# Patient Record
Sex: MALE | Race: BLACK OR AFRICAN AMERICAN | NOT HISPANIC OR LATINO | Employment: UNEMPLOYED | URBAN - METROPOLITAN AREA
[De-identification: names, ages, dates, MRNs, and addresses within clinical notes are randomized per-mention and may not be internally consistent; named-entity substitution may affect disease eponyms.]

---

## 2017-09-27 ENCOUNTER — OFFICE VISIT - HEALTHEAST (OUTPATIENT)
Dept: FAMILY MEDICINE | Facility: CLINIC | Age: 22
End: 2017-09-27

## 2017-09-27 ENCOUNTER — AMBULATORY - HEALTHEAST (OUTPATIENT)
Dept: FAMILY MEDICINE | Facility: CLINIC | Age: 22
End: 2017-09-27

## 2017-09-27 DIAGNOSIS — Z00.00 ROUTINE GENERAL MEDICAL EXAMINATION AT A HEALTH CARE FACILITY: ICD-10-CM

## 2017-09-27 DIAGNOSIS — D64.9 ANEMIA: ICD-10-CM

## 2017-09-27 LAB
CHOLEST SERPL-MCNC: 149 MG/DL
FASTING STATUS PATIENT QL REPORTED: YES
HDLC SERPL-MCNC: 39 MG/DL
LDLC SERPL CALC-MCNC: 87 MG/DL
TRIGL SERPL-MCNC: 113 MG/DL

## 2017-09-27 ASSESSMENT — MIFFLIN-ST. JEOR: SCORE: 1727.78

## 2017-12-05 ENCOUNTER — OFFICE VISIT - HEALTHEAST (OUTPATIENT)
Dept: FAMILY MEDICINE | Facility: CLINIC | Age: 22
End: 2017-12-05

## 2017-12-05 DIAGNOSIS — Z71.85 IMMUNIZATION COUNSELING: ICD-10-CM

## 2017-12-05 DIAGNOSIS — F32.A DEPRESSION: ICD-10-CM

## 2017-12-05 ASSESSMENT — MIFFLIN-ST. JEOR: SCORE: 1732.31

## 2017-12-19 ENCOUNTER — OFFICE VISIT - HEALTHEAST (OUTPATIENT)
Dept: BEHAVIORAL HEALTH | Facility: CLINIC | Age: 22
End: 2017-12-19

## 2017-12-19 DIAGNOSIS — F33.1 MODERATE EPISODE OF RECURRENT MAJOR DEPRESSIVE DISORDER (H): ICD-10-CM

## 2017-12-29 ENCOUNTER — COMMUNICATION - HEALTHEAST (OUTPATIENT)
Dept: FAMILY MEDICINE | Facility: CLINIC | Age: 22
End: 2017-12-29

## 2018-01-04 ENCOUNTER — OFFICE VISIT - HEALTHEAST (OUTPATIENT)
Dept: BEHAVIORAL HEALTH | Facility: CLINIC | Age: 23
End: 2018-01-04

## 2018-01-04 DIAGNOSIS — F33.1 MODERATE EPISODE OF RECURRENT MAJOR DEPRESSIVE DISORDER (H): ICD-10-CM

## 2018-01-18 ENCOUNTER — OFFICE VISIT - HEALTHEAST (OUTPATIENT)
Dept: BEHAVIORAL HEALTH | Facility: CLINIC | Age: 23
End: 2018-01-18

## 2018-01-18 DIAGNOSIS — F33.1 MODERATE EPISODE OF RECURRENT MAJOR DEPRESSIVE DISORDER (H): ICD-10-CM

## 2018-02-20 ENCOUNTER — OFFICE VISIT - HEALTHEAST (OUTPATIENT)
Dept: BEHAVIORAL HEALTH | Facility: CLINIC | Age: 23
End: 2018-02-20

## 2018-02-20 ENCOUNTER — AMBULATORY - HEALTHEAST (OUTPATIENT)
Dept: BEHAVIORAL HEALTH | Facility: CLINIC | Age: 23
End: 2018-02-20

## 2018-02-20 DIAGNOSIS — F33.1 MODERATE EPISODE OF RECURRENT MAJOR DEPRESSIVE DISORDER (H): ICD-10-CM

## 2018-03-01 ENCOUNTER — OFFICE VISIT - HEALTHEAST (OUTPATIENT)
Dept: BEHAVIORAL HEALTH | Facility: CLINIC | Age: 23
End: 2018-03-01

## 2018-03-01 DIAGNOSIS — F32.9 MDD (MAJOR DEPRESSIVE DISORDER): ICD-10-CM

## 2018-03-01 ASSESSMENT — MIFFLIN-ST. JEOR: SCORE: 1760.1

## 2018-03-14 ENCOUNTER — OFFICE VISIT - HEALTHEAST (OUTPATIENT)
Dept: BEHAVIORAL HEALTH | Facility: CLINIC | Age: 23
End: 2018-03-14

## 2018-03-14 DIAGNOSIS — F33.1 MODERATE EPISODE OF RECURRENT MAJOR DEPRESSIVE DISORDER (H): ICD-10-CM

## 2018-04-04 ENCOUNTER — OFFICE VISIT - HEALTHEAST (OUTPATIENT)
Dept: BEHAVIORAL HEALTH | Facility: CLINIC | Age: 23
End: 2018-04-04

## 2018-04-04 DIAGNOSIS — F33.1 MODERATE EPISODE OF RECURRENT MAJOR DEPRESSIVE DISORDER (H): ICD-10-CM

## 2018-04-18 ENCOUNTER — OFFICE VISIT - HEALTHEAST (OUTPATIENT)
Dept: BEHAVIORAL HEALTH | Facility: CLINIC | Age: 23
End: 2018-04-18

## 2018-04-18 DIAGNOSIS — F33.1 MODERATE EPISODE OF RECURRENT MAJOR DEPRESSIVE DISORDER (H): ICD-10-CM

## 2018-05-01 ENCOUNTER — COMMUNICATION - HEALTHEAST (OUTPATIENT)
Dept: BEHAVIORAL HEALTH | Facility: CLINIC | Age: 23
End: 2018-05-01

## 2018-05-01 DIAGNOSIS — F32.9 MDD (MAJOR DEPRESSIVE DISORDER): ICD-10-CM

## 2018-05-02 ENCOUNTER — OFFICE VISIT - HEALTHEAST (OUTPATIENT)
Dept: BEHAVIORAL HEALTH | Facility: CLINIC | Age: 23
End: 2018-05-02

## 2018-05-02 DIAGNOSIS — F33.1 MODERATE EPISODE OF RECURRENT MAJOR DEPRESSIVE DISORDER (H): ICD-10-CM

## 2018-05-23 ENCOUNTER — OFFICE VISIT - HEALTHEAST (OUTPATIENT)
Dept: BEHAVIORAL HEALTH | Facility: CLINIC | Age: 23
End: 2018-05-23

## 2018-05-23 DIAGNOSIS — F32.9 MDD (MAJOR DEPRESSIVE DISORDER): ICD-10-CM

## 2018-05-23 ASSESSMENT — MIFFLIN-ST. JEOR: SCORE: 1751.03

## 2018-06-13 ENCOUNTER — OFFICE VISIT - HEALTHEAST (OUTPATIENT)
Dept: BEHAVIORAL HEALTH | Facility: CLINIC | Age: 23
End: 2018-06-13

## 2018-06-13 ENCOUNTER — COMMUNICATION - HEALTHEAST (OUTPATIENT)
Dept: TELEHEALTH | Facility: CLINIC | Age: 23
End: 2018-06-13

## 2018-06-13 DIAGNOSIS — F33.1 MODERATE EPISODE OF RECURRENT MAJOR DEPRESSIVE DISORDER (H): ICD-10-CM

## 2018-06-16 ENCOUNTER — OFFICE VISIT (OUTPATIENT)
Dept: URGENT CARE | Facility: URGENT CARE | Age: 23
End: 2018-06-16
Payer: COMMERCIAL

## 2018-06-16 VITALS
BODY MASS INDEX: 25.91 KG/M2 | SYSTOLIC BLOOD PRESSURE: 133 MMHG | HEART RATE: 79 BPM | TEMPERATURE: 98.1 F | OXYGEN SATURATION: 99 % | HEIGHT: 68 IN | WEIGHT: 171 LBS | DIASTOLIC BLOOD PRESSURE: 81 MMHG

## 2018-06-16 DIAGNOSIS — G43.009 NONINTRACTABLE MIGRAINE, UNSPECIFIED MIGRAINE TYPE: Primary | ICD-10-CM

## 2018-06-16 PROCEDURE — 96372 THER/PROPH/DIAG INJ SC/IM: CPT | Performed by: FAMILY MEDICINE

## 2018-06-16 PROCEDURE — 99203 OFFICE O/P NEW LOW 30 MIN: CPT | Mod: 25 | Performed by: FAMILY MEDICINE

## 2018-06-16 RX ORDER — KETOROLAC TROMETHAMINE 30 MG/ML
60 INJECTION, SOLUTION INTRAMUSCULAR; INTRAVENOUS ONCE
Qty: 2 ML | Refills: 0 | OUTPATIENT
Start: 2018-06-16 | End: 2018-06-16

## 2018-06-16 RX ORDER — SERTRALINE HYDROCHLORIDE 25 MG/1
25 TABLET, FILM COATED ORAL
COMMUNITY
Start: 2018-05-23 | End: 2021-07-26

## 2018-06-16 NOTE — PROGRESS NOTES
"SUBJECTIVE:  Roland Sandoval is a 23 year old male who comes in for evaluation of headache.  Headache began this morning (over 10 hours ago) and is sudden onset, still present and worsening  DESCRIPTION OF HEADACHE:   Location of pain: frontal and retro-orbital   Radiation of pain?: NO   Character of pain:throbbing   Severity of pain: moderate   Accompanying symptoms: photophobia   Prodromal sx?: none   Rapidity of onset: abrupt     History of Migranes: Yes   Are most headaches similar in presentation? YES    TYPICAL PRECIPITANTS OF HEADACHE: stress, change in sleep pattern and change in eating pattern    CURRENT USE OF MEDS TO TREAT HA:   Abortive meds? none    Daily use? NO   Prophylactic meds? none    Patient has had migraine headaches since he was a teenager.  Migraine headaches are infrequent, only had 5 bad migraines since it started.  Usually resolves with rest and sleep.  This one has persisted, states that has not been taking care of self over the past couple of days, was out with friends and drinking.  Endorsed photophobia and phonophobia, denies any nausea.  Denies any recent URI symptoms, no fever.    Family history - HTN, Anxiety    PCP - [facility]    No past medical history on file.  Current Outpatient Prescriptions   Medication Sig Dispense Refill     sertraline (ZOLOFT) 25 MG tablet Take 25 mg by mouth       Social History   Substance Use Topics     Smoking status: Never Smoker     Smokeless tobacco: Never Used     Alcohol use Not on file       ROS:   Review of systems negative except as stated above.    OBJECTIVE:  /81  Pulse 79  Temp 98.1  F (36.7  C) (Tympanic)  Ht 5' 8\" (1.727 m)  Wt 171 lb (77.6 kg)  SpO2 99%  BMI 26 kg/m2  GENERAL APPEARANCE: healthy, alert and mild distress  PSYCH: mentation appears normal and affect normal/bright    ASSESSMENT/PLAN:  (G43.009) Nonintractable migraine, unspecified migraine type  (primary encounter diagnosis)  Plan: ketorolac (TORADOL) 60 MG/2ML " SOLN injection              Reassurance given, reviewed treatment for migraine headaches and encourage to avoid triggers.  Bad migraines are infrequent, recommend to keep track of triggers and follow up with primary to review if needs any abortive medication.  Due to migraine headache has been present since morning, will given Toradol 60 mg IM X1 now.    Work excuse note given to be off work today  Follow up with primary to review headache and if needs further evaluation.      Foreign Boateng MD  June 16, 2018 7:22 PM

## 2018-06-16 NOTE — PATIENT INSTRUCTIONS
Preventing Migraine Headaches: Triggers  The first step in preventing migraines is to learn what triggers them. You may then be able to control your triggers to avoid or reduce the severity of your migraines.  Know your triggers  Be aware that you may have more than one trigger, and that some triggers may work together. Common migraine triggers include:    Food and nutrition. Skipping meals or not drinking enough water can trigger headaches. So can certain foods, such as caffeine, monosodium glutamate (MSG), aged cheese, or sausage.    Alcohol. Red wine and other alcoholic beverages are common migraine triggers.    Chemicals. Scents, cleaning products, gasoline, glue, perfume, and paint can be triggers. So can tobacco smoke, including secondhand smoke.    Emotions. Stress can trigger headaches or make them worse once they begin.    Sleep disruption. Staying up late, sleeping late, and traveling across time zones can disrupt your sleep cycle, triggering headaches.    Hormones. Many women notice that migraines tend to happen at a certain point in their menstrual cycle. Birth control pills or hormone replacement therapy may also trigger migraines.    Environment and weather. Air travel, changes in altitude, air pressure changes, hot sun, or bright or flashing lights can be triggers.  Control your triggers  These are some of the things you can do to try to control triggers:    Avoid triggers if you can. For example, stay clear of alcohol and foods that trigger your headaches. Use unscented household products. Keep regular sleep habits. Manage stress to help control emotional triggers.    Change your behavior at times when triggers can't be avoided. For example, make sure to get enough rest and drink plenty of water while you're traveling. Make sure to carry a hat, sunglasses, and your medicines. Be alert for migraine symptoms, so you can treat a migraine early if it happens.  Date Last Reviewed: 10/9/2015    2117-6186  [employer]. [address] [place] [address], [place] [address]. All rights reserved. This information is not intended as a substitute for professional medical care. Always follow your healthcare professional's instructions.

## 2018-06-16 NOTE — LETTER
June 16, 2018      Roland Sandoval  [address] [place] [address]        To Whom It May Concern:    Roland Sandoval  was seen on 6/16.  Please excuse him from work 6/16 due to illness.        Sincerely,        Foreign Boateng MD     Admission

## 2018-06-17 NOTE — NURSING NOTE
Prior to injection verified patient identity using patient's name and date of birth.  Due to injection administration, patient instructed to remain in clinic for 15 minutes  afterwards, and to report any adverse reaction to me immediately.  Africa Pardo MA

## 2018-07-03 ENCOUNTER — OFFICE VISIT - HEALTHEAST (OUTPATIENT)
Dept: FAMILY MEDICINE | Facility: CLINIC | Age: 23
End: 2018-07-03

## 2018-07-03 DIAGNOSIS — Z11.3 SCREEN FOR STD (SEXUALLY TRANSMITTED DISEASE): ICD-10-CM

## 2018-07-03 DIAGNOSIS — F32.A DEPRESSION: ICD-10-CM

## 2018-07-03 LAB — HIV 1+2 AB+HIV1 P24 AG SERPL QL IA: NEGATIVE

## 2018-07-04 LAB
HBV SURFACE AG SERPL QL IA: NEGATIVE
HCV AB SERPL QL IA: NEGATIVE
T PALLIDUM AB SER QL: NEGATIVE

## 2018-07-05 LAB
C TRACH DNA SPEC QL PROBE+SIG AMP: NEGATIVE
N GONORRHOEA DNA SPEC QL NAA+PROBE: NEGATIVE

## 2018-07-09 ENCOUNTER — COMMUNICATION - HEALTHEAST (OUTPATIENT)
Dept: FAMILY MEDICINE | Facility: CLINIC | Age: 23
End: 2018-07-09

## 2018-07-09 ENCOUNTER — AMBULATORY - HEALTHEAST (OUTPATIENT)
Dept: FAMILY MEDICINE | Facility: CLINIC | Age: 23
End: 2018-07-09

## 2018-07-11 ENCOUNTER — OFFICE VISIT - HEALTHEAST (OUTPATIENT)
Dept: BEHAVIORAL HEALTH | Facility: CLINIC | Age: 23
End: 2018-07-11

## 2018-07-11 ENCOUNTER — AMBULATORY - HEALTHEAST (OUTPATIENT)
Dept: BEHAVIORAL HEALTH | Facility: CLINIC | Age: 23
End: 2018-07-11

## 2018-07-11 DIAGNOSIS — F33.1 MODERATE EPISODE OF RECURRENT MAJOR DEPRESSIVE DISORDER (H): ICD-10-CM

## 2018-08-08 ENCOUNTER — OFFICE VISIT - HEALTHEAST (OUTPATIENT)
Dept: BEHAVIORAL HEALTH | Facility: CLINIC | Age: 23
End: 2018-08-08

## 2018-08-08 DIAGNOSIS — F33.1 MODERATE EPISODE OF RECURRENT MAJOR DEPRESSIVE DISORDER (H): ICD-10-CM

## 2018-08-08 DIAGNOSIS — F33.0 MILD EPISODE OF RECURRENT MAJOR DEPRESSIVE DISORDER (H): ICD-10-CM

## 2018-08-15 ENCOUNTER — OFFICE VISIT - HEALTHEAST (OUTPATIENT)
Dept: BEHAVIORAL HEALTH | Facility: CLINIC | Age: 23
End: 2018-08-15

## 2018-08-15 DIAGNOSIS — F32.9 MDD (MAJOR DEPRESSIVE DISORDER): ICD-10-CM

## 2018-08-15 ASSESSMENT — MIFFLIN-ST. JEOR: SCORE: 1741.96

## 2018-10-09 ENCOUNTER — COMMUNICATION - HEALTHEAST (OUTPATIENT)
Dept: BEHAVIORAL HEALTH | Facility: CLINIC | Age: 23
End: 2018-10-09

## 2018-10-24 ENCOUNTER — OFFICE VISIT - HEALTHEAST (OUTPATIENT)
Dept: BEHAVIORAL HEALTH | Facility: CLINIC | Age: 23
End: 2018-10-24

## 2018-10-24 DIAGNOSIS — F32.9 MDD (MAJOR DEPRESSIVE DISORDER): ICD-10-CM

## 2018-10-24 ASSESSMENT — MIFFLIN-ST. JEOR: SCORE: 1746.49

## 2019-01-02 ENCOUNTER — COMMUNICATION - HEALTHEAST (OUTPATIENT)
Dept: BEHAVIORAL HEALTH | Facility: CLINIC | Age: 24
End: 2019-01-02

## 2019-01-16 ENCOUNTER — OFFICE VISIT - HEALTHEAST (OUTPATIENT)
Dept: BEHAVIORAL HEALTH | Facility: CLINIC | Age: 24
End: 2019-01-16

## 2019-01-16 DIAGNOSIS — F32.9 MDD (MAJOR DEPRESSIVE DISORDER): ICD-10-CM

## 2019-01-16 ASSESSMENT — MIFFLIN-ST. JEOR: SCORE: 1791.85

## 2019-01-18 ENCOUNTER — AMBULATORY - HEALTHEAST (OUTPATIENT)
Dept: BEHAVIORAL HEALTH | Facility: CLINIC | Age: 24
End: 2019-01-18

## 2019-02-14 ENCOUNTER — OFFICE VISIT - HEALTHEAST (OUTPATIENT)
Dept: BEHAVIORAL HEALTH | Facility: CLINIC | Age: 24
End: 2019-02-14

## 2019-02-14 DIAGNOSIS — F33.1 MODERATE EPISODE OF RECURRENT MAJOR DEPRESSIVE DISORDER (H): ICD-10-CM

## 2019-02-19 ENCOUNTER — COMMUNICATION - HEALTHEAST (OUTPATIENT)
Dept: BEHAVIORAL HEALTH | Facility: CLINIC | Age: 24
End: 2019-02-19

## 2019-02-19 DIAGNOSIS — F32.9 MDD (MAJOR DEPRESSIVE DISORDER): ICD-10-CM

## 2019-03-22 ENCOUNTER — COMMUNICATION - HEALTHEAST (OUTPATIENT)
Dept: BEHAVIORAL HEALTH | Facility: CLINIC | Age: 24
End: 2019-03-22

## 2019-03-22 DIAGNOSIS — F32.9 MDD (MAJOR DEPRESSIVE DISORDER): ICD-10-CM

## 2019-04-16 ENCOUNTER — COMMUNICATION - HEALTHEAST (OUTPATIENT)
Dept: BEHAVIORAL HEALTH | Facility: CLINIC | Age: 24
End: 2019-04-16

## 2019-04-17 ENCOUNTER — OFFICE VISIT - HEALTHEAST (OUTPATIENT)
Dept: BEHAVIORAL HEALTH | Facility: CLINIC | Age: 24
End: 2019-04-17

## 2019-04-17 DIAGNOSIS — F32.1 CURRENT MODERATE EPISODE OF MAJOR DEPRESSIVE DISORDER, UNSPECIFIED WHETHER RECURRENT (H): ICD-10-CM

## 2019-04-17 ASSESSMENT — MIFFLIN-ST. JEOR: SCORE: 1782.78

## 2019-04-19 ENCOUNTER — AMBULATORY - HEALTHEAST (OUTPATIENT)
Dept: BEHAVIORAL HEALTH | Facility: CLINIC | Age: 24
End: 2019-04-19

## 2019-04-19 ENCOUNTER — OFFICE VISIT - HEALTHEAST (OUTPATIENT)
Dept: BEHAVIORAL HEALTH | Facility: CLINIC | Age: 24
End: 2019-04-19

## 2019-04-19 DIAGNOSIS — F33.42 RECURRENT MAJOR DEPRESSIVE DISORDER, IN FULL REMISSION (H): ICD-10-CM

## 2019-05-03 ENCOUNTER — OFFICE VISIT - HEALTHEAST (OUTPATIENT)
Dept: BEHAVIORAL HEALTH | Facility: CLINIC | Age: 24
End: 2019-05-03

## 2019-05-03 DIAGNOSIS — F33.42 RECURRENT MAJOR DEPRESSIVE DISORDER, IN FULL REMISSION (H): ICD-10-CM

## 2019-06-21 ENCOUNTER — COMMUNICATION - HEALTHEAST (OUTPATIENT)
Dept: BEHAVIORAL HEALTH | Facility: CLINIC | Age: 24
End: 2019-06-21

## 2019-06-21 DIAGNOSIS — F32.9 MDD (MAJOR DEPRESSIVE DISORDER): ICD-10-CM

## 2019-06-29 ENCOUNTER — COMMUNICATION - HEALTHEAST (OUTPATIENT)
Dept: BEHAVIORAL HEALTH | Facility: CLINIC | Age: 24
End: 2019-06-29

## 2019-06-29 DIAGNOSIS — F32.9 MDD (MAJOR DEPRESSIVE DISORDER): ICD-10-CM

## 2019-07-10 ENCOUNTER — OFFICE VISIT - HEALTHEAST (OUTPATIENT)
Dept: BEHAVIORAL HEALTH | Facility: CLINIC | Age: 24
End: 2019-07-10

## 2019-07-10 DIAGNOSIS — F32.9 MDD (MAJOR DEPRESSIVE DISORDER): ICD-10-CM

## 2019-07-10 ASSESSMENT — MIFFLIN-ST. JEOR: SCORE: 1778.25

## 2019-09-16 ENCOUNTER — OFFICE VISIT - HEALTHEAST (OUTPATIENT)
Dept: FAMILY MEDICINE | Facility: CLINIC | Age: 24
End: 2019-09-16

## 2019-09-16 DIAGNOSIS — Z71.85 IMMUNIZATION COUNSELING: ICD-10-CM

## 2019-09-16 DIAGNOSIS — F32.9 MAJOR DEPRESSIVE DISORDER, REMISSION STATUS UNSPECIFIED, UNSPECIFIED WHETHER RECURRENT: ICD-10-CM

## 2019-09-16 DIAGNOSIS — Z00.00 ROUTINE GENERAL MEDICAL EXAMINATION AT A HEALTH CARE FACILITY: ICD-10-CM

## 2019-09-16 DIAGNOSIS — Z11.3 SCREEN FOR STD (SEXUALLY TRANSMITTED DISEASE): ICD-10-CM

## 2019-09-16 LAB
ALBUMIN SERPL-MCNC: 3.1 G/DL (ref 3.5–5)
ALP SERPL-CCNC: 76 U/L (ref 45–120)
ALT SERPL W P-5'-P-CCNC: 15 U/L (ref 0–45)
ANION GAP SERPL CALCULATED.3IONS-SCNC: 13 MMOL/L (ref 5–18)
AST SERPL W P-5'-P-CCNC: 14 U/L (ref 0–40)
BILIRUB SERPL-MCNC: 0.4 MG/DL (ref 0–1)
BUN SERPL-MCNC: 15 MG/DL (ref 8–22)
CALCIUM SERPL-MCNC: 9.2 MG/DL (ref 8.5–10.5)
CHLORIDE BLD-SCNC: 108 MMOL/L (ref 98–107)
CHOLEST SERPL-MCNC: 152 MG/DL
CO2 SERPL-SCNC: 23 MMOL/L (ref 22–31)
CREAT SERPL-MCNC: 0.93 MG/DL (ref 0.7–1.3)
ERYTHROCYTE [DISTWIDTH] IN BLOOD BY AUTOMATED COUNT: 12.2 % (ref 11–14.5)
FASTING STATUS PATIENT QL REPORTED: NO
GFR SERPL CREATININE-BSD FRML MDRD: >60 ML/MIN/1.73M2
GLUCOSE BLD-MCNC: 78 MG/DL (ref 70–125)
HCT VFR BLD AUTO: 43.6 % (ref 40–54)
HDLC SERPL-MCNC: 34 MG/DL
HGB BLD-MCNC: 14.3 G/DL (ref 14–18)
HIV 1+2 AB+HIV1 P24 AG SERPL QL IA: NEGATIVE
LDLC SERPL CALC-MCNC: 98 MG/DL
MCH RBC QN AUTO: 28.2 PG (ref 27–34)
MCHC RBC AUTO-ENTMCNC: 32.8 G/DL (ref 32–36)
MCV RBC AUTO: 86 FL (ref 80–100)
PLATELET # BLD AUTO: 234 THOU/UL (ref 140–440)
PMV BLD AUTO: 8.2 FL (ref 7–10)
POTASSIUM BLD-SCNC: 3.9 MMOL/L (ref 3.5–5)
PROT SERPL-MCNC: 5.4 G/DL (ref 6–8)
RBC # BLD AUTO: 5.07 MILL/UL (ref 4.4–6.2)
SODIUM SERPL-SCNC: 144 MMOL/L (ref 136–145)
TRIGL SERPL-MCNC: 98 MG/DL
WBC: 8.4 THOU/UL (ref 4–11)

## 2019-09-16 ASSESSMENT — MIFFLIN-ST. JEOR: SCORE: 1790.71

## 2019-09-17 ENCOUNTER — COMMUNICATION - HEALTHEAST (OUTPATIENT)
Dept: FAMILY MEDICINE | Facility: CLINIC | Age: 24
End: 2019-09-17

## 2019-09-17 DIAGNOSIS — Z71.85 IMMUNIZATION COUNSELING: ICD-10-CM

## 2019-09-17 DIAGNOSIS — Z71.6 ENCOUNTER FOR SMOKING CESSATION COUNSELING: ICD-10-CM

## 2019-09-17 LAB
C TRACH DNA SPEC QL PROBE+SIG AMP: NEGATIVE
HBV SURFACE AG SERPL QL IA: NEGATIVE
HCV AB SERPL QL IA: NEGATIVE
HEPATITIS B SURFACE ANTIBODY LHE- HISTORICAL: NEGATIVE
N GONORRHOEA DNA SPEC QL NAA+PROBE: NEGATIVE
T PALLIDUM AB SER QL: NEGATIVE

## 2019-09-17 ASSESSMENT — PATIENT HEALTH QUESTIONNAIRE - PHQ9: SUM OF ALL RESPONSES TO PHQ QUESTIONS 1-9: 0

## 2019-10-01 ENCOUNTER — COMMUNICATION - HEALTHEAST (OUTPATIENT)
Dept: BEHAVIORAL HEALTH | Facility: CLINIC | Age: 24
End: 2019-10-01

## 2019-10-02 ENCOUNTER — OFFICE VISIT - HEALTHEAST (OUTPATIENT)
Dept: BEHAVIORAL HEALTH | Facility: CLINIC | Age: 24
End: 2019-10-02

## 2019-10-02 DIAGNOSIS — F33.0 MILD EPISODE OF RECURRENT MAJOR DEPRESSIVE DISORDER (H): ICD-10-CM

## 2019-10-09 ENCOUNTER — OFFICE VISIT - HEALTHEAST (OUTPATIENT)
Dept: BEHAVIORAL HEALTH | Facility: CLINIC | Age: 24
End: 2019-10-09

## 2019-10-09 DIAGNOSIS — F32.9 MDD (MAJOR DEPRESSIVE DISORDER): ICD-10-CM

## 2019-10-09 ASSESSMENT — MIFFLIN-ST. JEOR: SCORE: 1817.93

## 2019-10-26 ENCOUNTER — COMMUNICATION - HEALTHEAST (OUTPATIENT)
Dept: BEHAVIORAL HEALTH | Facility: CLINIC | Age: 24
End: 2019-10-26

## 2019-10-26 DIAGNOSIS — F32.9 MDD (MAJOR DEPRESSIVE DISORDER): ICD-10-CM

## 2020-01-03 ENCOUNTER — OFFICE VISIT - HEALTHEAST (OUTPATIENT)
Dept: FAMILY MEDICINE | Facility: CLINIC | Age: 25
End: 2020-01-03

## 2020-01-03 DIAGNOSIS — F32.9 MDD (MAJOR DEPRESSIVE DISORDER): ICD-10-CM

## 2020-01-03 DIAGNOSIS — J06.9 VIRAL URI: ICD-10-CM

## 2020-01-03 ASSESSMENT — MIFFLIN-ST. JEOR: SCORE: 1754.42

## 2020-01-08 ENCOUNTER — OFFICE VISIT - HEALTHEAST (OUTPATIENT)
Dept: BEHAVIORAL HEALTH | Facility: CLINIC | Age: 25
End: 2020-01-08

## 2020-01-08 DIAGNOSIS — F32.9 MDD (MAJOR DEPRESSIVE DISORDER): ICD-10-CM

## 2020-01-08 ASSESSMENT — MIFFLIN-ST. JEOR: SCORE: 1768.03

## 2020-04-08 ENCOUNTER — COMMUNICATION - HEALTHEAST (OUTPATIENT)
Dept: BEHAVIORAL HEALTH | Facility: CLINIC | Age: 25
End: 2020-04-08

## 2020-04-08 ENCOUNTER — OFFICE VISIT - HEALTHEAST (OUTPATIENT)
Dept: BEHAVIORAL HEALTH | Facility: CLINIC | Age: 25
End: 2020-04-08

## 2020-04-08 DIAGNOSIS — F32.9 MDD (MAJOR DEPRESSIVE DISORDER): ICD-10-CM

## 2020-05-19 NOTE — MR AVS SNAPSHOT
After Visit Summary   6/16/2018    Roland Sandoval    MRN: [id number]           Patient Information     Date Of Birth          1995        Visit Information        Provider Department      6/16/2018 6:10 PM Foreign Boateng MD [facility]        Today's Diagnoses     Nonintractable migraine, unspecified migraine type    -  1      Care Instructions      Preventing Migraine Headaches: Triggers  The first step in preventing migraines is to learn what triggers them. You may then be able to control your triggers to avoid or reduce the severity of your migraines.  Know your triggers  Be aware that you may have more than one trigger, and that some triggers may work together. Common migraine triggers include:    Food and nutrition. Skipping meals or not drinking enough water can trigger headaches. So can certain foods, such as caffeine, monosodium glutamate (MSG), aged cheese, or sausage.    Alcohol. Red wine and other alcoholic beverages are common migraine triggers.    Chemicals. Scents, cleaning products, gasoline, glue, perfume, and paint can be triggers. So can tobacco smoke, including secondhand smoke.    Emotions. Stress can trigger headaches or make them worse once they begin.    Sleep disruption. Staying up late, sleeping late, and traveling across time zones can disrupt your sleep cycle, triggering headaches.    Hormones. Many women notice that migraines tend to happen at a certain point in their menstrual cycle. Birth control pills or hormone replacement therapy may also trigger migraines.    Environment and weather. Air travel, changes in altitude, air pressure changes, hot sun, or bright or flashing lights can be triggers.  Control your triggers  These are some of the things you can do to try to control triggers:    Avoid triggers if you can. For example, stay clear of alcohol and foods that trigger your headaches. Use unscented household products. Keep regular sleep  "habits. Manage stress to help control emotional triggers.    Change your behavior at times when triggers can't be avoided. For example, make sure to get enough rest and drink plenty of water while you're traveling. Make sure to carry a hat, sunglasses, and your medicines. Be alert for migraine symptoms, so you can treat a migraine early if it happens.  Date Last Reviewed: 10/9/2015    6959-4918 [employer]. [address] [place] [address], [place] [address]. All rights reserved. This information is not intended as a substitute for professional medical care. Always follow your healthcare professional's instructions.                Follow-ups after your visit        Who to contact     If you have questions or need follow up information about today's clinic visit or your schedule please contact [facility] directly at [phone].  Normal or non-critical lab and imaging results will be communicated to you by MyChart, letter or phone within 4 business days after the clinic has received the results. If you do not hear from us within 7 days, please contact the clinic through [employer] or phone. If you have a critical or abnormal lab result, we will notify you by phone as soon as possible.  Submit refill requests through [employer] or call your pharmacy and they will forward the refill request to us. Please allow 3 business days for your refill to be completed.          Additional Information About Your Visit        MyChart Information     [employer] lets you send messages to your doctor, view your test results, renew your prescriptions, schedule appointments and more. To sign up, go to [email or web address] . Click on \"Log in\" on the left side of the screen, which will take you to the Welcome page. Then click on \"Sign up Now\" on the right side of the page.     You will be asked to enter the access code listed below, as well as some personal information. Please follow the directions to create your " "username and password.     Your access code is: [email or web address]  Expires: 2018  6:41 PM     Your access code will  in 90 days. If you need help or a new code, please call your [facility] clinic or [phone].        Care EveryWhere ID     This is your Care EveryWhere ID. This could be used by other organizations to access your [facility] medical records  [id number]        Your Vitals Were     Pulse Temperature Height Pulse Oximetry BMI (Body Mass Index)       79 98.1  F (36.7  C) (Tympanic) 5' 8\" (1.727 m) 99% 26 kg/m2        Blood Pressure from Last 3 Encounters:   18 133/81    Weight from Last 3 Encounters:   18 171 lb (77.6 kg)              Today, you had the following     No orders found for display         Today's Medication Changes          These changes are accurate as of 18  6:41 PM.  If you have any questions, ask your nurse or doctor.               Start taking these medicines.        Dose/Directions    ketorolac 60 MG/2ML Soln injection   Commonly known as:  TORADOL   Used for:  Nonintractable migraine, unspecified migraine type   Started by:  Foreign Boateng MD        Dose:  60 mg   Inject 2 mLs (60 mg) into the muscle once for 1 dose   Quantity:  2 mL   Refills:  0            Where to get your medicines      Some of these will need a paper prescription and others can be bought over the counter.  Ask your nurse if you have questions.     You don't need a prescription for these medications     ketorolac 60 MG/2ML Soln injection                Primary Care Provider Fax #    Physician No Ref-Primary [phone]       No address on file        Equal Access to Services     [facility] : Montrell Thakkar, waaxda luqadaha, qaybta kaalmada sánchez, rosalind crocker. So [facility] [phone].    ATENCIÓN: Si habla español, tiene a dinero disposición servicios gratuitos de asistencia lingüística. Llame al [phone].    We comply with applicable federal " civil rights laws and [place] laws. We do not discriminate on the basis of race, color, national origin, age, disability, sex, sexual orientation, or gender identity.            Thank you!     Thank you for choosing [facility]  for your care. Our goal is always to provide you with excellent care. Hearing back from our patients is one way we can continue to improve our services. Please take a few minutes to complete the written survey that you may receive in the mail after your visit with us. Thank you!             Your Updated Medication List - Protect others around you: Learn how to safely use, store and throw away your medicines at www.disposemymeds.org.          This list is accurate as of 6/16/18  6:41 PM.  Always use your most recent med list.                   Brand Name Dispense Instructions for use Diagnosis    ketorolac 60 MG/2ML Soln injection    TORADOL    2 mL    Inject 2 mLs (60 mg) into the muscle once for 1 dose    Nonintractable migraine, unspecified migraine type       sertraline 25 MG tablet    ZOLOFT     Take 25 mg by mouth           Nsaids Pregnancy And Lactation Text: These medications are considered safe up to 30 weeks gestation. It is excreted in breast milk.

## 2020-11-05 ENCOUNTER — COMMUNICATION - HEALTHEAST (OUTPATIENT)
Dept: BEHAVIORAL HEALTH | Facility: CLINIC | Age: 25
End: 2020-11-05

## 2020-11-05 DIAGNOSIS — F32.9 MDD (MAJOR DEPRESSIVE DISORDER): ICD-10-CM

## 2020-12-02 ENCOUNTER — COMMUNICATION - HEALTHEAST (OUTPATIENT)
Dept: BEHAVIORAL HEALTH | Facility: CLINIC | Age: 25
End: 2020-12-02

## 2020-12-02 DIAGNOSIS — F32.9 MDD (MAJOR DEPRESSIVE DISORDER): ICD-10-CM

## 2021-01-11 ENCOUNTER — OFFICE VISIT - HEALTHEAST (OUTPATIENT)
Dept: BEHAVIORAL HEALTH | Facility: CLINIC | Age: 26
End: 2021-01-11

## 2021-01-11 DIAGNOSIS — F32.9 MDD (MAJOR DEPRESSIVE DISORDER): ICD-10-CM

## 2021-02-10 ENCOUNTER — OFFICE VISIT - HEALTHEAST (OUTPATIENT)
Dept: BEHAVIORAL HEALTH | Facility: CLINIC | Age: 26
End: 2021-02-10

## 2021-02-10 DIAGNOSIS — F32.9 MDD (MAJOR DEPRESSIVE DISORDER): ICD-10-CM

## 2021-04-07 ENCOUNTER — OFFICE VISIT - HEALTHEAST (OUTPATIENT)
Dept: BEHAVIORAL HEALTH | Facility: CLINIC | Age: 26
End: 2021-04-07

## 2021-04-07 DIAGNOSIS — F32.9 MDD (MAJOR DEPRESSIVE DISORDER): ICD-10-CM

## 2021-05-26 ASSESSMENT — PATIENT HEALTH QUESTIONNAIRE - PHQ9: SUM OF ALL RESPONSES TO PHQ QUESTIONS 1-9: 0

## 2021-05-26 NOTE — TELEPHONE ENCOUNTER
Date of Last Office Visit: 1/16/19  Date of Next Office Visit: 4/10/19  No shows since last visit: none  Cancellations since last visit: none  ED visits since last visit: none    Medication Zoloft 25 mg date last ordered: 1/16/2019 Qty: 90  Refills: 0 - Rx per Darrian    sertraline (ZOLOFT) 25 MG tablet 90 tablet 0 1/16/2019     Sig - Route: Take 1 tablet (25 mg total) by mouth daily. - Oral        Lapse in therapy greater than 7 days: no  Medication refill request verified as identical to current order: yes  Result of Last DAM, VPA, Li+ Level, CBC, or Carbamazepine Level (at or since last visit): N/A        []Eligibility - not seen in last year    []Supervision - no future appointment    []Compliance     []Verification - order discrepancy    []Controlled Medication    [x]90 - day supply request    [x]Other LPN pending medications    Current Medication list:       sertraline (ZOLOFT) 25 MG tablet Take 1 tablet (25 mg total) by mouth daily.       Medication Plan of Care at last office visit with MD/CNP:    PLAN:    1. Continue Zoloft 25 mg daily - MDD  2.Continue with psychotherapy - twice monthly -  3.Recommend abstain from THC- repots he has significantly reduced use and is working on  Total abstinence   4. RTC- 12 weeks, call in between visits with any questions or concerns   [place], [place], and [place] : NA

## 2021-05-27 NOTE — PROGRESS NOTES
"  Psychiatric  Progress Note  Date of visit. 4/17/2019         Discussion of Care and Treatment Recommendations:   This is a 24 y.o. male with presenting to the clinic today for a follow up appointment for medication  management of depression          Last visit  1/16/19.  Recommendation at last visit .  1. Continue Zoloft 25 mg daily - MDD  2.Continue with psychotherapy - twice monthly -  3.Recommend abstain from THC- repots he has significantly reduced use and is working on  Total abstinence   4. RTC- 12 weeks, call in between visits with any questions or concerns   Patient and I reviewed diagnosis and treatment plan and patient agrees with following recommendations:  Ongoing education given regarding diagnostic and treatment options with adequate verbalization of understanding.  Plan   1. Continue Zoloft 25 mg daily - MDD  2.Continue with psychotherapy - twice monthly -  3.Recommend abstain from THC- repots he has significantly reduced use and is working on  Total abstinence   4. RTC- 12 weeks, call in between visits with any questions or concerns          Diagnoses:     Moderate episode of recurrent major depressive disorder           Chief Complaint / Subjective:    Chief complaint: \" I am doing well \"     History of Present Illness:   Per patient's statement : He has been doing well.  He reports compliance with current medications and denies side effects.  He continues to work part-time.  He denies all psychiatric symptoms offers no new concerns.  Is currently on Zoloft 25 mg daily and would like to continue with the same medication and dosage.  I will therefore have him continue the same medications and return to the clinic in 3 months for follow-up appointment encouraged him to call in between visits with any questions or concerns.    Mental Status Examination:   General: Adequate hygiene, cooperative  Speech: Normal in rate and tone  Language: Intact  Thought process: Coherent  Thought content:              " "    Auditory hallucinations-Denies                    Visual Hallucinations - Denies                     Delusions Absent                       Loose Associations:  No                       Suicidal thoughts: Denies                       Homicidal thoughts:Denies                        Affect: Neutral  Mood: Neutral   Intellectual functioning: Within normal limits  Memory: Within normal limits  Fund of knowledge: Average  Attention and concentration: Within normal limits  Gait: Steady  Psychomotor activity: Calm, no agitation  Muscles: No atrophy, no abnormal movements  InSight and judgment: Fair      Drug/treatment history and current pattern of use:   Drug of choice  Age of first use: **  Date of last use: **  Blackouts: **  Seizures/DTs/hallucinations: **  : ; **  Relapse/Triggers : **    Medication changes: See Above   Medication adherence: compliant  Medication side effects: absent  Information about medications: Side effects, benefits and alternative treatments discussed and patient agrees and has capacity to do so.    Psychotherapy: Supportive therapy day-to-day living    Education: Diet, exercise, abstinence from drugs and alcohol, patient will not drive if sedated and medications or  under influence of any substance    Lab Results:   Personally reviewed and discussed with the patient    Lab Results   Component Value Date    WBC 5.6 09/27/2017    HGB 12.9 (L) 09/27/2017    HCT 41.8 09/27/2017     09/27/2017    CHOL 149 09/27/2017    TRIG 113 09/27/2017    HDL 39 (L) 09/27/2017    ALT 17 09/27/2017    AST 17 09/27/2017     09/27/2017    K 3.9 09/27/2017     09/27/2017    CREATININE 0.99 09/27/2017    BUN 18 09/27/2017    CO2 24 09/27/2017       Vital signs:  Vitals:    04/17/19 1207   BP: (!) 140/99   Patient Site: Right Arm   Patient Position: Sitting   Cuff Size: Adult Regular   Pulse: 88   Weight: 178 lb (80.7 kg)   Height: 5' 9\" (1.753 m)     Allergies: Patient has no known " allergies.         Medications:     Current Outpatient Medications on File Prior to Visit   Medication Sig Dispense Refill     sertraline (ZOLOFT) 25 MG tablet TAKE 1 TABLET(25 MG) BY MOUTH DAILY 90 tablet 0     [DISCONTINUED] sertraline (ZOLOFT) 25 MG tablet Take 1 tablet (25 mg total) by mouth daily. 90 tablet 0     No current facility-administered medications on file prior to visit.                   Review of Systems:      ROS:       10 point ROS was negative except for the items listed in HPI.    Review of Systems - General ROS: negative for - chills, fatigue, night sweats, sleep disturbance or weight loss    Respiratory ROS: no cough, shortness of breath, or wheezing  negative for - cough or shortness of breath  Cardiovascular ROS: no chest pain or dyspnea on exertion  Gastrointestinal ROS: no abdominal pain, change in bowel habits, or black or bloody stools  Musculoskeletal ROS: negative  negative for - gait disturbance, joint pain, joint stiffness, muscle pain or muscular weakness  Neurological ROS: negative for - confusion, gait disturbance, headaches or seizures    Coordination of Care:   More than 25 minutes spent on this visit  with more than 50% of time spent on coordination of care including: Educating patient about diagnosis, prognosis, side effects and benefits of medications, diet, exercise.  Time also spent providing supportive therapy regarding above issues.    This note was created using a dictation system. All typing errors or contextual distortion is unintentional and software inherent.

## 2021-05-27 NOTE — TELEPHONE ENCOUNTER
Writer left message with male roommate to have patient call Daniela Colmenares's office when he can.

## 2021-05-27 NOTE — PATIENT INSTRUCTIONS - HE
Continue medications as prescribed  Have your pharmacy contact us for a refill if you are running low on medications (We may ask you to come into clinic to get a refill from the nurse  No Alcohol or drug use  No driving if sedated  Call the clinic with any questions or concerns   Reach out for help if you feel like hurting yourself or others ([facility] [phone]: [address] [facility], [address] or [facility] Suicide Hotline [phone] , call 911 or go to nearest Emergency room    Follow up as directed, for your appointments, per your After Visit Summary Form.

## 2021-05-27 NOTE — PROGRESS NOTES
Correct pharmacy verified with patient and confirmed in snapshot? [x] yes []no    Charge captured ? [x] yes  [] no    Medications Phoned  to Pharmacy [] yes [x]no  Name of Pharmacist:  List Medications, including dose, quantity and instructions      Medication Prescriptions given to patient   [] yes  [x] no   List the name of the drug the prescription was written for.       Medications ordered this visit were e-scribed.  Verified by order class [] yes  [x] no    Medication changes or discontinuations were communicated to patient's pharmacy: [] yes  [x] no    UA collected [] yes  [x] no    [place] Prescription Monitoring Program Reviewed? [x] yes  [] no    Referrals were made to:  None    Future appointment was made: [x] yes  [] no  07/10/2019  Dictation completed at time of chart check: [x] yes  [] no    I have checked the documentation for today s encounters and the above information has been reviewed and completed.

## 2021-05-29 NOTE — TELEPHONE ENCOUNTER
Date of Last Office Visit: 4/17/19  Date of Next Office Visit: 7/10/19  No shows since last visit: no  Cancellations since last visit: no  ED visits since last visit: no    Medication Zoloft 25 mg date last ordered: 4/2/19  Qty: 90  Refills: 0     sertraline (ZOLOFT) 25 MG tablet 90 tablet 0 4/2/2019     Sig: TAKE 1 TABLET(25 MG) BY MOUTH DAILY        Lapse in therapy greater than 7 days: no  Medication refill request verified as identical to current order: yes  Result of Last DAM, VPA, Li+ Level, CBC, or Carbamazepine Level (at or since last visit): N/A        []Eligibility - not seen in last year    []Supervision - no future appointment    []Compliance     []Verification - order discrepancy    []Controlled Medication    []90 - day supply request    [x]Other LPN pending medications    Current Medication list:      sertraline (ZOLOFT) 25 MG tablet TAKE 1 TABLET(25 MG) BY MOUTH DAILY       Medication Plan of Care at last office visit with MD/CNP:    PLAN:    1. Continue Zoloft 25 mg daily - MDD  2.Continue with psychotherapy - twice monthly -  3.Recommend abstain from THC- repots he has significantly reduced use and is working on  Total abstinence   4. RTC- 12 weeks, call in between visits with any questions or concerns     [place], [place], [place], and [place]  : ADEOLA

## 2021-05-30 NOTE — PATIENT INSTRUCTIONS - HE
Continue medications as prescribed  Have your pharmacy contact us for a refill if you are running low on medications (We may ask you to come into clinic to get a refill from the nurse  No Alcohol or drug use  No driving if sedated  Call the clinic with any questions or concerns   Reach out for help if you feel like hurting yourself or others ([facility] [phone]: [address] [facility], [address] or [facility] Suicide Hotline [phone] , call 911 or go to nearest Emergency room    Follow up as directed, for your appointments, per your After Visit Summary Form.

## 2021-05-30 NOTE — PROGRESS NOTES
Patient here today for follow up of medication management. States depression 0/5 denies SI/HI, anxiety 0/5. States sleeps is good.    Nothing reported on [place]

## 2021-05-30 NOTE — PROGRESS NOTES
"Psychiatric  Progress Note  Date of visit:7/10/2019         Discussion of Care and Treatment Recommendations:   This is a 24 y.o. male with presenting to the clinic today for a follow up appointment for medication  management of depression        Last visit  4/17/19 Recommendation at last visit .  1. Continue Zoloft 25 mg daily - MDD  2.Continue with psychotherapy - twice monthly -  3.Recommend abstain from THC- repots he has significantly reduced use and is working on  Total abstinence   4. RTC- 12 weeks, call in between visits with any questions or concerns      Patient and I reviewed diagnosis and treatment plan and patient agrees with following recommendations:  Ongoing education given regarding diagnostic and treatment options with adequate verbalization of understanding.  Plan   1. Continue Zoloft 25 mg daily - MDD  2.Continue with psychotherapy - twice monthly -  3.Recommend abstain from THC- repots he has significantly reduced use and is working on  Total abstinence   4. RTC- 12 weeks, call in between visits with any questions or concerns          Diagnoses:     Moderate episode of recurrent major depressive disorder           Chief Complaint / Subjective:    Chief complaint: \" I am doing well\"     History of Present Illness:   Per patient statement  : He is doing well he has been compliant with current medications denying side effects.  He continues to work full-time.  He is taking some time off next week to spend time with his significant other.  He has decreased his THC use and is still working on abstinence though it has been challenging.  He denies all psychiatric issues offers no new concerns.  He would like to continue the same medi  cations.  He will return to the clinic in 3 months for follow-up appointment and encouraged him to call in between visits with any questions or concerns.    Mental Status Examination:   General: Adequate hygiene, cooperative  Speech: Normal in rate and tone  Language: " "Intact  Thought process: Coherent  Thought content:                Auditory hallucinations-Denies                Visual Hallucinations - Denies                 Delusions Absent                   Loose Associations:  No                   Suicidal thoughts: Denies                   Homicidal thoughts:Denies                        Affect: Neutral  Mood: Neutral   Intellectual functioning: Within normal limits  Memory: Within normal limits  Fund of knowledge: Average  Attention and concentration: Within normal limits  Gait: Steady  Psychomotor activity: Calm, no agitation  Muscles: No atrophy, no abnormal movements  InSight and judgment: Fair      Drug/treatment history and current pattern of use:   THC use     Medication changes: See Above   Medication adherence: compliant  Medication side effects: absent  Information about medications: Side effects, benefits and alternative treatments discussed and patient agrees .    Psychotherapy: Supportive therapy day-to-day living    Education: Diet, exercise, abstinence from drugs and alcohol, patient will not drive if sedated and medications or  under influence of any substance    Lab Results:   Personally reviewed and discussed with the patient    Lab Results   Component Value Date    WBC 5.6 09/27/2017    HGB 12.9 (L) 09/27/2017    HCT 41.8 09/27/2017     09/27/2017    CHOL 149 09/27/2017    TRIG 113 09/27/2017    HDL 39 (L) 09/27/2017    ALT 17 09/27/2017    AST 17 09/27/2017     09/27/2017    K 3.9 09/27/2017     09/27/2017    CREATININE 0.99 09/27/2017    BUN 18 09/27/2017    CO2 24 09/27/2017       Vital signs:    Vitals:    07/10/19 1009   BP: 134/86   Pulse: 73   Weight: 177 lb (80.3 kg)   Height: 5' 9\" (1.753 m)     Allergies: Patient has no known allergies.         Medications:     Current Outpatient Medications on File Prior to Visit   Medication Sig Dispense Refill     sertraline (ZOLOFT) 25 MG tablet TAKE 1 TABLET(25 MG) BY MOUTH DAILY 90 tablet " 0     sertraline (ZOLOFT) 25 MG tablet TAKE 1 TABLET(25 MG) BY MOUTH DAILY 90 tablet 0     No current facility-administered medications on file prior to visit.             Review of Systems:      ROS:       10 point ROS was negative except for the items listed in HPI.    Review of Systems - General ROS: negative for - chills, fatigue, night sweats, sleep disturbance or weight loss    Respiratory ROS: no cough, shortness of breath, or wheezing  negative for - cough or shortness of breath  Cardiovascular ROS: no chest pain or dyspnea on exertion  Gastrointestinal ROS: no abdominal pain, change in bowel habits, or black or bloody stools  Musculoskeletal ROS: negative  negative for - gait disturbance, joint pain, joint stiffness, muscle pain or muscular weakness  Neurological ROS: negative for - confusion, gait disturbance, headaches or seizures    Coordination of Care:   More than 25 minutes spent on this visit  with more than 50% of time spent on coordination of care including: Educating patient about diagnosis, prognosis, side effects and benefits of medications, diet, exercise.  Time also spent providing supportive therapy regarding above issues.    This note was created using a dictation system. All typing errors or contextual distortion is unintentional and software inherent.

## 2021-05-30 NOTE — PROGRESS NOTES
Correct pharmacy verified with patient and confirmed in snapshot? [x] yes []no    Charge captured ? [x] yes  [] no    Medications Phoned  to Pharmacy [] yes [x]no  Name of Pharmacist:  List Medications, including dose, quantity and instructions      Medication Prescriptions given to patient   [] yes  [x] no   List the name of the drug the prescription was written for.       Medications ordered this visit were e-scribed.  Verified by order class [x] yes  [] no  Zoloft 25 mg    Medication changes or discontinuations were communicated to patient's pharmacy: [] yes  [x] no    UA collected [] yes  [x] no    [place] Prescription Monitoring Program Reviewed? [x] yes  [] no    Referrals were made to: none     Future appointment was made: [x] yes  [] no    Dictation completed at time of chart check: [x] yes  [] no    I have checked the documentation for today s encounters and the above information has been reviewed and completed.

## 2021-05-31 VITALS — BODY MASS INDEX: 24.44 KG/M2 | HEIGHT: 69 IN | WEIGHT: 165 LBS

## 2021-05-31 VITALS — BODY MASS INDEX: 25.62 KG/M2 | HEIGHT: 69 IN | WEIGHT: 173 LBS

## 2021-05-31 VITALS — HEIGHT: 69 IN | BODY MASS INDEX: 24.59 KG/M2 | WEIGHT: 166 LBS

## 2021-06-01 VITALS — BODY MASS INDEX: 25.03 KG/M2 | WEIGHT: 169 LBS | HEIGHT: 69 IN

## 2021-06-01 VITALS — WEIGHT: 171 LBS | HEIGHT: 69 IN | BODY MASS INDEX: 25.33 KG/M2

## 2021-06-01 VITALS — BODY MASS INDEX: 26.3 KG/M2 | WEIGHT: 173 LBS

## 2021-06-01 NOTE — TELEPHONE ENCOUNTER
Called and spoke with patient to inform him of providers message. He had no additional questions, but asked for assistance in scheduling nurse only visit for Hep B shots.  Patient has a nurse visit scheduled for 10/14/19    Will set up Hep B shots and send to provider for review.

## 2021-06-01 NOTE — PROGRESS NOTES
"Subjective: Patient comes in for a physical.  Patient is a 24-year-old male he did want STD checks and I checked HIV hepatitis C hepatitis B syphilis GC and chlamydia.    He also believes he did have hepatitis B immunizations I did check a hepatitis B surface antibody    Comes in for physical CMP lipid and CBC were checked.    He does see psychiatry, Daniela Colmenares, for his depression, continues on sertraline.    No other conditions or worries.  He is not on any other medications.    Tobacco status: He  reports that he has never smoked. He has never used smokeless tobacco.    There are no active problems to display for this patient.      Current Outpatient Medications   Medication Sig Dispense Refill     sertraline (ZOLOFT) 25 MG tablet Take 1 tablet (25 mg total) by mouth daily. 90 tablet 0     No current facility-administered medications for this visit.        ROS:   10 point review of systems positive as discussed above otherwise negative.  Denies any STD symptoms.  Objective:    /48 (Patient Site: Right Arm, Patient Position: Sitting, Cuff Size: Adult Regular)   Pulse 80   Resp 12   Ht 5' 9.5\" (1.765 m)   Wt 178 lb (80.7 kg)   BMI 25.91 kg/m    Body mass index is 25.91 kg/m .      General appearance no acute distress    HEENT neck negative oropharynx clear pupils react normally extract movements full    Lungs are clear throughout no rales or rhonchi heart was regular S1-S2     Abdomen is soft nontender no guarding or rebound no axillary or inguinal adenopathy    Skin was normal no rashes    Patient has no peripheral edema, joints are normal no redness or genitalia normal descended testes no evidence of hernia no inguinal adenopathy.    Lab work CBC normal additional labs pending as outlined above    Results for orders placed or performed in visit on 09/16/19   HM2(CBC w/o Differential)   Result Value Ref Range    WBC 8.4 4.0 - 11.0 thou/uL    RBC 5.07 4.40 - 6.20 mill/uL    Hemoglobin 14.3 14.0 - " 18.0 g/dL    Hematocrit 43.6 40.0 - 54.0 %    MCV 86 80 - 100 fL    MCH 28.2 27.0 - 34.0 pg    MCHC 32.8 32.0 - 36.0 g/dL    RDW 12.2 11.0 - 14.5 %    Platelets 234 140 - 440 thou/uL    MPV 8.2 7.0 - 10.0 fL       Assessment:  1. Routine general medical examination at a health care facility  Comprehensive Metabolic Panel    Lipid Cascade RANDOM    HM2(CBC w/o Differential)   2. Screen for STD (sexually transmitted disease)  HIV Antigen/Antibody Screening [place]    Syphilis Screen, Cascade    Hepatitis B Surface Antigen (HBsAG)    Hepatitis C Antibody (Anti-HCV)    Chlamydia trachomatis & Neisseria gonorrhoeae, Amplified Detection   3. Major depressive disorder, remission status unspecified, unspecified whether recurrent     4. Immunization counseling  Hepatitis B Surface Antibody (Anti-HBs)     Physical stable    STD screen as outlined    Depression controlled.  His PHQ 9 was 0 today    Immunization counseling I did check a hepatitis B surface antibody    He did not want a flu shot.        Plan: As outlined above to be contacted regarding the results    This transcription uses voice recognition software, which may contain typographical errors.

## 2021-06-01 NOTE — TELEPHONE ENCOUNTER
----- Message from Oni Lincoln MD sent at 9/17/2019  5:26 PM CDT -----  Please contact this patient, let him know that all the STD checks were negative no evidence of infection.    Also the hepatitis B check for immunity came back showing that he is not immune to hepatitis B.  If he thinks he has had 3 hepatitis B shots and he should get one more shot and then recheck things again about 4 to 6 weeks after.    If he does not think he had any of that shots then he will need the series of 3 shots for the hepatitis B.    Please set up for the hepatitis B shot.    In addition the lab tests regarding his liver kidney electrolytes and blood sugar were all normal also the cholesterol levels look good.  No concerns.

## 2021-06-02 ENCOUNTER — OFFICE VISIT - HEALTHEAST (OUTPATIENT)
Dept: BEHAVIORAL HEALTH | Facility: CLINIC | Age: 26
End: 2021-06-02

## 2021-06-02 VITALS — BODY MASS INDEX: 25.18 KG/M2 | HEIGHT: 69 IN | WEIGHT: 170 LBS

## 2021-06-02 VITALS — HEIGHT: 69 IN | BODY MASS INDEX: 26.66 KG/M2 | WEIGHT: 180 LBS

## 2021-06-02 VITALS — BODY MASS INDEX: 26.36 KG/M2 | WEIGHT: 178 LBS | HEIGHT: 69 IN

## 2021-06-02 DIAGNOSIS — F32.9 MDD (MAJOR DEPRESSIVE DISORDER): ICD-10-CM

## 2021-06-02 NOTE — PROGRESS NOTES
"Psychiatric  Progress Note  Date of visit:10/9/2019         Discussion of Care and Treatment Recommendations:   This is a 24 y.o. male with presenting to the clinic today for a follow up appointment for medication  management of depression       Last visit 07/10/19  Recommendation at last visit .  1. Continue Zoloft 25 mg daily - MDD  2.Continue with psychotherapy - twice monthly -  3.Recommend abstain from THC- repots he has significantly reduced use and is working on  Total abstinence   4. RTC- 12 weeks, call in between visits with any questions or concerns   Patient and I reviewed diagnosis and treatment plan and patient agrees with following recommendations:  Ongoing education given regarding diagnostic and treatment options with adequate verbalization of understanding.  Plan   1. Continue Zoloft 25 mg daily - MDD  2.Continue with psychotherapy - twice monthly -  3.Recommend abstain from THC- repots he has significantly reduced use and is working on  Total abstinence   4. RTC- 12 weeks, call in between visits with any questions or concerns          Diagnoses:     Moderate episode of recurrent major depressive disorder             Chief Complaint / Subjective:    Chief complaint: \" I am doing ok \"     History of Present Illness:  Per patient statement : He has been taking current medications denies side effects.  Reports that current medications are working well.  He denies all psychiatric issues offers no new concerns.    Mental Status Examination:   General: Adequate hygiene, cooperative  Speech: Normal in rate and tone  Language: Intact  Thought process: Coherent  Thought content:                           Auditory hallucinations-Denies                          Visual Hallucinations - Denies                         Delusions Absent                           Loose Associations:  No                          Suicidal thoughts: Denies                          Homicidal thoughts:Denies                      " "  Affect: Neutral  Mood: Neutral   Intellectual functioning: Within normal limits  Memory: Within normal limits  Fund of knowledge: Average  Attention and concentration: Within normal limits  Gait: Steady  Psychomotor activity: Calm, no agitation  Muscles: No atrophy, no abnormal movements  InSight and judgment: Fair      Drug/treatment history and current pattern of use:   Hx of marijuana use     Medication changes: See Above   Medication adherence: compliant  Medication side effects: absent  Information about medications: Side effects, benefits and alternative treatments discussed and patient agrees .    Psychotherapy: Supportive therapy day-to-day living    Education: Diet, exercise, abstinence from drugs and alcohol, patient will not drive if sedated and medications or  under influence of any substance    Lab Results:   Personally reviewed and discussed with the patient    Lab Results   Component Value Date    WBC 8.4 09/16/2019    HGB 14.3 09/16/2019    HCT 43.6 09/16/2019     09/16/2019    CHOL 152 09/16/2019    TRIG 98 09/16/2019    HDL 34 (L) 09/16/2019    ALT 15 09/16/2019    AST 14 09/16/2019     09/16/2019    K 3.9 09/16/2019     (H) 09/16/2019    CREATININE 0.93 09/16/2019    BUN 15 09/16/2019    CO2 23 09/16/2019       Vital signs:    Vitals:    10/09/19 1407   BP: 146/88   Patient Site: Right Arm   Patient Position: Sitting   Cuff Size: Adult Regular   Pulse: 84   Weight: 184 lb (83.5 kg)   Height: 5' 9.5\" (1.765 m)     Allergies: Patient has no known allergies.         Medications:       Current Outpatient Medications on File Prior to Visit   Medication Sig Dispense Refill     sertraline (ZOLOFT) 25 MG tablet Take 1 tablet (25 mg total) by mouth daily. 90 tablet 0     No current facility-administered medications on file prior to visit.                 Review of Systems:      ROS:       10 point ROS was negative except for the items listed in HPI.    Review of Systems - General ROS: " negative for - chills, fatigue, night sweats, sleep disturbance or weight loss    Respiratory ROS: no cough, shortness of breath, or wheezing  negative for - cough or shortness of breath  Cardiovascular ROS: no chest pain or dyspnea on exertion  Gastrointestinal ROS: no abdominal pain, change in bowel habits, or black or bloody stools  Musculoskeletal ROS: negative  negative for - gait disturbance, joint pain, joint stiffness, muscle pain or muscular weakness  Neurological ROS: negative for - confusion, gait disturbance, headaches or seizures    Coordination of Care:   More than 25 minutes spent on this visit  with more than 50% of time spent on coordination of care including: Educating patient about diagnosis, prognosis, side effects and benefits of medications, diet, exercise.  Time also spent providing supportive therapy regarding above issues.    This note was created using a dictation system. All typing errors or contextual distortion is unintentional and software inherent.

## 2021-06-02 NOTE — PATIENT INSTRUCTIONS - HE
Continue medications as prescribed  Have your pharmacy contact us for a refill if you are running low on medications (We may ask you to come into clinic to get a refill from the nurse  No Alcohol or drug use  No driving if sedated  Call the clinic with any questions or concerns   Reach out for help if you feel like hurting yourself or others ([facility] [phone]: [address] [facility], [address] or [facility] Suicide Hotline [phone] , call 911 or go to nearest Emergency room    Follow up as directed, for your appointments, per your After Visit Summary Form.

## 2021-06-02 NOTE — PROGRESS NOTES
Correct pharmacy verified with patient and confirmed in snapshot? [x] yes []no    Charge captured ? [x] yes  [] no    Medications Phoned  to Pharmacy [] yes [x]no  Name of Pharmacist:  List Medications, including dose, quantity and instructions      Medication Prescriptions given to patient   [] yes  [x] no   List the name of the drug the prescription was written for.       Medications ordered this visit were e-scribed.  Verified by order class [x] yes  [] no  Zoloft 25 mg   Medication changes or discontinuations were communicated to patient's pharmacy: [] yes  [x] no    UA collected [] yes  [x] no    [place] Prescription Monitoring Program Reviewed? [x] yes  [] no    Referrals were made to:  None    Future appointment was made: [x] yes  [] no  01/08/2020  Dictation completed at time of chart check: [x] yes  [] no    I have checked the documentation for today s encounters and the above information has been reviewed and completed.

## 2021-06-03 VITALS
WEIGHT: 184 LBS | HEIGHT: 70 IN | DIASTOLIC BLOOD PRESSURE: 88 MMHG | SYSTOLIC BLOOD PRESSURE: 146 MMHG | BODY MASS INDEX: 26.34 KG/M2 | HEART RATE: 84 BPM

## 2021-06-03 VITALS
BODY MASS INDEX: 24.77 KG/M2 | HEART RATE: 99 BPM | WEIGHT: 173 LBS | DIASTOLIC BLOOD PRESSURE: 93 MMHG | HEIGHT: 70 IN | SYSTOLIC BLOOD PRESSURE: 136 MMHG

## 2021-06-03 VITALS
RESPIRATION RATE: 12 BRPM | SYSTOLIC BLOOD PRESSURE: 112 MMHG | HEIGHT: 70 IN | BODY MASS INDEX: 25.48 KG/M2 | HEART RATE: 80 BPM | WEIGHT: 178 LBS | DIASTOLIC BLOOD PRESSURE: 48 MMHG

## 2021-06-03 VITALS
OXYGEN SATURATION: 92 % | HEART RATE: 89 BPM | BODY MASS INDEX: 24.34 KG/M2 | RESPIRATION RATE: 20 BRPM | SYSTOLIC BLOOD PRESSURE: 120 MMHG | TEMPERATURE: 98.8 F | DIASTOLIC BLOOD PRESSURE: 72 MMHG | WEIGHT: 170 LBS | HEIGHT: 70 IN

## 2021-06-03 VITALS — BODY MASS INDEX: 26.22 KG/M2 | HEIGHT: 69 IN | WEIGHT: 177 LBS

## 2021-06-04 NOTE — PROGRESS NOTES
"  Chief Complaint   Patient presents with     Generalized Body Aches     X 2 weeks (boyfriend had bronchitis)      Cough     X 2 weeks          HPI:   Roland Sandoval is a 24 y.o. male with boyfriend has been sick for two weeks.  Started with myalgias--these have improved.  Started coughing about one week ago.  Productive.  No shortness of breath.  Has been hoarse.  Lots of post nasal drainage.  No head congestion.  Mild chest pain with coughing.  No fever.  No ear pain.  Intermittent sore throat.  No nausea, vomiting or diarrhea.  No dysuria.  No rash.  No medications used.    Boyfriend recently diagnosed with bronchitis.    ROS:  A 10 point comprehensive review of systems was negative except as noted.     Medications:  Current Outpatient Medications on File Prior to Visit   Medication Sig Dispense Refill     [DISCONTINUED] sertraline (ZOLOFT) 25 MG tablet Take 1 tablet (25 mg total) by mouth daily. 90 tablet 0     No current facility-administered medications on file prior to visit.          Social History:  Social History     Tobacco Use     Smoking status: Never Smoker     Smokeless tobacco: Never Used   Substance Use Topics     Alcohol use: No         Physical Exam:   Vitals:    01/03/20 0944   BP: 120/72   Pulse: 89   Resp: 20   Temp: 98.8  F (37.1  C)   TempSrc: Oral   SpO2: 92%   Weight: 170 lb (77.1 kg)   Height: 5' 9.5\" (1.765 m)       GENERAL:   Alert. Oriented.  EYES: Clear  HENT:  Ears: R TM pearly gray. Normal landmarks. L TM pearly gray.  Normal landmarks  Nose: Clear.  Sinuses: Nontender.  Oropharynx:  No erythema. No exudate. Drainage in posterior pharynx.  NECK: Supple. No adenopathy.  LUNGS: Clear to ascultation.  No crackles.  No wheezing  HEART: RRR  SKIN:  No rash.         Assessment/Plan:    1. Viral URI     2. MDD (major depressive disorder)  sertraline (ZOLOFT) 25 MG tablet      Discussed viral infection.  No antibiotics indicated.   Afrin and sudafed for post nasal drainage.  Recheck if " worsening or not improving.     Requests refill of zoloft.  Also needs follow up with his primary MD.            Chel Cao MD      1/3/2020    The following portions of the patient's history were reviewed and updated as appropriate: allergies, current medications, past family history, past medical history, past social history, past surgical history and problem list.

## 2021-06-05 NOTE — PATIENT INSTRUCTIONS - HE
Continue medications as prescribed  Have your pharmacy contact us for a refill if you are running low on medications (We may ask you to come into clinic to get a refill from the nurse  No Alcohol or drug use  No driving if sedated  Call the clinic with any questions or concerns   Reach out for help if you feel like hurting yourself or others ([facility] [phone]: [address] [facility], [address] or [facility] Suicide Hotline [phone] , call 911 or go to nearest Emergency room    Follow up as directed, for your appointments, per your After Visit Summary Form.

## 2021-06-05 NOTE — PROGRESS NOTES
"Psychiatric  Progress Note  Date of visit:1/8/2020           Discussion of Care and Treatment Recommendations:   This is a 24 y.o. male with presenting to the clinic today for a follow up appointment for medication  management of depression          Last visit  10/09/19.  Recommendation at last visit .  1. Continue Zoloft 25 mg daily - MDD  2.Continue with psychotherapy - twice monthly -  3.Recommend abstain from THC- repots he has significantly reduced use and is working on  Total abstinence   4. RTC- 12 weeks, call in between visits with any questions or concerns   Patient and I reviewed diagnosis and treatment plan and patient agrees with following recommendations:  Ongoing education given regarding diagnostic and treatment options with adequate verbalization of understanding.  Plan   1. Continue Zoloft 25 mg daily - MDD  2.Continue with psychotherapy - twice monthly -  3.Recommend abstain from THC- repots he has significantly reduced use and is working on  Total abstinence   4. RTC-  3 months  call in between visits with any questions or concerns          DIagnoses:     Moderate episode of recurrent major depressive disorder        Patient Active Problem List   Diagnosis     Moderate episode of recurrent major depressive disorder (H)             Chief Complaint / Subjective:    Chief complaint: \" I am doing well \"     History of Present Illness:   Per  patient's  statement : He recently twisted his ankle and is in a leg brace today.  He had a wonderful holiday although his significant other grandmother passed away.  He has been utilizing Zoloft 25 mg and engaging in psychotherapy twice monthly and finds it very beneficial.  He denies side effects from Zoloft.  Is happy with the current low-dose and does not want any increase in medications.  States depression is well managed at this time.  Has decreased his THC use and is working towards total abstinence.  Denies all psychiatric issues and offers no new " concerns.  Would like to continue the same medications.    Mental Status Examination:   General: Adequate hygiene, cooperative  Speech: Normal in rate and tone  Language: Intact  Thought process: Coherent  Thought content:                           Auditory hallucinations-Denies                          Visual Hallucinations - Denies                         Delusions Absent                           Loose Associations:  No                          Suicidal thoughts: Denies                          Homicidal thoughts:Denies                        Affect: Neutral  Mood: Neutral   Intellectual functioning: Within normal limits  Memory: Within normal limits  Fund of knowledge: Average  Attention and concentration: Within normal limits  Gait: Steady  Psychomotor activity: Calm, no agitation  Muscles: No atrophy, no abnormal movements  InSight and judgment: Fair      Drug/treatment history and current pattern of use:   Hx of marijuana use     Medication changes: See Above   Medication adherence: compliant  Medication side effects: absent  Information about medications: Side effects, benefits and alternative treatments discussed and patient agrees .    Psychotherapy: Supportive therapy day-to-day living    Education: Diet, exercise, abstinence from drugs and alcohol, patient will not drive if sedated and medications or  under influence of any substance    Lab Results:   Personally reviewed and discussed with the patient    Lab Results   Component Value Date    WBC 8.4 09/16/2019    HGB 14.3 09/16/2019    HCT 43.6 09/16/2019     09/16/2019    CHOL 152 09/16/2019    TRIG 98 09/16/2019    HDL 34 (L) 09/16/2019    ALT 15 09/16/2019    AST 14 09/16/2019     09/16/2019    K 3.9 09/16/2019     (H) 09/16/2019    CREATININE 0.93 09/16/2019    BUN 15 09/16/2019    CO2 23 09/16/2019       Vital signs:  Vitals:    01/08/20 1030   BP: (!) 136/93   Patient Site: Right Arm   Patient Position: Sitting   Cuff Size: Adult  "Regular   Pulse: 99   Weight: 173 lb (78.5 kg)   Height: 5' 9.5\" (1.765 m)     Allergies: Patient has no known allergies.         Medications:     Current Outpatient Medications on File Prior to Visit   Medication Sig Dispense Refill     sertraline (ZOLOFT) 25 MG tablet Take 1 tablet (25 mg total) by mouth daily. 90 tablet 0     No current facility-administered medications on file prior to visit.               Review of Systems:      ROS:       10 point ROS was negative except for the items listed in HPI.    Review of Systems - General ROS: negative for - chills, fatigue, night sweats, sleep disturbance or weight loss    Respiratory ROS: no cough, shortness of breath, or wheezing  negative for - cough or shortness of breath  Cardiovascular ROS: no chest pain or dyspnea on exertion  Gastrointestinal ROS: no abdominal pain, change in bowel habits, or black or bloody stools  Musculoskeletal ROS: negative  negative for - gait disturbance, joint pain, joint stiffness, muscle pain or muscular weakness  Neurological ROS: negative for - confusion, gait disturbance, headaches or seizures    Coordination of Care:   More than 25 minutes spent on this visit  with more than 50% of time spent on coordination of care including: Educating patient about diagnosis, prognosis, side effects and benefits of medications, diet, exercise.  Time also spent providing supportive therapy regarding above issues.    This note was created using a dictation system. All typing errors or contextual distortion is unintentional and software inherent.  "

## 2021-06-05 NOTE — PROGRESS NOTES
Correct pharmacy verified with patient and confirmed in snapshot? [x] yes []no    Charge captured ? [x] yes  [] no    Medications Phoned  to Pharmacy [] yes [x]no  Name of Pharmacist:  List Medications, including dose, quantity and instructions      Medication Prescriptions given to patient   [] yes  [x] no   List the name of the drug the prescription was written for.       Medications ordered this visit were e-scribed.  Verified by order class [x] yes  [] no  Zoloft 25 mg  Medication changes or discontinuations were communicated to patient's pharmacy: [] yes  [x] no    UA collected [] yes  [x] no    [place] Prescription Monitoring Program Reviewed? [x] yes  [] no    Referrals were made to: None     Future appointment was made: [x] yes  [] no  4/08/2020  Dictation completed at time of chart check: [x] yes  [] no    I have checked the documentation for today s encounters and the above information has been reviewed and completed.

## 2021-06-07 NOTE — PATIENT INSTRUCTIONS - HE
Continue medications as prescribed  Have your pharmacy contact us for a refill if you are running low on medications (We may ask you to come into clinic to get a refill from the nurse  No Alcohol or drug use  No driving if sedated  Call the clinic with any questions or concerns   Reach out for help if you feel like hurting yourself or others ([facility] [phone]: [address], [address] or [facility] Suicide Hotline [phone] , call 911 or go to nearest Emergency room    Follow up as directed, for your appointments, per your After Visit Summary Form.

## 2021-06-07 NOTE — PROGRESS NOTES
"Roland Sandoval is a 25 y.o. male who is being evaluated via a billable telephone visit.      The patient has been notified of following:     \"This telephone visit will be conducted via a call between you and your physician/provider. We have found that certain health care needs can be provided without the need for a physical exam.  This service lets us provide the care you need with a short phone conversation.  If a prescription is necessary we can send it directly to your pharmacy.  If lab work is needed we can place an order for that and you can then stop by our lab to have the test done at a later time.    Telephone visits are billed at different rates depending on your insurance coverage. During this emergency period, for some insurers they may be billed the same as an in-person visit.  Please reach out to your insurance provider with any questions.    If during the course of the call the physician/provider feels a telephone visit is not appropriate, you will not be charged for this service.\"    Patient has given verbal consent to a Telephone visit? Yes    Roland Sandoval complains of    Chief Complaint   Patient presents with     Follow-up     Medication Management       I have reviewed and updated the patient's Past Medical History, Social History, Family History and Medication List.    Psychiatric  Progress Note  Date of visit:4/8/2020         Discussion of Care and Treatment Recommendations:   This is a 25 y.o. male with a history of depression.     Last visit 1/8/2020 Recommendation at last visit.  1. Continue Zoloft 25 mg daily - MDD  2.Continue with psychotherapy - twice monthly -  3.Recommend abstain from THC- repots he has significantly reduced use and is working on  Total abstinence   4. RTC-  3 months  call in between visits with any questions or concerns   Patient and I reviewed diagnosis and treatment plan and patient agrees with following recommendations:  Ongoing education given regarding " "diagnostic and treatment options with adequate verbalization of understanding.  Plan   1. Continue Zoloft 25 mg daily - MDD  2.Highly recommend increase   Psychotherapy session to weekly or biweekly: Pt will call therapist to request  3.Recommend abstain from THC- repots he has significantly reduced use and is working on  Total abstinence   4. RTC-  4 weeks  call in between visits with any questions or concerns          Diagnoses:     Moderate episode of recurrent major depressive disorder        Patient Active Problem List   Diagnosis     Moderate episode of recurrent major depressive disorder (H)             Chief Complaint / Subjective:    Chief complaint: : I have some increased anxiety due to COVID-19\"    History of Present Illness:  Per patient's statement : Endorses anxiety triggered by COVID-19 . He is currently unemployed and staying home with partner and  His niece. He is not depressed or lonely just anxious . He is practicing social distance, isolation and good hand hygiene techniques.  He has not seen his therapist as they see each other every 2 months.. I did recommend calling his therapist to see if he can schedule weekly or biweekly appointment to help him with his anxiety during this pandemic times. He is agreeable to this and will be calling. He is happy with current medications and is not looking for changes at time      Mental Status Examination:   General: Unable to assess telephone visit   Speech: Normal in rate and tone  Language: Intact  Thought process: Coherent  Thought content:                           Auditory hallucinations-Denies                          Visual Hallucinations - Denies                         Delusions Absent                           Loose Associations:  No                          Suicidal thoughts: Denies                          Homicidal thoughts:Denies                        Affect: Neutral  Mood: Neutral   Intellectual functioning: Within normal limits  Memory: " Within normal limits  Fund of knowledge: Average  Attention and concentration: Within normal limits  Gait:  Unable to assess telephone vis  Psychomotor activity: Unable to assess telephone visit  Muscles: Unable to assess telephone visit  InSight and judgment: Fair      Drug/treatment history and current pattern of use:   Hx of  Marijuana Use   Denies use of all other mood altering substances      Medication changes: See Above   Medication adherence: compliant  Medication side effects: absent  Information about medications: Side effects, benefits and alternative treatments discussed and patient agrees .    Psychotherapy: Supportive therapy day-to-day living    Education: Diet, exercise, abstinence from drugs and alcohol, patient will not drive if sedated and medications or  under influence of any substance    Lab Results:  Personally reviewed and discussed with the patient    Lab Results   Component Value Date    WBC 8.4 09/16/2019    HGB 14.3 09/16/2019    HCT 43.6 09/16/2019     09/16/2019    CHOL 152 09/16/2019    TRIG 98 09/16/2019    HDL 34 (L) 09/16/2019    ALT 15 09/16/2019    AST 14 09/16/2019     09/16/2019    K 3.9 09/16/2019     (H) 09/16/2019    CREATININE 0.93 09/16/2019    BUN 15 09/16/2019    CO2 23 09/16/2019       Vital signs:  There were no vitals taken for this visit.  Unable to assess telephone visit  Allergies: Patient has no known allergies.         Medications:     Current Outpatient Medications on File Prior to Visit   Medication Sig Dispense Refill     [DISCONTINUED] sertraline (ZOLOFT) 25 MG tablet Take 1 tablet (25 mg total) by mouth daily. 90 tablet 0     No current facility-administered medications on file prior to visit.              This note was created using a dictation system. All typing errors or contextual distortion is unintentional and software inherent.  Phone call duration: 15  minutes    Daniela Colmenares NP

## 2021-06-07 NOTE — PROGRESS NOTES
Patient verified allergies, medications and pharmacy via phone. Patient states is ready for visit.      Right before visit today his phone  and he is using his friends phone for visit today.    Pt would like to discuss medications Zoloft feels that it is not working enough.

## 2021-06-07 NOTE — PROGRESS NOTES
Medications Phoned  to Pharmacy [x] yes []no  Name of Pharmacist:  List Medications, including dose, quantity and instructions    Medications ordered this visit were e-scribed.  Verified by order class [x] yes  [] no  Zoloft 25 mg  Medication changes or discontinuations were communicated to patient's pharmacy: [] yes  [x] no    Dictation completed at time of chart check: [x] yes  [] no    I have checked the documentation for today s encounters and the above information has been reviewed and completed.

## 2021-06-12 NOTE — TELEPHONE ENCOUNTER
Date of Last Office Visit: 4-8-20  Date of Next Office Visit: none  No shows since last visit: 0  Cancellations since last visit: 0  ED visits since last visit:  0  Medication zoloft  date last ordered: 4-8-20  Qty: 90  Refills: 0  Lapse in therapy greater than 7 days: yes  Medication refill request verified as identical to current order: yes  Result of Last DAM, VPA, Li+ Level, CBC, or Carbamazepine Level (at or since last visit): N/A     [] Medication refilled per [facility] M-1.   [x] Medication unable to be refilled by RN due to criteria not met as indicated below:     []Eligibility - not seen in last year    [x]Supervision - no future appointment    [x]Compliance     []Verification - order discrepancy    []Controlled Medication    []Medication not included in RN Protocol    [x]90 - day supply request    []Other   Current Medication list:  Your Current Medications Are    sertraline (ZOLOFT) 25 MG tablet Take 1 tablet (25 mg total) by mouth daily.       Medication Plan of Care at last office visit with MD/CNP: Plan   1. Continue Zoloft 25 mg daily - MDD  2.Highly recommend increase   Psychotherapy session to weekly or biweekly: Pt will call therapist to request  3.Recommend abstain from THC- repots he has significantly reduced use and is working on  Total abstinence   4. RTC-  4 weeks  call in between visits with any questions or concerns

## 2021-06-13 NOTE — TELEPHONE ENCOUNTER
It's almost 8 months since I last saw patient, he is under 25 years of age and would need to be seen first to restart med

## 2021-06-13 NOTE — PROGRESS NOTES
"Subjective: This patient comes in for physical.  He was here a year ago had a physical    Patient has no new findings and family history his mother has lung cancer father has hypertension.    Siblings no new symptoms or problems.    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    Patient did get checked for STDs yesterday he said he had blood in urine checked.    He has had some neck spasm a number of months ago went to a urgent care that is resolved denies any musculoskeletal problems    Has not had any allergies night denies any respiratory issues denies any bowel or bladder problems    He is a non-smoker no alcohol.    Weight is stayed the same his energy is about the same.    Tobacco status: He  reports that he has never smoked. He does not have any smokeless tobacco history on file.    There are no active problems to display for this patient.      No current outpatient prescriptions on file.     No current facility-administered medications for this visit.        ROS: 10 point review of systems negative other than as outlined above    Objective:    /79  Pulse 86  Temp 98  F (36.7  C)  Resp 20  Ht 5' 9.25\" (1.759 m)  Wt 165 lb (74.8 kg)  BMI 24.19 kg/m2  Body mass index is 24.19 kg/(m^2).      General appearance no acute distress    Vital signs were stable    Neck negative no adenopathy oropharynx clear pupils react normally extraocular movements full para graph canals and TMs normal    Lungs clear no rales or rhonchi, heart regular S1-S2 rate in the 80s no murmur    Abdomen nontender no masses no axillary inguinal adenopathy.    Genitalia normal descended testes no evidence of hernia.  Extremities without edema pulses full.  No joint redness warmth or swelling.  Back was negative no pain    Skin was normal no rashes or worrisome lesions.    CBC CMP lipid pending        Assessment:  1. Routine general medical examination at a health care facility  [facility](CBC w/o " Differential)    Lipid [facility] FASTING    Comprehensive Metabolic Panel     Stable physical.  Lab work pending    Patient does not want any shots today.    Plan: I will contact him regarding the results    This transcription uses voice recognition software, which may contain typographical errors.

## 2021-06-13 NOTE — TELEPHONE ENCOUNTER
Called patient and informed him  Of provider's decision. He will call and try to get an earlier appt-said he was told he got the earliest.

## 2021-06-14 NOTE — PROGRESS NOTES
"Subjective: This patient comes in for evaluation he was hospitalized 12 2 through 12 4 at [facility] for mental health.  He had suicidal thoughts was brought in by his boyfriend and his sister.    He no longer has those thoughts he did fill out a PHQ 9 came back at 12 today    He is not wanted to start medication.  He has had a lot of stress but now is no longer working which is helped some.  He works for [employer] and the jugular quite stressful at the end.    He is wanting to avoid medications we discussed options.  I elected to have him see psychologist for diagnostic assessment and go on to see psychiatry.    Also immunization update with REID REYES.    No additional concerns or issues.    He does feel he is in a better place and when he went into [facility] last week.    Tobacco status: He  reports that he has never smoked. He does not have any smokeless tobacco history on file.    There are no active problems to display for this patient.      No current outpatient prescriptions on file.     No current facility-administered medications for this visit.        ROS: Review of systems negative other than as outlined above    Objective:    /78 (Patient Site: Left Arm, Patient Position: Sitting, Cuff Size: Adult Large)  Pulse 72  Temp 97.4  F (36.3  C) (Oral)   Resp 16  Ht 5' 9.25\" (1.759 m)  Wt 166 lb (75.3 kg)  BMI 24.34 kg/m2  Body mass index is 24.34 kg/(m^2).  The following are part of a depression follow up plan for the patient:  under care of mental health team    General appearance no acute distress vital signs are stable    Lungs clear no rales or rhonchi, heart regular no murmur.    Neck negative no adenopathy  No abdominal pain or guarding      Extremities without edema skin was normal.            Assessment:  1. Depression  Ambulatory referral to Psychology    Ambulatory referral to Psychiatry   2. Immunization counseling  Tdap vaccine,  6yo or older,  IM     As outlined above PHQ 9 was at " 12    Referral to psychology for diagnostic assessment and counseling.  Referral to psychiatry pending the DA    Plan: As outlined above, Tdap was given also    This transcription uses voice recognition software, which may contain typographical errors.

## 2021-06-14 NOTE — PROGRESS NOTES
"Roland Sandoval is a 25 y.o. male who is being evaluated via a billable telephone visit.      The patient has been notified of following:     \"This telephone visit will be conducted via a call between you and your physician/provider. We have found that certain health care needs can be provided without the need for a physical exam.  This service lets us provide the care you need with a short phone conversation.  If a prescription is necessary we can send it directly to your pharmacy.  If lab work is needed we can place an order for that and you can then stop by our lab to have the test done at a later time.    Telephone visits are billed at different rates depending on your insurance coverage. During this emergency period, for some insurers they may be billed the same as an in-person visit.  Please reach out to your insurance provider with any questions.    If during the course of the call the physician/provider feels a telephone visit is not appropriate, you will not be charged for this service.\"    Patient has given verbal consent to a Telephone visit? Yes      Patient would like to receive their AVS by AVS Preference: Mail a copy.    Psychiatric  Out patient Follow Up Progress Note  Date of visit: 1/11/2021         Discussion of Care and Treatment Recommendations:   This is a 25 y.o. male with a history of depression.           Last visit 04/08/2020.  Recommendation at last visit .  1. Continue Zoloft 25 mg daily - MDD  2.Highly recommend increase   Psychotherapy session to weekly or biweekly: Pt will call therapist to request  3.Recommend abstain from THC- repots he has significantly reduced use and is working on  Total abstinence   4. RTC-  4 weeks  call in between visits with any questions or concerns   Patient and I reviewed diagnosis and treatment plan and patient agrees with following recommendations:  Ongoing education given regarding diagnostic and treatment options with adequate verbalization of " "understanding.  Plan   1. Continue Zoloft 25 mg daily - MDD  2.Highly recommend increase   Psychotherapy session to weekly or biweekly: Pt will call therapist to request  3.Recommend abstain from THC- repots he has significantly reduced use and is working on  Total abstinence   4. RTC-  8  weeks  call in between visits with any questions or concerns          DIagnoses:     Moderate episode of recurrent major depressive disorder     Patient Active Problem List   Diagnosis     Moderate episode of recurrent major depressive disorder (H)             Chief Complaint / Subjective:    Chief complaint: \" I am doing ok\"     History of Present Illness:   Per patient's statement : Patient reports compliance with current medication denies side effects.  States he has been doing well on the low-dose of sertraline and is not looking to increase his dose.  Last seen for his appointment in April 2020 and therefore ran out all of his medications and has not taken his meds for a few weeks.  He is happy to get back on his medication states that when he takes the medication his depression and anxiety are well managed.  Denies all other psychiatric issues offers no new concerns.  Mental Status Examination  General: Alert and oriented x 3   Speech: Normal in rate and tone  Language: Intact  Thought process: Coherent  Thought content:                           Auditory hallucinations-Denies                          Visual Hallucinations - Denies                         Delusions Absent                           Loose Associations:  No                          Suicidal thoughts: Denies                          Homicidal thoughts:Denies                        Affect: Neutral  Mood: Neutral   Intellectual functioning: Within normal limits  Memory: Within normal limits  Fund of knowledge: Average  Attention and concentration: Within normal limits  Gait: Unable to assess telephone visit  Psychomotor activity: Unable to assess telephone " visit  Muscles: Unable to assess telephone visit  InSight and judgment: Fair      Drug/treatment history and current pattern of use:     Hx of  Marijuana Use   Denies use of all other mood altering substances    Medication changes: See Above   Medication adherence: compliant  Medication side effects: absent  Information about medications: Side effects, benefits and alternative treatments discussed and patient agrees .    Psychotherapy: Supportive therapy day-to-day living    Education: Diet, exercise, abstinence from drugs and alcohol, patient will not drive if sedated and medications or  under influence of any substance    Lab Results:   Personally reviewed and discussed with the patient    Lab Results   Component Value Date    WBC 8.4 09/16/2019    HGB 14.3 09/16/2019    HCT 43.6 09/16/2019     09/16/2019    CHOL 152 09/16/2019    TRIG 98 09/16/2019    HDL 34 (L) 09/16/2019    ALT 15 09/16/2019    AST 14 09/16/2019     09/16/2019    K 3.9 09/16/2019     (H) 09/16/2019    CREATININE 0.93 09/16/2019    BUN 15 09/16/2019    CO2 23 09/16/2019       Vital signs:  There were no vitals taken for this visit.  Unable to assess telephone visit  Allergies: Patient has no known allergies.           Review of Systems:      ROS:  Subjective data only - Tele-Health  Visit   10 point ROS was negative except for the items listed in HPI-              Medications:     Current Outpatient Medications on File Prior to Visit   Medication Sig Dispense Refill     sertraline (ZOLOFT) 25 MG tablet Take 1 tablet (25 mg total) by mouth daily. 90 tablet 0     No current facility-administered medications on file prior to visit.              Coordination of Care:   More than 25 minutes spent on this visit  with more than 50% of time spent on coordination of care including: Educating patient about diagnosis, prognosis, side effects and benefits of medications, diet, exercise.  Time also spent providing supportive therapy  regarding above issues.    This note was created using a dictation system. All typing errors or contextual distortion is unintentional and software inherent.  Phone call duration: 25 minutes    Daniela Colmenares NP

## 2021-06-14 NOTE — PATIENT INSTRUCTIONS - HE
Continue medications as prescribed  Have your pharmacy contact us for a refill if you are running low on medications (We may ask you to come into clinic to get a refill from the nurse  No Alcohol or drug use  No driving if sedated  Call the clinic with any questions or concerns   Reach out for help if you feel like hurting yourself or others ([facility] [phone]: [address] [facility], [address] or [facility] Suicide Hotline [phone] , call 911 or go to nearest Emergency room    Follow up as directed, for your appointments, per your After Visit Summary Form.

## 2021-06-14 NOTE — PROGRESS NOTES
This video/telephone visit will be conducted via a call between you and your physician/provider. We have found that certain health care needs can be provided without the need for an in-person physical exam. This service lets us provide the care you need with a video /telephone conversation. If a prescription is necessary we can send it directly to your pharmacy. If lab work is needed we can place an order for that and you can then stop by our lab to have the test done at a later time.    Just as we bill insurance for in-person visits, we also bill insurance for video/telephone visits. If you have questions about your insurance coverage, we recommend that you speak with your [employer] company.    Patient has given verbal consent for video/Telephone visit? yes  Patient would like telephone visit, please call: [phone]  JEFFERSON/ANN MARIE : Dami SHARMA LPN  Pt reports being out of medicine   Patient verified allergies, medications and pharmacy via phone.  Patient states he is ready for visit.    : No records found    ________________________________________  Medications Phoned  to Pharmacy [] yes [x]no  Name of Pharmacist:  List Medications, including dose, quantity and instructions    Medications ordered this visit were e-scribed.  Verified by order class [x] yes  [] no  Zoloft 25 mg  Medication changes or discontinuations were communicated to patient's pharmacy: [] yes  [x] no    Dictation completed at time of chart check: [x] yes  [] no    I have checked the documentation for today s encounters and the above information has been reviewed and completed.          ________________________________________  Medications Phoned  to Pharmacy [] yes []no  Name of Pharmacist:  List Medications, including dose, quantity and instructions    Medications ordered this visit were e-scribed.  Verified by order class [] yes  [] no    Medication changes or discontinuations were communicated to patient's pharmacy: [] yes  [] no    Dictation  completed at time of chart check: [] yes  [] no    I have checked the documentation for today s encounters and the above information has been reviewed and completed.

## 2021-06-15 NOTE — PROGRESS NOTES
"Roland Sandoval is a 25 y.o. male who is being evaluated via a billable telephone visit.      The patient has been notified of following:     \"This telephone visit will be conducted via a call between you and your physician/provider. We have found that certain health care needs can be provided without the need for a physical exam.  This service lets us provide the care you need with a short phone conversation.  If a prescription is necessary we can send it directly to your pharmacy.  If lab work is needed we can place an order for that and you can then stop by our lab to have the test done at a later time.    Telephone visits are billed at different rates depending on your insurance coverage. During this emergency period, for some insurers they may be billed the same as an in-person visit.  Please reach out to your insurance provider with any questions.    If during the course of the call the physician/provider feels a telephone visit is not appropriate, you will not be charged for this service.\"    Patient has given verbal consent to a Telephone visit? Yes    Patient would like to receive their AVS by AVS Preference: Mail a copy.    Psychiatric  Out patient Follow Up Progress Note  Date of visit:2/10/2021         Discussion of Care and Treatment Recommendations:   This is a 25 y.o. male with  a history of depression presenting to the clinic today for follow-up appointment..         Last visit  01/11/2021Recommendation at last visit *.  1. Continue Zoloft 25 mg daily - MDD  2.Highly recommend increase   Psychotherapy session to weekly or biweekly: Pt will call therapist to request  3.Recommend abstain from THC- repots he has significantly reduced use and is working on  Total abstinence   4. RTC-  8  weeks  call in between visits with any questions or concerns   Patient and I reviewed diagnosis and treatment plan and patient agrees with following recommendations:  Ongoing education given regarding diagnostic and " "treatment options with adequate verbalization of understanding.  Plan   1. Continue Zoloft 25 mg daily - MDD  2.Highly recommend increase   Psychotherapy session to weekly or biweekly: Pt will call therapist to request  3.Recommend abstain from THC- repots he has significantly reduced use and is working on  Total abstinence   4. RTC-  8  weeks  call in between visits with any questions or concerns          DIagnoses:   Moderate episode of recurrent major depressive disorder       Patient Active Problem List   Diagnosis     Moderate episode of recurrent major depressive disorder (H)             Chief Complaint / Subjective:    Chief complaint: \" I am doing well\"     History of Present Illness:  Per patient's statement : He reports compliance with current medications denies side effects.  Feels that his symptoms are well managed at this time.  Still continues to use THC and is still working on weaning off but has continued to decrease use.  His symptoms are well managed on current medications.  He has been on a keto diet and is happy to report improved amount of weight loss and a boosting self-esteem.  I applauded him for these efforts.  He is fortunately is a good support system for him.  He feels stable on current dose of sertraline 25 mg.  He is not interested in medication changes today.    Mental Status Examination:     Appearance: unable to assess  Orientation: Patient alert and oriented to person, place, time, and situation  Reliability:  Patient appears to be an adequate historian.    Behavior: unable to assess  Speech: Speech is spontaneous and coherent, with a normal rate, rhythm and tone.    Language:There are no difficulties with expressive or receptive language as observed throughout the interview.    Mood: Described as \"ok\".    Affect: unable to assess  Judgement: Able to make basic decision regarding safety.  Insight: Good awareness of physical and mental health conditions and aware of needs around care " for these.  Gait and station: unable to assess  Thought process: Logical   Hallucinations : No evidence of any hallucination  Thought content: No evidence of delusions or paranoia.   Suicidal /Homical Ideations:  No thoughts of self harm or suicide. No thoughts of harming others.  Associations: Connected  Fund of knowledge: Average  Attention / Concentration: Able to remain focused during the interview with minimal distractibility or need for redirection.  Short Term Memory: Grossly intact as evidence by client recalling themes and ideas discussed.  Long Term Memory: Intact  Motor Status: unable to asse      Drug/treatment history and current pattern of use:   History of cannabis use    Medication changes: See Above   Medication adherence: compliant  Medication side effects: absent  Information about medications: Side effects, benefits and alternative treatments discussed and patient agrees .    Psychotherapy: Supportive therapy day-to-day living    Education: Diet, exercise, abstinence from drugs and alcohol, patient will not drive if sedated and medications or  under influence of any substance    Lab Results:   Personally reviewed and discussed with the patient    Lab Results   Component Value Date    WBC 8.4 09/16/2019    HGB 14.3 09/16/2019    HCT 43.6 09/16/2019     09/16/2019    CHOL 152 09/16/2019    TRIG 98 09/16/2019    HDL 34 (L) 09/16/2019    ALT 15 09/16/2019    AST 14 09/16/2019     09/16/2019    K 3.9 09/16/2019     (H) 09/16/2019    CREATININE 0.93 09/16/2019    BUN 15 09/16/2019    CO2 23 09/16/2019       Vital signs:  There were no vitals taken for this visit.  Unable to assess telephone visit  Allergies: Patient has no known allergies.           Review of Systems:      ROS:  Subjective data only - Tele-Health  Visit   10 point ROS was negative except for the items listed in HPI-              Medications:       Current Outpatient Medications on File Prior to Visit   Medication Sig  Dispense Refill     sertraline (ZOLOFT) 25 MG tablet Take 1 tablet (25 mg total) by mouth daily. 90 tablet 0     No current facility-administered medications on file prior to visit.              Coordination of Care:   More than 30 minutes spent on this visit  with more than 50% of time spent on coordination of care including: Educating patient about diagnosis, prognosis, side effects and benefits of medications, diet, exercise.  Time also spent providing supportive therapy regarding above issues.    This note was created using a dictation system. All typing errors or contextual distortion is unintentional and software inherent.    Daniela Colmenares NP  ;

## 2021-06-15 NOTE — PATIENT INSTRUCTIONS - HE
Continue medications as prescribed  Have your pharmacy contact us for a refill if you are running low on medications (We may ask you to come into clinic to get a refill from the nurse  No Alcohol or drug use  No driving if sedated  Call the clinic with any questions or concerns   Reach out for help if you feel like hurting yourself or others ([facility] [phone]: [address] [facility], [address] or [facility] Suicide Hotline [phone] , call 911 or go to nearest Emergency room    Follow up as directed, for your appointments, per your After Visit Summary Form.

## 2021-06-15 NOTE — PROGRESS NOTES
"Brief Diagnostic Assessment    Patient Name: Roland Sandoval  Patient : 1995  Patient Age: 22 y.o.    Date: 17  Start time: 1:00pm  Stop time: 2:00pm    Referring Provider:   Dr. Oni Lincoln    Persons Present:   Patient and therapist    Patient expectation for treatment:   Patient reports he would like to work on improving his mood and motivation and get his 's license.    Recipient's description of symptoms (including reason for referral):   Patient was referred by PCP, Dr. Lincoln, due to concerns regarding depression and recent hospitalization for suicidal ideation (17-17 at [facility]). Patient reports having suicidal ideation \"on and off\" and felt like he was at his ropes end, due to work and financial concerns and underlying unresolved loss and grief related to his mother's death and being bullied in middle/high school. Patient expressed that the hospitalization was helpful to take a break from life and \"opened my eyes\" as he participated in groups. Patient denies any current suicidal ideation. Patient expresses feeling much better today than he did a few weeks ago prior to the hospitalization.    Mental Status Exam:  Grooming: Well groomed  Attire: Appropriate  Age: Appears Stated  Behavior Towards Examiner: Cooperative  Motor Activity: Within normal   Eye Contact: Appropriate  Mood: Euthymic  Affect: Congruent w/content of speech  Speech/Language: Within normal  Attention: Within normal  Concentration: Within normal  Thought Process: Within normal  Thought Content: Within noraml  Within normal  Orientation: X 3No Evidence of Impairment  Memory: No Evidence of Impairment  Judgement: No Evidence of Impairment  Estimated Intelligence: Average  Demonstrated Insight: Adequate  Fund of Knowledge: adequate    Current living situation (including household membership and housing status):   Patient is renting an apartment with his boyfriend. Patient expressed some concerns regarding rent " being too high and having to clean the apartment due to pest/roaches/mice.    Basic needs status including economic status:   Patient reports basic needs are being met, no major concerns.    Education level:   Patient has his high school diploma. Patient reports he enrolled in college for a few weeks but dropped out due to lack of transportation and support to go to school. Patient reports he would like to continue his education in the future.    Employment status:   Patient is working part-time for a [employer] business, which has been going well but patient is also looking for a second job.    Significant personal relationships (including recipient's evaluation of relationship quality:  Patient reports being in a supportive relationship with his boyfriend of 6.5 months. Patient's dad lives in [place], [place], along with his 2 younger brothers, ages 16 and 18. Patient has a good relationship with them and visits them approximately once a year. Patient's mother passed away when patient was 8 years old. Patient thought that it was due to cancer but recently found out it was due to HIV from a bad needle. Patient has 2 older sisters who live in [place], ages 27 and 40. Patient reports having a close relationship with his 27-year-old sister. Patient also has some cousins in the area and still maintains contact with his family and friends in [place] as well.    Strengths and resources (including extent and quality of social networks):  Patient reports being friendly and talkative and trying to make everyone happy. Patient reports having a good support network.    Belief system:  Patient identified as being agnostic.    Contextual non-personal factors contributing to the recipient's presenting concerns:  Patient's mother passed away when he was 8-years-old, he had thought it was due to cancer, but recently the cause of death has come to light and he is figuring out how to deal with this new information. Patient also identifies as  "homosexual and reports that his family was very supportive of him when he was coming out.    General physical health and relationship to recipient's culture:  Patient identifies as  and reports utilizing Western medicine. No current major physical health concerns at this time.    Current medications:  No current outpatient prescriptions on file.      Substance use, abuse, or dependency:  Patient reports using THC occasionally, with last use being 1 month ago, but stated if he could afford to use it everyday he would. Patient reports THC helps him \"relax\" and \"chill out.\" Patient also reports drinking alcohol socially. No other reported drug use. No history of CD treatment.    Medical History  No past medical history on file.    Other standardized screening instruments:  WHODAS 2.0 score: 1/48=2% no problem, PHQ-9 score: 4 minimal depression, PARI-7 score: 6 mild anxiety, PANSI score: positive 4.3 and negative 1, low risk.     Clinical summary that explains the provisional diagnosis:  Patient is a 22-year-old  male who presents for diagnostic assessment for ongoing psychotherapy and referral to psychiatry. Patient was referred by PCP, Dr. Lincoln, due to concerns regarding depression and recent hospitalization for suicidal ideation (12/2/17-12/4/17 at [facility]). Patient reports having suicidal ideation \"on and off\" and felt like he was at his ropes end, due to work and financial concerns and underlying unresolved loss and grief related to his mother's death and being bullied in middle/high school. Patient had informed his sister he didn't want to live anymore and his plan was to \"wait until everyone was asleep and slit my wrists in the bath.\" Patient expressed that the hospitalization was helpful to take a break from life and \"opened my eyes\" as he participated in groups. Patient denies any current suicidal ideation and agrees to safety planning. Patient expresses feeling much better today " than he did a few weeks ago prior to the hospitalization. Patient is interested in starting therapy first and is reluctant to start medications, but would be open to medications in the future if he is still struggling to manage his depression.     Patient grew up in [place], [place]. Patient's mother passed away when he was 8-years-old, he had thought it was due to cancer, but recently the cause of death has come to light and he is figuring out how to deal with this new information. Patient's dad lives in [place], [place], along with his 2 younger brothers, ages 16 and 18. Patient has a good relationship with them and visits them approximately once a year. Patient has 2 older sisters who live in [place], ages 27 and 40. Patient reports having a close relationship with his 27-year-old sister. Patient also has some cousins in the area and still maintains contact with his family and friends in [place] as well. Patient identifies as homosexual and reports that his family was very supportive of him when he was coming out. Patient reports being in a supportive relationship with his boyfriend of 6.5 months but has had some not so positive relationships in the past. Patient has his high school diploma. Patient reports he enrolled in college for a few weeks but dropped out due to lack of transportation and support to go to school. Patient reports he would like to continue his education in the future. Patient is working part-time for a [employer] business, which has been going well but patient is also looking for a second job so he can save enough money for a car.    Patient endorsed the following symptoms: anxiety, depressed mood, increased irritability, elevated mood, mood swings, boredom, excessive fears, procrastination, lack of motivation, loss of interest in activities, decrease in social activities, distrust of others, inability to sleep, and decreased appetite. Patient denied any suicidal ideation within the last two weeks, but had a  recent hospitalization due to suicidal ideation within the last month. Patient denied any self-injurious behaviors, homicidal ideation, or psychotic symptoms. Patient reports social alcohol use and occasional THC use, but no history of significant CD abuse or treatment. Based on the reported symptoms and reported problems, patient meets DSM-5 criteria for Major Depressive Disorder, recurrent, moderate episode. Patient will follow up with referral to psychiatry for assessment per PCP request. Patient will start ongoing psychotherapy 2x/month. Future sessions will focus on building rapport and creating a treatment plan to alleviate symptoms of depression.       Diagnosis:   1. Moderate episode of recurrent major depressive disorder          RAND Medrano

## 2021-06-15 NOTE — PROGRESS NOTES
This video/telephone visit will be conducted via a call between you and your physician/provider. We have found that certain health care needs can be provided without the need for an in-person physical exam. This service lets us provide the care you need with a video /telephone conversation. If a prescription is necessary we can send it directly to your pharmacy. If lab work is needed we can place an order for that and you can then stop by our lab to have the test done at a later time.    Just as we bill insurance for in-person visits, we also bill insurance for video/telephone visits. If you have questions about your insurance coverage, we recommend that you speak with your insurance company.    Patient has given verbal consent for video/Telephone visit? yes  Patient would like telephone visit, please call: [phone]  JEFFERSON/ANN MARIE : Dami SHARMA LPN  Patient verified allergies, medications and pharmacy via phone. Patient states he  is ready for visit.    ________________________________________  Medications Phoned  to Pharmacy [] yes [x]no  Name of Pharmacist:  List Medications, including dose, quantity and instructions    Medications ordered this visit were e-scribed.  Verified by order class [x] yes  [] no  Zoloft  Medication changes or discontinuations were communicated to patient's pharmacy: [] yes  [x] no    Dictation completed at time of chart check: [x] yes  [] no    I have checked the documentation for today s encounters and the above information has been reviewed and completed.

## 2021-06-16 PROBLEM — F33.1 MODERATE EPISODE OF RECURRENT MAJOR DEPRESSIVE DISORDER (H): Status: ACTIVE | Noted: 2017-12-03

## 2021-06-16 NOTE — PROGRESS NOTES
Mental Health Visit Note    1/4/2018    Start time: 1:12pm    Stop Time: 1:50pm   Session # 1    Roland Sandoval is a 22 y.o. male is being seen today for    Chief Complaint   Patient presents with     MH Follow Up     Depression   .     New symptoms or complaints: Pt reports mood has been better, managing things well.    Functional Impairment:   Personal: 3  Family: 1  Work: 3  Social:2    Clinical assessment of mental status:   Grooming: Well groomed  Attire: Appropriate  Age: Appears Stated  Behavior Towards Examiner: Cooperative  Motor Activity: Within normal   Eye Contact: Appropriate  Mood: Euthymic  Affect: Congruent w/content of speech  Speech/Language: Within normal  Attention: Within normal  Concentration: Within normal  Thought Process: Within normal  Thought Content: Hallucinations: Within noraml  Delusions: Within normal  Orientation: X 3  Memory: No Evidence of Impairment  Judgement: No Evidence of Impairment  Estimated Intelligence: Average  Demonstrated Insight: Adequate  Fund of Knowledge: adequate      Suicidal/Homicidal Ideation present: None Reported This Session    Patient's impression of their current status: Patient was late for scheduled appointment today due to running late to catch the bus. Patient reports his boyfriend's car broke down so they have been having to take the bus. Patient shared that [place] and New Years was good and his mood has been better and that he has been managing things okay. He continues to work at his [employer] job but is looking for a second job and has a job interview coming up at [employer]. Patient shared that there were many things he was looking forward to in 2018, such as getting his license, getting a new car, and visiting his family in [place]. Patient reports the last time he visited his family in [place] was 2 years ago. He is excited to go on this trip with his boyfriend and introduce him to the family. Patient expressed interest in seeking  a family therapist to work on grief with his older sister, as he would like to ask his sister more about their mother's death and wants to find some closure.    Therapist impression of patients current state: Patient presented with euthymic mood and was engaged and cooperative. Patient openly discussed how things have been going for him and what he has been up to. Patient was able to identify needs and areas to work on. Patient is very future-oriented and identified many things to look forward to. Patient asked for support in finding a family therapist for patient and his sister to work on loss and grief. Therapist provided referrals to [facility] and [facility]. Patient denied any major concerns at this time. Next sessions will focus on treatment goal planning.    Type of psychotherapeutic technique provided: Insight oriented, Client centered and Solution-focused    Progress toward short term goals:Progress as expected, patient attended follow up session and developing rapport with therapist.    Review of long term goals: Not done at today's visit    Diagnosis:   1. Moderate episode of recurrent major depressive disorder        Plan and Follow up: Patient is scheduled for follow-up visit with therapist on 1/18/18 at 1pm and 1/30/18 at 1pm.      Discharge Criteria/Planning: Patient will continue with follow-up until therapy can be discontinued without return of signs and symptoms.    Victor Hugo Castillo 1/4/2018

## 2021-06-16 NOTE — PATIENT INSTRUCTIONS - HE
Continue medications as prescribed  Have your pharmacy contact us for a refill if you are running low on medications (We may ask you to come into clinic to get a refill from the nurse  No Alcohol or drug use  No driving if sedated  Call the clinic with any questions or concerns   Reach out for help if you feel like hurting yourself or others ([facility] [phone]: [address] [facility], [address] or [facility] Suicide Hotline [phone] , call 911 or go to nearest Emergency room    Follow up as directed, for your appointments, per your After Visit Summary Form.

## 2021-06-16 NOTE — PROGRESS NOTES
"      Date of Service:  3/1/2018    Name:  Roland Sandoval  :  1995  MRN:  [id number]    HPI:   Roland Sandoval is a 23 y.o. male  presenting to the clinic today at the referral of his primary care provider  also on for management depression.  Patient is not on any medications.  Dr. Lincoln's notes 2017 patient was not interested  with any medication during his visits on this day for Dr. Lincoln's documentation.  Continuing stressors to depression include :    \" Patient's mother passed away when he was 8-years-old, he had thought it was due to cancer, but recently the cause of death has come to light and he is figuring out how to deal with this new information. Patient also identifies as homosexual and reports that his family was very supportive of him when he was coming out\"       Patient presented to the clinic alone.  He is a very pleasant young man.  He was cooperative with the intake interview.  By his statement-depression and anxiety are currently under control but since he fluctuates so much with.'s of depression in and out he would like to consider an antidepressant today.  He tells me that after his hospitalizations at [facility] in December he did start seeing a therapist twice a month and this has been very helpful.  However he would like to consider some medications to keep him stabilized throughout the year.  He is currently dating a boyfriend is a good support system for him.  He also is walking full-time and is enjoying his new job.  He has a good relationship with his father who lives in [place] and is planning to visit pretty soon.  His father is also a good support system for him.  He is denying any hypomania symptoms, denies delusions hallucinations paranoia.  Denies suicidal homicidal ideations does not appear to be in any apparent distress does not appear to be in any imminent danger to himself or others.  Verbally contracts for safety.  Currently denies any " depression.  We discussed his current reported THC use which is twice a week.  Discussion included the fact that it may cause increase or exacerbate anxiety depression suicidal ideations or hallucinations.  My recommendation was for him to abstain from marijuana or any mood altering substances.  Patient endorsed understanding.  He tells me he will start weaning himself off and eventually abstain.  I applauded him for how well and positively he approached this topic.  We discussed antidepressant medications with him.  I discussed FDA warning for individuals and is a 8 of under 25 which includes activation of suicidal ideations with activations overdose increase.  Patient endorsed understanding.  I recommend that we start him on 25 mg of Zoloft today which is a lower dose and have him follow-up in the next 10-12 weeks.  Encouraged him to call me in between visits with any questions or concerns.  Crisis numbers have been provided.  I also encouraged him to call 911 if he cannot remember crisis number and he feels like he is in a crisis of he has any suicidal ideations.  He endorsed understanding.        Psychiatric History:  Current psychiatrist: None   Current psychotherapist: RAND Medrano  Current : None   Diagnoses: MDD   Hospitalizations: depression and recent hospitalization for suicidal ideation (12/2/17-12/4/17 at [facility])  Suicide attempts: Denies  Current medications: None .  Electroconvulsive therapy: None .  Judicial commitments: Non e.        Chemical use History:             THC- 2 times weekly   Alcohol : Socially - once month         Past Medical History:           THC occasionally       No past medical history on file.    No past surgical history on file.     Family Psychiatric History:      Mental illness: Mom side - anxiety   Addiction: Denies   Suicide: Denies       Social History:       Marital Status :Single, dating - boyfriend   Number of children:  None   Current living  "circumstances:  None   Current sources of financial support: working part-time        History  Denied  service.    Access to weapons  Denies access to weapons.            Trauma & Abuse History:  Major accidents and injuries: Denies   Concussion or traumatic brain injury: Denies   Abuse: Denies     Spiritual History:  Sources of hope, meaning, comfort, strength, peace and love: \" \" Music , reading \"   Part of an organized Baptism:Patient identified as being agnostic    Birth & Development History:  City and state of birth:  [facility]   Living circumstances:  Lives with boyfriend in [place]   Highest education achieved: high school diploma.    Legal History:  Denies       [place] Prescription Monitoring Program:  No worrisome pharmacy activity.  Not indicated for this patient.    Medications:   These were reviewed.  No current outpatient prescriptions on file prior to visit.     No current facility-administered medications on file prior to visit.        Lab Results:   Personally reviewed and discussed with the patient    Lab Results   Component Value Date    WBC 5.6 09/27/2017    HGB 12.9 (L) 09/27/2017    HCT 41.8 09/27/2017     09/27/2017    CHOL 149 09/27/2017    TRIG 113 09/27/2017    HDL 39 (L) 09/27/2017    ALT 17 09/27/2017    AST 17 09/27/2017     09/27/2017    K 3.9 09/27/2017     09/27/2017    CREATININE 0.99 09/27/2017    BUN 18 09/27/2017    CO2 24 09/27/2017     No results found for: PHENYTOIN, PHENOBARB, VALPROATE, CBMZ    Vital signs:   Vitals:    03/01/18 1400   BP: 145/77   Patient Site: Right Arm   Patient Position: Sitting   Cuff Size: Adult Regular   Pulse: 68   Temp: 98.1  F (36.7  C)   TempSrc: Oral   Weight: 173 lb (78.5 kg)   Height: 5' 9\" (1.753 m)     Allergies:   Review of patient's allergies indicates no known allergies.        Associated Clinical Documents:       Notes reviewed in EPIC and [facility] including: medication reconciliation, progress notes, " recent labs, PMH, and OSH records.    ROS:       10 point ROS was negative except for the items listed in HPI.  No Medication s/e's      MSE:      Alert & oriented x 3.   Appearance: Appears stated age, casually dressed.  Speech: Normal rate, rhythm and tone.  Gait: Normal.  Musculoskeletal: Normal strength, no abnormal movements.  Mood/Affect: Neutral.  Thought Process: Normal rate, logical.  Thought Content: No suicide or homicide ideation.  Associations: Intact, no delusions.  Perceptions: No hallucinations.  Memory: recent and remote memory intact.  Attention span and concentration: normal.  Language: Intact.  Fund of Knowledge: Normal.  Insight and Judgement: Adequate.    Clinical Outcome Measures:  1. PHQ-9: Total score : 2  2. MDQ: Total score : 1  3. PARI-7: Total score : 4  Impression:           Moderate episode of recurrent major depressive disorder     Plan:         Patient and I reviewed diagnosis and treatment plan.   Reviewed risks/benefits of medication with patient.  Ongoing education given regarding diagnostic and treatment options with adequate verbalization of understanding  Patient agrees with following recommendations:    1. Zoloft 25 mg daily - MDD  2.Continue with psychotherapy - twice monthly -  3.Recommend abstain from THC  4. RTC- 11-12 weeks, call in between visits with any questions or concerns       Total Time:      60 Minutes spent on this visit with >50% time spent on  discussing and educating patient about diagnosis, treatment options, risks, benefits ,side effects of medications and instructions for follow up.  Time also spent on reviewing  EHR, documentation and entering orders.      This dictation was completed with speech recognition software and there may be unintended word substitutions.

## 2021-06-16 NOTE — PROGRESS NOTES
"Mental Health Visit Note    2/20/2018    Start time: 1:00pm    Stop Time: 1:45pm   Session # 3    Roland Sandoval is a 23 y.o. male is being seen today for    Chief Complaint   Patient presents with      Follow Up     Depression   .     New symptoms or complaints: Pt reports increased depressed mood and interest in starting medication treatment. Patient has psych appointment on 3/1/18.    Functional Impairment:   Personal: 3  Family: 1  Work: 3  Social:1    Clinical assessment of mental status:   Grooming: Well groomed  Attire: Appropriate  Age: Appears Stated  Behavior Towards Examiner: Cooperative  Motor Activity: Within normal   Eye Contact: Appropriate  Mood: Tired  Affect: Congruent w/content of speech  Speech/Language: Within normal  Attention: Within normal  Concentration: Within normal  Thought Process: Within normal  Thought Content: Hallucinations: Within noraml  Delusions: Within normal  Orientation: X 3  Memory: No Evidence of Impairment  Judgement: No Evidence of Impairment  Estimated Intelligence: Average  Demonstrated Insight: Adequate  Fund of Knowledge: adequate      Suicidal/Homicidal Ideation present: None Reported This Session    Patient's impression of their current status: Patient presented on time for scheduled appointment today. 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 Patient reports he still maintains his job at [employer] and that is going well. Patient endorsed having had some mood swings recently, feeling very depressed about 2 weeks ago. Patient stated he was not sure what was contributing to his depressed mood. Patient denied any suicidal thoughts, intentions, or plans to hurt himself. Patient reports he went on with his normal routine during his depressive episodes, but just slept more and felt less motivated to socialize. Patient reports his boyfriend and " sister has been supportive to him. Patient reports he missed his psych appointment previously, and got it rescheduled for 3/1/18, and is now interested in starting on antidepressants to help him manage his mood.    Therapist impression of patients current state: Patient presented with tired mood but was engaged, cooperative, and expressive. Patient updated therapist on recent events. Patient endorsed having had some depressive episodes in recent weeks. Processed thoughts and feelings with patient and validated patient's experience. Patient displayed insight into his mood and symptoms but struggled with identifying triggers for his mood. Patient expressed interest in starting antidepressant to help manage his mood. Patient was able to identify coping skills and things to look forward to, such as his upcoming birthday which his boyfriend is planning a celebration for. Reviewed treatment plan with patient.    Type of psychotherapeutic technique provided: Insight oriented, Client centered and Solution-focused    Progress toward short term goals:Progress as expected, patient engaged, expressive, and receptive to feedback.    Review of long term goals: Date of last review 2/20/18    Diagnosis:   1. Moderate episode of recurrent major depressive disorder        Plan and Follow up: Patient is scheduled for follow up visit with therapist on 3/14/18 at 1pm and 4/4/18 at 1pm.      Discharge Criteria/Planning: Patient will continue with follow-up until therapy can be discontinued without return of signs and symptoms.    RAND Medrano 2/20/2018

## 2021-06-16 NOTE — PROGRESS NOTES
Outpatient Mental Health Treatment Plan    Name:  Roland Sandoval  :  1995  MRN:  [id number]    Treatment Plan:  Initial Treatment Plan  Intake/initial treatment plan date:  17  Benefit and risks and alternatives have been discussed: Yes  Is this treatment appropriate with minimal intrusion/restrictions: Yes  Estimated duration of treatment:  Approximately 20 sessions.  Anticipated frequency of services:  Every 2-3 weeks  Necessity for frequency: This frequency is needed to establish therapeutic goals and for continuity of care in order to monitor progress.  Necessity for treatment: To address cognitive, behavioral, and/or emotional barriers in order to work toward goals and to improve quality of life.    Plan:      ? Depression    Goal:  Decrease average depression level from 4 to 2.   Strategies:    ?[x] Decrease social isolation     [x] Increase involvement in meaningful activities     ?[x] Discuss sleep hygiene     ?[x] Explore thoughts and expectations about self and others     ?[x] Process grief (loss of significant person, independence, role,   etc.)     ?[x] Assess for suicide risk     ?[x] Implement physical activity routine (with physician approval)     [x] Consider introduction of bibliotherapy and/or videos     [x] Continue compliance with medical treatment plan (or explore  barriers)  ?  ?Degree to which this is a problem: 4  Degree to which goal is met: 1  Date of Review: 3 months         Functional Impairment:  1=Not at all/Rarely  2=Some days  3=Most Days  4=Every Day    Personal : 4  Family : 2  Social : 2   Work/school : 2    Diagnosis:  (EXAMPLE of DSM V: Major depressive disorder, recurrent, moderate; Generalized Anxiety disorder; borderline personality per patient PHI; fibromyalgia, History of breast cancer in remission; Problem with primary relationship.)   1. Moderate episode of recurrent major depressive disorder        WHODAS 2.0 12-item version: =2% no problem       Clinical assessments and measures completed: PHQ-9 score: 4 minimal depression, PARI-7 score: 6 mild anxiety, PANSI score: positive 4.3 and negative 1, low risk.     Strengths:  Patient reports being friendly and talkative and trying to make everyone happy. Patient reports having a good support network.  Limitations:  Patient sometimes struggle with identifying triggers for depressive episodes, may benefit from increased self-awareness to better understand early warning signs.  Cultural Considerations: Patient identifies as an  and quick male. Patient reports his family has been supportive of him during his coming out process.    Persons responsible for this plan: Patient and Provider      RAND Medrano  Psychotherapist Signature           Patient Signature:              Guardian Signature             Provider: Performed and documented by RAND Medrano   Date:  2/20/2018

## 2021-06-16 NOTE — CONFIDENTIAL NOTE
Psychiatric  Out patient Follow Up Progress Note  Date of visit:4/7/2021         Discussion of Care and Treatment Recommendations:   This is a 26 y.o. male with a history of depression presenting to the clinic today for follow-up appointment      Last visit  02/10/2021.  Recommendation at last visit .  1. Continue Zoloft 25 mg daily - MDD  2.Highly recommend increase   Psychotherapy session to weekly or biweekly: Pt will call therapist to request  3.Recommend abstain from THC- repots he has significantly reduced use and is working on  Total abstinence   4. RTC-  8  weeks  call in between visits with any questions or concerns   Patient and I reviewed diagnosis and treatment plan and patient agrees with following recommendations:  Ongoing education given regarding diagnostic and treatment options with adequate verbalization of understanding.  Plan   1. Continue Zoloft 25 mg daily - MDD  2.Highly recommend increase   Psychotherapy session to weekly or biweekly: Pt will call therapist to request  3.Recommend abstain from THC- repots he has significantly reduced use and is working on  Total abstinence   4. RTC-  8  weeks  call in between visits with any questions or concerns          DIagnoses:   Moderate episode of recurrent major depressive disorder       Patient Active Problem List   Diagnosis     Moderate episode of recurrent major depressive disorder (H)             Chief Complaint / Subjective:    Chief complaint: Depression    History of Present Illness:   Per patient's statement : Patient has been compliant with current medications denies side effects.  States that he is doing well in the low-dose of Zoloft 25 mg has been effective in managing his depression and anxiety.  He continues to work with his therapist.  He is also smoking cannabis occasionally and still working on weaning off.  He however feels stable on current medications and would like to continue the same plan of care.  Mental Status Examination:  "    Appearance: unable to assess  Orientation: Patient alert and oriented to person, place, time, and situation  Reliability:  Patient appears to be an adequate historian.    Behavior: unable to assess  Speech: Speech is spontaneous and coherent, with a normal rate, rhythm and tone.    Language:There are no difficulties with expressive or receptive language as observed throughout the interview.    Mood: Described as \"ok\".    Affect: unable to assess  Judgement: Able to make basic decision regarding safety.  Insight: Good awareness of physical and mental health conditions and aware of needs around care for these.  Gait and station: unable to assess  Thought process: Logical   Hallucinations : No evidence of any hallucination  Thought content: No evidence of delusions or paranoia.   Suicidal /Homical Ideations:  No thoughts of self harm or suicide. No thoughts of harming others.  Associations: Connected  Fund of knowledge: Average  Attention / Concentration: Able to remain focused during the interview with minimal distractibility or need for redirection.  Short Term Memory: Grossly intact as evidence by client recalling themes and ideas discussed.  Long Term Memory: Intact  Motor Status: unable to asse      Drug/treatment history and current pattern of use:   History of cannabis use    Medication changes: See Above   Medication adherence: compliant  Medication side effects: absent  Information about medications: Side effects, benefits and alternative treatments discussed and patient agrees .    Psychotherapy: Supportive therapy day-to-day living    Education: Diet, exercise, abstinence from drugs and alcohol, patient will not drive if sedated and medications or  under influence of any substance    Lab Results:   Personally reviewed and discussed with the patient    Lab Results   Component Value Date    WBC 8.4 09/16/2019    HGB 14.3 09/16/2019    HCT 43.6 09/16/2019     09/16/2019    CHOL 152 09/16/2019    TRIG " 98 09/16/2019    HDL 34 (L) 09/16/2019    ALT 15 09/16/2019    AST 14 09/16/2019     09/16/2019    K 3.9 09/16/2019     (H) 09/16/2019    CREATININE 0.93 09/16/2019    BUN 15 09/16/2019    CO2 23 09/16/2019       Vital signs:  There were no vitals taken for this visit.  Unable to assess telephone visit  Allergies: Patient has no known allergies.           Review of Systems:      ROS:  Subjective data only - Tele-Health  Visit   10 point ROS was negative except for the items listed in HPI-              Medications:     Current Outpatient Medications on File Prior to Visit   Medication Sig Dispense Refill     sertraline (ZOLOFT) 25 MG tablet Take 1 tablet (25 mg total) by mouth daily. 90 tablet 0     No current facility-administered medications on file prior to visit.                  Coordination of Care:   More than 30 minutes spent on this visit  with more than 50% of time spent on coordination of care including: Educating patient about diagnosis, prognosis, side effects and benefits of medications, diet, exercise.  Time also spent providing supportive therapy regarding above issues.    This note was created using a dictation system. All typing errors or contextual distortion is unintentional and software inherent.

## 2021-06-16 NOTE — PROGRESS NOTES
"Mental Health Visit Note    1/18/2018    Start time: 1:00pm    Stop Time: 1:45pm   Session # 2    Roland Sandoval is a 22 y.o. male is being seen today for    Chief Complaint   Patient presents with      Follow Up     Depression   .     New symptoms or complaints: None    Functional Impairment:   Personal: 3  Family: 1  Work: 3  Social:2    Clinical assessment of mental status:   Grooming: Well groomed  Attire: Appropriate  Age: Appears Stated  Behavior Towards Examiner: Cooperative  Motor Activity: Within normal   Eye Contact: Appropriate  Mood: Euthymic  Affect: Congruent w/content of speech  Speech/Language: Within normal  Attention: Within normal  Concentration: Within normal  Thought Process: Within normal  Thought Content: Hallucinations: Within noraml  Delusions: Within normal  Orientation: X 3  Memory: No Evidence of Impairment  Judgement: No Evidence of Impairment  Estimated Intelligence: Average  Demonstrated Insight: Adequate  Fund of Knowledge: adequate      Suicidal/Homicidal Ideation present: None Reported This Session    Patient's impression of their current status: Patient presented on time for scheduled appointment today. Patient reports that things are \"good.\" He shared that he started his new job as an  at [email or web address], working from 11pm-7am. Patient reports he's been managing the schedule well and don't feel like it has affected his sleep too much. Patient expressed being less worried about rent and other finances now that he has this second job. Patient shared that he is looking forward to saving a little extra money for a car and for his trip to visit his family in [place], [place]. Patient talked about his hospitalization in December and how the stress from his old job at [employer] was affecting him. Patient reports being glad he went to the hospital and reports learning how to open up more and ask for support from his family.    Therapist impression of patients current state: " Patient presented with euthymic mood and was engaged and cooperative. Patient openly discussed how things have been going for him and what he has been up to. No new concerns reported. Patient reflected about previous hospitalization and it's impact. Processed thoughts and feelings with patient and validated patient's experience and efforts to maintain stability. Will continue to follow-up with patient and work on developing treatment goals.    Type of psychotherapeutic technique provided: Insight oriented, Client centered and CBT    Progress toward short term goals:Progress as expected, patient attended follow up session and developing rapport with therapist.    Review of long term goals: Not done at today's visit    Diagnosis:   1. Moderate episode of recurrent major depressive disorder        Plan and Follow up: Patient is scheduled for follow-up visit with therapist on 1/30/18 at 1pm.      Discharge Criteria/Planning: Patient will continue with follow-up until therapy can be discontinued without return of signs and symptoms.    ASHLEY Medrano 1/18/2018

## 2021-06-16 NOTE — PROGRESS NOTES
Issues/Concerns: Patient is here to establish psychiatric care. Open to medication.    Depression: denies  Anxiety: denies  Sleep: pretty regular, sleeps 6-8 hr/ noc  SI/HI: denies, has crisis numbers    Medical Issues: high blood pressure runs in the family    PHQ: 2 not difficult  PARI: 4 not difficult  MDQ: 1 not difficult    GELA SULLIVAN  Search Criteria: Last Name 'gela' and First Name 'linda' and  =  and Request Period =   to   *N/R N=New R=Refill  +MED Daily  Patients that match search criteria  ----------------------------------------------------------------------------------------------------  No patient records available        Correct pharmacy verified with patient and confirmed in snapshot? [x] yes []no    Charge captured ? [x] yes  [] no    Medications Phoned  to Pharmacy [] yes [x]no  Name of Pharmacist:  List Medications, including dose, quantity and instructions      Medication Prescriptions given to patient   [] yes  [x] no   List the name of the drug the prescription was written for.       Medications ordered this visit were e-scribed.  Verified by order class [x] yes  [] no    Medication changes or discontinuations were communicated to patient's pharmacy: [] yes  [x] no    UA collected [] yes  [x] no    [place] Prescription Monitoring Program Reviewed? [x] yes  [] no , no entries    Referrals were made to:  none    Future appointment was made: [x] yes  [] no    Dictation completed at time of chart check: [x] yes  [] no    I have checked the documentation for today s encounters and the above information has been reviewed and completed.

## 2021-06-16 NOTE — PROGRESS NOTES
This video/telephone visit will be conducted via a call between you and your physician/provider. We have found that certain health care needs can be provided without the need for an in-person physical exam. This service lets us provide the care you need with a video /telephone conversation. If a prescription is necessary we can send it directly to your pharmacy. If lab work is needed we can place an order for that and you can then stop by our lab to have the test done at a later time.    Just as we bill insurance for in-person visits, we also bill insurance for video/telephone visits. If you have questions about your insurance coverage, we recommend that you speak with your insurance company.    Patient has given verbal consent for video/Telephone visit? yes  Patient would like telephone visit, please call: [phone]  JEFFERSON/ANN MARIE : Dami SHARMA LPN  Patient verified allergies, medications and pharmacy via phone. Patient states he  is ready for visit.    : Provider to review    ________________________________________  Medications Phoned  to Pharmacy [] yes [x]no  Name of Pharmacist:  List Medications, including dose, quantity and instructions    Medications ordered this visit were e-scribed.  Verified by order class [x] yes  [] no  Sertraline  Medication changes or discontinuations were communicated to patient's pharmacy: [] yes  [x] no    Dictation completed at time of chart check: [x] yes  [] no    I have checked the documentation for today s encounters and the above information has been reviewed and completed.

## 2021-06-16 NOTE — PROGRESS NOTES
"Mental Health Visit Note    3/14/2018    Start time: 1:00pm    Stop Time: 1:40pm   Session # 4    Roland Sandoval is a 23 y.o. male is being seen today for    Chief Complaint   Patient presents with      Follow Up     Depression   .     New symptoms or complaints: None    Functional Impairment:   Personal: 2  Family: 1  Work: 2  Social:1    Clinical assessment of mental status:   Grooming: Well groomed  Attire: Appropriate  Age: Appears Stated  Behavior Towards Examiner: Cooperative  Motor Activity: Within normal   Eye Contact: Appropriate  Mood: Euthymic  Affect: Congruent w/content of speech  Speech/Language: Within normal  Attention: Within normal  Concentration: Within normal  Thought Process: Within normal  Thought Content: Hallucinations: Within noraml  Delusions: Within normal  Orientation: X 3  Memory: No Evidence of Impairment  Judgement: No Evidence of Impairment  Estimated Intelligence: Average  Demonstrated Insight: Adequate  Fund of Knowledge: adequate      Suicidal/Homicidal Ideation present: None Reported This Session    Patient's impression of their current status: Patient presented on time for scheduled appointment today. Patient reported feeling \"good\" today. He shared that he went to his psychiatry appointment and has started on Zoloft 25mg. He reports feeling like it's working well so far, noting the side effect that his appetite has been on and off. Patient states he doesn't feel hungry but feels like he should be eating more, so he's been forcing himself to get up and make breakfast. Patient reports his psychiatrist reviewed all side effects with him, so his boyfriend and sister are both keeping an eye on him, and he's being mindful of suicidal ideation, but reports he hasn't had any at all. Patient reports his sleep is good, work has been going well, he celebrated his 23nd birthday and had a good time. Patient reports feeling \"happy\" and not even thinking about his depression much. " Therapist inquired about patient's THC use, as patient indicated that he would try to wean himself off it. Patient reports he currently uses THC 3x/week, recreationally, to help him relax, but denied using it when he's in a depressive mood. Patient reports he doesn't see himself using THC for the rest of his life and is motivated to decrease his use. Patient shared that his mother's birthday is coming up on 4/21 and he and his sister and their significant others, have a tradition of going down to the [facility] to reflect on their memories of their mom. Patient reports he and his sister miss their mom very much, but that it has been nice to have each other to lean on, and patient has come to have more closure regarding mother's death.     Therapist impression of patients current state: Patient presented with euthymic mood today and was engaged, cooperative, and expressive. Patient updated therapist on recent events, such as his psych appointment, birthday, getting a car, planning for mom's birthday in April, and planning for trip to [place] in May. Processed thoughts and feelings with patient. Patient displayed a lot of insight to his mood and symptoms and reported feeling really good right now. Patient also identified being very proud of the people around him, his boyfriend, his sister, and his friend, as they're all doing great things right now as well. Therapist reflected back to patient that he does appear to be in a really good place right now, as he is able to not only recognize strength in himself but in others around him as well. Patient reports coming to therapy has been helpful and patient has noticed his own personal growth and maturity. Therapist will continue to follow-up with patient and build on patient strengths.    Type of psychotherapeutic technique provided: Insight oriented and Client centered    Progress toward short term goals:Progress as expected, patient engaged, expressive, and  receptive to feedback.    Review of long term goals: Date of last review 2/20/18    Diagnosis:   1. Moderate episode of recurrent major depressive disorder        Plan and Follow up: Patient is scheduled for follow up visit with therapist on 4/4/18 at 1pm and 4/18/18 at 1pm.      Discharge Criteria/Planning: Patient will continue with follow-up until therapy can be discontinued without return of signs and symptoms.    RAND Medrano 3/14/2018

## 2021-06-17 NOTE — PROGRESS NOTES
Mental Health Visit Note    4/4/2018    Start time: 1:00pm    Stop Time: 1:40pm   Session # 5  Roland Sandoval is a 23 y.o. male is being seen today for    Chief Complaint   Patient presents with     MH Follow Up     Depression   .     New symptoms or complaints: None    Functional Impairment:   Personal: 2  Family: 1  Work: 3  Social:1    Clinical assessment of mental status:   Grooming: Well groomed  Attire: Appropriate  Age: Appears Stated  Behavior Towards Examiner: Cooperative  Motor Activity: Within normal   Eye Contact: Appropriate  Mood: Euthymic  Affect: Congruent w/content of speech  Speech/Language: Within normal  Attention: Within normal  Concentration: Within normal  Thought Process: Within normal  Thought Content: Hallucinations: Within noraml  Delusions: Within normal  Orientation: X 3  Memory: No Evidence of Impairment  Judgement: No Evidence of Impairment  Estimated Intelligence: Average  Demonstrated Insight: Adequate  Fund of Knowledge: adequate      Suicidal/Homicidal Ideation present: None Reported This Session    Patient's impression of their current status: Patient presented on time for scheduled appointment today. Patient shared that he recently got fired from his job this week. 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 He then showed up to his next shift and was notified that he had been fired due to missing a shift. Patient expressed frustration about the situation, stating that he tried to talk to his manager about it, but they already decided to fire him, so he was not going to try to let it get to him too much. Patient reports he started looking for a new job right away and has been applying to a few different places. Patient reports rent has already been paid for the month, so he is good with that, but will work hard to  obtain a job before the next month. Patient denied having too many worries about it, stating that he can typically find a job pretty quick. Patient also shared that it is one year anniversary with his boyfriend. They were planning to celebrate by going to dinner where they first met, but since they are short on money right now, they will be staying in for a movie night instead. Patient reports personal and family relationships have been good. He reports his medications seem to be working well, he denied any depression or suicidal ideation. He reports his appetite is coming back and his sleep has been good. No major concerns reported.    Therapist impression of patients current state: Patient presented with euthymic mood today and was engaged, cooperative, and expressive. Patient updated therapist on recent events, such as losing his job. Although patient expressed some frustration about his job situation, he maintained optimism and displayed motivation to look for a new job. Processed thoughts and feelings with patient and validated patient's experience. Patient was able to identify upcoming activities that he was looking forward to and personal goals that he would like to continue to work on. Therapist will continue to follow up with patient and support patient with treatment goals.    Type of psychotherapeutic technique provided: Insight oriented and Client centered    Progress toward short term goals:Progress as expected, patient engaged, expressive, and receptive to feedback.    Review of long term goals: Date of last review 2/20/18    Diagnosis:   1. Moderate episode of recurrent major depressive disorder        Plan and Follow up: Patient is scheduled for follow up visit with therapist on 4/18/18 at 1pm and 5/2/18 at 1pm.      Discharge Criteria/Planning: Patient will continue with follow-up until therapy can be discontinued without return of signs and symptoms.    RAND Medrano 4/4/2018

## 2021-06-17 NOTE — TELEPHONE ENCOUNTER
Telephone Encounter by Danyelle Novak RN at 12/2/2020  2:55 PM     Author: Danyelle Novak RN Service: -- Author Type: Registered Nurse    Filed: 12/2/2020  2:57 PM Encounter Date: 12/2/2020 Status: Signed    : Danyelle Novak RN (Registered Nurse)       Sertraline previously denied due to no follow up appt.  Patient now has follow up appt with provider on 1/11/2020. pls see notes from previous encounter as needed.           11:49 AM  Note     Date of Last Office Visit: 4-8-20  Date of Next Office Visit: none  No shows since last visit: 0  Cancellations since last visit: 0  ED visits since last visit:  0  Medication zoloft  date last ordered: 4-8-20  Qty: 90  Refills: 0  Lapse in therapy greater than 7 days: yes  Medication refill request verified as identical to current order: yes  Result of Last DAM, VPA, Li+ Level, CBC, or Carbamazepine Level (at or since last visit): N/A      []? Medication refilled per [facility].   [x]? Medication unable to be refilled by RN due to criteria not met as indicated below:                []?Eligibility - not seen in last year               [x]?Supervision - no future appointment               [x]?Compliance                []?Verification - order discrepancy               []?Controlled Medication               []?Medication not included in RN Protocol               [x]?90 - day supply request               []?Other            Current Medication list:  Your Current Medications Are     sertraline (ZOLOFT) 25 MG tablet Take 1 tablet (25 mg total) by mouth daily.         Medication Plan of Care at last office visit with MD/CNP: Plan   1. Continue Zoloft 25 mg daily - MDD  2.Highly recommend increase   Psychotherapy session to weekly or biweekly: Pt will call therapist to request  3.Recommend abstain from THC- repots he has significantly reduced use and is working on  Total abstinence   4. RTC-  4 weeks  call in between visits with any questions or concerns

## 2021-06-17 NOTE — PROGRESS NOTES
"Mental Health Visit Note    2018    Start time: 1:15pm    Stop Time: 1:55pm   Session # 6    Roland Sandoval is a 23 y.o. male is being seen today for    Chief Complaint   Patient presents with      Follow Up     Depression   .     New symptoms or complaints: None    Functional Impairment:   Personal: 2  Family: 1  Work: 3  Social:1    Clinical assessment of mental status:   Grooming: Well groomed  Attire: Appropriate  Age: Appears Stated  Behavior Towards Examiner: Cooperative  Motor Activity: Within normal   Eye Contact: Appropriate  Mood: Euthymic  Affect: Congruent w/content of speech  Speech/Language: Within normal  Attention: Within normal  Concentration: Within normal  Thought Process: Within normal  Thought Content: Hallucinations: Within noraml  Delusions: Within normal  Orientation: X 3  Memory: No Evidence of Impairment  Judgement: No Evidence of Impairment  Estimated Intelligence: Average  Demonstrated Insight: Adequate  Fund of Knowledge: adequate      Suicidal/Homicidal Ideation present: None Reported This Session    Patient's impression of their current status: Patient reported doing \"okay\" today, stating that he overslept, which was why he was running late to appointment today. Patient discussed recent events, such as attending his boyfriend's grandmother's , participating in a video game fighting tournament, and spending time with family and friends. Patient reports things have been going well, he continues to take his medications as prescribed, and feels like they are helping, stating he feels more like himself again. Patient reports his sleep and appetite is regular again and he is feeling rested. Patient reports he is using THC 1-2x per week, but has slowly been decreasing his use per psychiatrist recommendation. Patient reports he continues to look for a job, as his biggest concern right now is to have spending money for his planned trip to [place], [place], in May, to visit his " family and friends.     Therapist impression of patients current state: Patient presented with euthymic mood today and was engaged, cooperative, and expressive. Patient denied any major concerns at this time, and updated therapist on recent events and upcoming events as well. Processed thoughts and feelings with patient. Patient was insightful and receptive to feedback. Patient able to identify support network and effective coping skills. Patient will continue to follow up with therapy.    Type of psychotherapeutic technique provided: Insight oriented and Client centered    Progress toward short term goals:Progress as expected, patient engaged, expressive, and receptive to feedback.    Review of long term goals: Date of last review 2/20/18    Diagnosis:   1. Moderate episode of recurrent major depressive disorder        Plan and Follow up: Patient is scheduled for follow up visit with therapist on 5/2/18 at 1pm.      Discharge Criteria/Planning: Patient will continue with follow-up until therapy can be discontinued without return of signs and symptoms.    RAND Medrano 4/18/2018

## 2021-06-17 NOTE — PROGRESS NOTES
"Mental Health Visit Note    5/3/2018    Start time: 1:10pm    Stop Time: 1:50pm   Session # 7    Roland Sandoval is a 23 y.o. male is being seen today for    Chief Complaint   Patient presents with      Follow Up     Depression   .     New symptoms or complaints: None    Functional Impairment:   Personal: 2  Family: 1  Work: 3  Social:1    Clinical assessment of mental status:   Grooming: Well groomed  Attire: Appropriate  Age: Appears Stated  Behavior Towards Examiner: Cooperative  Motor Activity: Within normal   Eye Contact: Appropriate  Mood: Euthymic  Affect: Congruent w/content of speech  Speech/Language: Within normal  Attention: Within normal  Concentration: Within normal  Thought Process: Within normal  Thought Content: Hallucinations: Within noraml  Delusions: Within normal  Orientation: X 3  Memory: No Evidence of Impairment  Judgement: No Evidence of Impairment  Estimated Intelligence: Average  Demonstrated Insight: Adequate  Fund of Knowledge: adequate      Suicidal/Homicidal Ideation present: None Reported This Session    Patient's impression of their current status: Patient reports that things have been \"great,\" he has been enjoying the warmer weather and able to get out and spend time with family and friends. Patient expressed some stress related to finances and struggling to come up with rent this month, due to not having a job, but he and his boyfriend have been able to get some help from family members to make ends meet. 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 Patient also shared that he did get hired at [employer] and will be starting orientation on 5/22/18 at 5pm, after he returns from his trip to [place], [place]. Patient reports looking forward to the road trip and having his boyfriend meet the rest of his family. No other major concerns reported. Patient stated that his mood has been good, his sleep and appetite is " normal, and medications are working well. Patient reports that he did go two days without medications, as he ran out of refills and had to call for authorization for refills, but picked up his medications today and restarted them.      Therapist impression of patients current state: Patient presented with euthymic mood and was engaged, open and expressive. Patient updated therapist on recent events and stressors but reports managing things with the support of his boyfriend and family. Processed thoughts and feelings and praised patient on his progress. Patient displayed insight regarding his mood and symptoms and maintained positive outlook. Patient able to identify support network and effective coping skills.  Patient will continue to follow up with therapy for symptom management.    Type of psychotherapeutic technique provided: Insight oriented and Client centered    Progress toward short term goals:Progress as expected, patient engaged, expressive, and receptive to feedback.    Review of long term goals: Date of last review 2/20/18    Diagnosis:   1. Moderate episode of recurrent major depressive disorder        Plan and Follow up: Patient is scheduled for follow up visit with therapist on 5/30/18 at 1pm.      Discharge Criteria/Planning: Patient will continue with follow-up until therapy can be discontinued without return of signs and symptoms.    RAND Medrano 5/3/2018

## 2021-06-18 NOTE — PROGRESS NOTES
Correct pharmacy verified with patient and confirmed in snapshot? [x] yes []no    Medications Phoned  to Pharmacy [] yes [x]no  Name of Pharmacist:  List Medications, including dose, quantity and instructions      Medication Prescriptions given to patient   [] yes  [x] no   List the name of the drug the prescription was written for.      Medications ordered this visit were e-scribed.  Verified by order class [x] yes  [] no  Zoloft    Medication changes or discontinuations were communicated to patient's pharmacy:  [] yes  [x] no  Pharmacist Spoke With:     UA collected [] yes  [x] no    [place] Prescription Monitoring Program Reviewed? [x] yes  [] no    Referrals/Labs were made to:     Completed Charge Capture?  [x] yes  [] no    Future appointment was made: [x] yes  [] no  8/15/18  Dictation completed at time of chart check: [x] yes  [] no    I have checked the documentation for today s encounters and the above information has been reviewed and completed.

## 2021-06-18 NOTE — PROGRESS NOTES
Mental Health Visit Note    6/13/2018    Start time: 1:00pm    Stop Time: 1:40pm   Session # 8    Roland Sandoval is a 23 y.o. male is being seen today for    Chief Complaint   Patient presents with     MH Follow Up     Depression   .     New symptoms or complaints: No major concerns, reports feeling tired today.    Functional Impairment:   Personal: 2  Family: 1  Work: 2  Social: 1    Clinical assessment of mental status:   Grooming: Well groomed  Attire: Appropriate  Age: Appears Stated  Behavior Towards Examiner: Cooperative  Motor Activity: Within normal   Eye Contact: Appropriate  Mood: Tired  Affect: Congruent w/content of speech  Speech/Language: Within normal  Attention: Within normal  Concentration: Within normal  Thought Process: Within normal  Thought Content: Hallucinations: Within noraml  Delusions: Within normal  Orientation: X 3  Memory: No Evidence of Impairment  Judgement: No Evidence of Impairment  Estimated Intelligence: Average  Demonstrated Insight: Adequate  Fund of Knowledge: adequate      Suicidal/Homicidal Ideation present: None Reported This Session    Patient's impression of their current status: Patient presented on time for scheduled appointment. Patient reports doing okay, but tired today. Patient shared that he did not end up going on the trip to visit his family and friends in [place] in May, due to their car breaking down and not having money to fix it at the time. Patient reports being disappointed about it but is hopeful that he and his boyfriend will be able to make a trip to [place] sometime later this year. Patient also shared that he had a depressive episode a couple weeks ago. He reports he was feeling down, and very irritable, and needed a few days to reset everything. Patient reports he does not remember if he took his medications during that time, as that may have been a factor in his depressive episode as well. Patient denied having any suicidal ideation during that  time, or currently either. Patient reports he was more so irritable than anything else, as he got frustrated and broke the screen on his phone. Patient identified that he needed space, which his boyfriend and sister whom were supportive gave him space. And patient reports calling in 2 days to work and then leaving work 2 days early as well. Patient reports his supervisor talked to him about missing work, but his supervisor was understanding and supportive and worked with patient to reduce his work hours, something they both agreed on. Patient reports things have been okay since, and he is keeping himself busy and active and is looking forward to it being Pride Month.    Therapist impression of patients current state: Patient presented with tired mood today but was engaged and cooperative. Patient updated therapist on recent events, discussing his disappointment about not being able to visit his family in [place] and then starting his new job at [employer] and falling into a depressive episode for a few days. Processed thoughts and feelings and provided validation. Patient displayed some insight regarding his mood and symptoms, but states that sometimes his depression just comes and he doesn't know what, if anything triggered it. Patient identified getting more upset because he doesn't know why he feels depressed, as he doesn't want to be depressed. Reviewed coping skills with patient, patient able to identify activities to keep himself busy and grounded. Patient will continue to follow up with therapy for symptom management.    Type of psychotherapeutic technique provided: Insight oriented, Client centered and CBT    Progress toward short term goals:Progress as expected, patient engaged, expressive, and receptive to feedback.    Review of long term goals: Date of last review 2/20/18    Diagnosis:   1. Moderate episode of recurrent major depressive disorder (H)        Plan and Follow up: Patient is scheduled for follow  up visit with therapist on 6/27/18 at 1pm, 7/11/18 at 1pm, and 7/25/18 at 1pm.  Therapist informed patient that therapist will be taking a maternity in mid August, and informed patient that there is another therapist available if patient would like to see someone else in the interim. Patient reports he would like to wait until therapist returns to clinic to resume psychotherapy with therapist. Patient is aware of crisis phone numbers and that he may call the clinic to see a provider if any concerns/issues arise.      Discharge Criteria/Planning: Patient will continue with follow-up until therapy can be discontinued without return of signs and symptoms.    RAND Medrano 6/13/2018

## 2021-06-18 NOTE — PROGRESS NOTES
"Psychiatric  Progress Note  Date of visit:5/23/2018         Discussion of Care and Treatment Recommendations:   This is a 23 y.o. male with recently started on antidepressants and presenting to the clinic today for a follow up appointment       Last visit 3/1/18  Recommendation at last visit .  1. Zoloft 25 mg daily - MDD  2.Continue with psychotherapy - twice monthly -  3.Recommend abstain from THC  4. RTC- 11-12 weeks, call in between visits with any questions or concerns      Patient and I reviewed diagnosis and treatment plan and patient agrees with following recommendations:  Ongoing education given regarding diagnostic and treatment options with adequate verbalization of understanding.  Plan   1. Continue Zoloft 25 mg daily - MDD  2.Continue with psychotherapy - twice monthly -  3.Recommend abstain from THC- repots he has significantly reduced use and is working on  Total abstinence   4. RTC- 12 weeks, call in between visits with any questions or concerns             DIagnoses:     Moderate episode of recurrent major depressive disorder             Chief Complaint / Subjective:    Chief complaint: \" I am now feeling better \"     History of Present Illness:     Patient presented to the clinic alone.  She is very pleasant and polite.  He reports that he did experience GI side effects of Zoloft but this subsided and now he is tolerating the medications.  He reports that depression and anxiety have significantly decreased and under control with the very low dose of 25 mg of Zoloft.  He would like to continue on that same dose.  He offers no other complaints.  He has started a new job at [employer] and working 40 hours a week which is a good thing for him.  Denies guerrero hypomania.  Denies delusions hallucinations paranoia.  Denies suicidal homicidal ideations.  Does not appear to be in any apparent distress or in any imminent danger to himself or others and verbally contracts for safety.  He will continue with " psychotherapy continue the same low-dose of Zoloft and return to the clinic in 3 months for follow-up appointment.  Encouraged him to call in between visits with any questions or concerns.  Mental Status Examination:   General: Adequate hygiene, cooperative  Speech: Normal in rate and tone  Language: Intact  Thought process: Coherent  Thought content:                           Auditory hallucinations- absent                           Visual Hallucinations - Absent                           Delusions Absent                           Loose Associations:  No                          Suicidal thoughts: Absent                          Homicidal thoughts: Absent                       Affect: Neutral  Mood: Neutral   Intellectual functioning: Within normal limits  Memory: Within normal limits  Fund of knowledge: Average  Attention and concentration: Within normal limits  Gait: Steady  Psychomotor activity: Calm, no agitation  Muscles: No atrophy, no abnormal movements  InSight and judgment: Fair    Medication changes: See Above   Medication adherence: compliant  Medication side effects: absent  Information about medications: Side effects, benefits and alternative treatments discussed and patient agrees with capacity to do so.    Psychotherapy: Supportive therapy day-to-day living    Education: Diet, exercise, abstinence from drugs and alcohol, patient will not drive if sedated and medications or  under influence of any substance    Lab Results:  Personally reviewed and discussed with the patient    Lab Results   Component Value Date    WBC 5.6 09/27/2017    HGB 12.9 (L) 09/27/2017    HCT 41.8 09/27/2017     09/27/2017    CHOL 149 09/27/2017    TRIG 113 09/27/2017    HDL 39 (L) 09/27/2017    ALT 17 09/27/2017    AST 17 09/27/2017     09/27/2017    K 3.9 09/27/2017     09/27/2017    CREATININE 0.99 09/27/2017    BUN 18 09/27/2017    CO2 24 09/27/2017       Vital signs:  Vitals:    05/23/18 0905   BP:  "142/89   Patient Site: Right Arm   Patient Position: Sitting   Cuff Size: Adult Regular   Pulse: 60   Resp: 14   Temp: 98.4  F (36.9  C)   TempSrc: Oral   Weight: 171 lb (77.6 kg)   Height: 5' 9\" (1.753 m)     Allergies: Review of patient's allergies indicates no known allergies.         Medications:       Current Outpatient Prescriptions on File Prior to Visit   Medication Sig Dispense Refill     sertraline (ZOLOFT) 25 MG tablet TAKE 1 TABLET BY MOUTH EVERY DAY 30 tablet 0     No current facility-administered medications on file prior to visit.             Review of Systems:    Otherwise reminder of review of systems is negative    Coordination of Care:   More than 25 minutes spent on this visit  with more than 50% of time spent on coordination of care including: Educating patient about diagnosis, prognosis, side effects and benefits of medications, diet, exercise.  Time also spent on entering orders and preparing documentation for the visit.Time also spent providing supportive therapy regarding above issues.    This note was created using a dictation system. All typing errors or contextual distortion is unintentional and software inherent.  "

## 2021-06-19 NOTE — PROGRESS NOTES
Outpatient Mental Health Treatment Plan    Name:  Roland Sandoval  :  1995  MRN:  [id number]    Treatment Plan:  Updated Treatment Plan  Intake/initial treatment plan date:  17  Benefit and risks and alternatives have been discussed: Yes  Is this treatment appropriate with minimal intrusion/restrictions: Yes  Estimated duration of treatment:  Approximately 20 sessions.  Anticipated frequency of services:  Every 2-3 weeks  Necessity for frequency: This frequency is needed to establish therapeutic goals and for continuity of care in order to monitor progress.  Necessity for treatment: To address cognitive, behavioral, and/or emotional barriers in order to work toward goals and to improve quality of life.    Plan:      ? Depression    Goal:  Decrease average depression level from 2 to 1.   Strategies:    ?[x] Decrease social isolation     [x] Increase involvement in meaningful activities     ?[x] Discuss sleep hygiene     ?[x] Explore thoughts and expectations about self and others     ?[x] Process grief (loss of significant person, independence, role,   etc.)     ?[x] Assess for suicide risk     ?[x] Implement physical activity routine (with physician approval)     [x] Consider introduction of bibliotherapy and/or videos     [x] Continue compliance with medical treatment plan (or explore  barriers)  ?  ?Degree to which this is a problem: 2  Degree to which goal is met: 1  Date of Review: 3 months         Functional Impairment:  1=Not at all/Rarely  2=Some days  3=Most Days  4=Every Day    Personal : 2  Family : 2  Social : 2   Work/school : 2    Diagnosis:  (EXAMPLE of DSM V: Major depressive disorder, recurrent, moderate; Generalized Anxiety disorder; borderline personality per patient PHI; fibromyalgia, History of breast cancer in remission; Problem with primary relationship.)   1. Moderate episode of recurrent major depressive disorder        WHODAS 2.0 12-item version: 0/48=0% no problem       Clinical assessments and measures completed: PHQ-9 score: 2 minimal depression, PARI-7 score: 2 mild anxiety     Strengths:  Patient has a good support network, has been coming to therapy regularly, and is open and expressive.  Limitations:  Patient sometimes struggle with identifying triggers for depressive episodes.  Cultural Considerations: Patient identifies as an  and quick male. Patient reports his family has been supportive of him during his coming out process.    Persons responsible for this plan: Patient and Provider      RAND Medrano  Psychotherapist Signature           Patient Signature:              Guardian Signature             Provider: Performed and documented by RAND Medrano   Date: 7/11/18

## 2021-06-19 NOTE — PROGRESS NOTES
Mental Health Visit Note    7/11/2018    Start time: 1:00pm    Stop Time: 1:38pm   Session # 9    Roland Sandoval is a 23 y.o. male is being seen today for    Chief Complaint   Patient presents with      Follow Up     Depression   .     New symptoms or complaints: Pt reports feeling phsyically tired due to sickness and some irritability.    Functional Impairment:   Personal: 2  Family: 1  Work: 2  Social: 1    Clinical assessment of mental status: No changes from last session, 6/13/18  Grooming: Well groomed  Attire: Appropriate  Age: Appears Stated  Behavior Towards Examiner: Cooperative  Motor Activity: Within normal   Eye Contact: Appropriate  Mood: Tired  Affect: Congruent w/content of speech  Speech/Language: Within normal  Attention: Within normal  Concentration: Within normal  Thought Process: Within normal  Thought Content: Hallucinations: Within noraml  Delusions: Within normal  Orientation: X 3  Memory: No Evidence of Impairment  Judgement: No Evidence of Impairment  Estimated Intelligence: Average  Demonstrated Insight: Adequate  Fund of Knowledge: adequate      Suicidal/Homicidal Ideation present: None Reported This Session    Patient's impression of their current status: Patient presented on time for scheduled appointment. Patient reports that he has been dealing with a sickness for 4 days, was recently seen in urgent care, but recovering okay. He reports his mood has been pretty stable, rating his depression a 2 out of 10, stating that he rarely feels depressed, but more so irritable at times. Patient shared that his best friend in [place] recently attempted suicide and was hospitalized for a few days. Patient reports it was tough to hear the news and not be able to be there physically, but was glad that his friend reached out and let him know. Patient reports he's been keeping busy, participating in activities with his boyfriend and sister. Patient discussed goals he would like to accomplish,  including getting their car fixed, getting his 's license, and saving up money to be able to visit his family in [place] before winter.    Therapist impression of patients current state: Patient presented with tired mood today but was engaged and cooperative. Patient updated therapist on recent events and discussed his physical health, mental health, and relationships with family and friends. Processed thoughts and feelings and provided validation. Patient displayed insight and was receptive to feedback. Reviewed treatment goals with patient. Patient maintained positive outlook and able to identify things to look forward to/continue working on and effective coping skills when feeling down.     Type of psychotherapeutic technique provided: Insight oriented, Client centered, Solution-focused and CBT    Progress toward short term goals:Progress as expected, patient engaged, expressive, and receptive to feedback.    Review of long term goals: Date of last review 7/11/18    Diagnosis:   1. Moderate episode of recurrent major depressive disorder (H)        Plan and Follow up: Patient is scheduled for follow up visit with therapist on 7/25/18 at 1pm, 8/8/18 at 1pm, and 8/22/18 at 1pm.  Therapist informed patient that therapist will be taking a maternity in mid August, and informed patient that there is another therapist available if patient would like to see someone else in the interim. Patient reports he would like to wait until therapist returns to clinic to resume psychotherapy with therapist. Patient is aware of crisis phone numbers and that he may call the clinic to see a provider if any concerns/issues arise.      Discharge Criteria/Planning: Patient will continue with follow-up until therapy can be discontinued without return of signs and symptoms.    RAND Medrano 7/11/2018

## 2021-06-19 NOTE — PROGRESS NOTES
Subjective: Patient comes in for evaluation this 23-year-old male has ongoing depression he sees a counselor here at the [facility] and also sees Daniela Ascencio he is on Zoloft 25 mg 1 a day    He feels like things are doing well his PHQ 9 score was 1 today    He did want to get checked for STDs and he has no symptoms, but just wanted to get checked    I did check GC chlamydia HIV syphilis hepatitis B and hepatitis C please see below.    No additional concerns or meds.  No known exposure to confirmed STD    Tobacco status: He  reports that he has never smoked. He has never used smokeless tobacco.    There are no active problems to display for this patient.      Current Outpatient Prescriptions   Medication Sig Dispense Refill     sertraline (ZOLOFT) 25 MG tablet Take 1 tablet (25 mg total) by mouth daily. 30 tablet 2     No current facility-administered medications for this visit.        ROS:   Review of systems negative other than as outlined above    Objective:    /70 (Patient Site: Left Arm, Patient Position: Sitting, Cuff Size: Adult Regular)  Pulse 82  Wt 173 lb (78.5 kg)  BMI 25.55 kg/m2  Body mass index is 25.55 kg/(m^2).  The following are part of a depression follow up plan for the patient:  under care of mental health team    General appearance no acute distress    Patient denies any discharge or skin lesions no additional exam was done    STD checks were all negative    Results for orders placed or performed in visit on 07/03/18   Chlamydia trachomatis & Neisseria gonorrhoeae, Amplified Detection   Result Value Ref Range    Chlamydia trachomatis, Amplified Detection Negative Negative    Neisseria gonorrhoeae, Amplified Detection Negative Negative   HIV Antigen/Antibody Screening Cascade   Result Value Ref Range    HIV Antigen / Antibody Negative Negative   Syphilis Screen, Cascade   Result Value Ref Range    Treponema Antibody (Syphilis) Negative Negative   Hepatitis B Surface Antigen  (HBsAG)   Result Value Ref Range    Hepatitis B Surface Ag Negative Negative   Hepatitis C Antibody (Anti-HCV)   Result Value Ref Range    Hepatitis C Ab Negative Negative       Assessment:  1. Screen for STD (sexually transmitted disease)  Chlamydia trachomatis & Neisseria gonorrhoeae, Amplified Detection    HIV Antigen/Antibody Screening Raymond    Syphilis Screen, Cascade    Hepatitis B Surface Antigen (HBsAG)    Hepatitis C Antibody (Anti-HCV)   2. Depression       No evidence of STD.  Patient will be contacted with results    Depression stable PHQ 9 score was 1 continue to see counseling and psychiatry and stay on the Zoloft.    Total time with patient 15 minutes over 10 minutes spent in counseling reviewing his depression and screening for STDs    Plan: As outlined above    This transcription uses voice recognition software, which may contain typographical errors.

## 2021-06-19 NOTE — PROGRESS NOTES
Standard Diagnostic Assessment  Date(s): 18  Start Time: 1:00pm  Stop Time: 1:50pm  Patient Name: Roland Sandoval  Age: 23 y.o.   1995     Referral Source: Dr. Oni Lincoln   Therapist: RAND Medrano        Persons Present: Patient and therapist  Chief Complaint (in the patients words; reason patient believes they have been referred):   Patient reports he would like to continue psychotherapy for ongoing support with managing his depression and daily stressors.  Patient s expectation for treatment (patient stated initial goal; i.e.:  I want to let go of my worries , Medication treatment if indicated):  Patient reports he would like to improve coping skills for managing his depression and improve communication - asking for additional support when needed.  Presenting Problem/History:  Issues/Stressors:   Patient denied any current stressors at this time, he reports managing symptoms adequately with medications and ongoing psychotherapy.  Physical Problems: No physical problems reported at this time    Social Problems: Job problems - unable to hold job for more than 90 days but able to keep getting new jobs    Behavioral Problems: Subtance abuse - THC use, 1x/day    Cognitive Problems: Paranoia - one incident within the last 2 weeks, in which a person made some bizarre/threatening statements    Emotional Problems: No emotional problems reported at this time      Functional Impairments:   Personal: 1  Family: 0  Work: 2  Social: 1     How does the presenting problem affect patients daily functioning:    Patient reports his depression comes and goes, sometimes he is unsure why, when feeling depressed, he reports feeling unmotivated to do anything, such as calling in and missing too much work.  Onset/Frequency/Duration presenting problem symptoms:    Patient reports dealing with depression for over a year now, his last inpatient hospitalization was in 2017.  How does the patient perceive  "his/her problem?  Patient is insightful about his mental health symptoms, open to talking about them, has a good support system, and is receptive to feedback.  Family/Social History:   Current living situation (Household members, housing status, stability, multiple moves, potential eviction):  Patient is renting an apartment with his boyfriend and sister. No concerns regarding housing at this time.  Marriages/Significant other (including patients evaluation of the relationship quality):  Patient identifies as homosexual and has a boyfriend whom he's been with for one year, though he also reports it is an open relationship, patient reports they have a good and supportive relationship.  Children (sex and ages, any significant issues):  Patient does not have any children.  Parents (ages, living or , how many years ):   Patient's mother passed away when he was 8-years-old, he had thoughts it was due to cancer but found out a year ago that it was due to HIV from a bad needle. Patient's father lives in [place], [place] and patient reports having a good relationship with him.  Siblings (birth order, ages, significant issues):   Patient has two younger brothers, ages 16 and 18, who live with their father in [place], [place]. Patient has two older sisters, ages 27 and 40, who live in [place]. Patient reports having a close relationship with his 27-year-old sister.   Climate in family of origin (how does the patient perceive their childhood experience):  Patient reports having a \"pretty good\" childhood despite being poor, he grew up in [place], [place] until high school. He was exposed to his mother's drug use, and greatly impacted by her death. He also found out that his dad who had been raising him was not his biological dad, his bio dad had passed away. He reports being made fun of when he was younger due to being the \"smart kid.\" Patient reports he knew he was quick when he was 9 years old but was fighting with himself and " didn't come out to his family until he was 19 years old.  Education (type and level of education):  Patient has his high school diploma. Patient reports he enrolled in college for a few weeks but dropped out due to lack of transportation and support to go to school. Patient reports he would like to continue his education in the future.    Problems with Learning or School (developmental issues, learning disabilities, behavioral concerns in school)  No problems reported.  Developmental factors (developmental milestones, head injuries, CVA s, etc. that may have impeded milestones):   No major developmental factors/injuries reported.  Work History (current employment situation and any past employment history):  Patient was recently let go from his job at [employer] for missing too many shifts within his first 90 days. 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 Patient historically is unable to hold a job for longer than 90 days, but is motivated and able to to obtain a new job fairly quickly after losing a job.    Financial Concerns (basic status, housing, food, clothing are they on any assistance including SSI/SSDI):   Patient reports managing finances adequately at this time, no major concerns.    Significant life events (what does the patient identify as a personal life changing/influencing event):  Patient reports his mother's death was a significant event, along with finding out that his dad was not his bio dad, and coming out as homosexual was also another significant life event.    Sexual/physical/emotional/financial abuse/traumatic event. (any child protection involvement; who reported, Impact on patient/family/other):   No history of abuse or trauma reported.    PTSD Symptoms:       [x] No    Contextual Non-personal factors contributing to the patients concerns (divorce in family,  nation/natural disasters):  Patient reports being impacted by racism and homophobia in general.    Significant personal relationships including patient s evaluation of the relationship quality (Co-worker s, neighbor s, AA groups, Christianity peers, etc.):   Patient reports having many good friends and family (including boyfriend's family).    Support network(s)/Resources (including strength and quality of social networks, who does the client consider supportive, other agencies or services patient uses):   Patient's support system includes his dad, sister, boyfriend, best friend, and other family members. Patient reports having a really good support system.    Belief system:    Patient reports being agnostic.    Cultural influences and impact on patient (ask about all aspects of culture and ask which are relevant to the patient. Go beyond nationality and ethnicity. Consider biases, life style, community style, i.e.: urban, poverty, abuse, etc). See page 5 Diagnostic Assessment, Clinical Training for descriptors):  Patient identifies as  and was impacted by growing up in poverty and exposed to drug use. He reports being influenced by Jorge Nicolas Jr., Catalino GODOY, Masha Schafer, and his dad and sister.    Cultural impact on health and health care (how does patient s culture influence how the patient receives health care):   Patient reports using Western medicine and is receptive to treatment recommendations.    Legal Problems (DUI S, divorce, law suits, etc.):  None reported.    Strengths/personal resources (what does the patient do well, what is going well in life, positive personality characteristics):  Patient identifies himself as friendly, talkative, tries to make everyone happy - peacemaker, and good at video games.    Weaknesses (what does patient identify as a weakness):  Patient reports struggling with maintaining a job and expressing himself sometimes.    Hobbies/Interests:    Patient enjoys  "reading, playing video games, watching tv and movies, exercising, and eating out.    Assessment of client needs (based on baseline measurements, symptoms, behaviors, skills, abilities, resources, vulnerabilities, safety needs):  Patient is managing mental health symptoms adequately at this time, with medications, individual psychotherapy, and a good support system. No imminent safety concerns reported or observed. Patient displays insight and seeks support when needed.    Family Mental Health/Medical History    Family Mental Health:    Patient reports his sister also struggle from depression.    Family history of Suicide:  None reported    Family history Chemical Dependency:    No known family history of chemical dependency reported.    Family Medical history:   No known significant family medical history reported.    Patient Medical History    Hospitalizations (When/Where):     None reported    Medical diagnoses/concerns: (i.e.: Heart disease, thyroid problems,  Bld. Pressure,  seizures,  head Inj., Other)   No medical diagnoses/concerns at this time.     Current physician/other non psychiatric medical provider s:    Dr. Oni Lincoln             Date of last medical exam:   7/3/18    Current Medications:    Current Outpatient Prescriptions:      sertraline (ZOLOFT) 25 MG tablet, Take 1 tablet (25 mg total) by mouth daily., Disp: 30 tablet, Rfl: 2    Past Mental Health History:    Previous mental health diagnosis & Date of Diagnosis:  None reported    Hx of Mental Health Treatment or Services:  None reported    LALO Received:      [] Yes   [x] No      Hx of MH Tx/Hospitalizations (When/Where: must include a review of patient s record.  If not available, why, what if anything are you doing to obtain a record?):   Patient has had one history of hospitalization for suicidal ideation (12/2/17-12/4/17 at [facility]). Patient reports having suicidal ideation \"on and off\" and felt like he was at his ropes end, due to work " "and financial concerns and underlying unresolved loss and grief related to his mother's death and being bullied in middle/high school. Patient had informed his sister he didn't want to live anymore and his plan was to \"wait until everyone was asleep and slit my wrists in the bath.\" Patient expressed that the hospitalization was helpful to take a break from life and \"opened my eyes\" as he participated in groups. No other history of mental health treatment reported.    Hx of Psychiatric Medications:  See current medlist    Suicidal/Homicidal Risk Assessment:  Suicidal: None reported  Ideation:None reported  History of Past Attempt(s): description: Previous ideation of slitting wrists in bath but no history of attempts reported   Crisis Plan: Patient is aware of crisis phone numbers and that he may go to ED in case of emergency.  Homicidal: None reported   Ideation:None reported  History of Aggression towards others: None reported  Crisis Plan: Patient is aware of crisis phone numbers and that he may go to ED in case of emergency.  History of destruction to property:  Description: None reported  Crisis Plan: Patient is aware of crisis phone numbers and that he may go to ED in case of emergency.  Chemical Use/Abuse History  Alcohol:   [] None Reported    [x] Yes   [] No  Type: Liquor  Frequency (daily, weekly, occasionally): Occasionally  Age of first use: 20    Date of last use: Last week        Street Drugs:   [] None Reported    [x] Yes   [] No  Type: Marijuana  Frequency (daily, weekly, occasionally): 1x daily  Age of first use: 17    Date of last use: Today  Prescription Drugs:   [] None Reported    [x] Yes   [] No  Type: See medlist  Frequency (daily, weekly, occasionally): Daily  Age of first use: Not reported    Date of last use: Today  Tobacco:   [] None Reported    [x] Yes   [] No  Type: Cigarettes  Frequency (daily, weekly, occasionally): Rarely, only a couple times in life  Age of first use: Not " "reported    Date of last use: Not reported  Caffeine:   [] None Reported    [x] Yes   [] No  Type: Soda  Frequency (daily, weekly, occasionally): Occasionally  Age of first use: Not reported    Date of last use: Not reported  Currently in a treatment program:   [] Yes   [x] No    Where: Not currently in treatment  LALO Received:    [] Yes   [x] No       Collaborative info requested/received:   [] Yes   [x] No    History of CD Treatment:  No history of CD treatment reported.   CAGE-AID (screening to determine a patients use/abuse/dependency):      0/4    Non- Substance Abuse addictive Behaviors/Compulsive Behaviors:  [] Gambling     [] Sex     [] Pornography    [] Shopping     [] Eating     [] Self-Injury  [] Other           [x] None Reported    [] Hoarding    MENTAL STATUS EVALUATION  Grooming: Well groomed  Attire: Appropriate  Age: Appears Stated  Behavior Towards Examiner: Cooperative  Motor Activity: Within normal   Eye Contact: Appropriate  Mood: Euthymic  Affect: Congruent w/content of speech  Speech/Language: Within normal  Attention: Within normal  Concentration: Within normal  Thought Process: Within normal  Thought Content: Hallucinations: Within noraml  Delusions: Within normal  Orientation: X 3  Memory: No Evidence of Impairment  Judgement: No Evidence of Impairment  Estimated Intelligence: Average  Demonstrated Insight: Adequate  Fund of Knowledge: adequate    Clinical Impressions/Assessment/Recommendations:   Patient is a 23-year-old  male who presents for diagnostic assessment to continue ongoing psychotherapy. Patient was initially referred by PCP, Dr. Lincoln, in December 2017, due to concerns regarding depression and hospitalization for suicidal ideation (12/2/17-12/4/17 at [facility]). Patient reported having suicidal ideation \"on and off\" and felt like he was at his ropes end, due to work and financial concerns and underlying unresolved loss and grief related to his mother's death and " being bullied in middle/high school. Patient denies any current suicidal ideation and has been consistently involved in ongoing individual psychotherapy, taking medications as prescribed, and following-up with psychiatry as scheduled.     Patient grew up in [place], [place]. Patient's mother passed away when he was 8-years-old, he had thought it was due to cancer, but has learned that it was related to her drug use and kristal HIV. Patient never knew his biological father, who passed away. His dad who raised him lives in [place], [place], along with his 2 younger brothers, ages 16 and 18. Patient has a good relationship with them and visits them approximately once a year. Patient has 2 older sisters who live in [place], ages 27 and 40. Patient reports having a close relationship with his 27-year-old sister. Patient also has some cousins in the area and still maintains contact with his family and friends in [place] as well. Patient identifies as homosexual and reports that his family was very supportive of him when he was coming out. Patient reports being in a supportive relationship with his boyfriend, though they do have a somewhat open relationship. Patient has his high school diploma. Patient reports he enrolled in college for a few weeks but dropped out due to lack of transportation and support to go to school. Patient reports he would like to continue his education in the future. Patient was recently let go from his job at [employer] for missing too many shifts within his first 90 days. 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 Patient historically is unable to hold a job for longer than 90 days, but is motivated and able to to obtain a new job fairly quickly after losing a job.     Patient denied any significant mental health symptoms at this time, as he is managing depression well. Patient denied  any self-injurious behaviors, homicidal ideation, manic symptoms, trauma, or psychotic symptoms. Patient reports social alcohol use and daily THC use, but no history of significant CD abuse or treatment. Based on historical diagnosis, presenting problems and symptoms, patient meets DSM-5 criteria for Major Depressive Disorder, recurrent, mild episode. Alternative diagnosis that was considered but ruled out at this time was Major Depressive Disorder, recurrent, in partial remission.      Recommendations (treatment, referrals, services needed).  1. Continue individual psychotherapy every 2-3 weeks  2. Continue medication adherence and follow-up with psychiatry as scheduled    Diagnosis (non-Axial as defined in DSM-5)  1. Mild episode of recurrent major depressive disorder (H)      Provisional Diagnosis (list only- no explanation needed)  1. Major Depressive Disorder, recurrent, in partial remission    WHODAS 2.0 12-item version: 0/0=0% no problem    Sources/references used in completing this assessment:   -Face to face interview  -Patient Chart  -Adult Intake Questionnaire  -Measures completed: WHODAS, PANSI, CAGE, PHQ-9, PARI-7, Mood Disorder  PANSI: Positive Factors = 4.83 (<3.4 = high risk)               Negative Factors = 1.00 (>1.6 = high risk)               Indicates low risk for suicide.    PHQ-9: 0 . Difficulty with daily functioning= not difficult at all.              Indicates minimal severity.    PARI-7: 1. Difficulty with daily functioning= not difficult at all .               Indicates minimal severity.         Assessment of client resolving presenting mental health concerns:  Ability  [] low     [x] average     [] high  Motivation [] low     [x] average     [] high  Willingness [] low     [x] average     [] high    Initial Assessment Objectives (ex: Refer to psychiatry/psych testing, Return for follow up psychotherapy, Refer to, Obtain, Administer measures, etc.):  1. Return for follow-up psychotherapy  2.  Continue medication adherence  Is patient's family involved in the treatment?  [] No     [x] Yes  If yes, How?  Patient's family is supportive of patient participating in psychotherapy.    Therapist s Signature/Supervision/co-signature statement:   Victor Hugo Castillo LICSW

## 2021-06-19 NOTE — PROGRESS NOTES
"Psychiatric  Progress Note  Date of visit:8/15/2018         Discussion of Care and Treatment Recommendations:   This is a 23 y.o. male with recently started on antidepressants and presenting to the clinic today for a follow up appointment          Last visit 5/23/18  Recommendation at last visit.  1. Continue Zoloft 25 mg daily - MDD  2.Continue with psychotherapy - twice monthly -  3.Recommend abstain from THC- repots he has significantly reduced use and is working on  Total abstinence   4. RTC- 12 weeks, call in between visits with any questions or concerns   Patient and I reviewed diagnosis and treatment plan and patient agrees with following recommendations:  Ongoing education given regarding diagnostic and treatment options with adequate verbalization of understanding.  Plan   1. Continue Zoloft 25 mg daily - MDD  2.Continue with psychotherapy - twice monthly -  3.Recommend abstain from THC- repots he has significantly reduced use and is working on  Total abstinence   4. RTC- 12 weeks, call in between visits with any questions or concerns          Diagnoses:     Moderate episode of recurrent major depressive disorder               Chief Complaint / Subjective:    Chief complaint: \" Am alright\"     History of Present Illness:   Patient reports that so far his depression is currently well managed with current dose of Zoloft and he would not like to any dose adjustments.  He denies anxiety, denies hallucinations paranoia, denies guerrero or hypomania symptoms.  Denies suicidal or homicidal ideations he tells me he feels safe he verbally contracts for safety.  He is working on stopping marijuana therefore trying to wean himself off.  We discussed detrimental side effects for his help with continued marijuana use he continued to endorse understanding and is working on complete abstinence.  He has continued follow-up with his therapist and that is going well.  He had [facility] to [facility] staff that he was experiencing " some diarrhea but he told me that it is not a problem he does experience that when he eats fast food or fatty food and he does not at this point think anything to do with the medications.  He also has lost a few pounds but he is also working out and therefore tells me the weight loss is expected and what he wants to do.  He offers no other complaints he would like to continue the same dose I have renewed his prescriptions.  I will have him return to the clinic in 8 weeks for follow-up appointment I encouraged him to call in between visits with any questions or concerns.    Mental Status Examination:   General: Adequate hygiene, cooperative  Speech: Normal in rate and tone  Language: Intact  Thought process: Coherent  Thought content:                           Auditory hallucinations- absent                           Visual Hallucinations - Absent                           Delusions Absent                           Loose Associations:  No                          Suicidal thoughts: Absent                          Homicidal thoughts: Absent                       Affect: Neutral  Mood: Neutral   Intellectual functioning: Within normal limits  Memory: Within normal limits  Fund of knowledge: Average  Attention and concentration: Within normal limits  Gait: Steady  Psychomotor activity: Calm, no agitation  Muscles: No atrophy, no abnormal movements  InSight and judgment: Fair    Medication changes: None  Medication adherence: compliant  Medication side effects: absent  Information about medications: Side effects, benefits and alternative treatments discussed and patient agrees with capacity to do so.    Psychotherapy: Supportive therapy day-to-day living    Education: Diet, exercise, abstinence from drugs and alcohol, patient will not drive if sedated and medications or  under influence of any substance    Lab Results:   Personally reviewed and discussed with the patient    Lab Results   Component Value Date    WBC 5.6  "09/27/2017    HGB 12.9 (L) 09/27/2017    HCT 41.8 09/27/2017     09/27/2017    CHOL 149 09/27/2017    TRIG 113 09/27/2017    HDL 39 (L) 09/27/2017    ALT 17 09/27/2017    AST 17 09/27/2017     09/27/2017    K 3.9 09/27/2017     09/27/2017    CREATININE 0.99 09/27/2017    BUN 18 09/27/2017    CO2 24 09/27/2017       Vital signs:  Ht 5' 9\" (1.753 m)  Wt 169 lb (76.7 kg)  BMI 24.96 kg/m2    Allergies: Review of patient's allergies indicates no known allergies.         Medications:       Current Outpatient Prescriptions on File Prior to Visit   Medication Sig Dispense Refill     sertraline (ZOLOFT) 25 MG tablet Take 1 tablet (25 mg total) by mouth daily. 30 tablet 2     No current facility-administered medications on file prior to visit.                 Review of Systems:    Otherwise reminder of review of systems is negative    Coordination of Care:   More than 25 minutes spent on this visit  with more than 50% of time spent on coordination of care including: Educating patient about diagnosis, prognosis, side effects and benefits of medications, diet, exercise.  Time also spent on entering orders and preparing documentation for the visit.Time also spent providing supportive therapy regarding above issues.    This note was created using a dictation system. All typing errors or contextual distortion is unintentional and software inherent.    "

## 2021-06-19 NOTE — PROGRESS NOTES
Pt here for follow up and medication management.    Mood- good no changes  Depression controled with medication   Anxiety controled with medication   Sleep good no concerns    Denies SI/HI thoughts at this time.      [place]  below:  None Found

## 2021-06-19 NOTE — PROGRESS NOTES
Correct pharmacy verified with patient and confirmed in snapshot? [x] yes []no    Charge captured ? [x] yes  [] no    Medications Phoned  to Pharmacy [] yes [x]no  Name of Pharmacist:  List Medications, including dose, quantity and instructions      Medication Prescriptions given to patient   [] yes  [x] no   List the name of the drug the prescription was written for.       Medications ordered this visit were e-scribed.  Verified by order class [x] yes  [] no  Sertraline 25 mg   Medication changes or discontinuations were communicated to patient's pharmacy: [] yes  [x] no    UA collected [] yes  [x] no    [place] Prescription Monitoring Program Reviewed? [x] yes  [] no    Referrals were made to:  None    Future appointment was made: [x] yes  [] no  10/17/18  Dictation completed at time of chart check: [x] yes  [] no    I have checked the documentation for today s encounters and the above information has been reviewed and completed.

## 2021-06-21 NOTE — PROGRESS NOTES
Pt here for follow up and medication management.     Mood- stable  Depression denies today    Denies SI/HI thoughts at this time    MN :  None Found No activity

## 2021-06-21 NOTE — PROGRESS NOTES
"Psychiatric  Progress Note  Date of visit:10/24/2018         Discussion of Care and Treatment Recommendations:   This is a 23 y.o. male  presenting to the clinic today for a follow up appointment for medication  management of depression       Last visit 8/15/18.  Recommendation at last visit .  1. Continue Zoloft 25 mg daily - MDD  2.Continue with psychotherapy - twice monthly -  3.Recommend abstain from THC- repots he has significantly reduced use and is working on  Total abstinence   4. RTC- 12 weeks, call in between visits with any questions or concerns       Patient and I reviewed diagnosis and treatment plan and patient agrees with following recommendations:  Ongoing education given regarding diagnostic and treatment options with adequate verbalization of understanding.  Plan    1. Continue Zoloft 25 mg daily - MDD  2.Continue with psychotherapy - twice monthly -  3.Recommend abstain from THC- repots he has significantly reduced use and is working on  Total abstinence   4. RTC- 12 weeks, call in between visits with any questions or concerns          Diagnoses:     Moderate episode of recurrent major depressive disorder               Chief Complaint / Subjective:    Chief complaint: \" I am doing really well '     History of Present Illness:   Per patient's statement : He has been compliant with medications.  Denies side effects.  Reports that depression has been well managed on current dose.  He reports a significant decrease in the use of THC-has only used when he travels and he tried but otherwise has not been.  I applauded him for these efforts and the progress he is making towards complete abstinence from THC.  He denies any other psychiatric issues.  Sleep and appetite are unremarkable.  Travel this summer to [facility] to visit his family members and also traveling out of town to also attend a friend's graduation.  Reports this was a good relaxation time and therapeutic time with his family.  Live at home " and at work is going well.  He is still dating his boyfriend and things are going well.  Wants to continue the same medications.  We will have him continue the same medications.  I provided a 90-day supply to enhance compliance.  I will have him return to the clinic in 12 weeks for follow-up appointment and encouraged him to call in between visits any questions or concerns  Mental Status Examination:   General: Adequate hygiene, cooperative  Speech: Normal in rate and tone  Language: Intact  Thought process: Coherent  Thought content:                           Auditory hallucinations- absent                           Visual Hallucinations - Absent                           Delusions Absent                           Loose Associations:  No                          Suicidal thoughts: Absent                          Homicidal thoughts: Absent                       Affect: Neutral  Mood: Neutral   Intellectual functioning: Within normal limits  Memory: Within normal limits  Fund of knowledge: Average  Attention and concentration: Within normal limits  Gait: Steady  Psychomotor activity: Calm, no agitation  Muscles: No atrophy, no abnormal movements  InSight and judgment: Fair     Decision Making Capacity   Patient has the capacity to make independent decisions regarding medical and psychiatric care.      Medication changes: See above   Medication adherence: compliant  Medication side effects: absent  Information about medications: Side effects, benefits and alternative treatments discussed and patient agrees with capacity to do so.    Psychotherapy: Supportive therapy day-to-day living    Education: Diet, exercise, abstinence from drugs and alcohol, patient will not drive if sedated and medications or  under influence of any substance    Lab Results:   Personally reviewed and discussed with the patient    Lab Results   Component Value Date    WBC 5.6 09/27/2017    HGB 12.9 (L) 09/27/2017    HCT 41.8 09/27/2017    PLT  "191 09/27/2017    CHOL 149 09/27/2017    TRIG 113 09/27/2017    HDL 39 (L) 09/27/2017    ALT 17 09/27/2017    AST 17 09/27/2017     09/27/2017    K 3.9 09/27/2017     09/27/2017    CREATININE 0.99 09/27/2017    BUN 18 09/27/2017    CO2 24 09/27/2017       Vital signs:  Vitals:    10/24/18 1236   BP: 152/89   Patient Site: Right Arm   Patient Position: Sitting   Cuff Size: Adult Regular   Pulse: 81   Weight: 170 lb (77.1 kg)   Height: 5' 9\" (1.753 m)     Allergies: No Known Allergies       Medications:     Current Outpatient Prescriptions on File Prior to Visit   Medication Sig Dispense Refill     sertraline (ZOLOFT) 25 MG tablet Take 1 tablet (25 mg total) by mouth daily. 30 tablet 2     No current facility-administered medications on file prior to visit.             Review of Systems:      ROS:       10 point ROS was negative except for the items listed in HPI.    Coordination of Care:   More than 25 minutes spent on this visit  with more than 50% of time spent on coordination of care including: Educating patient about diagnosis, prognosis, side effects and benefits of medications, diet, exercise.  Time also spent on entering orders and preparing documentation for the visit.Time also spent providing supportive therapy regarding above issues.    This note was created using a dictation system. All typing errors or contextual distortion is unintentional and software inherent.  "

## 2021-06-21 NOTE — PROGRESS NOTES
Correct pharmacy verified with patient and confirmed in snapshot? [x] yes []no    Charge captured ? [x] yes  [] no    Medications Phoned  to Pharmacy [] yes [x]no  Name of Pharmacist:  List Medications, including dose, quantity and instructions      Medication Prescriptions given to patient   [] yes  [x] no   List the name of the drug the prescription was written for.       Medications ordered this visit were e-scribed.  Verified by order class [x] yes  [] no  Zoloft 25 mg   Medication changes or discontinuations were communicated to patient's pharmacy: [] yes  [x] no    UA collected [] yes  [x] no    [place] Prescription Monitoring Program Reviewed? [x] yes  [] no    Referrals were made to:  None    Future appointment was made: [x] yes  [] no  01/16/2019  Dictation completed at time of chart check: [x] yes  [] no    I have checked the documentation for today s encounters and the above information has been reviewed and completed.

## 2021-06-22 NOTE — TELEPHONE ENCOUNTER
Left voice message asking pt to call clinic if interested in scheduling follow-up with therapist.    Victor Hugo Castillo, RAND

## 2021-06-23 NOTE — PROGRESS NOTES
"Psychiatric  Progress Note  Date of visit:1/16/2019         Discussion of Care and Treatment Recommendations:   This is a 23 y.o. male with presenting to the clinic today for a follow up appointment for medication  management of depression       Last visit 10/24/18  Recommendation at last visit   1. Continue Zoloft 25 mg daily - MDD  2.Continue with psychotherapy - twice monthly -  3.Recommend abstain from THC- repots he has significantly reduced use and is working on  Total abstinence   4. RTC- 12 weeks, call in between visits with any questions or concerns     Patient and I reviewed diagnosis and treatment plan and patient agrees with following recommendations:  Ongoing education given regarding diagnostic and treatment options with adequate verbalization of understanding.  Plan   1. Continue Zoloft 25 mg daily - MDD  2.Continue with psychotherapy - twice monthly -  3.Recommend abstain from THC- repots he has significantly reduced use and is working on  Total abstinence   4. RTC- 12 weeks, call in between visits with any questions or concerns          Diagnoses:     Moderate episode of recurrent major depressive disorder              Chief Complaint / Subjective:    Chief complaint: \" I am doing well \"     History of Present Illness:   Per patient statement-he is doing really well.  He had a wonderful holiday and spent a lot of quality time with family and friends.  He also received a lot of presents.  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  He is also in the process of trying to get his 's license and then eventually enrolled in college.  He has significantly cut down on his marijuana use and I applauded him for that.  Depression and anxiety are well managed.  Medications have been helpful.  He has been compliant and denies any side effects.  He denies all psychiatric symptoms today and offers no new concerns.  He still is engaged in " psychotherapy on a biweekly schedule and he finds it beneficial.  He would like to continue the same medications.  I will have him return in 12 weeks for follow-up appointments I encouraged him to call in between visits with any questions or concerns.    Mental Status Examination:   General: Adequate hygiene, cooperative  Speech: Normal in rate and tone  Language: Intact  Thought process: Coherent  Thought content:                    Auditory hallucinations- absent                       Visual Hallucinations - Absent                           Delusions Absent                           Loose Associations:  No                          Suicidal thoughts: Absent                          Homicidal thoughts: Absent                       Affect: Neutral  Mood: Neutral   Intellectual functioning: Within normal limits  Memory: Within normal limits  Fund of knowledge: Average  Attention and concentration: Within normal limits  Gait: Steady  Psychomotor activity: Calm, no agitation  Muscles: No atrophy, no abnormal movements  InSight and judgment: Fair      Medication changes: See Above   Medication adherence: compliant  Medication side effects: absent  Information about medications: Side effects, benefits and alternative treatments discussed and patient agrees with capacity to do so.    Psychotherapy: Supportive therapy day-to-day living    Education: Diet, exercise, abstinence from drugs and alcohol, patient will not drive if sedated and medications or  under influence of any substance    Lab Results:   Personally reviewed and discussed with the patient    Lab Results   Component Value Date    WBC 5.6 09/27/2017    HGB 12.9 (L) 09/27/2017    HCT 41.8 09/27/2017     09/27/2017    CHOL 149 09/27/2017    TRIG 113 09/27/2017    HDL 39 (L) 09/27/2017    ALT 17 09/27/2017    AST 17 09/27/2017     09/27/2017    K 3.9 09/27/2017     09/27/2017    CREATININE 0.99 09/27/2017    BUN 18 09/27/2017    CO2 24 09/27/2017  "      Vital signs:    Vitals:    01/16/19 1345   BP: 144/86   Patient Site: Right Arm   Patient Position: Sitting   Cuff Size: Adult Regular   Pulse: 75   Weight: 180 lb (81.6 kg)   Height: 5' 9\" (1.753 m)     Allergies: Patient has no known allergies.         Medications:     Current Outpatient Medications on File Prior to Visit   Medication Sig Dispense Refill     sertraline (ZOLOFT) 25 MG tablet Take 1 tablet (25 mg total) by mouth daily. 90 tablet 0     No current facility-administered medications on file prior to visit.               Review of Systems:      ROS:       10 point ROS was negative except for the items listed in HPI.        Coordination of Care:   More than 25 minutes spent on this visit  with more than 50% of time spent on coordination of care including: Educating patient about diagnosis, prognosis, side effects and benefits of medications, diet, exercise.  Time also spent on entering orders and preparing documentation for the visit.Time also spent providing supportive therapy regarding above issues.    This note was created using a dictation system. All typing errors or contextual distortion is unintentional and software inherent.    "

## 2021-06-23 NOTE — PROGRESS NOTES
Letter sent to patient regarding writer's departure from clinic. Provided 3 community referrals and the option of returning to clinic to request new referral from PCP. Provided crisis phone numbers. Patient will be discharged from psychotherapy.    RAND Medrano

## 2021-06-23 NOTE — PROGRESS NOTES
Pt is here for psychiatric follow up and medication management.    Mood: pretty good.  Sleep: good  Appetite: good       Denies SI/HI thought at this time.     MN :  Printed, labeled, provider reviewed and sent to scanning.

## 2021-06-23 NOTE — PROGRESS NOTES
Correct pharmacy verified with patient and confirmed in snapshot? [x] yes []no    Charge captured ? [x] yes  [] no    Medications Phoned  to Pharmacy [] yes [x]no  Name of Pharmacist:  List Medications, including dose, quantity and instructions      Medication Prescriptions given to patient   [] yes  [x] no   List the name of the drug the prescription was written for.       Medications ordered this visit were e-scribed.  Verified by order class [x] yes  [] no  Zoloft 25 mg   Medication changes or discontinuations were communicated to patient's pharmacy: [] yes  [x] no    UA collected [] yes  [x] no    [place] Prescription Monitoring Program Reviewed? [x] yes  [] no    Referrals were made to:  None    Future appointment was made: [x] yes  [] no  04/10/2019  Dictation completed at time of chart check: [x] yes  [] no    I have checked the documentation for today s encounters and the above information has been reviewed and completed.

## 2021-06-23 NOTE — PATIENT INSTRUCTIONS - HE
Continue medications as prescribed  No Alcohol or drug use  No driving if sedated  Call the clinic with any questions or concerns   Mental Health Urgent Care Crisis Line  is available 24 hrs a day  [facility] Suicide HotLine [phone] or go to nearest Emergency room  Follow-up as directed.

## 2021-06-24 NOTE — PROGRESS NOTES
Mental Health Visit Note    Date of Service: 2019    Start time: 4:03   Stop time: 4:58   Session # 1    Patient, a 23 year old male, is being seen today for psychotherapy intake appointment for depression. He is transfer from [facility], whom he saw from 2017 to 2018.     New symptoms or complaints:  None reported.    Functional Impairment:   Patient identifies the how daily stressors affect daily functioning in today's session as follows (Scale is 1-4, 1=not at all or rarely; 4=extremely so/every day.)    Personal: 1  Family: 1  Social: 1  Work: 1    Clinical assessment of mental status:   Patient presented on time. Patient was oriented x3, open and cooperative, and dressed appropriately for this session and weather. Patient s memory and cognitive functioning were within normal. Speech and language were within normal. Concentration and focus were within normal. Psychosis was not observed or reported. Patient s reported mood was euthymic. Affect was congruent with content of speech and was normal in intensity. Fund of knowledge was adequate. Insight was adequate for therapy.    Suicidal/homicidal ideation present:   Patient did not report suicidal and homicidal ideation, intent or plans.     Patient's impression of their current status:  Patient provided overview of his personal and mental health history. He was inpatient at [facility] for suicidality for about a week in 2017. Patient identifies as quick and , lives with his boyfriend ([employer]), grew up in [place], mother  of drug overdose at age 8, has supportive family including older sister in [facility], works as  and  . Patient reports that he is doing pretty well on 25 mg Zoloft, along with self-cares. He would like to do therapy every 2-3 weeks to have someone to talk to, express emotions, help with problem-solving.     Therapist impression of patient's current state:   Patient presents as a  sensitive, positive and bright young man. He appears stable and content, but became overwhelmed and suicidal 14 months ago. Therapy work will include listening to self and caring for self to avoid getting too far off track. DA was last updated on 8/8/2018.     Type of psychotherapeutic techniques provided:   Client-centered  Supportive  Relationship forming    Progress toward short term goals:   None, as this was first session with this writer.     Review of long term goals:   Not done at this visit. Review and update next therapy session in 2 weeks.     Diagnosis:   Major depressive disorder, moderate, recurrent (by history)    Plan and follow-up:   Patient will follow safety plan of seeking help from friend/family, calling [facility], calling 911, and/or presenting to the ED if necessary.  Patient will be compliant with medications and attend appointments as scheduled.  Patient will return for f/u psychotherapy in 2 weeks.    Discharge criteria/planning:   Patient will continue with follow-up until therapy can be discontinued without return of signs and symptoms.    Signature:   Performed and documented by RAND Huitron

## 2021-06-25 NOTE — PROGRESS NOTES
"Roland Sandoval is a 26 y.o. male who is being evaluated via a billable telephone visit.      The patient has been notified of following:     \"This telephone visit will be conducted via a call between you and your physician/provider. We have found that certain health care needs can be provided without the need for a physical exam.  This service lets us provide the care you need with a short phone conversation.  If a prescription is necessary we can send it directly to your pharmacy.  If lab work is needed we can place an order for that and you can then stop by our lab to have the test done at a later time.    Telephone visits are billed at different rates depending on your insurance coverage. During this emergency period, for some insurers they may be billed the same as an in-person visit.  Please reach out to your insurance provider with any questions.    If during the course of the call the physician/provider feels a telephone visit is not appropriate, you will not be charged for this service.\"    Patient has given verbal consent to a Telephone visit? Yes      Patient would like to receive their AVS by AVS Preference: Mail a copy.  Psychiatric  Out patient Follow Up Progress Note  Date of visit:6/2/2021         Discussion of Care and Treatment Recommendations:   This is a 26 y.o. male with  a history of depression presenting to the clinic today for follow-up appointment      Last visit 04/07/2021.  Recommendation at last visit .  1. Continue Zoloft 25 mg daily - MDD  2.Highly recommend increase   Psychotherapy session to weekly or biweekly: Pt will call therapist to request  3.Recommend abstain from THC- repots he has significantly reduced use and is working on  Total abstinence   4. RTC-  8  weeks  call in between visits with any questions or concerns   Patient and I reviewed diagnosis and treatment plan and patient agrees with following recommendations:  Ongoing education given regarding diagnostic and " "treatment options with adequate verbalization of understanding.  Plan   1. Continue Zoloft 25 mg daily - MDD  2.Highly recommend increase   Psychotherapy session to weekly or biweekly: Pt will call therapist to request  3.Recommend abstain from THC- repots he has significantly reduced use and is working on  Total abstinence   4. RTC-  8  weeks  call in between visits with any questions or concerns          DIagnoses:     Moderate episode of recurrent major depressive disorder        Patient Active Problem List   Diagnosis     Moderate episode of recurrent major depressive disorder (H)             Chief Complaint / Subjective:    Chief complaint: \" I am doing well\"     History of Present Illness:  Per patient's statement : He has been compliant with current medications denies side effects.  Still looking for a job and hoping to find one before the end of the summer.  No major plans for the summer stating that he is broke.  Continues to smoke cannabis and states that he is working on weaning himself off.  Reports feeling stable on current low-dose of Zoloft 25 mg daily.  Denies all psychiatric issues offers no new concerns today.  Patient will continue on current medications as is doing well on low-dose of Zoloft.  Will call in between visits with questions or concerns will return to the clinic in approximately 8 weeks.    Mental Status Examination:     Appearance: unable to assess  Orientation: Patient alert and oriented to person, place, time, and situation  Reliability:  Patient appears to be an adequate historian.    Behavior: unable to assess  Speech: Speech is spontaneous and coherent, with a normal rate, rhythm and tone.    Language:There are no difficulties with expressive or receptive language as observed throughout the interview.    Mood: Described as \"ok\".    Affect: unable to assess  Judgement: Able to make basic decision regarding safety.  Insight: Good awareness of physical and mental health conditions " and aware of needs around care for these.  Gait and station: unable to assess  Thought process: Logical   Hallucinations : No evidence of any hallucination  Thought content: No evidence of delusions or paranoia.   Suicidal /Homical Ideations:  No thoughts of self harm or suicide. No thoughts of harming others.  Associations: Connected  Fund of knowledge: Average  Attention / Concentration: Able to remain focused during the interview with minimal distractibility or need for redirection.  Short Term Memory: Grossly intact as evidence by client recalling themes and ideas discussed.  Long Term Memory: Intact  Motor Status: unable to asse      Drug/treatment history and current pattern of use:   History of cannabis use       Medication changes: See Above   Medication adherence: compliant  Medication side effects: absent  Information about medications: Side effects, benefits and alternative treatments discussed and patient agrees .    Psychotherapy: Supportive therapy day-to-day living    Education: Diet, exercise, abstinence from drugs and alcohol, patient will not drive if sedated and medications or  under influence of any substance    Lab Results:   Personally reviewed and discussed with the patient    Lab Results   Component Value Date    WBC 8.4 09/16/2019    HGB 14.3 09/16/2019    HCT 43.6 09/16/2019     09/16/2019    CHOL 152 09/16/2019    TRIG 98 09/16/2019    HDL 34 (L) 09/16/2019    ALT 15 09/16/2019    AST 14 09/16/2019     09/16/2019    K 3.9 09/16/2019     (H) 09/16/2019    CREATININE 0.93 09/16/2019    BUN 15 09/16/2019    CO2 23 09/16/2019       Vital signs:  There were no vitals taken for this visit.  Unable to assess telephone visit  Allergies: Patient has no known allergies.           Review of Systems:      ROS:  Subjective data only - Tele-Health  Visit   10 point ROS was negative except for the items listed in HPI-              Medications:     Current Outpatient Medications on File  Prior to Visit   Medication Sig Dispense Refill     sertraline (ZOLOFT) 25 MG tablet Take 1 tablet (25 mg total) by mouth daily. 90 tablet 0     No current facility-administered medications on file prior to visit.                Coordination of Care:   More than 30 minutes spent on this visit  with more than 50% of time spent on coordination of care including: Educating patient about diagnosis, prognosis, side effects and benefits of medications, diet, exercise.  Time also spent providing supportive therapy regarding above issues.    This note was created using a dictation system. All typing errors or contextual distortion is unintentional and software inherent.  Start Time : 1: 30 PM   End time :2: 00 pm       Daniela Colmenares NP

## 2021-06-25 NOTE — PROGRESS NOTES
This video/telephone visit will be conducted via a call between you and your physician/provider. We have found that certain health care needs can be provided without the need for an in-person physical exam. This service lets us provide the care you need with a video /telephone conversation. If a prescription is necessary we can send it directly to your pharmacy. If lab work is needed we can place an order for that and you can then stop by a lab to have the test done at a later time.    Just as we bill insurance for in-person visits, we also bill insurance for video/telephone visits. If you have questions about your insurance coverage, we recommend that you speak with your insurance company.    Patient has given verbal consent for video/Telephone visit? yes  Patient would like the video visit invitation sent by: Text to cell phone:[phone]  JEFFERSON/ANN MARIE Rhodes RN    Patient verified allergies, medications and pharmacy via phone.. Patient states yes  is ready for visit.    Patient states he is doing well.  He does need a refill on is zoloft.  No new issues or concerns.

## 2021-06-25 NOTE — PROGRESS NOTES
________________________________________  Medications Phoned  to Pharmacy [] yes [x]no  Name of Pharmacist:  List Medications, including dose, quantity and instructions    Medications ordered this visit were e-scribed.  Verified by order class [x] yes  [] no  Zoloft  Medication changes or discontinuations were communicated to patient's pharmacy: [] yes  [x] NA    Dictation completed at time of chart check: [x] yes  [] no    I have checked the documentation for today s encounters and the above information has been reviewed and completed.

## 2021-06-26 NOTE — PATIENT INSTRUCTIONS - HE
Continue medications as prescribed  Have your pharmacy contact us for a refill if you are running low on medications (We may ask you to come into clinic to get a refill from the nurse  No Alcohol or drug use  No driving if sedated  Call the clinic with any questions or concerns   Reach out for help if you feel like hurting yourself or others ([facility] [phone]: [address] [facility], [address] or [facility] Suicide Hotline [phone] , call 911 or go to nearest Emergency room    Follow up as directed, for your appointments, per your After Visit Summary Form.

## 2021-06-27 NOTE — PROGRESS NOTES
Progress Notes by Lluvia Perez CMA at 4/17/2019 12:00 PM     Author: Lluvia Perez CMA Service: Psychiatry Author Type: Certified Medical Assistant    Filed: 4/17/2019 12:30 PM Encounter Date: 4/17/2019 Status: Signed    : Lluvia Perez CMA (Certified Medical Assistant)       Pt is here for psychiatric follow up and medication management.        [place] :

## 2021-06-28 NOTE — PROGRESS NOTES
Progress Notes by Lluvia Perez CMA at 10/9/2019  2:00 PM     Author: Lluvia Perez CMA Service: Psychiatry Author Type: Certified Medical Assistant    Filed: 10/9/2019  2:20 PM Encounter Date: 10/9/2019 Status: Signed    : Lluvia Perez CMA (Certified Medical Assistant)       Pt is here for psychiatric follow up and medication management.        [place] :

## 2021-06-28 NOTE — PROGRESS NOTES
Progress Notes by Lluvia Perez CMA at 1/8/2020 10:30 AM     Author: Lluvia Perez CMA Service: Psychiatry Author Type: Certified Medical Assistant    Filed: 1/8/2020 10:59 AM Encounter Date: 1/8/2020 Status: Signed    : Lluvia Perez CMA (Certified Medical Assistant)       Pt is here for psychiatric follow up and medication management.  Cannabis: used last night to help aid sleep, other then that event has not smoked since his last visit with provider.     [place] :

## 2021-07-26 ENCOUNTER — VIRTUAL VISIT (OUTPATIENT)
Dept: BEHAVIORAL HEALTH | Facility: CLINIC | Age: 26
End: 2021-07-26
Payer: COMMERCIAL

## 2021-07-26 DIAGNOSIS — F32.1 CURRENT MODERATE EPISODE OF MAJOR DEPRESSIVE DISORDER, UNSPECIFIED WHETHER RECURRENT (H): Primary | ICD-10-CM

## 2021-07-26 PROCEDURE — 99214 OFFICE O/P EST MOD 30 MIN: CPT | Mod: 95 | Performed by: NURSE PRACTITIONER

## 2021-07-26 RX ORDER — SERTRALINE HYDROCHLORIDE 25 MG/1
25 TABLET, FILM COATED ORAL DAILY
Qty: 90 TABLET | Refills: 0 | Status: SHIPPED | OUTPATIENT
Start: 2021-07-26 | End: 2021-10-20

## 2021-07-26 ASSESSMENT — LIFESTYLE VARIABLES
HOW MANY STANDARD DRINKS CONTAINING ALCOHOL DO YOU HAVE ON A TYPICAL DAY: 1 OR 2
HOW OFTEN DO YOU HAVE SIX OR MORE DRINKS ON ONE OCCASION: MONTHLY

## 2021-07-26 NOTE — PROGRESS NOTES
"  The patient has been notified of following:      \"This telephone visit will be conducted via a call between you and your physician/provider. We have found that certain health care needs can be provided without the need for a physical exam.  This service lets us provide the care you need with a short phone conversation.  If a prescription is necessary we can send it directly to your pharmacy.  If lab work is needed we can place an order for that and you can then stop by our lab to have the test done at a later time.     Telephone visits are billed at different rates depending on your insurance coverage. During this emergency period, for some insurers they may be billed the same as an in-person visit.  Please reach out to your insurance provider with any questions.     If during the course of the call the physician/provider feels a telephone visit is not appropriate, you will not be charged for this service.\"     Patient has given verbal consent to a Telephone visit? Yes        Patient would like to receive their AVS by AVS P/reference: Mail a copy      Psychiatric  Out patient Follow Up Progress Note  Date of visit:7/26/2021           Discussion of Care and Treatment Recommendations:   This is a 26 year old male with a history of depression presenting to the clinic today for follow-up appointment         Last visit  06/02/2021  Recommendation at last visit .  1. Continue Zoloft 25 mg daily - MDD  2.Highly recommend increase   Psychotherapy session to weekly or biweekly: Pt will call therapist to request  3.Recommend abstain from THC- repots he has significantly reduced use and is working on  Total abstinence   4. RTC-  8  weeks  call in between visits with any questions or concerns      Patient and I reviewed diagnosis and treatment plan and patient agrees with following recommendations:  Ongoing education given regarding diagnostic and treatment options with adequate verbalization of understanding.  Plan   1. " "Continue Zoloft 25 mg daily - MDD  2.Highly recommend increase   Psychotherapy session to weekly or biweekly: Pt will call therapist to request  3.Recommend abstain from THC- repots he has significantly reduced use and is working on  Total abstinence   4. RTC-  12  weeks  call in between visits with any questions or concerns             DIagnoses:   Moderate episode of recurrent major depressive disorder       Patient Active Problem List   Diagnosis     Moderate episode of recurrent major depressive disorder (H)             Chief Complaint / Subjective:    Chief complaint: \" I am doing ok\"     History of Present Illness:   Per patient's statement : Reports compliance with current medication.  He denies any side effects.  He is still job searching and is yet to secure appointment that keeps trying and spending more resumes.  Continues to work on decreasing cannabis use I applauded him for this.  Denies all psychiatric issues offers no new concerns.  Mental Status Examination:     Appearance: unable to assess  Orientation: Patient alert and oriented to person, place, time, and situation  Reliability:  Patient appears to be an adequate historian.    Behavior: Calm and cooperative  Speech: Speech is spontaneous and coherent, with a normal rate, rhythm and tone.    Language:There are no difficulties with expressive or receptive language as observed throughout the interview.    Mood: Described as \"ok\".    Affect: unable to assess  Judgement: Able to make basic decision regarding safety.  Insight: Good awareness of physical and mental health conditions and aware of needs around care for these.  Gait and station: unable to assess  Thought process: Logical   Hallucinations : No evidence of any hallucination  Thought content: No evidence of delusions or paranoia.   Suicidal /Homical Ideations:  No thoughts of self harm or suicide. No thoughts of harming others.  Associations: Connected  Fund of knowledge: Average  Attention / " Concentration: Able to remain focused during the interview with minimal distractibility or need for redirection.  Short Term Memory: Grossly intact as evidence by client recalling themes and ideas discussed.  Long Term Memory: Intact  Motor Status: unable to asses      Drug/treatment history and current pattern of use:   History of cannabis use    Medication changes: See Above   Medication adherence: compliant  Medication side effects: absent  Information about medications: Side effects, benefits and alternative treatments discussed and patient agrees .    Psychotherapy: Supportive therapy day-to-day living    Education: Diet, exercise, abstinence from drugs and alcohol, patient will not drive if sedated and medications or  under influence of any substance    Lab Results:   Personally reviewed and discussed with the patient    Lab Results   Component Value Date    WBC 8.4 09/16/2019    HGB 14.3 09/16/2019    HCT 43.6 09/16/2019     09/16/2019    CHOL 152 09/16/2019    TRIG 98 09/16/2019    HDL 34 (L) 09/16/2019    ALT 15 09/16/2019    AST 14 09/16/2019     09/16/2019    BUN 15 09/16/2019    CO2 23 09/16/2019       Vital signs:  There were no vitals taken for this visit.  Unable to assess telephone visit  Allergies: Patient has no known allergies.           Review of Systems:      ROS:  Subjective data only - Tele-Health  Visit   10 point ROS was negative except for the items listed in HPI-              Medications:     Current Outpatient Medications   Medication     sertraline (ZOLOFT) 25 MG tablet     No current facility-administered medications for this visit.           Coordination of Care:   More than 30 minutes spent on this visit  with more than 50% of time spent on coordination of care including: Educating patient about diagnosis, prognosis, side effects and benefits of medications, diet, exercise.  Time also spent providing supportive therapy regarding above issues.    This note was created  using a dictation system. All typing errors or contextual distortion is unintentional and software inherent.  Start Time : 3: 30 pm   End time 4: 00 PM

## 2021-07-26 NOTE — PATIENT INSTRUCTIONS
Continue medications as prescribed  Have your pharmacy contact us for a refill if you are running low on medications (We may ask you to come into clinic to get a refill from the nurse  No Alcohol or drug use  No driving if sedated  Call the clinic with any questions or concerns   Reach out for help if you feel like hurting yourself or others ([facility] [phone]: [address], [address] or [facility] Suicide Hotline [phone] , call 911 or go to nearest Emergency room    Follow up as directed, for your appointments, per your After Visit Summary Form.

## 2021-07-26 NOTE — NURSING NOTE
This video/telephone visit will be conducted via a call between you and your physician/provider. We have found that certain health care needs can be provided without the need for an in-person physical exam. This service lets us provide the care you need with a video /telephone conversation. If a prescription is necessary we can send it directly to your pharmacy. If lab work is needed we can place an order for that and you can then stop by our lab to have the test done at a later time.    Just as we bill insurance for in-person visits, we also bill insurance for video/telephone visits. If you have questions about your insurance coverage, we recommend that you speak with your insurance company.    Patient has given verbal consent for video/Telephone visit? yes  Patient would like telephone visit, please call: [phone]   JEFFERSON/ANN MARIE : Dami SHARMA LPN    Patient verified allergies, medications and pharmacy via phone. Patient states he  is ready for visit.    ________________________________________  Medications Phoned  to Pharmacy [] yes [x]no  Name of Pharmacist:  List Medications, including dose, quantity and instructions    Medications ordered this visit were e-scribed.  Verified by order class [x] yes  [] no  Zoloft  Medication changes or discontinuations were communicated to patient's pharmacy: [] yes  [x] no    Dictation completed at time of chart check: [x] yes  [] no    I have checked the documentation for today s encounters and the above information has been reviewed and completed.

## 2021-08-14 ENCOUNTER — HEALTH MAINTENANCE LETTER (OUTPATIENT)
Age: 26
End: 2021-08-14

## 2021-10-10 ENCOUNTER — HEALTH MAINTENANCE LETTER (OUTPATIENT)
Age: 26
End: 2021-10-10

## 2021-10-20 DIAGNOSIS — F32.1 CURRENT MODERATE EPISODE OF MAJOR DEPRESSIVE DISORDER, UNSPECIFIED WHETHER RECURRENT (H): ICD-10-CM

## 2021-10-20 RX ORDER — SERTRALINE HYDROCHLORIDE 25 MG/1
TABLET, FILM COATED ORAL
Qty: 90 TABLET | Refills: 0 | Status: SHIPPED | OUTPATIENT
Start: 2021-10-20 | End: 2022-01-21

## 2021-10-20 NOTE — TELEPHONE ENCOUNTER
Date of Last Office Visit: 07/26/2021  Date of Next Office Visit: 10/25/2021  No shows since last visit: 0  Cancellations since last visit: 0    Medication requested: sertraline (ZOLOFT) 25 MG  Date last ordered: 07/26/2021 Qty: 90 Refills: 0     Lapse in medication adherence greater than 5 days?: no  If yes, call patient and gather details: n/a  Medication refill request verified as identical to current order?: yes  Result of Last DAM, VPA, Li+ Level, CBC, or Carbamazepine Level (at or since last visit): N/A    Last visit treatment plan:   1. Continue Zoloft 25 mg daily - MDD  2.Highly recommend increase   Psychotherapy session to weekly or biweekly: Pt will call therapist to request  3.Recommend abstain from THC- repots he has significantly reduced use and is working on  Total abstinence   4. RTC-  12  weeks  call in between visits with any questions or concerns     []Medication refilled per  Medication Refill in Ambulatory Care  policy.  [x]Medication unable to be refilled by RN due to criteria not met as indicated below:    []Eligibility - not seen in the last year   []Supervision - no future appointment   []Compliance - no shows, cancellations or lapse in therapy   []Verification - order discrepancy   []Controlled medication   []Medication not included in policy   [x]90-day supply request   []Other    Routing to provider pool dt prescriber out of office this week.    Jeremias Ortega RN on 10/20/2021 at 11:12 AM

## 2021-10-27 ENCOUNTER — TRANSFERRED RECORDS (OUTPATIENT)
Dept: HEALTH INFORMATION MANAGEMENT | Facility: CLINIC | Age: 26
End: 2021-10-27

## 2021-11-10 ENCOUNTER — OFFICE VISIT (OUTPATIENT)
Dept: FAMILY MEDICINE | Facility: CLINIC | Age: 26
End: 2021-11-10
Payer: COMMERCIAL

## 2021-11-10 VITALS
WEIGHT: 200 LBS | TEMPERATURE: 97.3 F | HEIGHT: 70 IN | DIASTOLIC BLOOD PRESSURE: 77 MMHG | SYSTOLIC BLOOD PRESSURE: 139 MMHG | RESPIRATION RATE: 20 BRPM | HEART RATE: 86 BPM | BODY MASS INDEX: 28.63 KG/M2

## 2021-11-10 DIAGNOSIS — F33.1 MODERATE EPISODE OF RECURRENT MAJOR DEPRESSIVE DISORDER (H): ICD-10-CM

## 2021-11-10 DIAGNOSIS — Z71.85 IMMUNIZATION COUNSELING: ICD-10-CM

## 2021-11-10 DIAGNOSIS — Z11.3 SCREEN FOR STD (SEXUALLY TRANSMITTED DISEASE): ICD-10-CM

## 2021-11-10 DIAGNOSIS — E78.6 LOW HDL (UNDER 40): ICD-10-CM

## 2021-11-10 DIAGNOSIS — Z00.00 ROUTINE HISTORY AND PHYSICAL EXAMINATION OF ADULT: Primary | ICD-10-CM

## 2021-11-10 LAB
ALBUMIN SERPL-MCNC: 4.3 G/DL (ref 3.5–5)
ALP SERPL-CCNC: 109 U/L (ref 45–120)
ALT SERPL W P-5'-P-CCNC: 24 U/L (ref 0–45)
ANION GAP SERPL CALCULATED.3IONS-SCNC: 12 MMOL/L (ref 5–18)
AST SERPL W P-5'-P-CCNC: 20 U/L (ref 0–40)
BILIRUB SERPL-MCNC: 0.7 MG/DL (ref 0–1)
BUN SERPL-MCNC: 17 MG/DL (ref 8–22)
CALCIUM SERPL-MCNC: 9.9 MG/DL (ref 8.5–10.5)
CHLORIDE BLD-SCNC: 106 MMOL/L (ref 98–107)
CHOLEST SERPL-MCNC: 223 MG/DL
CO2 SERPL-SCNC: 23 MMOL/L (ref 22–31)
CREAT SERPL-MCNC: 1.01 MG/DL (ref 0.7–1.3)
ERYTHROCYTE [DISTWIDTH] IN BLOOD BY AUTOMATED COUNT: 13.2 % (ref 10–15)
FASTING STATUS PATIENT QL REPORTED: ABNORMAL
GFR SERPL CREATININE-BSD FRML MDRD: >90 ML/MIN/1.73M2
GLUCOSE BLD-MCNC: 81 MG/DL (ref 70–125)
HCT VFR BLD AUTO: 46.3 % (ref 40–53)
HDLC SERPL-MCNC: 38 MG/DL
HGB BLD-MCNC: 15 G/DL (ref 13.3–17.7)
HIV 1+2 AB+HIV1 P24 AG SERPL QL IA: NEGATIVE
LDLC SERPL CALC-MCNC: 140 MG/DL
MCH RBC QN AUTO: 28.3 PG (ref 26.5–33)
MCHC RBC AUTO-ENTMCNC: 32.4 G/DL (ref 31.5–36.5)
MCV RBC AUTO: 87 FL (ref 78–100)
PLATELET # BLD AUTO: 257 10E3/UL (ref 150–450)
POTASSIUM BLD-SCNC: 4.1 MMOL/L (ref 3.5–5)
PROT SERPL-MCNC: 7.4 G/DL (ref 6–8)
RBC # BLD AUTO: 5.3 10E6/UL (ref 4.4–5.9)
SODIUM SERPL-SCNC: 141 MMOL/L (ref 136–145)
TRIGL SERPL-MCNC: 227 MG/DL
WBC # BLD AUTO: 6.1 10E3/UL (ref 4–11)

## 2021-11-10 PROCEDURE — 80061 LIPID PANEL: CPT | Performed by: FAMILY MEDICINE

## 2021-11-10 PROCEDURE — 99395 PREV VISIT EST AGE 18-39: CPT | Mod: 25 | Performed by: FAMILY MEDICINE

## 2021-11-10 PROCEDURE — 87491 CHLMYD TRACH DNA AMP PROBE: CPT | Performed by: FAMILY MEDICINE

## 2021-11-10 PROCEDURE — 36415 COLL VENOUS BLD VENIPUNCTURE: CPT | Performed by: FAMILY MEDICINE

## 2021-11-10 PROCEDURE — 87591 N.GONORRHOEAE DNA AMP PROB: CPT | Performed by: FAMILY MEDICINE

## 2021-11-10 PROCEDURE — 85027 COMPLETE CBC AUTOMATED: CPT | Performed by: FAMILY MEDICINE

## 2021-11-10 PROCEDURE — 86803 HEPATITIS C AB TEST: CPT | Performed by: FAMILY MEDICINE

## 2021-11-10 PROCEDURE — 87389 HIV-1 AG W/HIV-1&-2 AB AG IA: CPT | Performed by: FAMILY MEDICINE

## 2021-11-10 PROCEDURE — 86780 TREPONEMA PALLIDUM: CPT | Performed by: FAMILY MEDICINE

## 2021-11-10 PROCEDURE — 90746 HEPB VACCINE 3 DOSE ADULT IM: CPT | Performed by: FAMILY MEDICINE

## 2021-11-10 PROCEDURE — 80053 COMPREHEN METABOLIC PANEL: CPT | Performed by: FAMILY MEDICINE

## 2021-11-10 PROCEDURE — 87340 HEPATITIS B SURFACE AG IA: CPT | Performed by: FAMILY MEDICINE

## 2021-11-10 PROCEDURE — 90471 IMMUNIZATION ADMIN: CPT | Performed by: FAMILY MEDICINE

## 2021-11-10 ASSESSMENT — MIFFLIN-ST. JEOR: SCORE: 1890.5

## 2021-11-10 NOTE — PROGRESS NOTES
Assessment & Plan        ICD-10-CM    1. Routine history and physical examination of adult  Z00.00 Lipid Profile (Chol, Trig, HDL, LDL calc)     Comprehensive metabolic panel (BMP + Alb, Alk Phos, ALT, AST, Total. Bili, TP)     CBC with platelets   2. Moderate episode of recurrent major depressive disorder (H)  F33.1    3. Screen for STD (sexually transmitted disease)  Z11.3 Hepatitis B surface antigen     HIV Antigen Antibody Combo     Treponema Abs w Reflex to RPR and Titer     Chlamydia trachomatis/Neisseria gonorrhoeae by PCR - Clinic Collect     Hepatitis C antibody   4. Immunization counseling  Z71.85 HEPATITIS B VACCINE, ADULT, IM   5. Low HDL (under 40)  E78.6         Stable history and physical    Depression seems to be controlled with his sertraline continue to see psychiatrist.  He is not having counseling now he feels he does not need it at this time.    Screen for STDs as outlined    Immunization update with hepatitis B #1, I discussed it is a series of 3 shots.    Low HDL continue to be active, will recheck lipid panel.    Patient be contacted regarding the lab results and discuss follow-up.              Answers for HPI/ROS submitted by the patient on 11/10/2021  Frequency of exercise:: 4-5 days/week  Getting at least 3 servings of Calcium per day:: Yes  Diet:: Regular (no restrictions), Gluten-free/reduced  Taking medications regularly:: Yes  Medication side effects:: None  Bi-annual eye exam:: Yes  Dental care twice a year:: Yes  Sleep apnea or symptoms of sleep apnea:: None  Additional concerns today:: Yes  Duration of exercise:: 15-30 minutes        Return in about 1 year (around 11/10/2022) for Routine preventive. for recheck.        Subjective:    This 26 year old male was seen today for a physical.    Patient had a physical in September 2019 at that time hepatitis B surface antibody was negative.  We discussed getting hepatitis B shots but he never did get that he does want to do that today we  "will start the series.    Has had his Covid shot    He refuses a flu shot    He sees Daniela Colmenares for his mental health/depression diagnosis.  He is on low-dose sertraline 25 mg a day he feels like things are going okay PHQ-9 today was at 1.    2 years ago his HDL was 3040 has increased his physical activity will recheck on lipid panel.  Also CMP and CBC    He did want to get checked for STDs he is in a monogamous relationship, his male partner was with him today.  I did check HIV hepatitis C hepatitis B surface antigen syphilis GC and chlamydia.  He is having no active symptoms.    No additional concern or issue.    Social history he is not smoking no alcohol.  He is looking for work.    Review of Systems    10 point review of systems positive as outlined above otherwise negative    Current Outpatient Medications   Medication     sertraline (ZOLOFT) 25 MG tablet     No current facility-administered medications for this visit.       Objective    Vitals: /77 (BP Location: Left arm, Patient Position: Sitting, Cuff Size: Adult Large)   Pulse 86   Temp 97.3  F (36.3  C) (Temporal)   Resp 20   Ht 1.765 m (5' 9.5\")   Wt 90.7 kg (200 lb)   BMI 29.11 kg/m    BMI= Body mass index is 29.11 kg/m .     Physical Exam    General appearance no acute distress    HEENT neck nontender no adenopathy oropharynx is clear pupils react normally extraocular movements full    Canals and TMs normal    Lungs clear throughout no rales or rhonchi, heart regular S1-S2 no murmur    No axillary or inguinal adenopathy    Abdomen soft nontender no masses    Genitalia normal descended testes no evidence of hernia.    Lower extremities without edema skin is normal.  Normal pulses.    No joint redness warmth or swelling.    Multiple labs pending as outlined below        Oni Lincoln MD   "

## 2021-11-11 LAB
C TRACH DNA SPEC QL PROBE+SIG AMP: NEGATIVE
HBV SURFACE AG SERPL QL IA: NONREACTIVE
HCV AB SERPL QL IA: NEGATIVE
N GONORRHOEA DNA SPEC QL NAA+PROBE: NEGATIVE
T PALLIDUM AB SER QL: NONREACTIVE

## 2021-11-23 ENCOUNTER — TELEPHONE (OUTPATIENT)
Dept: FAMILY MEDICINE | Facility: CLINIC | Age: 26
End: 2021-11-23
Payer: COMMERCIAL

## 2021-11-23 NOTE — TELEPHONE ENCOUNTER
Provider response below relayed to patient. Message understood.scheduled follow up    ----- Message from Oni Lincoln MD sent at 11/23/2021  2:47 PM CST -----  Please contact this patient.  Let him know that there is no evidence of STDs.The liver kidney and electrolytes and blood sugar were all normal.  The hemoglobin and white count were normal.The cholesterol has gone up from 152, 2 years ago, now up to 223 and the triglycerides increased from 98 up to 227.Need to decrease the carbohydrates in the diet less bread sugar Posta sweets alcohol.  Also decrease the fatty red meat cheese fried food fast food.Plan to follow-up in 6 months come in fasting we will recheck on the lipid panel.Also try to increase physical activity to increase the good cholesterol the HDL which is still low at 38.

## 2021-12-30 ENCOUNTER — HOSPITAL ENCOUNTER (EMERGENCY)
Facility: HOSPITAL | Age: 26
Discharge: HOME | End: 2021-12-30
Attending: STUDENT IN AN ORGANIZED HEALTH CARE EDUCATION/TRAINING PROGRAM | Admitting: STUDENT IN AN ORGANIZED HEALTH CARE EDUCATION/TRAINING PROGRAM
Payer: COMMERCIAL

## 2021-12-30 VITALS
RESPIRATION RATE: 20 BRPM | WEIGHT: 198 LBS | SYSTOLIC BLOOD PRESSURE: 130 MMHG | OXYGEN SATURATION: 94 % | DIASTOLIC BLOOD PRESSURE: 79 MMHG | TEMPERATURE: 98.5 F | HEART RATE: 67 BPM | BODY MASS INDEX: 28.82 KG/M2

## 2021-12-30 DIAGNOSIS — Z20.822 SUSPECTED 2019 NOVEL CORONAVIRUS INFECTION: ICD-10-CM

## 2021-12-30 PROBLEM — A60.02 HERPES GENITALIS IN MEN: Status: ACTIVE | Noted: 2017-12-02

## 2021-12-30 LAB
FLUAV RNA SPEC QL NAA+PROBE: NEGATIVE
FLUBV RNA RESP QL NAA+PROBE: NEGATIVE
SARS-COV-2 RNA RESP QL NAA+PROBE: POSITIVE

## 2021-12-30 PROCEDURE — C9803 HOPD COVID-19 SPEC COLLECT: HCPCS

## 2021-12-30 PROCEDURE — 99283 EMERGENCY DEPT VISIT LOW MDM: CPT

## 2021-12-30 PROCEDURE — 87636 SARSCOV2 & INF A&B AMP PRB: CPT | Performed by: STUDENT IN AN ORGANIZED HEALTH CARE EDUCATION/TRAINING PROGRAM

## 2021-12-30 RX ORDER — ONDANSETRON 4 MG/1
4 TABLET, ORALLY DISINTEGRATING ORAL EVERY 8 HOURS PRN
Qty: 10 TABLET | Refills: 0 | Status: SHIPPED | OUTPATIENT
Start: 2021-12-30 | End: 2022-01-02

## 2021-12-30 NOTE — ED TRIAGE NOTES
Presents with onset on Friday feeling hot/feverish. Cough started on Friday. Today developed vomiting. Also endorses sore throat.

## 2021-12-30 NOTE — ED PROVIDER NOTES
EMERGENCY DEPARTMENT ENCOUNTER      NAME: Roland Sandoval  AGE: 26 year old male  YOB: 1995  MRN: [id number]  EVALUATION DATE & TIME: No admission date for patient encounter.    PCP: No Ref-Primary, Physician    ED PROVIDER: Sesar Paiz M.D.      Chief Complaint   Patient presents with     Vomiting         FINAL IMPRESSION:  1. Suspected 2019 novel coronavirus infection          ED COURSE & MEDICAL DECISION MAKING:    Pertinent Labs & Imaging studies reviewed. (See chart for details)  26 year old male presents to the Emergency Department for evaluation of vomiting.  Patient with generalized body aches, URI symptoms and one episode of vomiting earlier today.  Nonacute nontoxic.  Most likely COVID-19.  No shortness of breath or difficulty breathing will discharge home with Zofran and suspected Covid diagnosis.    9:17 AM I met with the patient and performed my initial exam.  I discussed the plan for care and the patient is agreeable with this plan. PPE: Provider wore gloves, N95 mask, eye glasses.    At the conclusion of the encounter I discussed the results of all of the tests and the disposition. The questions were answered. The patient or family acknowledged understanding and was agreeable with the care plan.           MEDICATIONS GIVEN IN THE EMERGENCY:  Medications - No data to display    NEW PRESCRIPTIONS STARTED AT TODAY'S ER VISIT  New Prescriptions    ONDANSETRON (ZOFRAN ODT) 4 MG ODT TAB    Take 1 tablet (4 mg) by mouth every 8 hours as needed for nausea          =================================================================    HPI    Patient information was obtained from: the patient    Use of : N/A      Roland Sandoval is a 26 year old male with a pertinent history of depression.  Genital herpes, lowe HDL who presents to this ED by personal vehicle for evaluation of vomiting.    The patient presents to the ED reporting generalized body aches for the last week.   Patient says he feels feverish but has not had a documented fever.  Today he feels a little bit worse.  No shortness of breath.  No chest pain.  No abdominal pain no diarrhea.  He had one episode of vomiting that sounds mostly like he spit up some water not significant other vomiting.  Otherwise feels fine.    REVIEW OF SYSTEMS   Review of Systems   All other systems reviewed and are negative.       PAST MEDICAL HISTORY:  No past medical history on file.    PAST SURGICAL HISTORY:  No past surgical history on file.      CURRENT MEDICATIONS:    ondansetron (ZOFRAN ODT) 4 MG ODT tab  sertraline (ZOLOFT) 25 MG tablet        ALLERGIES:  No Known Allergies    FAMILY HISTORY:  Family History   Problem Relation Age of Onset     Anxiety Disorder Mother      Hypertension Father        SOCIAL HISTORY:   Social History     Socioeconomic History     Marital status: Single     Spouse name: Not on file     Number of children: Not on file     Years of education: Not on file     Highest education level: Not on file   Occupational History     Not on file   Tobacco Use     Smoking status: Never Smoker     Smokeless tobacco: Never Used   Substance and Sexual Activity     Alcohol use: No     Drug use: No     Sexual activity: Not on file   Other Topics Concern     Not on file   Social History Narrative     Not on file     Social Determinants of Health     Financial Resource Strain: Not on file   Food Insecurity: Not on file   Transportation Needs: Not on file   Physical Activity: Not on file   Stress: Not on file   Social Connections: Not on file   Intimate Partner Violence: Not on file   Housing Stability: Not on file       VITALS:  /79   Pulse 67   Temp 98.5  F (36.9  C) (Oral)   Resp 20   Wt 89.8 kg (198 lb)   SpO2 94%   BMI 28.82 kg/m        PHYSICAL EXAM    PHYSICAL EXAM    VITAL SIGNS: /79   Pulse 67   Temp 98.5  F (36.9  C) (Oral)   Resp 20   Wt 89.8 kg (198 lb)   SpO2 94%   BMI 28.82 kg/m     Constitutional:  Well developed, well nourished,  EYES: Conjunctivae clear, no discharge  HENT: Atraumatic, normocephalic, bilateral external ears normal.  Oropharynx moist. Nose normal. Erythematous, no asymmetry, no exudate, no tonsilar swelling.  Neck: Normal ROM , Supple   Respiratory:  No respiratory distress, normal nonlabored respirations.   Cardiovascular:  Distal perfusion appears intact  Musculoskeletal:  No edema appreciated, No cyanosis, No clubbing. Good range of motion in all major joints.   Integument:  Warm, Dry, No erythema, No rash.   Neurologic:  Alert and oriented. No focal deficits noted.  Ambulatory  Psychiatric:  Affect normal            LAB:  All pertinent labs reviewed and interpreted.  Labs Ordered and Resulted from Time of ED Arrival to Time of ED Departure - No data to display    RADIOLOGY:  Reviewed all pertinent imaging. Please see official radiology report.  No orders to display         I have independently reviewed and interpreted the EKG(s) documented above.    PROCEDURES:   None      I, Rob Bacon, am serving as a scribe to document services personally performed by Dr. Sesar Paiz based on my observation and the provider's statements to me. ISesar MD attest that Rob Bacon is acting in a scribe capacity, has observed my performance of the services and has documented them in accordance with my direction.    Sesar Paiz M.D.  Emergency Medicine  [facility] EMERGENCY DEPARTMENT  [facility] [facility] [address]  [phone]  Dept: [phone]     Sesar Paiz MD  12/30/21 0973

## 2022-01-21 ENCOUNTER — TELEPHONE (OUTPATIENT)
Dept: BEHAVIORAL HEALTH | Facility: CLINIC | Age: 27
End: 2022-01-21
Payer: COMMERCIAL

## 2022-01-21 DIAGNOSIS — F32.1 CURRENT MODERATE EPISODE OF MAJOR DEPRESSIVE DISORDER, UNSPECIFIED WHETHER RECURRENT (H): ICD-10-CM

## 2022-01-21 RX ORDER — SERTRALINE HYDROCHLORIDE 25 MG/1
25 TABLET, FILM COATED ORAL DAILY
Qty: 30 TABLET | Refills: 0 | Status: SHIPPED | OUTPATIENT
Start: 2022-01-21 | End: 2022-02-14

## 2022-01-21 NOTE — TELEPHONE ENCOUNTER
Date of Last Office Visit: 7/26/2021  Date of Next Office Visit: 2/14/2022  No shows since last visit: none  Cancellations since last visit: 3-provider initiated    Medication requested: Sertraline 25 mg tablet Date last ordered: 10/20/2021 Qty: 90 Refills: 0     Review of MN ?: n/a    Lapse in medication adherence greater than 5 days?: no  If yes, call patient and gather details: no  Medication refill request verified as identical to current order?: yes  Result of Last DAM, VPA, Li+ Level, CBC, or Carbamazepine Level (at or since last visit): N/A    Last visit treatment plan: Plan   1. Continue Zoloft 25 mg daily - MDD  2.Highly recommend increase   Psychotherapy session to weekly or biweekly: Pt will call therapist to request  3.Recommend abstain from THC- repots he has significantly reduced use and is working on  Total abstinence   4. RTC-  12  weeks  call in between visits with any questions or concerns     []Medication refilled per  Medication Refill in Ambulatory Care  policy.  [x]Medication unable to be refilled by RN due to criteria not met as indicated below:    []Eligibility - not seen in the last year   []Supervision - no future appointment   []Compliance - no shows, cancellations or lapse in therapy   []Verification - order discrepancy   []Controlled medication   [x]Medication not included in policy   []90-day supply request   []Other

## 2022-02-14 ENCOUNTER — VIRTUAL VISIT (OUTPATIENT)
Dept: BEHAVIORAL HEALTH | Facility: CLINIC | Age: 27
End: 2022-02-14
Payer: COMMERCIAL

## 2022-02-14 DIAGNOSIS — F32.1 CURRENT MODERATE EPISODE OF MAJOR DEPRESSIVE DISORDER, UNSPECIFIED WHETHER RECURRENT (H): ICD-10-CM

## 2022-02-14 PROCEDURE — G0463 HOSPITAL OUTPT CLINIC VISIT: HCPCS | Mod: PN,RTG | Performed by: NURSE PRACTITIONER

## 2022-02-14 PROCEDURE — 99214 OFFICE O/P EST MOD 30 MIN: CPT | Mod: 95 | Performed by: NURSE PRACTITIONER

## 2022-02-14 RX ORDER — SERTRALINE HYDROCHLORIDE 25 MG/1
25 TABLET, FILM COATED ORAL DAILY
Qty: 90 TABLET | Refills: 0 | Status: SHIPPED | OUTPATIENT
Start: 2022-02-14 | End: 2022-05-23

## 2022-02-14 NOTE — PATIENT INSTRUCTIONS
Continue medications as prescribed  Have your pharmacy contact us for a refill if you are running low on medications (We may ask you to come into clinic to get a refill from the nurse  No Alcohol or drug use  No driving if sedated  Call the clinic with any questions or concerns   Reach out for help if you feel like hurting yourself or others ([facility] [phone]: [address] [place], [address] or [facility] Suicide Hotline [phone] , call 911 or go to nearest Emergency room    Follow up as directed, for your appointments, per your After Visit Summary Form.

## 2022-02-14 NOTE — PROGRESS NOTES
This video/telephone visit will be conducted via a call between you and your physician/provider. We have found that certain health care needs can be provided without the need for an in-person physical exam. This service lets us provide the care you need with a video /telephone conversation. If a prescription is necessary we can send it directly to your pharmacy. If lab work is needed we can place an order for that and you can then stop by our lab to have the test done at a later time.    Just as we bill insurance for in-person visits, we also bill insurance for video/telephone visits. If you have questions about your insurance coverage, we recommend that you speak with your insurance company.    Patient has given verbal consent for video/Telephone visit? Yes   Patient would like video visit, please connect: Send SMS invite [phone]  Reason for visit: Medication Follow up  [facility]   Patient verified allergies, medications and pharmacy via telephone verbally with writer.   Medication refill is pended for review and approval by provider.  Patient states he is ready for visit.  MN  to be reviewed by provider.   Lluvia Perez February 14, 2022 3:10 PM

## 2022-02-14 NOTE — PROGRESS NOTES
"  The patient has been notified of following:      \"This virtual  visit will be conducted via a call between you and your physician/provider. We have found that certain health care needs can be provided without the need for a physical exam.  This service lets us provide the care you need virtually/via video   If a prescription is necessary we can send it directly to your pharmacy.  If lab work is needed we can place an order for that and you can then stop by our lab to have the test done at a later time.     Virtual/Video visits are billed at different rates depending on your insurance coverage. During this emergency period, for some insurers they may be billed the same as an in-person visit.  Please reach out to your insurance provider with any questions.          Mickey verma would you like to obtain your AVS? Mail a copy  If the video visit is dropped, the invitation should be resent by: Send to e-mail at: [email or web address]  Will anyone else be joining your video visit? No            Psychiatric  Out- Patient  Follow Up Progress Note  Date of visit:2/14/2022           Discussion of Care and Treatment Recommendations:   This is a 26 year old male with a history of depression presenting to the clinic today for follow-up appointment      Last visit  07/26/2021.  Recommendation at last visit .  1. Continue Zoloft 25 mg daily - MDD  2.Highly recommend increase   Psychotherapy session to weekly or biweekly: Pt will call therapist to request  3.Recommend abstain from THC- repots he has significantly reduced use and is working on  Total abstinence   4. RTC-  12  weeks  call in between visits with any questions or concerns      Patient and I reviewed diagnosis and treatment plan and patient agrees with following recommendations:  Ongoing education given regarding diagnostic and treatment options with adequate verbalization of understanding.  Plan   1. Continue Zoloft 25 mg daily - MDD  2.Highly recommend " "increase   Psychotherapy session to weekly or biweekly: Pt will call therapist to request  3.Recommend abstain from THC- repots he has significantly reduced use and is working on  Total abstinence   4. RTC-  12  weeks  call in between visits with any questions or concerns             DIagnoses:   Moderate episode of recurrent major depressive disorder       Patient Active Problem List   Diagnosis     Moderate episode of recurrent major depressive disorder (H)     Low HDL (under 40)     Herpes genitalis in men             Chief Complaint / Subjective:    Chief complaint:depression     History of Present Illness:   Per patient statement-he is responding positively to low-dose sertraline at 25 mg.  He recently started a new job and was feeling a little bit down past week but this has since changed and he is feeling better.  He has been trying to balance his new job and social life and has been keeping a pretty good job.  Denies all psychiatric issues and offers no new concerns.  He would like to continue on current medications.    Mental Status Examination:   Appearance: unable to assess  Orientation: Patient alert and oriented to person, place, time, and situation  Reliability:  Patient appears to be an adequate historian.    Behavior: unable to assess  Speech: Speech is spontaneous and coherent, with a normal rate, rhythm and tone.    Language:There are no difficulties with expressive or receptive language as observed throughout the interview.    Mood: Described as \"ok\".    Affect: unable to assess  Judgement: Able to make basic decision regarding safety.  Insight: Good awareness of physical and mental health conditions and aware of needs around care for these.  Gait and station: unable to assess  Thought process: Logical   Thought content: No evidence of delusions or paranoia.    Hallucinations : No evidence of any hallucination  Thought content: No evidence of delusions or paranoia.   Suicidal /Homical Ideations:  " No thoughts of self harm or suicide. No thoughts of harming others.  Associations: Connected  Fund of knowledge: Average  Attention / Concentration: Able to remain focused during the interview with minimal distractibility or need for redirection.  Short Term Memory: Grossly intact as evidence by client recalling themes and ideas discussed.  Long Term Memory: Intact  Motor Status: unable to asse    Drug/treatment history and current pattern of use:   History of cannabis use    Medication changes: See Above   Medication adherence: compliant  Medication side effects: absent  Information about medications: Side effects, benefits and alternative treatments discussed and patient agrees .    Psychotherapy: Supportive therapy day-to-day living    Education: Diet, exercise, abstinence from drugs and alcohol, patient will not drive if sedated and medications or  under influence of any substance    Lab Results:   Personally reviewed and discussed with the patient    Lab Results   Component Value Date    WBC 6.1 11/10/2021    HGB 15.0 11/10/2021    HCT 46.3 11/10/2021     11/10/2021    CHOL 223 (H) 11/10/2021    TRIG 227 (H) 11/10/2021    HDL 38 (L) 11/10/2021    ALT 24 11/10/2021    AST 20 11/10/2021     11/10/2021    BUN 17 11/10/2021    CO2 23 11/10/2021       Vital signs:  There were no vitals taken for this visit.  Telemedicine visit-no vital signs completed  Allergies: Patient has no known allergies.         Medications:     Current Outpatient Medications   Medication     sertraline (ZOLOFT) 25 MG tablet     No current facility-administered medications for this visit.         Medication adherence: Reviewed risk/benefits of medication , Patient able to verbalize understanding of side effects and Patient verbally consents to taking medications           Review of Systems:      ROS:    Subjective Data Only- Tele-Health Visit    10 point ROS was negative except for the items listed in HPI.      Coordination of  Care:   More than 30 minutes spent on this visit  with more than 50% of time spent on coordination of care including: Educating patient about diagnosis, prognosis, side effects and benefits of medications, diet, exercise.  Time also spent providing supportive therapy regarding above issues.      Video-Visit Details    Type of service:  Video Visit    Originating Location (pt. Location): Home    Distant Location (provider location):  [facility]     Platform used for Video Visit: [employer]      This note was created using a dictation system. All typing errors or contextual distortion is unintentional and software inherent.  Start Time : 1530  End time : 1600

## 2022-02-15 NOTE — PROGRESS NOTES
[facility] Post-Appointment Cart -check      Correct pharmacy verified with patient and updated in chart? [x] yes []no    Charge captured ? [] yes  [] no [x] n/a-virtual     Medications ordered this visit were e-scribed.  Verified by order class [x] yes  [] no    List Medications: Zoloft 25 mg    Medication changes or discontinuations were communicated to patient's pharmacy: [] yes  [x] no    UA collected [] yes  [] no  [x] n/a-virtual     Outside referrals / labs, etc support staff to follow up: [] yes  [x] no    Future appointment was made: [] yes  [x] no  [] n/a   RTC 4 weeks  Dictation completed at time of chart check: [x] yes  [] no    I have checked the documentation for today s encounters and the above information has been reviewed and completed.      Lluvia Perez on February 15, 2022 at 8:03 AM

## 2022-04-13 ENCOUNTER — HOSPITAL ENCOUNTER (EMERGENCY)
Facility: HOSPITAL | Age: 27
Discharge: HOME | End: 2022-04-13
Admitting: PHYSICIAN ASSISTANT
Payer: COMMERCIAL

## 2022-04-13 VITALS
SYSTOLIC BLOOD PRESSURE: 140 MMHG | RESPIRATION RATE: 18 BRPM | WEIGHT: 204 LBS | BODY MASS INDEX: 29.69 KG/M2 | DIASTOLIC BLOOD PRESSURE: 75 MMHG | HEART RATE: 74 BPM | TEMPERATURE: 98.6 F | OXYGEN SATURATION: 99 %

## 2022-04-13 DIAGNOSIS — J02.9 PHARYNGITIS: ICD-10-CM

## 2022-04-13 DIAGNOSIS — M79.606: ICD-10-CM

## 2022-04-13 LAB
DEPRECATED S PYO AG THROAT QL EIA: NEGATIVE
FLUAV RNA SPEC QL NAA+PROBE: NEGATIVE
FLUBV RNA RESP QL NAA+PROBE: NEGATIVE
GROUP A STREP BY PCR: DETECTED
SARS-COV-2 RNA RESP QL NAA+PROBE: NEGATIVE

## 2022-04-13 PROCEDURE — 99283 EMERGENCY DEPT VISIT LOW MDM: CPT

## 2022-04-13 PROCEDURE — 250N000013 HC RX MED GY IP 250 OP 250 PS 637: Performed by: EMERGENCY MEDICINE

## 2022-04-13 PROCEDURE — 87651 STREP A DNA AMP PROBE: CPT | Performed by: EMERGENCY MEDICINE

## 2022-04-13 PROCEDURE — C9803 HOPD COVID-19 SPEC COLLECT: HCPCS

## 2022-04-13 PROCEDURE — 87636 SARSCOV2 & INF A&B AMP PRB: CPT | Performed by: EMERGENCY MEDICINE

## 2022-04-13 RX ORDER — IBUPROFEN 600 MG/1
600 TABLET, FILM COATED ORAL ONCE
Status: COMPLETED | OUTPATIENT
Start: 2022-04-13 | End: 2022-04-13

## 2022-04-13 RX ORDER — CYCLOBENZAPRINE HCL 10 MG
10 TABLET ORAL 3 TIMES DAILY PRN
Qty: 8 TABLET | Refills: 0 | Status: SHIPPED | OUTPATIENT
Start: 2022-04-13 | End: 2022-05-23

## 2022-04-13 RX ADMIN — IBUPROFEN 600 MG: 600 TABLET ORAL at 14:33

## 2022-04-13 ASSESSMENT — ENCOUNTER SYMPTOMS
CARDIOVASCULAR NEGATIVE: 1
CONSTITUTIONAL NEGATIVE: 1
SHORTNESS OF BREATH: 0
NECK PAIN: 0
BACK PAIN: 0
CHILLS: 0
MYALGIAS: 1
WEAKNESS: 0
NUMBNESS: 0
GASTROINTESTINAL NEGATIVE: 1
SORE THROAT: 1
EYES NEGATIVE: 1
FEVER: 0
VOICE CHANGE: 0
NEUROLOGICAL NEGATIVE: 1
SINUS PAIN: 1
FACIAL SWELLING: 0
JOINT SWELLING: 0
SINUS PRESSURE: 0
TROUBLE SWALLOWING: 0
ARTHRALGIAS: 0

## 2022-04-13 NOTE — ED PROVIDER NOTES
ED Provider In Triage Note  [facility]  Encounter Date: Apr 13, 2022    Chief Complaint   Patient presents with     Facial Pain     Leg Pain       Brief HPI:   Roland Sandoval is a 27 year old male presenting to the Emergency Department with a chief complaint of body aches.    Workup routine on Monday. Wokeup next day with muscle aches in left calf, face and neck pain. Hurts in legs to walk.     Patient has concerns for sinus or viral infection.        Brief Physical Exam:  /66   Pulse 88   Temp 98.5  F (36.9  C) (Oral)   Resp 16   Wt 92.5 kg (204 lb)   SpO2 95%   BMI 29.69 kg/m    General: Non-toxic appearing   HEENT: Atraumatic  Resp: No respiratory distress  Abdomen: Non-peritoneal   Neuro: Alert, oriented, answers questions appropriately  Psych: Behavior appropriate  Musculoskeletal: atraumatic      Plan Initiated in Triage:  COVID/Flu, Strep, Ibuprofen      PIT Dispo:   Return to lobby while awaiting workup and ED bed availability        Sonja Hawley MD on 4/13/2022 at 2:07 PM        Patient was evaluated by the Physician in Triage due to a limitation of available rooms in the Emergency Department. A plan of care was discussed based on the information obtained on the initial evaluation and patient was counseled to return back to the Emergency Department lobby after this initial evalutaiton until results were obtained or a room became available in the Emergency Department. Patient was counseled not to leave prior to receiving the results of their workup.            Sonja Hawley MD  04/13/22 1412

## 2022-04-13 NOTE — DISCHARGE INSTRUCTIONS
You were seen in the ED for evaluation of sore throat, as well as bilateral leg pain.  Started new workout routine on Monday, which I do believe to be the root cause of your leg pain.  Recommend Tylenol, ibuprofen for pain control.  We will also send you home with a muscle relaxant Flexeril to use as needed.  This is strong medication and can make you drowsy.  Please do not take this while working, driving, or operating machinery.      Additionally for the sore throat we tested you for strep, cold, and flu.  You were negative for all of these.  Think more than likely he may have some sort of viral infection.     Your blood pressure was elevated in the emergencydepartment today and requires recheck and close follow-up in your primary care clinic. Untreated blood pressure can cause serious complications including, but not limited to stroke, heart attack/failure, and kidney disease.  Please make a close follow-up appointment to have this recheck performed. Please return to the emergency department immediately if you develop a severe headache, vision changes, chest pain, shortness of breath, orabdominal pain.      Recommend that you follow-up with your primary care provider in 1 to 2 weeks if the pain does not improve.  Additionally, should you develop chest pain, shortness of breath, worsening fever, difficulty swallowing, facial or neck swelling, return to the ED for further evaluation.

## 2022-04-13 NOTE — ED PROVIDER NOTES
Emergency Department Encounter     Evaluation Date & Time:   2022  4:54 PM    CHIEF COMPLAINT:  Facial Pain and Leg Pain      Triage Note:Patient presents here for evaluation of facial pressure with radiation to the back of his head. He notes pain to the gastrocnemius of his left leg following a new workout routine.         Impression and Plan     ED COURSE & MEDICAL DECISION MAKIN Patient seen and evaluated, discussed plan for discharge home given his negative testing here.     Patient is a 27-year-old male who presents to the ED for evaluation of bilateral lower leg pain, as well as a sore throat.  Patient notes that the sore throat is been ongoing for 2 days, denies any fever at this time.  Denies any difficulty swallowing or handling secretions.  Denies any cough, cold, fevers, nasal discharge.  Additionally complains of bilateral calf pain which started on Tuesday morning.  Patient reports starting a new workout which involved heavy amount of running, jump roping the day before.  He has not done significant activity in approximately 2 weeks.  Discussed with him that more than likely his pain is secondary to increase in muscle activity for both of his legs.  Recommend that he uses rest, ice, ibuprofen and Tylenol for pain control.  No swelling, palpable cords, risk factors for DVT or superficial thrombophlebitis.  Extremities are warm and well perfused with strong pulses -no concern for limb ischemia.    For his throat pain on examination he does have some mild maxillary sinus tenderness, though no swelling or no discharge.  No findings to suggest acute bacterial sinusitis, however likely viral.  He does have posterior oropharynx erythema mild tonsillar edema.  Uvula is midline and there is no asymmetric soft palate swelling concerning for peritonsillar abscess.  Additionally no dental abscesses, dentition intact.  Plan will be to obtain influenza, Covid, as well as strep throat cultures.       Covid and flu testing were negative, discussed results with patient.  Recommend that he takes ibuprofen Tylenol for control at home of pain.  Additionally will provide him with some cyclobenzaprine for his lower leg pain as he has taken this before with good results.  Plan for him to follow-up with his primary care provider in 1 to 2 weeks if he continues to have symptoms.  Additionally discussed return precautions with him.  He expresses understanding.    At the conclusion of the encounter I discussed the results of all the tests and the disposition. The questions were answered. The patient or family acknowledged understanding and was agreeable with the care plan.    FINAL IMPRESSION:    ICD-10-CM    1. Musculoskeletal leg pain  M79.606 Primary Care Referral   2. Pharyngitis  J02.9        0 minutes of critical care time    Reassessments & Consults       MEDICATIONS GIVEN IN THE EMERGENCY DEPARTMENT:  Medications   ibuprofen (ADVIL/MOTRIN) tablet 600 mg (600 mg Oral Given 4/13/22 1433)       NEW PRESCRIPTIONS STARTED AT TODAY'S ED VISIT:  Discharge Medication List as of 4/13/2022  5:33 PM      START taking these medications    Details   cyclobenzaprine (FLEXERIL) 10 MG tablet Take 1 tablet (10 mg) by mouth 3 times daily as needed for muscle spasms, Disp-8 tablet, R-0, Local Print             HPI     HPI     Jose Manuelsvenandreas SKYLER Sandoval is a 27 year old male with a pertinent history of moderate depression, herpes, who presents to this ED private vehicle for evaluation of calf pain and sore throat.     Per patient, he had not worked out in several weeks and did a significant workout 2 days ago on Monday.  He woke up on Tuesday morning with severe cramping in his bilateral calves and did see some active spasm.  He has been sore since that time.  He also woke up that morning with a mild sore throat as well as pressure in his bilateral sinuses.  No fevers or chills.  No neck stiffness.  No difficulty swallowing or breathing,  however it is painful to swallow.  No coughing no full on body aches.  No skin changes, leg swelling, numbness or weakness.  Patient did have COVID-19 back in December.  No other sick contacts.    REVIEW OF SYSTEMS:  Review of Systems   Constitutional: Negative.  Negative for chills and fever.   HENT: Positive for sinus pain and sore throat. Negative for facial swelling, postnasal drip, sinus pressure, trouble swallowing and voice change.    Eyes: Negative.    Respiratory: Negative for shortness of breath.    Cardiovascular: Negative.  Negative for chest pain.   Gastrointestinal: Negative.    Musculoskeletal: Positive for myalgias. Negative for arthralgias, back pain, gait problem, joint swelling and neck pain.   Neurological: Negative.  Negative for weakness and numbness.       Medical History     No past medical history on file.    No past surgical history on file.    Family History   Problem Relation Age of Onset     Anxiety Disorder Mother      Hypertension Father        Social History     Tobacco Use     Smoking status: Never Smoker     Smokeless tobacco: Never Used   Substance Use Topics     Alcohol use: No     Drug use: No       cyclobenzaprine (FLEXERIL) 10 MG tablet  sertraline (ZOLOFT) 25 MG tablet        Physical Exam     First Vitals:  Patient Vitals for the past 24 hrs:   BP Temp Temp src Pulse Resp SpO2 Weight   04/13/22 1736 (!) 140/75 -- -- 74 -- 99 % --   04/13/22 1657 (!) 148/75 98.6  F (37  C) Oral 82 18 97 % --   04/13/22 1408 136/66 98.5  F (36.9  C) Oral 88 16 95 % 92.5 kg (204 lb)       PHYSICAL EXAM:   Physical Exam  Constitutional:       General: He is not in acute distress.     Appearance: Normal appearance. He is not ill-appearing or toxic-appearing.   HENT:      Head: Normocephalic.      Right Ear: Tympanic membrane normal.      Left Ear: Tympanic membrane normal.      Ears:      Comments: Mild bilateral sinus maxillary tenderness.  No fullness.     Nose: No congestion or rhinorrhea.       Mouth/Throat:      Mouth: Mucous membranes are moist.      Pharynx: No oropharyngeal exudate.      Comments: Posterior oropharynx erythema present.  Mild tonsillar edema.  No exudates.  Eyes:      Pupils: Pupils are equal, round, and reactive to light.   Neck:      Comments: No meningeal signs.  Cardiovascular:      Rate and Rhythm: Normal rate and regular rhythm.   Abdominal:      General: Abdomen is flat.      Palpations: Abdomen is soft.      Tenderness: There is no abdominal tenderness. There is no guarding or rebound.   Musculoskeletal:         General: Tenderness present. Normal range of motion.      Cervical back: Normal range of motion and neck supple. No rigidity or tenderness.      Right lower leg: No edema.      Left lower leg: No edema.      Comments: Tenderness overlying bilateral calves consistent with muscle pain, no evidence of swelling, no erythema or skin changes.  Extremities are warm and well perfused.  2+ DP and PT pulses.  Dorsiflexion plantarflexion intact.   Neurological:      General: No focal deficit present.      Mental Status: He is alert.       Results     LAB:  All pertinent labs reviewed and interpreted  Labs Ordered and Resulted from Time of ED Arrival to Time of ED Departure   INFLUENZA A/B & SARS-COV2 PCR MULTIPLEX - Normal       Result Value    Influenza A PCR Negative      Influenza B PCR Negative      SARS CoV2 PCR Negative     STREPTOCOCCUS A RAPID SCREEN W REFELX TO PCR - Normal    Group A Strep antigen Negative     GROUP A STREPTOCOCCUS PCR THROAT SWAB       RADIOLOGY:  No orders to display              PROCEDURES:  Procedures:        Qamar Miller PA-C  Emergency Medicine  [facility] EMERGENCY DEPARTMENT       Kenzie Leonard PA-C  04/13/22 2466

## 2022-04-13 NOTE — ED TRIAGE NOTES
Patient presents here for evaluation of facial pressure with radiation to the back of his head. He notes pain to the gastrocnemius of his left leg following a new workout routine.

## 2022-04-14 ENCOUNTER — VIRTUAL VISIT (OUTPATIENT)
Dept: BEHAVIORAL HEALTH | Facility: CLINIC | Age: 27
End: 2022-04-14
Payer: COMMERCIAL

## 2022-04-14 ENCOUNTER — NURSE TRIAGE (OUTPATIENT)
Dept: NURSING | Facility: CLINIC | Age: 27
End: 2022-04-14
Payer: COMMERCIAL

## 2022-04-14 VITALS — WEIGHT: 204 LBS | BODY MASS INDEX: 29.2 KG/M2 | HEIGHT: 70 IN

## 2022-04-14 DIAGNOSIS — F32.0 CURRENT MILD EPISODE OF MAJOR DEPRESSIVE DISORDER, UNSPECIFIED WHETHER RECURRENT (H): Primary | ICD-10-CM

## 2022-04-14 PROCEDURE — 99213 OFFICE O/P EST LOW 20 MIN: CPT | Mod: 95 | Performed by: NURSE PRACTITIONER

## 2022-04-14 PROCEDURE — G0463 HOSPITAL OUTPT CLINIC VISIT: HCPCS | Mod: PN,RTG | Performed by: NURSE PRACTITIONER

## 2022-04-14 RX ORDER — PENICILLIN V POTASSIUM 500 MG/1
500 TABLET, FILM COATED ORAL 4 TIMES DAILY
Qty: 40 TABLET | Refills: 0 | Status: SHIPPED | OUTPATIENT
Start: 2022-04-14 | End: 2022-04-24

## 2022-04-14 NOTE — PATIENT INSTRUCTIONS
Continue medications as prescribed  Have your pharmacy contact us for a refill if you are running low on medications (We may ask you to come into clinic to get a refill from the nurse  No Alcohol or drug use  No driving if sedated  Call the clinic with any questions or concerns   Reach out for help if you feel like hurting yourself or others ([facility] [phone]: [address] [facility], [address] or [facility] Suicide Hotline   [phone] , call 911 or go to nearest Emergency room     Crisis Resources:    Present to the Emergency Department as needed or call after hours crisis line at [phone] or [phone].   [place] Crisis Text Line: Text [place] to 082302.  Suicide LifeLine Chat: suicidepreventionlifeline.org/chat/.  National Suicide Prevention Lifeline: 689.834.8749 (TTY: 595.527.2198). Call anytime for help.  (www.suicidepreventionlifeline.org)  National [place] on Mental Illness (www.livan.org): 399.950.8435 or 946-181-1510.  Mental Health Association (www.mentalhealth.org): 630.490.8337 or 223-851-5363.       Follow up as directed, for your appointments, per your After Visit Summary Form.

## 2022-04-14 NOTE — PROGRESS NOTES
"  The patient has been notified of following:      \"This virtual  visit will be conducted via a call between you and your physician/provider. We have found that certain health care needs can be provided without the need for a physical exam.  This service lets us provide the care you need virtually/via video   If a prescription is necessary we can send it directly to your pharmacy.  If lab work is needed we can place an order for that and you can then stop by our lab to have the test done at a later time.     Virtual/Video visits are billed at different rates depending on your insurance coverage.Some insurers they may be billed the same as an in-person visit.  Please reach out to your insurance provider with any questions.          Mickey bayron would you like to obtain your AVS? Mail a copy  If the video visit is dropped, the invitation should be resent by: Send to e-mail at: [email or web address]  Will anyone else be joining your video visit? No            Psychiatric  Out- Patient  Follow Up Progress Note  Date of visit:4/14/2022           Discussion of Care and Treatment Recommendations:   This is a 27 year old male with a history of depression presenting to the clinic today for follow-up appointment      Last visit  02/14/2022 Recommendation at last visit .  1. Continue Zoloft 25 mg daily - MDD  2.Highly recommend increase   Psychotherapy session to weekly or biweekly: Pt will call therapist to request  3.Recommend abstain from THC- repots he has significantly reduced use and is working on  Total abstinence   4. RTC-  12  weeks  call in between visits with any questions or concerns Patient and I reviewed diagnosis and treatment plan and patient agrees with following recommendations:  Ongoing education given regarding diagnostic and treatment options with adequate verbalization of understanding.  Plan   1. Continue Zoloft 25 mg daily - MDD  2.Highly recommend increase   Psychotherapy session to weekly or biweekly: Pt " "will call therapist to request  3.Recommend abstain from THC- repots he has significantly reduced use and is working on  Total abstinence   4. RTC-  12  weeks  call in between visits with any questions or concerns          DIagnoses:     Moderate episode of recurrent major depressive disorder        Patient Active Problem List   Diagnosis     Moderate episode of recurrent major depressive disorder (H)     Low HDL (under 40)     Herpes genitalis in men             Chief Complaint / Subjective:    Chief complaint: depression     History of Present Illness:   The patient statement-he currently has strep throat and has been started on antibiotics.  He reports compliance with current medications and denies side effects.  He lost his job after kristal COVID-19 and being away for 14 days.  He is back looking for work again.  Feels the depression symptoms are well managed at this time.  Denies all other psychiatric issues and offers no other concerns.    Mental Status Examination:   Appearance: Well groomed, good eye contact   Orientation: Patient alert and oriented to person, place, time, and situation  Reliability:  Patient appears to be an adequate historian.    Behavior: cooperative   Speech: Speech is spontaneous and coherent, with a normal rate, rhythm and tone.    Language:There are no difficulties with expressive or receptive language as observed throughout the interview.    Mood: Described as \"ok\".    Affect: congruent   Judgement: Able to make basic decision regarding safety.  Insight: Good awareness of physical and mental health conditions and aware of needs around care for these.  Gait and station: unable to assess  Thought process: Logical   Thought content: No evidence of delusions or paranoia.    Hallucinations : No evidence of any hallucination  Thought content: No evidence of delusions or paranoia.   Suicidal /Homical Ideations:  No thoughts of self harm or suicide. No thoughts of harming " "others.  Associations: Connected  Fund of knowledge: Average  Attention / Concentration: Able to remain focused during the interview with minimal distractibility or need for redirection.  Short Term Memory: Grossly intact as evidence by client recalling themes and ideas discussed.  Long Term Memory: Intact  Motor Status: unable to asse    Drug/treatment history and current pattern of use:   History of cannabis use       Medication changes: See Above   Medication adherence: compliant  Medication side effects: absent  Information about medications: Side effects, benefits and alternative treatments discussed and patient agrees .    Psychotherapy: Supportive therapy day-to-day living    Education: Diet, exercise, abstinence from drugs and alcohol, patient will not drive if sedated and medications or  under influence of any substance    Lab Results:   Personally reviewed and discussed with the patient    Lab Results   Component Value Date    WBC 6.1 11/10/2021    HGB 15.0 11/10/2021    HCT 46.3 11/10/2021     11/10/2021    CHOL 223 (H) 11/10/2021    TRIG 227 (H) 11/10/2021    HDL 38 (L) 11/10/2021    ALT 24 11/10/2021    AST 20 11/10/2021     11/10/2021    BUN 17 11/10/2021    CO2 23 11/10/2021       Vital signs:  Ht 1.765 m (5' 9.5\")   Wt 92.5 kg (204 lb)   BMI 29.69 kg/m    Telemedicine visit-no vital signs completed  Allergies: Patient has no known allergies.         Medications:     Current Outpatient Medications   Medication     cyclobenzaprine (FLEXERIL) 10 MG tablet     sertraline (ZOLOFT) 25 MG tablet     penicillin V (VEETID) 500 MG tablet     No current facility-administered medications for this visit.       Medication adherence: Reviewed risk/benefits of medication , Patient able to verbalize understanding of side effects and Patient verbally consents to taking medications           Review of Systems:      ROS:    Subjective Data Only- Tele-Health Visit    10 point ROS was negative except for " the items listed in HPI.      Crisis Resources:    1. Present to the Emergency Department as needed or call after hours crisis line at [phone] or [phone].   2. [place] Crisis Text Line: Text [place] to 629502.  3. Suicide LifeLine Chat: suicidepreItsMyURLs.org/chat/.  4. National Suicide Prevention Lifeline: 898.910.6197 (TTY: 608.957.5686). Call anytime for help.  (www.suicidepreventionSmartShootline.org)  5. National [place] on Mental Illness (www.livan.org): 499.892.3647 or 113-571-1159.  6. Mental Health Association (www.mentalhealth.org): 368.928.7807 or 817-293-2134.      Coordination of Care:   More than 30 minutes spent on this visit  with more than 50% of time spent on coordination of care including: Educating patient about diagnosis, prognosis, side effects and benefits of medications, diet, exercise.  Time also spent providing supportive therapy regarding above issues.    Video-Visit Details    Type of service:  Video Visit    Originating Location (pt. Location): Home    Distant Location (provider location):  [facility]     Platform used for Video Visit: Tbricks      This note was created using a dictation system. All typing errors or contextual distortion is unintentional and software inherent.  Start Time : 1330  End time : 1400

## 2022-04-14 NOTE — TELEPHONE ENCOUNTER
RN triage   Call from pt   Pt states seen yesterday and got a message today about positive strep     Reviewed note --- and prescription sent   Reviewed home care advice     Pt indicated he will follow advice     Anu Regalado RN  [facility]  Triage Nurse Advisor    COVID 19 Nurse Triage Plan/Patient Instructions    Please be aware that novel coronavirus (COVID-19) may be circulating in the community. If you develop symptoms such as fever, cough, or SOB or if you have concerns about the presence of another infection including coronavirus (COVID-19), please contact your health care provider or visit [email or web address].     Disposition/Instructions    Home care recommended. Follow home care protocol based instructions.    Thank you for taking steps to prevent the spread of this virus.  o Limit your contact with others.  o Wear a simple mask to cover your cough.  o Wash your hands well and often.    Resources    [facility]: About COVID-19: www.[employer].org/covid19/    CDC: What to Do If You're Sick: www.cdc.gov/coronavirus/2019-ncov/about/steps-when-sick.html    CDC: Ending Home Isolation: www.cdc.gov/coronavirus/2019-ncov/hcp/disposition-in-home-patients.html     CDC: Caring for Someone: www.cdc.gov/coronavirus/2019-ncov/if-you-are-sick/care-for-someone.html     [facility]: Interim Guidance for Hospital Discharge to Home: www.health.[facility].[place]/diseases/coronavirus/hcp/hospdischarge.pdf    [facility] clinical trials (COVID-19 research studies): clinicalaffairs.[facility]/umn-clinical-trials     Below are the COVID-19 hotlines at the [facility] ([facility]). Interpreters are available.   o For health questions: Call 584-099-8719 or 1-648.763.5668 (7 a.m. to 7 p.m.)  o For questions about schools and childcare: Call 371-525-7088 or 1-106.266.5154 (7 a.m. to 7 p.m.)                   Additional Information    Information only question and nurse able to answer    Protocols used:  INFORMATION ONLY CALL - NO TRIAGE-A-[place]

## 2022-04-14 NOTE — PROGRESS NOTES
This video/telephone visit will be conducted via a call between you and your physician/provider. We have found that certain health care needs can be provided without the need for an in-person physical exam. This service lets us provide the care you need with a video /telephone conversation. If a prescription is necessary we can send it directly to your pharmacy. If lab work is needed we can place an order for that and you can then stop by our lab to have the test done at a later time.    Just as we bill insurance for in-person visits, we also bill insurance for video/telephone visits. If you have questions about your insurance coverage, we recommend that you speak with your insurance company.    Patient has given verbal consent for video/Telephone visit? Yes   Patient would like video visit, please connect: Rhianna if connection issue: [phone]  Reason for visit: Follow up  [facility]   Pt was seen on ER yesterday for sore throat and bilateral lower leg muscle strain. Pt was just told that his 24 hr Strep culture was Pos and prescription written for PCN for 10 days. Pt will be picking this up later today.  No new concerns noted during rooming.   Patient verified allergies, medications and pharmacy via telephone verbally with writer.   No medication refills are needed at this time.   Patient states he is ready for visit.  [place]  to be reviewed by provider.   Lluvia Perez April 14, 2022 1:25 PM

## 2022-04-14 NOTE — PROGRESS NOTES
.MH&A Post-Appointment Cart -check      Correct pharmacy verified with patient and updated in chart? [x] yes []no    Charge captured ? [] yes  [] no [x] n/a-virtual     Medications ordered this visit were e-scribed.  Verified by order class [] yes  [x] no    List Medications:    Medication changes or discontinuations were communicated to patient's pharmacy: [] yes  [x] no    UA collected [] yes  [] no  [x] n/a-virtual     Outside referrals / labs, etc support staff to follow up: [] yes  [x] no    Future appointment was made: [] yes  [x] no  [] n/a  RTC 8 Weeks June 2022  Dictation completed at time of chart check: [x] yes  [] no    I have checked the documentation for today s encounters and the above information has been reviewed and completed.      Lluvia Perez on April 14, 2022 at 3:22 PM

## 2022-05-23 ENCOUNTER — OFFICE VISIT (OUTPATIENT)
Dept: FAMILY MEDICINE | Facility: CLINIC | Age: 27
End: 2022-05-23
Payer: COMMERCIAL

## 2022-05-23 VITALS
TEMPERATURE: 98.2 F | SYSTOLIC BLOOD PRESSURE: 119 MMHG | BODY MASS INDEX: 29.84 KG/M2 | HEART RATE: 79 BPM | WEIGHT: 205 LBS | RESPIRATION RATE: 16 BRPM | DIASTOLIC BLOOD PRESSURE: 73 MMHG

## 2022-05-23 DIAGNOSIS — F32.1 CURRENT MODERATE EPISODE OF MAJOR DEPRESSIVE DISORDER, UNSPECIFIED WHETHER RECURRENT (H): ICD-10-CM

## 2022-05-23 DIAGNOSIS — Z71.85 IMMUNIZATION COUNSELING: ICD-10-CM

## 2022-05-23 DIAGNOSIS — E78.5 DYSLIPIDEMIA: Primary | ICD-10-CM

## 2022-05-23 LAB
CHOLEST SERPL-MCNC: 195 MG/DL
FASTING STATUS PATIENT QL REPORTED: YES
HDLC SERPL-MCNC: 40 MG/DL
LDLC SERPL CALC-MCNC: 137 MG/DL
TRIGL SERPL-MCNC: 92 MG/DL

## 2022-05-23 PROCEDURE — 99213 OFFICE O/P EST LOW 20 MIN: CPT | Mod: 25 | Performed by: FAMILY MEDICINE

## 2022-05-23 PROCEDURE — 36415 COLL VENOUS BLD VENIPUNCTURE: CPT | Performed by: FAMILY MEDICINE

## 2022-05-23 PROCEDURE — 90651 9VHPV VACCINE 2/3 DOSE IM: CPT | Performed by: FAMILY MEDICINE

## 2022-05-23 PROCEDURE — 80061 LIPID PANEL: CPT | Performed by: FAMILY MEDICINE

## 2022-05-23 PROCEDURE — 91305 COVID-19,PF,PFIZER (12+ YRS): CPT | Performed by: FAMILY MEDICINE

## 2022-05-23 PROCEDURE — 90471 IMMUNIZATION ADMIN: CPT | Performed by: FAMILY MEDICINE

## 2022-05-23 PROCEDURE — 0054A COVID-19,PF,PFIZER (12+ YRS): CPT | Performed by: FAMILY MEDICINE

## 2022-05-23 RX ORDER — SERTRALINE HYDROCHLORIDE 25 MG/1
25 TABLET, FILM COATED ORAL DAILY
Qty: 90 TABLET | Refills: 1 | Status: SHIPPED | OUTPATIENT
Start: 2022-05-23 | End: 2022-09-08

## 2022-05-23 ASSESSMENT — PATIENT HEALTH QUESTIONNAIRE - PHQ9: SUM OF ALL RESPONSES TO PHQ QUESTIONS 1-9: 1

## 2022-05-23 NOTE — PROGRESS NOTES
Assessment & Plan        ICD-10-CM    1. Dyslipidemia  E78.5 REVIEW OF HEALTH MAINTENANCE PROTOCOL ORDERS     Lipid Profile (Chol, Trig, HDL, LDL calc)   2. Current moderate episode of major depressive disorder, unspecified whether recurrent (H)  F32.1 sertraline (ZOLOFT) 25 MG tablet   3. Immunization counseling  Z71.85 HPV, IM (9 - 26 YRS) - Gardasil 9        Dyslipidemia we will recheck lipid panel in the back and patient    Refill on sertraline he has been stable regarding depression    Immunization update with HPV #1, as well as COVID booster, Pfizer.    Patient be contacted with lab results and discuss follow-up.    He does know that I will be retiring in July, and plans to see Dr. Joseph      Return in about 6 months (around 11/23/2022). for recheck.        Subjective:    This 27 year old male was seen today for evaluation and follow-up.    He had checkup back in November.  He has elevated triglyceride low HDL and elevated LDL    Cholesterol 223 triglyceride 227 HDL 38 .    Has been trying to watch diet closer to his weight is about the same he has been active exercising daily he states.    We reviewed his immunizations and he is due for COVID booster, Pfizer also HPV #1 this was given.    Patient has depression he has been stable he is on sertraline this was refilled.    No additional concern or issue    Review of Systems    10 point review of systems positive as outlined above otherwise negative  Current Outpatient Medications   Medication     sertraline (ZOLOFT) 25 MG tablet     No current facility-administered medications for this visit.       Objective    Vitals: /73 (BP Location: Left arm, Patient Position: Sitting, Cuff Size: Adult Regular)   Pulse 79   Temp 98.2  F (36.8  C) (Temporal)   Resp 16   Wt 93 kg (205 lb)   BMI 29.84 kg/m    BMI= Body mass index is 29.84 kg/m .     Physical Exam    General appearance no acute distress    Labs as discussed.    No additional exam was done  normal blood pressure        Oni Lincoln MD

## 2022-05-24 ENCOUNTER — TELEPHONE (OUTPATIENT)
Dept: FAMILY MEDICINE | Facility: CLINIC | Age: 27
End: 2022-05-24
Payer: COMMERCIAL

## 2022-05-24 NOTE — TELEPHONE ENCOUNTER
----- Message from Oni Lincoln MD sent at 5/24/2022 12:30 PM CDT -----  Please contact this patient.  Let him know that the cholesterol levels look much better    The cholesterol dropped from 223 down to 195.  The triglycerides dropped down from 227 down to 92.  The good cholesterol increased from 38 up to 40.  The bad cholesterol, LDL dropped from 140 down to 137.    Continue to limit the fried food red meat cheeses fast food etc.    Plan to follow-up in about 6 months for recheck.  He does know that I am retiring in July

## 2022-06-09 ENCOUNTER — VIRTUAL VISIT (OUTPATIENT)
Dept: BEHAVIORAL HEALTH | Facility: CLINIC | Age: 27
End: 2022-06-09
Attending: PSYCHIATRY & NEUROLOGY
Payer: COMMERCIAL

## 2022-06-09 VITALS — WEIGHT: 195 LBS | HEIGHT: 70 IN | BODY MASS INDEX: 27.92 KG/M2

## 2022-06-09 DIAGNOSIS — F32.0 CURRENT MILD EPISODE OF MAJOR DEPRESSIVE DISORDER, UNSPECIFIED WHETHER RECURRENT (H): Primary | ICD-10-CM

## 2022-06-09 PROCEDURE — G0463 HOSPITAL OUTPT CLINIC VISIT: HCPCS | Mod: PN,RTG | Performed by: NURSE PRACTITIONER

## 2022-06-09 PROCEDURE — 99214 OFFICE O/P EST MOD 30 MIN: CPT | Mod: 95 | Performed by: NURSE PRACTITIONER

## 2022-06-09 NOTE — PROGRESS NOTES
This video/telephone visit will be conducted via a call between you and your physician/provider. We have found that certain health care needs can be provided without the need for an in-person physical exam. This service lets us provide the care you need with a video /telephone conversation. If a prescription is necessary we can send it directly to your pharmacy. If lab work is needed we can place an order for that and you can then stop by our lab to have the test done at a later time.    Just as we bill insurance for in-person visits, we also bill insurance for video/telephone visits. If you have questions about your insurance coverage, we recommend that you speak with your insurance company.    Patient has given verbal consent for video/Telephone visit? Yes     Patient would like video visit, please connect: Send SMS invite: [phone]  Reason for visit: Follow up  [facility]  Patient verified allergies, medications and pharmacy via telephone verbally with writer.   No medication refills are needed at this time.   PARI-7 scores:  No flowsheet data found.  PHQ-9 scores:   PHQ-9 SCORE 9/17/2019 5/23/2022   PHQ-9 Total Score 0 1   Patient states he is ready for visit.  [place]  to be reviewed by provider.  Lluvia Perez June 9, 2022 1:55 PM

## 2022-06-09 NOTE — PATIENT INSTRUCTIONS
Continue medications as prescribed  Have your pharmacy contact us for a refill if you are running low on medications (We may ask you to come into clinic to get a refill from the nurse  No Alcohol or drug use  No driving if sedated  Call the clinic with any questions or concerns   Reach out for help if you feel like hurting yourself or others ([facility] [phone]: [address], [address] or [facility] Suicide Hotline   [phone] , call 911 or go to nearest Emergency room     Crisis Resources:    Present to the Emergency Department as needed or call after hours crisis line at [phone] or [phone].   [place] Crisis Text Line: Text [place] to 082764.  Suicide LifeLine Chat: suicidepreventionlifeline.org/chat/.  National Suicide Prevention Lifeline: 901.704.6409 (TTY: 451.489.5916). Call anytime for help.  (www.suicidepreventionlifeline.org)  National [place] on Mental Illness (www.livan.org): 859.653.8634 or 916-282-0934.  Mental Health Association (www.mentalhealth.org): 598.304.7106 or 069-694-6108.       Follow up as directed, for your appointments, per your After Visit Summary Form.

## 2022-06-09 NOTE — PROGRESS NOTES
"  The patient has been notified of following:      \"This virtual  visit will be conducted via a call between you and your physician/provider. We have found that certain health care needs can be provided without the need for a physical exam.  This service lets us provide the care you need virtually/via video   If a prescription is necessary we can send it directly to your pharmacy.  If lab work is needed we can place an order for that and you can then stop by our lab to have the test done at a later time.     Virtual/Video visits are billed at different rates depending on your insurance coverage.Some insurers they may be billed the same as an in-person visit.  Please reach out to your insurance provider with any questions.          Mickey bayron would you like to obtain your AVS? Mail a copy  If the video visit is dropped, the invitation should be resent by: Send to e-mail at: [email or web address]  Will anyone else be joining your video visit? No    Psychiatric  Out- Patient  Follow Up Progress Note  Date of visit:6/9/2022           Discussion of Care and Treatment Recommendations:   This is a 27 year old male with a history of depression presenting to the clinic today for follow-up appointment      Last visit  04/14/2022 Recommendation at last visit.   1. Continue Zoloft 25 mg daily - MDD  2.Highly recommend increase   Psychotherapy session to weekly or biweekly: Pt will call therapist to request  3.Recommend abstain from THC- repots he has significantly reduced use and is working on  Total abstinence   4. RTC-  12  weeks  call in between visits with any questions or concerns   Patient and I reviewed diagnosis and treatment plan and patient agrees with following recommendations:  Ongoing education given regarding diagnostic and treatment options with adequate verbalization of understanding.  Plan   1. Continue Zoloft 25 mg daily - MDD  2.Highly recommend increase   Psychotherapy session to weekly or biweekly: Pt will call " "therapist to request  3.Recommend abstain from THC- repots he has significantly reduced use and is working on  Total abstinence   4. RTC-  12  weeks  call in between visits with any questions or concerns          DIagnoses:     Moderate episode of recurrent major depressive disorder     Patient Active Problem List   Diagnosis     Moderate episode of recurrent major depressive disorder (H)     Low HDL (under 40)     Herpes genitalis in men             Chief Complaint / Subjective:    Chief complaint: depression     History of Present Illness:   Per patient statement-he has been compliant with current medications and denies side effects.  Symptoms are well managed on current medications.  He denies all psychiatric issues and offers no new concerns.    Mental Status Examination:   Appearance: Well groomed, good eye contact   Orientation: Patient alert and oriented to person, place, time, and situation  Reliability:  Patient appears to be an adequate historian.    Behavior: cooperative   Speech: Speech is spontaneous and coherent, with a normal rate, rhythm and tone.    Language:There are no difficulties with expressive or receptive language as observed throughout the interview.    Mood: Described as \"ok\".    Affect: congruent   Judgement: Able to make basic decision regarding safety.  Insight: Good awareness of physical and mental health conditions and aware of needs around care for these.  Gait and station: unable to assess  Thought process: Logical   Thought content: No evidence of delusions or paranoia.    Hallucinations : No evidence of any hallucination  Thought content: No evidence of delusions or paranoia.   Suicidal /Homical Ideations:  No thoughts of self harm or suicide. No thoughts of harming others.  Associations: Connected  Fund of knowledge: Average  Attention / Concentration: Able to remain focused during the interview with minimal distractibility or need for redirection.  Short Term Memory: Grossly " "intact as evidence by client recalling themes and ideas discussed.  Long Term Memory: Intact  Motor Status: unable to asse    Drug/treatment history and current pattern of use:   History of cannabis use    Medication changes: See Above   Medication adherence: compliant  Medication side effects: absent  Information about medications: Side effects, benefits and alternative treatments discussed and patient agrees .    Psychotherapy: Supportive therapy day-to-day living    Education: Diet, exercise, abstinence from drugs and alcohol, patient will not drive if sedated and medications or  under influence of any substance    Lab Results:   Personally reviewed and discussed with the patient    Lab Results   Component Value Date    WBC 6.1 11/10/2021    HGB 15.0 11/10/2021    HCT 46.3 11/10/2021     11/10/2021    CHOL 195 05/23/2022    TRIG 92 05/23/2022    HDL 40 05/23/2022    ALT 24 11/10/2021    AST 20 11/10/2021     11/10/2021    BUN 17 11/10/2021    CO2 23 11/10/2021       Vital signs:  Ht 1.765 m (5' 9.5\")   Wt 88.5 kg (195 lb)   BMI 28.38 kg/m    Telemedicine visit-no vital signs completed  Allergies: Patient has no known allergies.         Medications:     Current Outpatient Medications   Medication     sertraline (ZOLOFT) 25 MG tablet     No current facility-administered medications for this visit.       Medication adherence: Reviewed risk/benefits of medication , Patient able to verbalize understanding of side effects and Patient verbally consents to taking medications           Review of Systems:      ROS:    Subjective Data Only- Tele-Health Visit    10 point ROS was negative except for the items listed in HPI.      Crisis Resources:    1. Present to the Emergency Department as needed or call after hours crisis line at [phone] or [phone].   2. [place] Crisis Text Line: Text [place] to 941231.  3. Suicide LifeLine Chat: suicidepreventionlifeline.org/chat/.  4. National Suicide Prevention " Lifeline: 991.120.3735 (TTY: 598.682.5148). Call anytime for help.  (www.suicidepreventionlifeline.org)  5. National [place] on Mental Illness (www.livan.org): 280.279.2386 or 807-670-2521.  6. Mental Health Association (www.mentalhealth.org): 191.559.2079 or 513-287-8046.      Coordination of Care:   More than 30 minutes spent on this visit  with more than 50% of time spent on coordination of care including: Educating patient about diagnosis, prognosis, side effects and benefits of medications, diet, exercise.  Time also spent providing supportive therapy regarding above issues.    Video-Visit Details    Type of service:  Video Visit    Originating Location (pt. Location): Home    Distant Location (provider location):  [facility]     Platform used for Video Visit: [employer]      This note was created using a dictation system. All typing errors or contextual distortion is unintentional and software inherent.  Start Time : 1400  End time : 1430

## 2022-06-09 NOTE — PROGRESS NOTES
MH&A Post-Appointment Chart -check      Correct pharmacy verified with patient and updated in chart? [x] yes []no    Charge captured ? [] yes  [] no [x] n/a-virtual     Medications ordered this visit were e-scribed.  Verified by order class [] yes  [x] no    List Medications:    Medication changes or discontinuations were communicated to patient's pharmacy: [] yes  [x] no    UA collected [] yes  [] no  [x] n/a-virtual     Outside referrals / labs, etc support staff to follow up: [] yes  [x] no    Future appointment was made: [] yes  [x] no  [] n/a   RTC 3 months OBC to reach out  Dictation completed at time of chart check: [x] yes  [] no    I have checked the documentation for today s encounters and the above information has been reviewed and completed.      Lluvia Perez on June 9, 2022 at 3:24 PM

## 2022-09-08 ENCOUNTER — VIRTUAL VISIT (OUTPATIENT)
Dept: PSYCHIATRY | Facility: CLINIC | Age: 27
End: 2022-09-08
Attending: PSYCHIATRY & NEUROLOGY
Payer: COMMERCIAL

## 2022-09-08 DIAGNOSIS — F32.1 CURRENT MODERATE EPISODE OF MAJOR DEPRESSIVE DISORDER, UNSPECIFIED WHETHER RECURRENT (H): ICD-10-CM

## 2022-09-08 PROCEDURE — 99214 OFFICE O/P EST MOD 30 MIN: CPT | Mod: 95 | Performed by: NURSE PRACTITIONER

## 2022-09-08 RX ORDER — SERTRALINE HYDROCHLORIDE 25 MG/1
25 TABLET, FILM COATED ORAL DAILY
Qty: 90 TABLET | Refills: 1 | Status: SHIPPED | OUTPATIENT
Start: 2022-09-08 | End: 2023-01-04

## 2022-09-08 NOTE — PATIENT INSTRUCTIONS
Continue medications as prescribed  Have your pharmacy contact us for a refill if you are running low on medications (We may ask you to come into clinic to get a refill from the nurse  No Alcohol or drug use  No driving if sedated  Call the clinic with any questions or concerns   Reach out for help if you feel like hurting yourself or others ([facility] [phone]: [address], [address] or [facility] Suicide Hotline   [phone] , call 911 or go to nearest Emergency room     Crisis Resources:    Present to the Emergency Department as needed or call after hours crisis line at [phone] or [phone].   [place] Crisis Text Line: Text [place] to 539229.  Suicide LifeLine Chat: suicidepreventionlifeline.org/chat/.  National Suicide Prevention Lifeline: 374.180.1093 (TTY: 891.588.9544). Call anytime for help.  (www.suicidepreventionlifeline.org)  National [place] on Mental Illness (www.livan.org): 488.164.5553 or 650-826-0415.  Mental Health Association (www.mentalhealth.org): 209.191.8433 or 354-498-8836.       Follow up as directed, for your appointments, per your After Visit Summary Form.

## 2022-09-08 NOTE — PROGRESS NOTES
This video/telephone visit will be conducted via a call between you and your physician/provider. We have found that certain health care needs can be provided without the need for an in-person physical exam. This service lets us provide the care you need with a video /telephone conversation. If a prescription is necessary we can send it directly to your pharmacy. If lab work is needed we can place an order for that and you can then stop by our lab to have the test done at a later time.    Just as we bill insurance for in-person visits, we also bill insurance for video/telephone visits. If you have questions about your insurance coverage, we recommend that you speak with your insurance company.    Patient has given verbal consent for video/Telephone visit? yes    Patient would like a video visit, please connect/call : my chart  Reason for visit: mh follow up   Anything the provider should be aware of for today's appointment: needs medication refilled no other concerns    Patient verified allergies, medications and pharmacy- yes.     PARI-7 scores:  No flowsheet data found.    PHQ-9 scores:   PHQ-9 SCORE 9/17/2019 5/23/2022   PHQ-9 Total Score 0 1       Patient states they are ready for visit.    Hilda Richards MA September 8, 2022 1:54 PM

## 2022-09-08 NOTE — PROGRESS NOTES
"  The patient has been notified of following:      \"This telephone visit will be conducted via a call between you and your physician/provider. We have found that certain health care needs can be provided without the need for a physical exam.  This service lets us provide the care you need with a short phone conversation.  If a prescription is necessary we can send it directly to your pharmacy.  If lab work is needed we can place an order for that and you can then stop by our lab to have the test done at a later time.     Telephone visits are billed at different rates depending on your insurance coverage. During this emergency period, for some insurers they may be billed the same as an in-person visit.  Please reach out to your insurance provider with any questions.     If during the course of the call the physician/provider feels a telephone visit is not appropriate, you will not be charged for this service.\"     Patient has given verbal consent to a Telephone visit? Yes        Patient would like to receive their AVS by AVS P/reference: Mail a copy          Psychiatric  Out patient Follow Up Progress Note  Date of visit:9/8/2022           Discussion of Care and Treatment Recommendations:   This is a 27 year old male with a history of depression presenting to the clinic today for follow-up appointment      Last visit  06/09/2022.  Recommendation at last visit *  1. Continue Zoloft 25 mg daily - MDD  2.Highly recommend increase   Psychotherapy session to weekly or biweekly: Pt will call therapist to request  3.Recommend abstain from THC- repots he has significantly reduced use and is working on  Total abstinence   4. RTC-  12  weeks  call in between visits with any questions or concerns      Patient and I reviewed diagnosis and treatment plan and patient agrees with following recommendations:  Ongoing education given regarding diagnostic and treatment options with adequate verbalization of understanding.  Plan   1. " "Continue Zoloft 25 mg daily - MDD  2.Highly recommend increase   Psychotherapy session to weekly or biweekly: Pt will call therapist to request  3.Recommend abstain from THC- repots he has significantly reduced use and is working on  Total abstinence   4. RTC-  4 months  call in between visits with any questions or concerns          DIagnoses:   Moderate episode of recurrent major depressive disorder       Patient Active Problem List   Diagnosis     Moderate episode of recurrent major depressive disorder (H)     Low HDL (under 40)     Herpes genitalis in men             Chief Complaint / Subjective:    Chief complaint: Depression     History of Present Illness:   Per patient's statement : Patient reports compliance with current medications and denies side effects.  Symptoms are well managed on a low-dose of sertraline.  He denies all psychiatric issues offers no new concerns.  Mental Status Examination:     Appearance: unable to assess  Orientation: Patient alert and oriented to person, place, time, and situation  Reliability:  Patient appears to be an adequate historian.    Behavior: calm and coperative   Speech: Speech is spontaneous and coherent, with a normal rate, rhythm and tone.    Language:There are no difficulties with expressive or receptive language as observed throughout the interview.    Mood: Described as \"ok\".    Affect: unable to assess  Judgement: Able to make basic decision regarding safety.  Insight: Good awareness of physical and mental health conditions and aware of needs around care for these.  Gait and station: unable to assess  Thought process: Logical   Hallucinations : No evidence of any hallucination  Thought content: No evidence of delusions or paranoia.   Suicidal /Homical Ideations:  No thoughts of self harm or suicide. No thoughts of harming others.  Associations: Connected  Fund of knowledge: Average  Attention / Concentration: Able to remain focused during the interview with minimal " distractibility or need for redirection.  Short Term Memory: Grossly intact as evidence by client recalling themes and ideas discussed.  Long Term Memory: Intact  Motor Status: unable to asses      Drug/treatment history and current pattern of use:   History of cannabis use    Medication changes: See Above   Medication adherence: compliant  Medication side effects: absent  Information about medications: Side effects, benefits and alternative treatments discussed and patient agrees .    Psychotherapy: Supportive therapy day-to-day living    Education: Diet, exercise, abstinence from drugs and alcohol, patient will not drive if sedated and medications or  under influence of any substance    Lab Results:  Personally reviewed and discussed with the patient    Lab Results   Component Value Date    WBC 6.1 11/10/2021    HGB 15.0 11/10/2021    HCT 46.3 11/10/2021     11/10/2021    CHOL 195 05/23/2022    TRIG 92 05/23/2022    HDL 40 05/23/2022    ALT 24 11/10/2021    AST 20 11/10/2021     11/10/2021    BUN 17 11/10/2021    CO2 23 11/10/2021       Vital signs:  There were no vitals taken for this visit.  Unable to assess telephone visit  Allergies: Patient has no known allergies.           Review of Systems:      ROS:  Subjective data only - Tele-Health  Visit   10 point ROS was negative except for the items listed in HPI-              Medications:         Current Outpatient Medications   Medication     sertraline (ZOLOFT) 25 MG tablet     No current facility-administered medications for this visit.             Medication adherence: Reviewed risk/benefits of medication , Patient able to verbalize understanding of side effects and Patient verbally consents to taking medications        Crisis Resources:    1. Present to the Emergency Department as needed or call after hours crisis line at [phone] or [phone].   2. [place] Crisis Text Line: Text [place] to 897379.  3. Suicide LifeLine Chat:  suicidepreventionlifeline.org/chat/.  4. National Suicide Prevention Lifeline: 894.733.3825 (TTY: 750.825.1842). Call anytime for help.  (www.suicidepreventionlifeline.org)  5. National [place] on Mental Illness (www.livan.org): 171.614.3629 or 183-023-5599.  6. Mental Health Association (www.mentalhealth.org): 643.348.7359 or 959-276-1674.      Coordination of Care:   More than 30 minutes spent on this visit  with more than 50% of time spent on coordination of care including: Educating patient about diagnosis, prognosis, side effects and benefits of medications, diet, exercise.  Time also spent providing supportive therapy regarding above issues.    This note was created using a dictation system. All typing errors or contextual distortion is unintentional and software inherent.  Start Time : 1400  End time : 1415

## 2022-09-18 ENCOUNTER — HEALTH MAINTENANCE LETTER (OUTPATIENT)
Age: 27
End: 2022-09-18

## 2023-01-04 ENCOUNTER — VIRTUAL VISIT (OUTPATIENT)
Dept: PSYCHIATRY | Facility: CLINIC | Age: 28
End: 2023-01-04
Payer: COMMERCIAL

## 2023-01-04 DIAGNOSIS — F32.1 CURRENT MODERATE EPISODE OF MAJOR DEPRESSIVE DISORDER, UNSPECIFIED WHETHER RECURRENT (H): ICD-10-CM

## 2023-01-04 PROCEDURE — 99214 OFFICE O/P EST MOD 30 MIN: CPT | Mod: GT | Performed by: NURSE PRACTITIONER

## 2023-01-04 RX ORDER — SERTRALINE HYDROCHLORIDE 25 MG/1
25 TABLET, FILM COATED ORAL DAILY
Qty: 90 TABLET | Refills: 1 | Status: SHIPPED | OUTPATIENT
Start: 2023-01-04 | End: 2023-07-10

## 2023-01-04 NOTE — PROGRESS NOTES
This video/telephone visit will be conducted via a call between you and your physician/provider. We have found that certain health care needs can be provided without the need for an in-person physical exam. This service lets us provide the care you need with a video /telephone conversation. If a prescription is necessary we can send it directly to your pharmacy. If lab work is needed we can place an order for that and you can then stop by our lab to have the test done at a later time.    Just as we bill insurance for in-person visits, we also bill insurance for video/telephone visits. If you have questions about your insurance coverage, we recommend that you speak with your insurance company.    Patient has given verbal consent for video/Telephone visit? Yes    Patient would like a video visit, please send text link to : [phone]    Reason for visit: Follow up  Anything the provider should be aware of for today's appointment: No new concerns, taking sertraline, no side effects.    Patient verified allergies, medications and pharmacy via telephone.     PARI-7 scores:  No flowsheet data found.    PHQ-9 scores:   PHQ-9 SCORE 9/17/2019 5/23/2022   PHQ-9 Total Score 0 1     Patient states they are ready for visit.  Renee Austin CMA January 4, 2023 1:01 PM

## 2023-01-04 NOTE — PROGRESS NOTES
"  The patient has been notified of following:      \"This virtual  visit will be conducted via a call between you and your physician/provider. We have found that certain health care needs can be provided without the need for a physical exam.  This service lets us provide the care you need virtually/via video   If a prescription is necessary we can send it directly to your pharmacy.  If lab work is needed we can place an order for that and you can then stop by our lab to have the test done at a later time.     Virtual/Video visits are billed at different rates depending on your insurance coverage.Some insurers they may be billed the same as an in-person visit.  Please reach out to your insurance provider with any questions.    Patient has given verbal consent for virtual  visit : Yes      Ho w would you like to obtain your AVS? Mail a copy  If the video visit is dropped, the invitation should be resent by: Send to e-mail at: [email or web address]  Will anyone else be joining your video visit? No            Psychiatric  Out- Patient  Follow Up Progress Note  Date of visit:1/4/2023           Discussion of Care and Treatment Recommendations:   This is a 27 year old male with with a history of depression presenting to the clinic today for follow-up appointment      Last visit 09/08/2022.  Recommendation at last visit .  1. Continue Zoloft 25 mg daily - MDD  2.Highly recommend increase   Psychotherapy session to weekly or biweekly: Pt will call therapist to request  3.Recommend abstain from THC- repots he has significantly reduced use and is working on  Total abstinence   4. RTC-  4 months  call in between visits with any questions or concerns   Patient and I reviewed diagnosis and treatment plan and patient agrees with following recommendations:  Ongoing education given regarding diagnostic and treatment options with adequate verbalization of understanding.  Plan   1. Continue Zoloft 25 mg daily - MDD  2.Highly recommend " "increase   Psychotherapy session to weekly or biweekly: Pt will call therapist to request  3.Recommend abstain from THC- repots he has significantly reduced use and is working on  Total abstinence   4. RTC-  4 months  call in between visits with any questions or concerns          DIagnoses:     Moderate episode of recurrent major depressive disorder     Patient Active Problem List   Diagnosis     Moderate episode of recurrent major depressive disorder (H)     Low HDL (under 40)     Herpes genitalis in men             Chief Complaint / Subjective:    Chief complaint: Depression     History of Present Illness:   Per patient's statement : Reports compliance with current medications denies side effects.  States symptoms are well managed on current dosage of sertraline.  Had a wonderful holiday season and still available.  He offers no new concerns.      Mental Status Examination:   Appearance: Well groomed, good eye contact   Orientation: Patient alert and oriented to person, place, time, and situation  Reliability:  Patient appears to be an adequate historian.    Behavior: cooperative   Speech: Speech is spontaneous and coherent, with a normal rate, rhythm and tone.    Language:There are no difficulties with expressive or receptive language as observed throughout the interview.    Mood: Described as \"ok\".    Affect: congruent   Judgement: Able to make basic decision regarding safety.  Insight: Good awareness of physical and mental health conditions and aware of needs around care for these.  Gait and station: unable to assess  Thought process: Logical   Thought content: No evidence of delusions or paranoia.    Hallucinations : No evidence of any hallucination  Thought content: No evidence of delusions or paranoia.   Suicidal /Homical Ideations:  No thoughts of self harm or suicide. No thoughts of harming others.  Associations: Connected  Fund of knowledge: Average  Attention / Concentration: Able to remain focused " during the interview with minimal distractibility or need for redirection.  Short Term Memory: Grossly intact as evidence by client recalling themes and ideas discussed.  Long Term Memory: Intact  Motor Status: unable to asse    Drug/treatment history and current pattern of use:   Denies     Medication changes: See Above   Medication adherence: compliant  Medication side effects: absent  Information about medications: Side effects, benefits and alternative treatments discussed and patient agrees .    Psychotherapy: Supportive therapy day-to-day living    Education: Diet, exercise, abstinence from drugs and alcohol, patient will not drive if sedated and medications or  under influence of any substance    Lab Results:   Personally reviewed and discussed with the patient    Lab Results   Component Value Date    WBC 6.1 11/10/2021    HGB 15.0 11/10/2021    HCT 46.3 11/10/2021     11/10/2021    CHOL 195 05/23/2022    TRIG 92 05/23/2022    HDL 40 05/23/2022    ALT 24 11/10/2021    AST 20 11/10/2021     11/10/2021    BUN 17 11/10/2021    CO2 23 11/10/2021       Vital signs:  There were no vitals taken for this visit.  Telemedicine visit-no vital signs completed  Allergies: Patient has no known allergies.         Medications:               Medication adherence: Reviewed risk/benefits of medication , Patient able to verbalize understanding of side effects and Patient verbally consents to taking medications           Review of Systems:      ROS:    Subjective Data Only- Tele-Health Visit    10 point ROS was negative except for the items listed in HPI.      Crisis Resources:    1. Present to the Emergency Department as needed or call after hours crisis line at [phone] or [phone].   2. [place] Crisis Text Line: Text [place] to 966340.  3. Suicide LifeLine Chat: suicidepreventionlifeline.org/chat/.  4. National Suicide Prevention Lifeline: 630.699.9458 (TTY: 322.963.2097). Call anytime for help.   (www.suicidepreventionlifeline.org)  5. National [place] on Mental Illness (www.livan.org): 695.582.7922 or 452-946-7093.  6. Mental Health Association (www.mentalhealth.org): 338.125.6566 or 450-656-5205.      Coordination of Care:   More than 30 minutes spent on this visit  with more than 50% of time spent on coordination of care including: Educating patient about diagnosis, prognosis, side effects and benefits of medications, diet, exercise.  Time also spent providing supportive therapy regarding above issues.    Video-Visit Details    Type of service:  Video Visit    Originating Location (pt. Location): Home    Distant Location (provider location): Providers Remote Office     Platform used for Video Visit: Rebeca      This note was created using a dictation system. All typing errors or contextual distortion is unintentional and software inherent.  Start Time : 1420  End time : 1438

## 2023-01-04 NOTE — PATIENT INSTRUCTIONS
Continue medications as prescribed  Have your pharmacy contact us for a refill if you are running low on medications (We may ask you to come into clinic to get a refill from the nurse  No Alcohol or drug use  No driving if sedated  Call the clinic with any questions or concerns   Reach out for help if you feel like hurting yourself or others ([facility] [phone]: [address], [address] or [facility] Suicide Hotline   [phone] , call 911 or go to nearest Emergency room     Crisis Resources:    Present to the Emergency Department as needed or call after hours crisis line at [phone] or [phone].   [place] Crisis Text Line: Text [place] to 114733.  Suicide LifeLine Chat: suicidepreventionlifeline.org/chat/.  National Suicide Prevention Lifeline: 425.792.7253 (TTY: 996.366.2184). Call anytime for help.  (www.suicidepreventionlifeline.org)  National [place] on Mental Illness (www.livan.org): 674.238.5918 or 544-258-1993.  Mental Health Association (www.mentalhealth.org): 251.329.3753 or 169-665-9623.       Follow up as directed, for your appointments, per your After Visit Summary Form.

## 2023-01-28 ENCOUNTER — HEALTH MAINTENANCE LETTER (OUTPATIENT)
Age: 28
End: 2023-01-28

## 2023-03-20 ENCOUNTER — MYC MEDICAL ADVICE (OUTPATIENT)
Dept: PSYCHIATRY | Facility: CLINIC | Age: 28
End: 2023-03-20
Payer: COMMERCIAL

## 2023-03-20 DIAGNOSIS — F33.1 MODERATE EPISODE OF RECURRENT MAJOR DEPRESSIVE DISORDER (H): Primary | ICD-10-CM

## 2023-03-22 NOTE — TELEPHONE ENCOUNTER
Routing to Daniela Colmenares for therapy referral; will then follow up with patient.    Tika Márquez RN on 3/22/2023 at 2:24 PM

## 2023-07-10 ENCOUNTER — VIRTUAL VISIT (OUTPATIENT)
Dept: PSYCHIATRY | Facility: CLINIC | Age: 28
End: 2023-07-10
Payer: COMMERCIAL

## 2023-07-10 DIAGNOSIS — F32.1 CURRENT MODERATE EPISODE OF MAJOR DEPRESSIVE DISORDER, UNSPECIFIED WHETHER RECURRENT (H): ICD-10-CM

## 2023-07-10 PROCEDURE — 99214 OFFICE O/P EST MOD 30 MIN: CPT | Mod: VID | Performed by: NURSE PRACTITIONER

## 2023-07-10 RX ORDER — SERTRALINE HYDROCHLORIDE 25 MG/1
25 TABLET, FILM COATED ORAL DAILY
Qty: 90 TABLET | Refills: 2 | Status: SHIPPED | OUTPATIENT
Start: 2023-07-10 | End: 2023-09-25

## 2023-07-10 ASSESSMENT — PAIN SCALES - GENERAL: PAINLEVEL: NO PAIN (0)

## 2023-07-10 ASSESSMENT — PATIENT HEALTH QUESTIONNAIRE - PHQ9
10. IF YOU CHECKED OFF ANY PROBLEMS, HOW DIFFICULT HAVE THESE PROBLEMS MADE IT FOR YOU TO DO YOUR WORK, TAKE CARE OF THINGS AT HOME, OR GET ALONG WITH OTHER PEOPLE: NOT DIFFICULT AT ALL
SUM OF ALL RESPONSES TO PHQ QUESTIONS 1-9: 9
SUM OF ALL RESPONSES TO PHQ QUESTIONS 1-9: 9

## 2023-07-10 NOTE — PROGRESS NOTES
"  The patient has been notified of following:      \"This virtual  visit will be conducted via a call between you and your physician/provider. We have found that certain health care needs can be provided without the need for a physical exam.  This service lets us provide the care you need virtually/via video   If a prescription is necessary we can send it directly to your pharmacy.  If lab work is needed we can place an order for that and you can then stop by our lab to have the test done at a later time.     Virtual/Video visits are billed at different rates depending on your insurance coverage.Some insurers they may be billed the same as an in-person visit.  Please reach out to your insurance provider with any questions.    Patient has given verbal consent for virtual  visit : Yes      Ho w would you like to obtain your AVS? Mail a copy  If the video visit is dropped, the invitation should be resent by: Send to e-mail at: [email or web address]  Will anyone else be joining your video visit? No          Psychiatric  Out- Patient  Follow Up Progress Note  Date of visit:7/10/2023           Discussion of Care and Treatment Recommendations:   This is a 28 year old male with a history of depression presenting to the clinic today for follow-up appointment         Last visit  01/04/2023 Recommendation at last visit.  1. Continue Zoloft 25 mg daily - MDD  2.Highly recommend increase   Psychotherapy session to weekly or biweekly: Pt will call therapist to request  3.Recommend abstain from THC- repots he has significantly reduced use and is working on  Total abstinence   4. RTC-  4 months  call in between visits with any questions or concerns      Patient and I reviewed diagnosis and treatment plan and patient agrees with following recommendations:  Ongoing education given regarding diagnostic and treatment options with adequate verbalization of understanding.  Plan   1. Continue Zoloft 25 mg daily - MDD  2.Highly recommend " "increase   Psychotherapy session to weekly or biweekly: Pt will call therapist to request  3.Recommend abstain from THC- repots he has significantly reduced use and is working on  Total abstinence   4. RTC-  6 months  call in between visits with any questions or concerns             DIagnoses:     Moderate episode of recurrent major depressive disorder     Patient Active Problem List   Diagnosis     Moderate episode of recurrent major depressive disorder (H)     Low HDL (under 40)     Herpes genitalis in men             Chief Complaint / Subjective:    Chief complaint:  Depression     History of Present Illness:   Patient is doing well.  Depression is well managed.  He has no concerns.  He was presented to [facility] .  Continue current medication.    Mental Status Examination:   Appearance: Well groomed, good eye contact   Orientation: Patient alert and oriented to person, place, time, and situation  Reliability:  Patient appears to be an adequate historian.    Behavior: cooperative   Speech: Speech is spontaneous and coherent, with a normal rate, rhythm and tone.    Language:There are no difficulties with expressive or receptive language as observed throughout the interview.    Mood: Described as \"ok\".    Affect: congruent   Judgement: Able to make basic decision regarding safety.  Insight: Good awareness of physical and mental health conditions and aware of needs around care for these.  Gait and station: unable to assess  Thought process: Logical   Thought content: No evidence of delusions or paranoia.    Hallucinations : No evidence of any hallucination  Thought content: No evidence of delusions or paranoia.   Suicidal /Homical Ideations:  No thoughts of self harm or suicide. No thoughts of harming others.  Associations: Connected  Fund of knowledge: Average  Attention / Concentration: Able to remain focused during the interview with minimal distractibility or need for redirection.  Short Term Memory: " Grossly intact as evidence by client recalling themes and ideas discussed.  Long Term Memory: Intact  Motor Status: unable to asse      Answers for HPI/ROS submitted by the patient on 7/10/2023  If you checked off any problems, how difficult have these problems made it for you to do your work, take care of things at home, or get along with other people?: Not difficult at all  PHQ9 TOTAL SCORE: 9    Drug/treatment history and current pattern of use:   Denies     Medication changes: See Above   Medication adherence: compliant  Medication side effects: absent  Information about medications: Side effects, benefits and alternative treatments discussed and patient agrees .      Answers for HPI/ROS submitted by the patient on 7/10/2023  If you checked off any problems, how difficult have these problems made it for you to do your work, take care of things at home, or get along with other people?: Not difficult at all  PHQ9 TOTAL SCORE: 9      Psychotherapy: Supportive therapy day-to-day living    Education: Diet, exercise, abstinence from drugs and alcohol, patient will not drive if sedated and medications or  under influence of any substance    Lab Results:  Personally reviewed and discussed with the patient    Lab Results   Component Value Date    WBC 6.1 11/10/2021    HGB 15.0 11/10/2021    HCT 46.3 11/10/2021     11/10/2021    CHOL 195 05/23/2022    TRIG 92 05/23/2022    HDL 40 05/23/2022    ALT 24 11/10/2021    AST 20 11/10/2021     11/10/2021    BUN 17 11/10/2021    CO2 23 11/10/2021       Vital signs:  There were no vitals taken for this visit.  Telemedicine visit-no vital signs completed  Allergies: Patient has no known allergies.         Medications:     Current Outpatient Medications   Medication     sertraline (ZOLOFT) 25 MG tablet     No current facility-administered medications for this visit.       Current Outpatient Medications   Medication     sertraline (ZOLOFT) 25 MG tablet     No current  facility-administered medications for this visit.       Medication adherence: Reviewed risk/benefits of medication , Patient able to verbalize understanding of side effects and Patient verbally consents to taking medications      PSYCHOEDUCATION:  Medication side effects and alternatives reviewed. Health promotion activities recommended and reviewed today. All questions addressed. Education and counseling completed regarding risks and benefits of medications and psychotherapy options.  Consent provided by patient/guardian  Call the psychiatric nurse line with medication questions or concerns at [phone].  Madeira[employer]hart may be used to communicate with your provider, but this is not intended to be used for emergencies.  SEROTONIN SYNDROME:  Discussed risks of Serotonin syndrome (ie, serotonin toxicity) which is a potentially life-threatening condition associated with increased serotonergic activity in the central nervous system (CNS). It is seen with therapeutic medication use, inadvertent interactions between drugs, and intentional self-poisoning. Serotonin syndrome may involve a spectrum of clinical findings, which often include mental status changes, autonomic hyperactivity, and neuromuscular abnormalities.    STIMULANT THERAPY: Side effects discussed including but not limited to cardiac (including HTN, tachycardia, sudden death), motor/tic, appetite/growth, mood lability and sleep disruption. This is a controlled substance with risk for abuse, need to keep in a safe keep place and cannot replace lost scripts  HARM REDUCTION:  Discussions regarding effects of mood altering substances, alcohol and cannabis, on mood and that approach is harm reduction, will continue to prescribe meds as they work to cut back use.    SAFETY:  We all care about your loved one's safety. To reduce the risk of self-harm, remove access to all:  Firearms, Medicines (both prescribed and over-the-counter), Knives and other sharp objects, Ropes  and like materials, and Alcohol  SLEEP HYGIENE: establish a sleep routine, limit screen time 1 hour prior to bed, use bed for sleep only, take sleep/medications on time (including sleepy time tea, trazadone or herbal treatments such as melatonin), aroma therapy, limit caffeine/sugar, yoga, guided imagery, stretch, meditation, limit naps to 20 minutes, make a temperature change in the room, white noise, be mindful of slowing down breathing, take a warm bath/shower, frequently wash sheets, and journaling.   Medlineplus.gov is information for patients.  It is run by the [employer] and it contains information about all disorders, diseases and all medications.              Review of Systems:      ROS:    Subjective Data Only- Tele-Health Visit    10 point ROS was negative except for the items listed in HPI.      Crisis Resources:    1. Present to the Emergency Department as needed or call after hours crisis line at [phone] or [phone].   2. [place] Crisis Text Line: Text [place] to 392269.  3. Suicide LifeLine Chat: suicidepreShape Pharmaceuticalsline.org/chat/.  4. National Suicide Prevention Lifeline: 815.464.3313 (TTY: 108.319.3944). Call anytime for help.  (www.suicidepreventionlifeline.org)  5. National [place] on Mental Illness (www.livan.org): 351.440.6618 or 215-932-3532.  6. Mental Health Association (www.mentalhealth.org): 713.294.7472 or 855-661-2750.      Coordination of Care:   More than 30 minutes spent on this visit  with more than 50% of time spent on coordination of care including: Educating patient about diagnosis, prognosis, side effects and benefits of medications, diet, exercise.  Time also spent providing supportive therapy regarding above issues.    Video-Visit Details    Type of service:  Video Visit    Originating Location (pt. Location): Home    Distant Location (provider location): Providers Remote Office     Platform used for Video Visit: Rebeca      This note was created using a  dictation system. All typing errors or contextual distortion is unintentional and software inherent.  Start Time : 1300  End time : 1330

## 2023-07-10 NOTE — PATIENT INSTRUCTIONS
"The [facility]  What to Expect  Here's what to expect in the Panel Psychiatry Program.   About the program  You'll be meeting with a psychiatric doctor to check your mental health. A psychiatric doctor helps you deal with troubling thoughts and feelings by giving you medicine. They'll make sure you know the plan for your care. You may see them for a long time. When you're feeling better, they may refer you back to seeing your family doctor.   If you have any questions, we'll be glad to talk to you.  About visits  Be open  At your visits, please talk openly about your problems. It may feel hard, but it's the best way for us to help you.  Cancelling visits  If you can't come to your visit, please call us right away at [phone]. If you don't cancel at least 24 hours (1 full day) before your visit, that's \"late cancellation.\"  Not showing up for your visits  Being very late is the same as not showing up. You'll be a \"no show\" if:  You're more than 15 minutes late for a 30-minute (half hour) visit.  You're more than 30 minutes late for a 60-minute (full hour) visit.  If you cancel late or don't show up 2 times within 6 months, we may end your care.  Getting help between visits  If you need help between visits, you can call us Monday to Friday from 8 a.m. to 4:30 p.m. at [phone].  Emergency care  Call 911 or go to the nearest emergency department if your life or someone else's life is in danger.  Call 988 anytime to reach the national Suicide and Crisis hotline.  Medicine refills  To refill your medicine, call your pharmacy. You can also call [facility]'s [facility] at [phone], Monday to Friday, 8 a.m. to 4:30 p.m. It can take 1 to 3 business days to get a refill.   Forms, letters, and tests  You may have papers to fill out, like FMLA, short-term disability, and workability. We can help you with these forms at your visits, but you must have an appointment. You may need more " than 1 visit for this, to be in an intensive therapy program, or both.  Before we can give you medicine for ADHD, we may refer you to get tested for it or confirm it another way.  We may not be able to give you an emotional support animal letter.  We don't do mental health checks ordered by the court.   We don't do mental health testing, but we can refer you to get tested.   Thank you for choosing us for your care.  For informational purposes only. Not to replace the advice of your health care provider. Copyright   2022 [employer]. All rights reserved. [employer] [id number] - 12/22      After Visit Summary   Continue medications as prescribed  Have your pharmacy contact us for a refill if you are running low on medications (We may ask you to come into clinic to get a refill from the nurse  No Alcohol or drug use  No driving if sedated  Call the clinic with any questions or concerns   Reach out for help if you feel like hurting yourself or others ([facility] [phone]: [address], [address] or [facility] Suicide Hotline   [phone] , call 911 or go to nearest Emergency room     Crisis Resources:    Present to the Emergency Department as needed or call after hours crisis line at [phone] or [phone].   [place] Crisis Text Line: Text [place] to 784772.  Suicide LifeLine Chat: suicidepreventionlifeline.org/chat/.  National Suicide Prevention Lifeline: 583.100.3661 (TTY: 900.722.6578). Call anytime for help.  (www.suicidepreventionlifeline.org)  National [place] on Mental Illness (www.livan.org): 930.738.1949 or 931-921-7517.  Mental Health Association (www.mentalhealth.org): 378.654.9919 or 502-645-0393.       Follow up as directed, for your appointments, per your After Visit Summary Form.

## 2023-07-10 NOTE — NURSING NOTE
Is the patient currently in the state of [place]? YES    Visit mode:VIDEO    If the visit is dropped, the patient can be reconnected by: VIDEO VISIT: Text to cell phone: [phone]    Will anyone else be joining the visit? NO      How would you like to obtain your AVS? MyChart    Are changes needed to the allergy or medication list? NO    Reason for visit: RECHECK

## 2023-07-31 ENCOUNTER — DOCUMENTATION ONLY (OUTPATIENT)
Dept: PSYCHOLOGY | Facility: CLINIC | Age: 28
End: 2023-07-31
Payer: COMMERCIAL

## 2023-07-31 NOTE — PROGRESS NOTES
No Show for the patient. Message were left in his phone and the scheduling team number . No answer.

## 2023-08-16 ENCOUNTER — VIRTUAL VISIT (OUTPATIENT)
Dept: PSYCHIATRY | Facility: CLINIC | Age: 28
End: 2023-08-16
Payer: COMMERCIAL

## 2023-08-16 DIAGNOSIS — F32.1 CURRENT MODERATE EPISODE OF MAJOR DEPRESSIVE DISORDER, UNSPECIFIED WHETHER RECURRENT (H): Primary | ICD-10-CM

## 2023-08-16 ASSESSMENT — PATIENT HEALTH QUESTIONNAIRE - PHQ9
SUM OF ALL RESPONSES TO PHQ QUESTIONS 1-9: 3
SUM OF ALL RESPONSES TO PHQ QUESTIONS 1-9: 3
10. IF YOU CHECKED OFF ANY PROBLEMS, HOW DIFFICULT HAVE THESE PROBLEMS MADE IT FOR YOU TO DO YOUR WORK, TAKE CARE OF THINGS AT HOME, OR GET ALONG WITH OTHER PEOPLE: SOMEWHAT DIFFICULT

## 2023-08-16 NOTE — PROGRESS NOTES
"Virtual Visit Details    Type of service:  Video Visit   Video Start Time: {video visit start/end time for provider to select:387805}  Video End Time:{video visit start/end time for provider to select:593854}    Originating Location (pt. Location): {video visit patient location:081892::\"Home\"}  {PROVIDER LOCATION On-site should be selected for visits conducted from your clinic location or adjoining [facility] hospital, academic office, or other nearby [facility] building. Off-site should be selected for all other provider locations, including home:206229}  Distant Location (provider location):  {virtual location provider:293815}  Platform used for Video Visit: {Virtual Visit Platforms:787082::\"[employer]\"}  "

## 2023-09-25 ENCOUNTER — VIRTUAL VISIT (OUTPATIENT)
Dept: PSYCHIATRY | Facility: CLINIC | Age: 28
End: 2023-09-25
Payer: COMMERCIAL

## 2023-09-25 DIAGNOSIS — F32.1 CURRENT MODERATE EPISODE OF MAJOR DEPRESSIVE DISORDER, UNSPECIFIED WHETHER RECURRENT (H): ICD-10-CM

## 2023-09-25 PROCEDURE — 99214 OFFICE O/P EST MOD 30 MIN: CPT | Mod: VID | Performed by: NURSE PRACTITIONER

## 2023-09-25 RX ORDER — SERTRALINE HYDROCHLORIDE 25 MG/1
25 TABLET, FILM COATED ORAL DAILY
Qty: 90 TABLET | Refills: 2 | Status: SHIPPED | OUTPATIENT
Start: 2023-09-25 | End: 2024-03-07

## 2023-09-25 ASSESSMENT — PAIN SCALES - GENERAL: PAINLEVEL: NO PAIN (0)

## 2023-09-25 ASSESSMENT — PATIENT HEALTH QUESTIONNAIRE - PHQ9
SUM OF ALL RESPONSES TO PHQ QUESTIONS 1-9: 2
SUM OF ALL RESPONSES TO PHQ QUESTIONS 1-9: 2
10. IF YOU CHECKED OFF ANY PROBLEMS, HOW DIFFICULT HAVE THESE PROBLEMS MADE IT FOR YOU TO DO YOUR WORK, TAKE CARE OF THINGS AT HOME, OR GET ALONG WITH OTHER PEOPLE: NOT DIFFICULT AT ALL

## 2023-09-25 NOTE — NURSING NOTE
Is the patient currently in the state of [place]? YES    Visit mode:VIDEO    If the visit is dropped, the patient can be reconnected by: VIDEO VISIT: Text to cell phone:   Telephone Information:   Mobile [phone]       Will anyone else be joining the visit? No  (If patient encounters technical issues they should call [phone])    How would you like to obtain your AVS? MyChart    Are changes needed to the allergy or medication list? No    Rooming Documentation: Assigned questionnaire(s) completed .    Reason for visit: RECHECK     Katherine Alaniz, [facility]      Care team has reviewed attendance agreement with patient. Patient advised that two failed appointments within 6 months may lead to termination of current episode of care.

## 2023-09-25 NOTE — PROGRESS NOTES
"  The patient has been notified of following:      \"This virtual  visit will be conducted via a call between you and your physician/provider. We have found that certain health care needs can be provided without the need for a physical exam.  This service lets us provide the care you need virtually/via video   If a prescription is necessary we can send it directly to your pharmacy.  If lab work is needed we can place an order for that and you can then stop by our lab to have the test done at a later time.     Virtual/Video visits are billed at different rates depending on your insurance coverage.Some insurers they may be billed the same as an in-person visit.  Please reach out to your insurance provider with any questions.    Patient has given verbal consent for virtual  visit : Yes      Ho w would you like to obtain your AVS? Mail a copy  If the video visit is dropped, the invitation should be resent by: Send to e-mail at: [email or web address]  Will anyone else be joining your video visit? No            Psychiatric  Out- Patient  Follow Up Progress Note  Date of visit:9/25/2023           Discussion of Care and Treatment Recommendations:   This is a 28 year old male with a history of depression presenting to the clinic today for follow-up appointment         Last visit 07/10/23.  Recommendation at last visit .  1. Continue Zoloft 25 mg daily - MDD  2.Highly recommend increase   Psychotherapy session to weekly or biweekly: Pt will call therapist to request  3.Recommend abstain from THC- repots he has significantly reduced use and is working on  Total abstinence   4. RTC-  6 months  call in between visits with any questions or concerns      Patient and I reviewed diagnosis and treatment plan and patient agrees with following recommendations:  Ongoing education given regarding diagnostic and treatment options with adequate verbalization of understanding.  Plan   1. Continue Zoloft 25 mg daily - MDD  2.Highly recommend " "increase   Psychotherapy session to weekly or biweekly: P3.Recommend abstain from THC- repots he has significantly reduced use and is working on  Total abstinence   4. RTC-  6 months  call in between visits with any questions or concerns             DIagnoses:   Moderate episode of recurrent major depressive disorder       Patient Active Problem List   Diagnosis    Moderate episode of recurrent major depressive disorder (H)    Low HDL (under 40)    Herpes genitalis in men             Chief Complaint / Subjective:    Chief complaint: Depression     History of Present Illness:   Per patient mzdihuggm-jsftrldhg-xpbbzhmyimusabqdfuhpthynanurkade and just returned home from the family yesterday.  He has had a lot of support from his extended family.  He is otherwise managing well.  Compliant with current medications and denies side effects.  Has an upcoming appointment with his therapist.  No other concerns.  He would like to continue on current medication.  Mental Status Examination:   Appearance: Well groomed, good eye contact   Orientation: Patient alert and oriented to person, place, time, and situation  Reliability:  Patient appears to be an adequate historian.    Behavior: cooperative   Speech: Speech is spontaneous and coherent, with a normal rate, rhythm and tone.    Language:There are no difficulties with expressive or receptive language as observed throughout the interview.    Mood: Described as \"ok\".    Affect: congruent   Judgement: Able to make basic decision regarding safety.  Insight: Good awareness of physical and mental health conditions and aware of needs around care for these.  Gait and station: unable to assess  Thought process: Logical   Thought content: No evidence of delusions or paranoia.    Hallucinations : No evidence of any hallucination  Thought content: No evidence of delusions or paranoia.   Suicidal /Homical Ideations:  No thoughts of self harm or suicide. No thoughts of harming " others.  Associations: Connected  Fund of knowledge: Average  Attention / Concentration: Able to remain focused during the interview with minimal distractibility or need for redirection.  Short Term Memory: Grossly intact as evidence by client recalling themes and ideas discussed.  Long Term Memory: Intact  Motor Status: unable to asse    Drug/treatment history and current pattern of use:   Denies     Medication changes: See Above   Medication adherence: compliant  Medication side effects: absent  Information about medications: Side effects, benefits and alternative treatments discussed and patient agrees .    Psychotherapy: Supportive therapy day-to-day living    Education: Diet, exercise, abstinence from drugs and alcohol, patient will not drive if sedated and medications or  under influence of any substance    Lab Results:   Personally reviewed and discussed with the patient    Lab Results   Component Value Date    WBC 6.1 11/10/2021    HGB 15.0 11/10/2021    HCT 46.3 11/10/2021     11/10/2021    CHOL 195 05/23/2022    TRIG 92 05/23/2022    HDL 40 05/23/2022    ALT 24 11/10/2021    AST 20 11/10/2021     11/10/2021    BUN 17 11/10/2021    CO2 23 11/10/2021       Vital signs:  There were no vitals taken for this visit.  Telemedicine visit-no vital signs completed  Allergies: Patient has no known allergies.         Medications:     Current Outpatient Medications   Medication    sertraline (ZOLOFT) 25 MG tablet     No current facility-administered medications for this visit.           Medication adherence: Reviewed risk/benefits of medication , Patient able to verbalize understanding of side effects and Patient verbally consents to taking medications      PSYCHOEDUCATION:  Medication side effects and alternatives reviewed. Health promotion activities recommended and reviewed today. All questions addressed. Education and counseling completed regarding risks and benefits of medications and psychotherapy  options.  Consent provided by patient/guardian  Call the psychiatric nurse line with medication questions or concerns at [phone].  MyChart may be used to communicate with your provider, but this is not intended to be used for emergencies.  SEROTONIN SYNDROME:  Discussed risks of Serotonin syndrome (ie, serotonin toxicity) which is a potentially life-threatening condition associated with increased serotonergic activity in the central nervous system (CNS). It is seen with therapeutic medication use, inadvertent interactions between drugs, and intentional self-poisoning. Serotonin syndrome may involve a spectrum of clinical findings, which often include mental status changes, autonomic hyperactivity, and neuromuscular abnormalities.    STIMULANT THERAPY: Side effects discussed including but not limited to cardiac (including HTN, tachycardia, sudden death), motor/tic, appetite/growth, mood lability and sleep disruption. This is a controlled substance with risk for abuse, need to keep in a safe keep place and cannot replace lost scripts  HARM REDUCTION:  Discussions regarding effects of mood altering substances, alcohol and cannabis, on mood and that approach is harm reduction, will continue to prescribe meds as they work to cut back use.    SAFETY:  We all care about your loved one's safety. To reduce the risk of self-harm, remove access to all:  Firearms, Medicines (both prescribed and over-the-counter), Knives and other sharp objects, Ropes and like materials, and Alcohol  SLEEP HYGIENE: establish a sleep routine, limit screen time 1 hour prior to bed, use bed for sleep only, take sleep/medications on time (including sleepy time tea, trazadone or herbal treatments such as melatonin), aroma therapy, limit caffeine/sugar, yoga, guided imagery, stretch, meditation, limit naps to 20 minutes, make a temperature change in the room, white noise, be mindful of slowing down breathing, take a warm bath/shower, frequently  wash sheets, and journaling.   Medlineplus.gov is information for patients.  It is run by the [employer] and it contains information about all disorders, diseases and all medications.              Review of Systems:      ROS:    Subjective Data Only- Tele-Health Visit    10 point ROS was negative except for the items listed in HPI.      Crisis Resources:    Present to the Emergency Department as needed or call after hours crisis line at [phone] or [phone].   [place] Crisis Text Line: Text [place] to 519011.  Suicide LifeLine Chat: suicideL2C.org/chat/.  National Suicide Prevention Lifeline: 208.500.2037 (TTY: 365.979.9251). Call anytime for help.  (www.suicidepreventionlifeline.org)  National [place] on [place] Illness (www.livan.org): 368.588.3942 or 556-837-1640.  Mental Health Association (www.mentalhealth.org): 868.414.2391 or 224-895-6737.      Coordination of Care:   More than 30 minutes spent on this visit  with more than 50% of time spent on coordination of care including: Educating patient about diagnosis, prognosis, side effects and benefits of medications, diet, exercise.  Time also spent providing supportive therapy regarding above issues.    Video-Visit Details    Type of service:  Video Visit    Originating Location (pt. Location): Home    Distant Location (provider location): Providers Remote Office     Platform used for Video Visit: Toutiao      This note was created using a dictation system. All typing errors or contextual distortion is unintentional and software inherent.  Start Time :1600   End time :  1630    Answers submitted by the patient for this visit:  Patient Health Questionnaire (Submitted on 9/25/2023)  If you checked off any problems, how difficult have these problems made it for you to do your work, take care of things at home, or get along with other people?: Not difficult at all  PHQ9 TOTAL SCORE: 2

## 2023-09-26 ENCOUNTER — HOSPITAL ENCOUNTER (EMERGENCY)
Facility: HOSPITAL | Age: 28
Discharge: HOME | End: 2023-09-26
Attending: STUDENT IN AN ORGANIZED HEALTH CARE EDUCATION/TRAINING PROGRAM | Admitting: STUDENT IN AN ORGANIZED HEALTH CARE EDUCATION/TRAINING PROGRAM
Payer: COMMERCIAL

## 2023-09-26 ENCOUNTER — APPOINTMENT (OUTPATIENT)
Dept: CT IMAGING | Facility: HOSPITAL | Age: 28
End: 2023-09-26
Attending: STUDENT IN AN ORGANIZED HEALTH CARE EDUCATION/TRAINING PROGRAM
Payer: COMMERCIAL

## 2023-09-26 VITALS
DIASTOLIC BLOOD PRESSURE: 63 MMHG | RESPIRATION RATE: 16 BRPM | TEMPERATURE: 98.1 F | WEIGHT: 172 LBS | HEART RATE: 66 BPM | HEIGHT: 69 IN | SYSTOLIC BLOOD PRESSURE: 116 MMHG | BODY MASS INDEX: 25.48 KG/M2 | OXYGEN SATURATION: 100 %

## 2023-09-26 DIAGNOSIS — R07.89 LEFT-SIDED CHEST WALL PAIN: ICD-10-CM

## 2023-09-26 LAB
ALBUMIN SERPL BCG-MCNC: 3.8 G/DL (ref 3.5–5.2)
ALP SERPL-CCNC: 77 U/L (ref 40–129)
ALT SERPL W P-5'-P-CCNC: 22 U/L (ref 0–70)
ANION GAP SERPL CALCULATED.3IONS-SCNC: 7 MMOL/L (ref 7–15)
AST SERPL W P-5'-P-CCNC: 23 U/L (ref 0–45)
BILIRUB SERPL-MCNC: 0.3 MG/DL
BUN SERPL-MCNC: 15.1 MG/DL (ref 6–20)
CALCIUM SERPL-MCNC: 8.9 MG/DL (ref 8.6–10)
CHLORIDE SERPL-SCNC: 105 MMOL/L (ref 98–107)
CREAT SERPL-MCNC: 1.04 MG/DL (ref 0.67–1.17)
DEPRECATED HCO3 PLAS-SCNC: 27 MMOL/L (ref 22–29)
EGFRCR SERPLBLD CKD-EPI 2021: >90 ML/MIN/1.73M2
ERYTHROCYTE [DISTWIDTH] IN BLOOD BY AUTOMATED COUNT: 13.3 % (ref 10–15)
GLUCOSE SERPL-MCNC: 92 MG/DL (ref 70–99)
HCT VFR BLD AUTO: 46.9 % (ref 40–53)
HGB BLD-MCNC: 15 G/DL (ref 13.3–17.7)
LIPASE SERPL-CCNC: 33 U/L (ref 13–60)
MCH RBC QN AUTO: 27.8 PG (ref 26.5–33)
MCHC RBC AUTO-ENTMCNC: 32 G/DL (ref 31.5–36.5)
MCV RBC AUTO: 87 FL (ref 78–100)
PLATELET # BLD AUTO: 260 10E3/UL (ref 150–450)
POTASSIUM SERPL-SCNC: 4.4 MMOL/L (ref 3.4–5.3)
PROT SERPL-MCNC: 6.2 G/DL (ref 6.4–8.3)
RBC # BLD AUTO: 5.4 10E6/UL (ref 4.4–5.9)
SODIUM SERPL-SCNC: 139 MMOL/L (ref 136–145)
TROPONIN T SERPL HS-MCNC: <6 NG/L
WBC # BLD AUTO: 4.3 10E3/UL (ref 4–11)

## 2023-09-26 PROCEDURE — 84484 ASSAY OF TROPONIN QUANT: CPT | Performed by: STUDENT IN AN ORGANIZED HEALTH CARE EDUCATION/TRAINING PROGRAM

## 2023-09-26 PROCEDURE — 83690 ASSAY OF LIPASE: CPT | Performed by: STUDENT IN AN ORGANIZED HEALTH CARE EDUCATION/TRAINING PROGRAM

## 2023-09-26 PROCEDURE — 85027 COMPLETE CBC AUTOMATED: CPT | Performed by: STUDENT IN AN ORGANIZED HEALTH CARE EDUCATION/TRAINING PROGRAM

## 2023-09-26 PROCEDURE — 71275 CT ANGIOGRAPHY CHEST: CPT

## 2023-09-26 PROCEDURE — 250N000011 HC RX IP 250 OP 636: Mod: JZ | Performed by: STUDENT IN AN ORGANIZED HEALTH CARE EDUCATION/TRAINING PROGRAM

## 2023-09-26 PROCEDURE — 99285 EMERGENCY DEPT VISIT HI MDM: CPT | Mod: 25

## 2023-09-26 PROCEDURE — 36415 COLL VENOUS BLD VENIPUNCTURE: CPT | Performed by: STUDENT IN AN ORGANIZED HEALTH CARE EDUCATION/TRAINING PROGRAM

## 2023-09-26 PROCEDURE — 93005 ELECTROCARDIOGRAM TRACING: CPT | Performed by: STUDENT IN AN ORGANIZED HEALTH CARE EDUCATION/TRAINING PROGRAM

## 2023-09-26 PROCEDURE — 80053 COMPREHEN METABOLIC PANEL: CPT | Performed by: STUDENT IN AN ORGANIZED HEALTH CARE EDUCATION/TRAINING PROGRAM

## 2023-09-26 RX ORDER — IOPAMIDOL 755 MG/ML
90 INJECTION, SOLUTION INTRAVASCULAR ONCE
Status: DISCONTINUED | OUTPATIENT
Start: 2023-09-26 | End: 2023-09-26

## 2023-09-26 RX ORDER — IOPAMIDOL 755 MG/ML
90 INJECTION, SOLUTION INTRAVASCULAR ONCE
Status: COMPLETED | OUTPATIENT
Start: 2023-09-26 | End: 2023-09-26

## 2023-09-26 RX ADMIN — IOPAMIDOL 90 ML: 755 INJECTION, SOLUTION INTRAVENOUS at 10:11

## 2023-09-26 ASSESSMENT — ACTIVITIES OF DAILY LIVING (ADL): ADLS_ACUITY_SCORE: 35

## 2023-09-26 NOTE — ED TRIAGE NOTES
Patient here for left lower chest pain with inspiration at 1600 yesterday, pain at 7/10. Today pain remains, was able to sleep, pain now at 4/10. No trauma, does kick box, denies injury. No cough, non smoker except for marijuana infrequently

## 2023-09-26 NOTE — DISCHARGE INSTRUCTIONS
You were seen in the department today for left-sided chest pain. Your cardiac work-up is normal and reassuring. The CT of your chest and no evidence of pulmonary embolism, enlarged spleen, or other acute pathology. This is likely a muscle strain. You may alternate heat and ice for pain. Please call your primary care provider to schedule follow up for this if your pain persists.    You may take Ibuprofen up to 400 mg by mouth every 4-6 hours as needed for pain. Do not exceed 2400 mg/day.  You may take Tylenol 325-1000 mg by mouth every 4-6 hours as needed for pain. Do not exceed 1000mg (1g) in 4 hours or 4 g/day from all sources.  You may combine Tylenol and Ibuprofen- up to 400 mg of ibuprofen and 1000 mg of Tylenol at the same time, up to 3 times a day for the pain    Please return to the emergency department if you begin to experience sudden shortness of breath, severe worsening abdominal pain, or develop a rash in the area

## 2023-09-26 NOTE — Clinical Note
Roland Sandoval was seen and treated in our emergency department on 9/26/2023.  He may return to work on 09/28/2023.  May return to work earlier if symptoms resolve.      If you have any questions or concerns, please don't hesitate to call.      Ohl, Neeraj Kaur, DO

## 2023-09-26 NOTE — ED PROVIDER NOTES
Emergency Department Encounter   NAME: Roland Sandoval ; AGE: 28 year old male ; YOB: 1995 ; MRN: [id number] ; PCP: No Ref-Primary, Physician   ED PROVIDER: Katherine Garcia PA-C    Evaluation Date & Time:   No admission date for patient encounter.    CHIEF COMPLAINT:  chest wall pain        Impression and Plan   FINAL IMPRESSION:    ICD-10-CM    1. Left-sided chest wall pain  R07.89           MDM:  Roland Sandoval is a 28 year old male with PMH of depression presenting for pleuritic lower left sided chest wall/upper abdomen pain that began around 4:30PM yesterday while he was at work. He states he was not doing any heavy lifting at that time. He does kick boxing regularly but denies known injury. He denies any fevers, chills, cough, shortness of breath, nausea. No underlying lung disease.    Vitals reviewed and unremarkable aside from a BP of 140/67. On exam he is resting comfortably, not ill or toxic appearing. No reproducible tenderness on exam.    Differential diagnosis includes but not limited to pleurisy, muscle strain, shingles, splenomegaly, PE, ACS. Lab work unremarkable. He has a negative troponin and EKG found normal sinus rhythm with benign early repolarization, making ACS is highly unlikely. CT chest is negative for PE or acute thoracic abnormality. No evidence of splenomegaly. I discussed with the patient that there could be several other causes for his atypical chest pain that are non-emergent including intercostal muscle strain, pleurisy, and possible early stages of shingles rash. As he does kickboxing I feel a muscle strain is most likely. He can take ibuprofen and tylenol as needed for pain as well as alternating heat and ice. He will follow up with his primary care provider if this pain persists or he begins to develop a shingles rash. He will return to the emergency department if symptoms worsen or he develops shortness of breath, cough, or fevers.       Medical Decision  Making    History:  Supplemental history from: Documented in chart, if applicable  External Record(s) reviewed: Documented in chart, if applicable.    Work Up:  Chart documentation includes differential considered and any EKGs or imaging independently interpreted by provider, where specified.  In additional to work up documented, I considered the following work up: Documented in chart, if applicable.    External consultation:  Discussion of management with another provider: Dr. LATRELL Cooper  Complicating factors:  Care impacted by chronic illness: Mental Health  Care affected by social determinants of health: N/A    Disposition considerations: Discharge. No recommendations on prescription strength medication(s). See documentation for any additional details.      ED COURSE:  0900 I met and introduced myself to the patient. I gathered initial history and performed my physical exam. We discussed plan for initial workup.   0910 I have staffed the patient with Dr. LATRELL Cooper, ED MD, who has evaluated the patient and agrees with all aspects of today's care.   11:11 AM I rechecked the patient and discussed results, discharge, follow up, and reasons to return to the ED.     At the conclusion of the encounter I discussed the results of all the tests and the disposition. The questions were answered. The patient or family acknowledged understanding and was agreeable with the care plan.        MEDICATIONS GIVEN IN THE EMERGENCY DEPARTMENT:  Medications   iopamidol (ISOVUE-370) solution 90 mL (90 mLs Intravenous $Given 9/26/23 1011)         NEW PRESCRIPTIONS STARTED AT TODAY'S ED VISIT:  Discharge Medication List as of 9/26/2023 11:14 AM            HPI   Patient information was obtained from: Patient  Use of Intrepreter: N/A     Roland Sandoval is a 28 year old male with PMH of depression presenting for pleuritic lower left sided chest wall/upper abdomen pain that began around 4:30PM yesterday while he was at work. He states he was not  "doing any heavy lifting at that time. He does kick boxing regularly but denies known injury. He denies any fevers, chills, cough, shortness of breath, nausea. No underlying lung disease.      Medical History     No past medical history on file.    No past surgical history on file.    Family History   Problem Relation Age of Onset    Anxiety Disorder Mother     Hypertension Father        Social History     Tobacco Use    Smoking status: Never    Smokeless tobacco: Never   Vaping Use    Vaping Use: Never used   Substance Use Topics    Alcohol use: No    Drug use: No       sertraline (ZOLOFT) 25 MG tablet          Physical Exam     First Vitals:  Patient Vitals for the past 24 hrs:   BP Temp Pulse Resp SpO2 Height Weight   09/26/23 1104 116/63 -- 66 16 100 % -- --   09/26/23 0819 (!) 140/67 98.1  F (36.7  C) 87 20 97 % 1.753 m (5' 9\") 78 kg (172 lb)         PHYSICAL EXAM    General Appearance:  Alert, cooperative, no distress, appears stated age. GCS 15.  Respiratory: No distress. Lungs clear to ausculation bilaterally. No wheezes, rhonchi or stridor  Cardiovascular: Regular rate and rhythm, no murmur. Normal cap refill. No peripheral edema.  GI: Abdomen soft and non-distended. No reproducible tenderness on exam.  : No CVA tenderness  Musculoskeletal: Moving all extremities. No gross deformities  Integument: Warm, dry, no rashes or lesions  Neurologic: Alert and orientated x3. No focal deficits.  Psych: Normal mood and affect        Results     LAB:  All pertinent labs reviewed and interpreted  Labs Ordered and Resulted from Time of ED Arrival to Time of ED Departure   COMPREHENSIVE METABOLIC PANEL - Abnormal       Result Value    Sodium 139      Potassium 4.4      Carbon Dioxide (CO2) 27      Anion Gap 7      Urea Nitrogen 15.1      Creatinine 1.04      GFR Estimate >90      Calcium 8.9      Chloride 105      Glucose 92      Alkaline Phosphatase 77      AST 23      ALT 22      Protein Total 6.2 (*)     Albumin 3.8   "    Bilirubin Total 0.3     LIPASE - Normal    Lipase 33     CBC WITH PLATELETS - Normal    WBC Count 4.3      RBC Count 5.40      Hemoglobin 15.0      Hematocrit 46.9      MCV 87      MCH 27.8      MCHC 32.0      RDW 13.3      Platelet Count 260     TROPONIN T, HIGH SENSITIVITY - Normal    Troponin T, High Sensitivity <6         RADIOLOGY:  CT Chest Pulmonary Embolism w Contrast   Final Result   IMPRESSION:   1.  No evidence of acute pulmonary embolism, right heart strain, or acute thoracic abnormality.   2.  No acute osseous abnormality or evidence of splenomegaly.            ECG:  Performed at: 8:25    Impression: normal sinus rhythm, J-point notching present in leads II and III    Rate: 73  Rhythm: sinus  Axis: normal  ND Interval: 160  QRS Interval: 84  QTc Interval: 398  ST Changes: none  Comparison: normal ECG    EKG results reviewed and interpreted by Dr. LATRELL Cooper, ED MD.         Katherine Garcia PA-C   Emergency Medicine   [facility] EMERGENCY DEPARTMENT       Katherine Garcia PA-C  09/26/23 4067

## 2023-09-26 NOTE — ED PROVIDER NOTES
"INeeraj Oh, DO have reviewed the documentation, personally taken the patient's history, performed an exam and agree with the physical finds, diagnosis and management plan.    HPI:      Roland Sandoval is a 28 year old male with a PMHx of depressive disorder who presents for the evaluation of left lower chest wall discomfort.    Patient reports onset of left lower chest pain/discomfort around 1630 yesterday while at work. He denies any strenuous work at onset. He notes yesterday the pain would increase with turning his body or general breathing with a pain severity of 7/10. Explains he had difficulty sleeping last night due to the pain, states pain is now at 4/10 worst with deep inhalation. Denies any change in pain with abdominal muscle use.     Denies any shortness of breath, fevers, leg pain, sore throat, chills, cough, asthma, lung or cardiac history. Reports he is on sertraline for depression      ROS:   Review of Systems   Constitutional: Negative for fever, chills and fatigue.   HENT: Negative for neck pain.    Eyes: Negative for visual disturbance.   Respiratory: Negative for chest tightness and shortness of breath.    Cardiovascular: Positive for left lower chest pain.   Gastrointestinal: Negative for nausea, vomiting, abdominal pain and diarrhea.   Genitourinary: Negative for dysuria.   Musculoskeletal: Negative for back pain.   Skin: Negative for color change.   Neurological: Negative for dizziness, weakness and numbness.   All other systems reviewed and are negative.    Physical Exam:     BP (!) 140/67   Pulse 87   Temp 98.1  F (36.7  C)   Resp 20   Ht 1.753 m (5' 9\")   Wt 78 kg (172 lb)   SpO2 97%   BMI 25.40 kg/m       Constitutional:  Well developed, well nourished, no acute distress   Integument: Warm, Dry, No erythema, No rash.   EYES: Conjunctivae clear, EOMI  HENT:  Atraumatic, external ears normal, nose normal, oropharynx moist. Neck- supple  Respiratory:  No respiratory distress, " normal breath sounds, no rales, no wheezing   Cardiovascular:  Normal rate, normal rhythm, no murmurs, capillary refill normal.  No chest tenderness, no extremity edema  GI:  Soft, nondistended, nontender, no palpable masses, no rebound, no guarding   : No CVA tenderness  Musculoskeletal:  No edema.  Range of motion major extremities intact. No tenderness to palpation or major deformities noted.    Neurologic:  Alert & oriented, no focal deficits noted  Psych: Affect normal, Mood normal.        MDM:       ED Course as of 09/26/23 0833   Tue Sep 26, 2023   0831 EKG sinus 73 no signs acute ischemia, J-point elevation in 2 and 3, no STEMI, no inversions no depressions QTc 398       Healthy 21-year-old here with left-sided chest discomfort.  Patient states that its been present since yesterday.  Pain persisted which initiated the visit today.  No fevers chills nausea vomiting.  No specific traumatic event.  Heart and lungs are normal on examination.  Vitals are stable.  No skin changes suggesting shingles although this is in the differential.    Labs normal.  CT PE normal.  No splenomegaly.  No abdominal discomfort.  Patient states he feels better.  Plan for discharge home.    Unsure cause of symptoms today.  Does not seem frankly pleuritic.  No signs of PE or ACS.  EKG nonischemic.  Muscle strength possibility.  Also discussed possibility of this being early shingles.  Recommending outpatient follow-up.        Medical Decision Making    History:  Supplemental history from: Documented in chart, if applicable  External Record(s) reviewed: Documented in chart, if applicable.    Work Up:  Chart documentation includes differential considered and any EKGs or imaging independently interpreted by provider, where specified.  In additional to work up documented, I considered the following work up: Documented in chart, if applicable.    External consultation:  Discussion of management with another provider: Documented in chart,  if applicable    Complicating factors:  Care impacted by chronic illness: N/A  Care affected by social determinants of health: N/A    Disposition considerations: Discharge. No recommendations on prescription strength medication(s). See documentation for any additional details.      Final Diagnosis:   Chest pain     Neeraj Cooper,   09/26/23 1110

## 2023-09-26 NOTE — ED NOTES
Pt alert and oriented x4. Ambulated with a steady gait. Discharged to home with friend. Pt instructed to follow up with PCP if needed. Pt given work note.

## 2023-10-01 LAB
ATRIAL RATE - MUSE: 73 BPM
DIASTOLIC BLOOD PRESSURE - MUSE: NORMAL MMHG
INTERPRETATION ECG - MUSE: NORMAL
P AXIS - MUSE: 79 DEGREES
PR INTERVAL - MUSE: 160 MS
QRS DURATION - MUSE: 84 MS
QT - MUSE: 362 MS
QTC - MUSE: 398 MS
R AXIS - MUSE: 78 DEGREES
SYSTOLIC BLOOD PRESSURE - MUSE: NORMAL MMHG
T AXIS - MUSE: 47 DEGREES
VENTRICULAR RATE- MUSE: 73 BPM

## 2023-10-26 ENCOUNTER — FCC EXTENDED DOCUMENTATION (OUTPATIENT)
Dept: PSYCHOLOGY | Facility: CLINIC | Age: 28
End: 2023-10-26
Payer: COMMERCIAL

## 2023-10-26 ENCOUNTER — VIRTUAL VISIT (OUTPATIENT)
Dept: PSYCHOLOGY | Facility: CLINIC | Age: 28
End: 2023-10-26
Payer: COMMERCIAL

## 2023-10-26 DIAGNOSIS — F33.1 MODERATE EPISODE OF RECURRENT MAJOR DEPRESSIVE DISORDER (H): ICD-10-CM

## 2023-10-26 PROCEDURE — 90837 PSYTX W PT 60 MINUTES: CPT | Mod: VID | Performed by: SOCIAL WORKER

## 2023-10-26 ASSESSMENT — PATIENT HEALTH QUESTIONNAIRE - PHQ9
10. IF YOU CHECKED OFF ANY PROBLEMS, HOW DIFFICULT HAVE THESE PROBLEMS MADE IT FOR YOU TO DO YOUR WORK, TAKE CARE OF THINGS AT HOME, OR GET ALONG WITH OTHER PEOPLE: SOMEWHAT DIFFICULT
SUM OF ALL RESPONSES TO PHQ QUESTIONS 1-9: 5
SUM OF ALL RESPONSES TO PHQ QUESTIONS 1-9: 5

## 2023-10-26 NOTE — PROGRESS NOTES
"[facility]   Mental Health & Addiction Services     Progress Note - Initial Visit    Patient  Name:  Roland Sandoval Date: 10/26/2023         Service Type: Individual     Visit Start Time: 8:04  Visit End Time: 8:58    Visit #: 1    Attendees: Client attended alone    Service Modality:  Video Visit:      Provider verified identity through the following two step process.  Patient provided:  Patient  and Patient address;middle name \" Rashmi\"    Telemedicine Visit: The patient's condition can be safely assessed and treated via synchronous audio and visual telemedicine encounter.      Reason for Telemedicine Visit: Services only offered telehealth    Originating Site (Patient Location): Patient's home    Distant Site (Provider Location): Provider Remote Setting- Home Office    Consent:  The patient/guardian has verbally consented to: the potential risks and benefits of telemedicine (video visit) versus in person care; bill my insurance or make self-payment for services provided; and responsibility for payment of non-covered services.     Patient would like the video invitation sent by:  My Chart    Mode of Communication:  Video Conference via [facility]    Distant Location (Provider):  Off-site    As the provider I attest to compliance with applicable laws and regulations related to telemedicine.       DATA:   Interactive Complexity: Yes, visit entailed Interactive Complexity evidenced by:  First encounter, AIDET was performed, DA was initiated, future appts were scheduled     Crisis: No     Presenting Concerns/  Current Stressors:  Patient presented with \"My mental health and clarity on certain situations in my life that keep replaying in my head like a broken record\".  \" No pleasure, no enjoyment of things I used to have, like I don't feel I belong\" ;  \"My Maternal aunt passed away  months ago\". He starting working with a psychiatrist. Hopes therapy will help to regain strengths and not feel " broken and  feel he belongs. Denies any SI but per chart review , he has experienced SI and was hospitalized two years ago. Did share he was hospitalized but did not menton SI at that time.  Further assessment will be done at the next visit. Will be seen on 10/31.    ASSESSMENT:  Mental Status Assessment:  Appearance:   Appropriate   Eye Contact:   Good   Psychomotor Behavior: Normal   Attitude:   Cooperative   Orientation:   Person Place Time Situation  Speech   Rate / Production: Normal/ Responsive   Volume:  Normal   Mood:    Anxious  Depressed  Irritable  Sad   Affect:    Appropriate   Thought Content:  Clear   Thought Form:  Coherent  Logical   Insight:    Good       Safety Issues and Plan for Safety and Risk Management:   [place] Suicide Severity Rating Scale (Short Version)      12/30/2021     9:06 AM 4/13/2022     2:13 PM 9/26/2023     8:23 AM   [place] Suicide Severity Rating (Short Version)   Over the past 2 weeks have you felt down, depressed, or hopeless? yes no no   Over the past 2 weeks have you had thoughts of killing yourself? no no no   Have you ever attempted to kill yourself? yes no no     Patient denies current fears or concerns for personal safety.  Patient denies current or recent suicidal ideation or behaviors.  Patient denies current or recent homicidal ideation or behaviors.  Patient denies current or recent self injurious behavior or ideation.  Patient denies other safety concerns.  Recommended that patient call 911 or go to the local ED should there be a change in any of these risk factors.  Patient reports there are no firearms in the house.     Diagnostic Criteria:  Generalized Anxiety Disorder  A. Excessive anxiety and worry about a number of events or activities (such as work or school performance).   B. The person finds it difficult to control the worry.  C. Select 3 or more symptoms (required for diagnosis). Only one item is required in children.   - Restlessness or feeling keyed  up or on edge.    - Being easily fatigued.    - Difficulty concentrating or mind going blank.    - Irritability.    - Muscle tension.    - Sleep disturbance (difficulty falling or staying asleep, or restless unsatisfying sleep).   D. The focus of the anxiety and worry is not confined to features of an Axis I disorder.  E. The anxiety, worry, or physical symptoms cause clinically significant distress or impairment in social, occupational, or other important areas of functioning.   F. The disturbance is not due to the direct physiological effects of a substance (e.g., a drug of abuse, a medication) or a general medical condition (e.g., hyperthyroidism) and does not occur exclusively during a Mood Disorder, a Psychotic Disorder, or a Pervasive Developmental Disorder.  Major Depressive Disorder  CRITERIA (A-C) REPRESENT A MAJOR DEPRESSIVE EPISODE - SELECT THESE CRITERIA  A) Recurrent episode(s) - symptoms have been present during the same 2-week period and represent a change from previous functioning 5 or more symptoms (required for diagnosis)   - Depressed mood. Note: In children and adolescents, can be irritable mood.     - Diminished interest or pleasure in all, or almost all, activities.    - change in  sleep.    - Fatigue or loss of energy.    - Diminished ability to think or concentrate, or indecisiveness.   B) The symptoms cause clinically significant distress or impairment in social, occupational, or other important areas of functioning  C) The episode is not attributable to the physiological effects of a substance or to another medical condition  D) The occurence of major depressive episode is not better explained by other thought / psychotic disorders  E) There has never been a manic episode or hypomanic episode    DSM5 Diagnoses: (Sustained by DSM5 Criteria Listed Above)  Diagnoses: 296.32 (F33.1) Major Depressive Disorder, Recurrent Episode, Moderate _ and With anxious distress  300.02 (F41.1) Generalized  Anxiety Disorder  Psychosocial & Contextual Factors: History of anxiety, depression  WHODAS 2.0 (12 item): Not used       No data to display            PROMIS 10: 27  Intervention:AIDET was performed. working rapport has started. Assessment was initiated.  Future appts were scheduled. Goal discussed:  Self care  Collateral Reports Completed:  Not Applicable      PLAN: (Homework, other):  1. Provider will continue Diagnostic Assessment.  Patient was given the following to do until next session:  Self care, take some walks    2. Provider recommended the following referrals: Not discussed today.      3.  Suicide Risk and Safety Concerns were assessed for Roland Sandoval.    Patient meets the following risk assessment and triage: Patient denied any current/recent/lifetime history of suicidal ideation and/or behaviors.  No safety plan indicated at this time.       RAND Howard  October 26, 2023       Answers submitted by the patient for this visit:  Patient Health Questionnaire (Submitted on 10/26/2023)  If you checked off any problems, how difficult have these problems made it for you to do your work, take care of things at home, or get along with other people?: Somewhat difficult  PHQ9 TOTAL SCORE: 5

## 2023-10-31 ENCOUNTER — VIRTUAL VISIT (OUTPATIENT)
Dept: PSYCHOLOGY | Facility: CLINIC | Age: 28
End: 2023-10-31
Payer: COMMERCIAL

## 2023-10-31 DIAGNOSIS — F33.1 MODERATE EPISODE OF RECURRENT MAJOR DEPRESSIVE DISORDER (H): Primary | ICD-10-CM

## 2023-10-31 DIAGNOSIS — F41.1 GAD (GENERALIZED ANXIETY DISORDER): ICD-10-CM

## 2023-10-31 PROCEDURE — 90791 PSYCH DIAGNOSTIC EVALUATION: CPT | Mod: 95 | Performed by: SOCIAL WORKER

## 2023-10-31 ASSESSMENT — ANXIETY QUESTIONNAIRES
2. NOT BEING ABLE TO STOP OR CONTROL WORRYING: SEVERAL DAYS
6. BECOMING EASILY ANNOYED OR IRRITABLE: SEVERAL DAYS
IF YOU CHECKED OFF ANY PROBLEMS ON THIS QUESTIONNAIRE, HOW DIFFICULT HAVE THESE PROBLEMS MADE IT FOR YOU TO DO YOUR WORK, TAKE CARE OF THINGS AT HOME, OR GET ALONG WITH OTHER PEOPLE: SOMEWHAT DIFFICULT
3. WORRYING TOO MUCH ABOUT DIFFERENT THINGS: SEVERAL DAYS
GAD7 TOTAL SCORE: 7
1. FEELING NERVOUS, ANXIOUS, OR ON EDGE: SEVERAL DAYS
GAD7 TOTAL SCORE: 7
7. FEELING AFRAID AS IF SOMETHING AWFUL MIGHT HAPPEN: SEVERAL DAYS
5. BEING SO RESTLESS THAT IT IS HARD TO SIT STILL: SEVERAL DAYS

## 2023-10-31 ASSESSMENT — PATIENT HEALTH QUESTIONNAIRE - PHQ9: 5. POOR APPETITE OR OVEREATING: SEVERAL DAYS

## 2023-10-31 NOTE — PROGRESS NOTES
"[facility]    PATIENT'S NAME: Roland Sandoval  PREFERRED NAME: Roland  PRONOUNS:     he, him, his  MRN: [id number]  : 1995  ADDRESS: [address] [id number]  ACCT. NUMBER:  [id number]  DATE OF SERVICE: 10/31/2023  START TIME: 9:02  END TIME: 10:00  PREFERRED PHONE: [phone]  May we leave a program related message: Yes  SERVICE MODALITY:  Video Visit:      Provider verified identity through the following two step process.  Patient provided:  Patient  and Patient address, Middle name \" Katiairas\"    Telemedicine Visit: The patient's condition can be safely assessed and treated via synchronous audio and visual telemedicine encounter.      Reason for Telemedicine Visit: Services only offered telehealth    Originating Site (Patient Location): Patient's home    Distant Site (Provider Location): Provider Remote Setting- Home Office    Consent:  The patient/guardian has verbally consented to: the potential risks and benefits of telemedicine (video visit) versus in person care; bill my insurance or make self-payment for services provided; and responsibility for payment of non-covered services.     Patient would like the video invitation sent by:  My Chart    Mode of Communication:  [facility] Conference [facility]    Distant Location (Provider):  Off-site    As the provider I attest to compliance with applicable laws and regulations related to telemedicine.    UNIVERSAL ADULT Mental Health DIAGNOSTIC ASSESSMENT    Identifying Information:  Patient is a 28 year old,     individual.  Patient was referred for an assessment by self.  Patient attended the session alone.    Chief Complaint:   The reason for seeking services at this time is: \"My mental health and clarity on certain situations in my life that keep replaying in my head like a broken record\".  \" No pleasure, no enjoyment of things I used to have, like I don't feel I belong\" ;  \"My Maternal aunt " "passed away  months ago\". The problem(s) began 03/05/23-around the time when he felt these feelings.    Patient has attempted to resolve these concerns in the past through Medications. Has tried therapy in the past like in 2021.    Social/Family History:  Patient reported he grew up in other [place], [place].  he was raised by other Guardian who acted as my dad.  Parents  dad. Mom passed when patient was 8. At the time mom was in a different relationship when she passed.  Patient reported that his childhood was fair. Patient described his current relationships with family of origin as good with dad..     The patient describes his cultural background as .  Cultural influences and impact on patient's life structure, values, norms, and healthcare: Dad grew up and scene racism first hand, so he can be skeptical, and judgemental when it comes to all races, but he has gotten better about over the years and taught me to love everyone regardless of race and social standing, despite what he has been through. Grew up poor not having a lot, and my dad is is overprotective so didn't really have a social [place] until the end of high school..  Contextual influences on patient's health include: Contextual Factors: Individual Factors : Mental health  and Family Factors : Maternal Aunt passed away 2 months.    These factors will be addressed in the Preliminary Treatment plan. Patient identified his preferred language to be English. Patient reported he does not need the assistance of an  or other support involved in therapy.     Patient reported had no significant delays in developmental tasks.   Patient's highest education level was high school graduate  .  Patient identified the following learning problems: none reported.  Modifications will not be used to assist communication in therapy.  Patient reports he is  able to understand written materials.    Patient reported the following relationship " history: 3.  Patient's current relationship status is has a partner or significant other for 7 years.   Patient identified his sexual orientation as quick.  Patient reported having   no child. Patient identified partner; father; siblings; pets; friends as part of his support system.  Has 2 half sisters and 2 full brothers. Dad also has had at least 2 other kids.  Patient identified the quality of these relationships as fair.    Patient's current living/housing situation involves staying with someone.  The immediate members of family and household include Ascencion Haas, 29,Boyfriend and 2 cats and he report that housing is stable.    Patient is currently employed fulltime.  Patient reports his finances are obtained through employment. Patient does not identify finances as a current stressor.      Patient reported that he have not been involved with the legal system.     Patient does not report being under probation/ parole/ jurisdiction. He is not under any current court jurisdiction. .    Patient's Strengths and Limitations:  Patient identified the following strengths or resources that will help them succeed in treatment: commitment to health and well being, exercise routine, renae / spirituality, friends / good social support, family support, insight, positive work environment, sense of humor, and work ethic. Things that may interfere with the patient's success in treatment include: none identified.     Assessments:  The following assessments were completed by patient for this visit:  PHQ2:       11/10/2021     2:32 PM   PHQ-2 ( 1999 [employer])   Q1: Little interest or pleasure in doing things 0   Q2: Feeling down, depressed or hopeless 0   PHQ-2 Score 0   Q1: Little interest or pleasure in doing things Not at all   Q2: Feeling down, depressed or hopeless Not at all   PHQ-2 Score 0     PHQ9:       9/17/2019     9:00 AM 5/23/2022     1:48 PM 7/10/2023    12:59 PM 8/16/2023     1:29 PM 9/25/2023     3:48 PM  10/26/2023     7:45 AM   PHQ-9 SCORE   PHQ-9 Total Score MyChart   9 (Mild depression) 3 (Minimal depression) 2 (Minimal depression) 5 (Mild depression)   PHQ-9 Total Score 0 1 9 3 2 5     GAD2:       7/10/2023    12:59 PM 10/26/2023     8:03 AM   PARI-2   Feeling nervous, anxious, or on edge 1 1   Not being able to stop or control worrying 1 0   PARI-2 Total Score 2 1     GAD7:       10/31/2023     6:05 PM   PARI-7 SCORE   Total Score 7     CAGE-AID:       10/26/2023     8:04 AM   CAGE-AID Total Score   Total Score 1   Total Score MyChart 1 (A total score of 2 or greater is considered clinically significant)     PROMIS 10-Global Health (all questions and answers displayed):       7/10/2023    12:57 PM 10/26/2023     8:04 AM   PROMIS 10   In general, would you say your health is: Excellent Good   In general, would you say your quality of life is: Good Fair   In general, how would you rate your physical health? Excellent Excellent   In general, how would you rate your mental health, including your mood and your ability to think? Good Fair   In general, how would you rate your satisfaction with your social activities and relationships? Good Fair   In general, please rate how well you carry out your usual social activities and roles Good Good   To what extent are you able to carry out your everyday physical activities such as walking, climbing stairs, carrying groceries, or moving a chair? Completely Completely   In the past 7 days, how often have you been bothered by emotional problems such as feeling anxious, depressed, or irritable? Often Sometimes   In the past 7 days, how would you rate your fatigue on average? Mild Mild   In the past 7 days, how would you rate your pain on average, where 0 means no pain, and 10 means worst imaginable pain? 0 3   In general, would you say your health is: 5 3   In general, would you say your quality of life is: 3 2   In general, how would you rate your physical health? 5 5   In  general, how would you rate your mental health, including your mood and your ability to think? 3 2   In general, how would you rate your satisfaction with your social activities and relationships? 3 2   In general, please rate how well you carry out your usual social activities and roles. (This includes activities at home, at work and in your community, and responsibilities as a parent, child, spouse, employee, friend, etc.) 3 3   To what extent are you able to carry out your everyday physical activities such as walking, climbing stairs, carrying groceries, or moving a chair? 5 5   In the past 7 days, how often have you been bothered by emotional problems such as feeling anxious, depressed, or irritable? 4 3   In the past 7 days, how would you rate your fatigue on average? 2 2   In the past 7 days, how would you rate your pain on average, where 0 means no pain, and 10 means worst imaginable pain? 0 3   Global Mental Health Score 11 9   Global Physical Health Score 19 18   PROMIS TOTAL - SUBSCORES 30 27     [place] Suicide Severity Rating Scale (Short Version)      12/30/2021     9:06 AM 4/13/2022     2:13 PM 9/26/2023     8:23 AM   [place] Suicide Severity Rating (Short Version)   Over the past 2 weeks have you felt down, depressed, or hopeless? yes no no   Over the past 2 weeks have you had thoughts of killing yourself? no no no   Have you ever attempted to kill yourself? yes no no       Personal and Family Medical History:  Patient does report a family history of mental health concerns.  Patient reports family history includes Anxiety Disorder in his mother; Hypertension in his father. Sister has anxiety. Cousins: anxiety and depression.    Patient does report Mental Health Diagnosis and/or Treatment.  Patient Patient reported the following previous diagnoses which include(s): Depression.  Patient reported symptoms began since mother passed away at age 8..   Patient has received mental health services in the  past: primary care provider at [facility], [facility]. and psychiatry with [facility]. .  [facility] Hospital[facility] he was 26 years old.  Patient denies a history of civil commitment.  Patient is not receiving other mental health services.      Patient has had a physical exam to rule out medical causes for current symptoms.  Date of last physical exam was within the past year. Symptoms have developed since last physical exam and client was encouraged to follow up with PCP.  . The patient has a [place] Primary Care Provider, who is named No Ref-Primary, Physician. Patient in a process of finding a new provider. Previous has retired.  Patient reports no current medical and/or dental concerns.  Patient denies any issues with pain..   There are not significant appetite / nutritional concerns / weight changes.   Patient does not report a history of head injury / trauma / cognitive impairment.        Current Outpatient Medications:     sertraline (ZOLOFT) 25 MG tablet, Take 1 tablet (25 mg) by mouth daily, Disp: 90 tablet, Rfl: 2     Medication Adherence:  Patient reports taking.    taking prescribed medications as prescribed.    Patient Allergies:  No Known Allergies    Medical History:  No past medical history on file.      Current Mental Status Exam:   Appearance:  Appropriate    Eye Contact:  Good   Psychomotor:  Normal       Gait / station:  no problem  Attitude / Demeanor: Cooperative   Speech      Rate / Production: Normal/ Responsive      Volume:  Normal  volume      Language:  intact  Mood:   Depressed  Irritable   Affect:   Appropriate    Thought Content: Clear   Thought Process: Coherent  Logical       Associations: No loosening of associations  Insight:   Good   Judgment:  Intact   Orientation:  Person Place Time Situation  Attention/concentration: Good    Substance Use:  Patient did report a family history of substance use concerns; see medical history  section for details: alcohol and marijuana on mom's side.  Patient has not received chemical dependency treatment in the past.  Patient has not ever been to detox.      Patient is not currently receiving any chemical dependency treatment.       Substance History of use Age of first use Date of last use     Pattern and duration of use (include amounts and frequency)   Alcohol used in the past   20 10/12/23 1-2 glass of annamaria, occasionally    Cannabis   currently use 17 10/25/23 Couple puffs daily. Used to be more     Amphetamines   never used     REPORTS SUBSTANCE USE: N/A   Cocaine/crack    never used       REPORTS SUBSTANCE USE: N/A   Hallucinogens never used         REPORTS SUBSTANCE USE: N/A   Inhalants never used         REPORTS SUBSTANCE USE: N/A   Heroin never used         REPORTS SUBSTANCE USE: N/A   Other Opiates never used     REPORTS SUBSTANCE USE: N/A   Benzodiazepine   never used     REPORTS SUBSTANCE USE: N/A   Barbiturates never used     REPORTS SUBSTANCE USE: N/A   Over the counter meds Never used   REPORTS SUBSTANCE USE: N/A   Caffeine used in the past 8   10/23/2023   Energy drink, occasionally     Nicotine  never used       REPORTS SUBSTANCE USE: N/A   Other substances not listed above:  Identify:  never used     REPORTS SUBSTANCE USE: N/A     Patient reported the following problems as a result of his substance use: no problems, not applicable.    Substance Use: No symptoms    Based on the negative CAGE score and clinical interview there  are not indications of drug or alcohol abuse.    Significant Losses / Trauma / Abuse / Neglect Issues:   Patient did not  serve in the .    There are indications or report of significant loss, trauma, abuse or neglect issues related to: are no indications and client denies any losses, trauma, abuse, or neglect concerns.  Concerns for possible neglect are not present.     Safety Assessment:   Patient denies current homicidal ideation and  behaviors.  Patient denies current self-injurious ideation and behaviors.    Patient denied risk behaviors associated with substance use.  Patient denies any high risk behaviors associated with mental health symptoms.  Patient reports the following current concerns for his personal safety: None.  Patient reports there are not firearms in the house.        History of Safety Concerns:  Patient denied a history of homicidal ideation.     Patient denied a history of personal safety concerns.    Patient denied a history of assaultive behaviors.    Patient denied a history of sexual assault behaviors.     Patient denied a history of risk behaviors associated with substance use.  Patient denies any history of high risk behaviors associated with mental health symptoms.  Patient reports the following protective factors: forward or future oriented thinking; dedication to family or friends; safe and stable environment; regular sleep; effectively controls impulses; regular physical activity; sense of belonging; purpose; secure attachment; help seeking behaviors when distressed; abstinence from substances; daily obligations; structured day; commitment to well being; sense of meaning; positive social skills; strong sense of self worth or esteem; sense of personal control or determination; other    Risk Plan:  See Recommendations for Safety and Risk Management Plan    Review of Symptoms per patient report:   Depression: Change in sleep, Lack of interest, Change in energy level, Psychomotor slowing or agitation, Feelings of hopelessness, Feelings of helplessness, Ruminations, Irritability, Feeling sad, down, or depressed, and Withdrawn  [place]:  Racing thoughts  Psychosis: No Symptoms  Anxiety: Excessive worry, Nervousness, Separation anxiety, Social anxiety, Ruminations, and Irritability  Panic:  No symptoms  Post Traumatic Stress Disorder:  Experienced traumatic event : seeing mom in a casket when he was 8 years old   Eating  Disorder: No Symptoms  ADD / ADHD:  Distractibility, Forgetful, Interrupts, Restlessness/fidgety, Hyperverbal, and Hyperactive  Conduct Disorder: No symptoms  Autism Spectrum Disorder: No symptoms  Obsessive Compulsive Disorder: No Symptoms    Patient reports the following compulsive behaviors and treatment history: None reported.      Diagnostic Criteria:   Generalized Anxiety Disorder  A. Excessive anxiety and worry about a number of events or activities (such as work or school performance).   B. The person finds it difficult to control the worry.  C. Select 3 or more symptoms (required for diagnosis). Only one item is required in children.   - Restlessness or feeling keyed up or on edge.    - Being easily fatigued.    - Difficulty concentrating or mind going blank.    - Irritability.    - Sleep disturbance (difficulty falling or staying asleep, or restless unsatisfying sleep).   D. The focus of the anxiety and worry is not confined to features of an Axis I disorder.  E. The anxiety, worry, or physical symptoms cause clinically significant distress or impairment in social, occupational, or other important areas of functioning.  Major Depressive Disorder  CRITERIA (A-C) REPRESENT A MAJOR DEPRESSIVE EPISODE - SELECT THESE CRITERIA   - Depressed mood. Note: In children and adolescents, can be irritable mood.     - Diminished interest or pleasure in all, or almost all, activities.    - Fatigue or loss of energy.    - Diminished ability to think or concentrate, or indecisiveness.   B) The symptoms cause clinically significant distress or impairment in social, occupational, or other important areas of functioning  C) The episode is not attributable to the physiological effects of a substance or to another medical condition  D) The occurence of major depressive episode is not better explained by other thought / psychotic disorders  E) There has never been a manic episode or hypomanic episode    Functional  Status:  Patient reports the following functional impairments:  educational activities, management of the household and or completion of tasks, operation of a motor vehicle, relationship(s), self-care, and social interactions.       Nonprogrammatic care:  Patient is requesting basic services to address current mental health concerns.    Clinical Summary:  1. Reason for assessment: depression, anxiety  .  2. Psychosocial, Cultural and Contextual Factors: feelings around rejection, not belonging for so long that it has been affecting his sleep and his thinking.  .  3. Principal DSM5 Diagnoses  (Sustained by DSM5 Criteria Listed Above):   296.32 (F33.1) Major Depressive Disorder, Recurrent Episode, Moderate _ and With anxious distress  300.02 (F41.1) Generalized Anxiety Disorder.  4. Other Diagnoses that is relevant to services:   300.02 (F41.1) Generalized Anxiety Disorder.  5. Provisional Diagnosis:  296.32 (F33.1) Major Depressive Disorder, Recurrent Episode, Moderate _ and With anxious distress as evidenced by clinical inventories, chart review, clinical interview, mental status .  6. Prognosis: Expect Improvement.  7. Likely consequences of symptoms if not treated: symptoms would get worse.  8. Client strengths include:  caring, creative, empathetic, employed, good listener, insightful, open to learning, open to suggestions / feedback, support of family, friends and providers, supportive, wants to learn, willing to ask questions, willing to relate to others, and work history .     Recommendations:     1. Plan for Safety and Risk Management:   Safety and Risk: Recommended that patient call 911 or go to the local ED should there be a change in any of these risk factors..          Report to child / adult protection services was NA.     2. Patient's identified mental health concerns with a cultural influence will be addressed by patient .     3. Initial Treatment will focus on:   Depressed Mood - increase energy and  motivation  Anxiety - challenge cognitive traps  Adjustment Difficulties related to: family concerns  Relational Problems related to: Conflict or difficulties with keeping /making friends.     4. Resources/Service Plan:    services are not indicated.   Modifications to assist communication are not indicated.   Additional disability accommodations are not indicated.      5. Collaboration:   Collaboration / coordination of treatment will be initiated with the following  support professionals: no PCP assigned.      6.  Referrals:   The following referral(s) will be initiated: no referral  needed today. . Next Scheduled Appointment: 11/14/2023.      A Release of Information has been obtained for the following: No LALO needed for [facility] care team .     Clinical Substantiation/medical necessity for the above recommendations:     After this diagnostic assessment by writer, the patient's circumstances and mental state were appropriate for outpatient treatment here at [facility].  Provider's prognosis is the patient to improve. This goal will be met at this setting.In case of a referral for extra services, writer and patient will discuss this before the referral is made. It is this writer' assessment that patient is not currently presenting in an acute risk to self or others. Patient identified multiple protective factors. Denies SI/SIB/HI/AVH/VIVIENNE and has never been to the ED or inpatient for mental health related issues.     7. VIVIENNE:  NO   VIVIENNE:  Discussed the general effects of drugs and alcohol on health and well-being. Recommendations:  Patient will communicate should any change takes place around id mood altering substances on the above table .     8. Records:   These were reviewed at time of assessment.   Information in this assessment was obtained from the medical record and  provided by patient who is a good historian.  Patient will have open access to their mental health medical record.    9.    Interactive Complexity: No    10. Safety Plan:  Patient denied any current/recent/lifetime history of suicidal ideation and/or behaviors.  No safety plan indicated at this time.     Provider Name/ Credentials:  SOHEILA Howard; [facility]     October 31, 2023

## 2023-11-14 ENCOUNTER — VIRTUAL VISIT (OUTPATIENT)
Dept: PSYCHOLOGY | Facility: CLINIC | Age: 28
End: 2023-11-14
Payer: COMMERCIAL

## 2023-11-14 DIAGNOSIS — F33.1 MODERATE EPISODE OF RECURRENT MAJOR DEPRESSIVE DISORDER (H): Primary | ICD-10-CM

## 2023-11-14 DIAGNOSIS — F41.1 GAD (GENERALIZED ANXIETY DISORDER): ICD-10-CM

## 2023-11-14 PROCEDURE — 90837 PSYTX W PT 60 MINUTES: CPT | Mod: VID | Performed by: SOCIAL WORKER

## 2023-11-14 NOTE — PROGRESS NOTES
[facility]                                     Progress Note    Patient Name: Roland Sandoval  Date: 11/14/2023         Service Type: Individual      Session Start Time: 10:09  Session End Time: 10:58     Session Length: 47    Session #: 1    Attendees: Client    Service Modality:  Video Visit:      Provider verified identity through the following two step process.  Patient provided:  Patient is known previously to provider    Telemedicine Visit: The patient's condition can be safely assessed and treated via synchronous audio and visual telemedicine encounter.      Reason for Telemedicine Visit: Services only offered telehealth    Originating Site (Patient Location): Patient's home    Distant Site (Provider Location): Provider Remote Setting- Home Office    Consent:  The patient/guardian has verbally consented to: the potential risks and benefits of telemedicine (video visit) versus in person care; bill my insurance or make self-payment for services provided; and responsibility for payment of non-covered services.     Patient would like the video invitation sent by:  My Chart    Mode of Communication:  Video Conference via [employer]    Distant Location (Provider):  Off-site    As the provider I attest to compliance with applicable laws and regulations related to telemedicine.    DATA  Interactive Complexity: Yes, visit entailed Interactive Complexity evidenced by: first visit following the DA. Results were discussed, treatment plan was developed.    Crisis: No     Progress Since Last Session (Related to Symptoms / Goals / Homework):   Symptoms: No change ; still feels depressed    Homework: Partially completed      Episode of Care Goals: Satisfactory progress - ACTION (Actively working towards change); Intervened by reinforcing change plan / affirming steps taken,     Current / Ongoing Stressors and Concerns:   Patient and writer reviewed the results from the DA. Discussed the focus in therapy  and some coping modalities including CBT were introduced. It would convenient for the patient to have his visits in person. His appts were reviewed and he was oriented to the clinic. Will start reviewing the coping skills sent via [employer] and will start taking some walks or going to the gym daily. His next visit is on 12/15.     Treatment Objective(s) Addressed in This Session:   use thought-stopping strategy daily to reduce intrusive thoughts  Decrease frequency and intensity of feeling down, depressed, hopeless  Identify negative self-talk and behaviors: challenge core beliefs, myths, and actions  Provide space to process these thoughts     Intervention:    Introduction CBT modality    Assessments completed prior to visit 10/30/2023    The following assessments were completed by patient for this visit:  PHQ2:       11/10/2021     2:32 PM   PHQ-2 ( 1999 [employer])   Q1: Little interest or pleasure in doing things 0   Q2: Feeling down, depressed or hopeless 0   PHQ-2 Score 0   Q1: Little interest or pleasure in doing things Not at all   Q2: Feeling down, depressed or hopeless Not at all   PHQ-2 Score 0     PHQ9:       9/17/2019     9:00 AM 5/23/2022     1:48 PM 7/10/2023    12:59 PM 8/16/2023     1:29 PM 9/25/2023     3:48 PM 10/26/2023     7:45 AM   PHQ-9 SCORE   PHQ-9 Total Score [facility]   9 (Mild depression) 3 (Minimal depression) 2 (Minimal depression) 5 (Mild depression)   PHQ-9 Total Score 0 1 9 3 2 5     GAD2:       7/10/2023    12:59 PM 10/26/2023     8:03 AM   PARI-2   Feeling nervous, anxious, or on edge 1 1   Not being able to stop or control worrying 1 0   PARI-2 Total Score 2 1         ASSESSMENT: Current Emotional / Mental Status (status of significant symptoms):   Risk status (Self / Other harm or suicidal ideation)   Patient denies current fears or concerns for personal safety.   Patient denies current or recent suicidal ideation or behaviors.   Patient denies current or recent homicidal ideation or  behaviors.   Patient denies current or recent self injurious behavior or ideation.   Patient denies other safety concerns.   Patient reports there has been no change in risk factors since their last session.     Patient reports there has been no change in protective factors since their last session.     Recommended that patient call 911 or go to the local ED should there be a change in any of these risk factors.     Appearance:   Appropriate    Eye Contact:   Good    Psychomotor Behavior: Normal    Attitude:   Cooperative    Orientation:   Person Place Time Situation   Speech    Rate / Production: Normal/ Responsive Normal     Volume:  Normal    Mood:    Normal   Affect:    Appropriate    Thought Content:  Clear    Thought Form:  Coherent  Logical    Insight:    Good      Medication Review:   No changes to current psychiatric medication(s)     Medication Compliance:   Yes     Changes in Health Issues:   None reported     Chemical Use Review:   Substance Use: Chemical use reviewed, no active concerns identified      Tobacco Use: No current tobacco use.      Diagnosis:  1. Moderate episode of recurrent major depressive disorder (H)    2. PARI (generalized anxiety disorder)      Collateral Reports Completed:   Not Applicable    PLAN: (Patient Tasks / Therapist Tasks / Other)  Patient will read the handout sent via [employer] on CBT  Patient will keep up with physical activities.  Patients next visit is on 12/05    RAND Howard  ______________________________________________________________________    Individual Treatment Plan    Patient's Name: Roland Sandoval  YOB: 1995    Date of Creation: 11/14/2023  Date Treatment Plan Last Reviewed/Revised: 11/14/2023    DSM5 Diagnoses: 296.32 (F33.1) Major Depressive Disorder, Recurrent Episode, Moderate _ and With anxious distress or 300.02 (F41.1) Generalized Anxiety Disorder  Psychosocial / Contextual Factors:  Feelings around rejection, not  belonging for so long that it has been affecting his sleep and his thinking.  .   PROMIS (reviewed every 90 days): 27    Referral / Collaboration:  Referral to another professional/service is not indicated at this time..    Anticipated number of session for this episode of care: 9-12 sessions  Anticipation frequency of session: Biweekly  Anticipated Duration of each session: 38-52 minutes  Treatment plan will be reviewed in 90 days or when goals have been changed.     MeasurableTreatment Goal(s) related to diagnosis / functional impairment(s)  Goal 1: Patient will stabilize anxiety level while increasing ability to function on a daily basis.    I will know I've met my goal when patient is able to challenge any cognitive traps as they present at least at 80 % of the time by the next review(90 days)     Objective #A (Patient Action)    Patient will use thought-stopping strategy daily to reduce intrusive thoughts.  Status: New - Date: 11/14/20023      Intervention(s)  Therapist will teach distraction skills. Using the 5 senses .    Objective #B  Patient will identify at least 4 fears / thoughts that contribute to feeling anxious.  Status: New - Date: 11/14/2023      Intervention(s)  Therapist will teach CBT modality and coping skills to challenge any cognitive traps such as the 3 Cs    Goal 2: Patient will alleviate depressed mood and return to previous level of effective functioning    I will know I've met my goal when my energy level have increased at least at 65 % on daily basis by the next review( 90 days)    Objective #A (Patient Action)    Status: New - Date: 11/14/2023    Patient will Decrease frequency and intensity of feeling down, depressed, hopeless.  Intervention(s)  Therapist will provide educational materials on mindfulness .    Objective #B  Patient will Improve concentration, focus, and mindfulness in daily activities .    Status: New - Date: 11/14/2023    Intervention(s)  Therapist will provide space  to share and process thoughts and feelings related to current stressors .    Patient has reviewed and agreed to the above plan.      RAND Howard  November 14, 2023

## 2023-12-13 DIAGNOSIS — F32.1 CURRENT MODERATE EPISODE OF MAJOR DEPRESSIVE DISORDER, UNSPECIFIED WHETHER RECURRENT (H): ICD-10-CM

## 2023-12-13 RX ORDER — SERTRALINE HYDROCHLORIDE 25 MG/1
25 TABLET, FILM COATED ORAL DAILY
Qty: 90 TABLET | Refills: 2 | OUTPATIENT
Start: 2023-12-13

## 2023-12-13 NOTE — TELEPHONE ENCOUNTER
1) RN received refill request from [id number] - [place], [place] - [address] AT [facility] [address] for sertraline (ZOLOFT) 25 MG tablet .     2) This medication was last prescribed on 9/25/2023 for qty of 90 with 3 refills.     3) [place]- was [place], it is not necessary for this medication     4) Pt should not need a new prescription until around September 2024.    5) Therefore, RN sent reply back to pharmacy that refill request will be DENIED - should have refills on file.     6) Pt also does have a follow-up appointment scheduled on 3/7/2024 with Daniela Colmenares NP.     Jennifer Wilkins RN on 12/13/2023 at 1:45 PM

## 2023-12-15 ENCOUNTER — VIRTUAL VISIT (OUTPATIENT)
Dept: PSYCHOLOGY | Facility: CLINIC | Age: 28
End: 2023-12-15
Payer: COMMERCIAL

## 2023-12-15 DIAGNOSIS — F33.1 MODERATE EPISODE OF RECURRENT MAJOR DEPRESSIVE DISORDER (H): Primary | ICD-10-CM

## 2023-12-15 PROCEDURE — 90837 PSYTX W PT 60 MINUTES: CPT | Mod: VID | Performed by: SOCIAL WORKER

## 2023-12-15 ASSESSMENT — PATIENT HEALTH QUESTIONNAIRE - PHQ9
10. IF YOU CHECKED OFF ANY PROBLEMS, HOW DIFFICULT HAVE THESE PROBLEMS MADE IT FOR YOU TO DO YOUR WORK, TAKE CARE OF THINGS AT HOME, OR GET ALONG WITH OTHER PEOPLE: NOT DIFFICULT AT ALL
SUM OF ALL RESPONSES TO PHQ QUESTIONS 1-9: 1
SUM OF ALL RESPONSES TO PHQ QUESTIONS 1-9: 1

## 2023-12-15 NOTE — PROGRESS NOTES
[facility]                                     Progress Note    Patient Name: Roland Sandoval  Date: 12/15/2023         Service Type: Individual      Session Start Time: 14:05  Session End Time:14:58     Session Length: 53    Session #: 2    Attendees: Client    Service Modality:  Video Visit:      Provider verified identity through the following two step process.  Patient provided:  Patient is known previously to provider    Telemedicine Visit: The patient's condition can be safely assessed and treated via synchronous audio and visual telemedicine encounter.      Reason for Telemedicine Visit: Services only offered telehealth    Originating Site (Patient Location): Patient's place of employment    Distant Site (Provider Location): [facility] [place]    Consent:  The patient/guardian has verbally consented to: the potential risks and benefits of telemedicine (video visit) versus in person care; bill my insurance or make self-payment for services provided; and responsibility for payment of non-covered services.     Patient would like the video invitation sent by:  My Chart    Mode of Communication:  Video Conference via Amwell    Distant Location (Provider):  On-site    As the provider I attest to compliance with applicable laws and regulations related to telemedicine.    DATA  Interactive Complexity: Yes, visit entailed Interactive Complexity evidenced by: Some lack of motivation, not being productive as she should. Wanted to process this and learn how to cope.    Crisis: No     Progress Since Last Session (Related to Symptoms / Goals / Homework):   Symptoms: No change ; still feels depressed    Homework: Partially completed      Episode of Care Goals: Satisfactory progress - ACTION (Actively working towards change); Intervened by reinforcing change plan / affirming steps taken. Has not noted the change.     Current / Ongoing Stressors and  Concerns: Patient and writer processed patient's current feelings that appear to be the symptoms of depression like  lack of motivation that leads to less productivity. No much of energy to do things he loves. Patient will continue the good style of physical exercise. Trying some stretching between hours at work and some short walks around the work place. Patient feels he loses interest when things become a routine. So he will try to very different activities to keep up with motivation. His next visit is in a week.      Treatment Objective(s) Addressed in This Session:   use thought-stopping strategy daily to reduce intrusive thoughts  Decrease frequency and intensity of feeling down, depressed, hopeless  Identify negative self-talk and behaviors: challenge core beliefs, myths, and actions  Provide space to process these thoughts     Intervention:    Introduction CBT modality    Assessments completed prior to visit 10/30/2023    The following assessments were completed by patient for this visit:  PHQ2:       11/10/2021     2:32 PM   PHQ-2 ( 1999 [employer])   Q1: Little interest or pleasure in doing things 0   Q2: Feeling down, depressed or hopeless 0   PHQ-2 Score 0   Q1: Little interest or pleasure in doing things Not at all   Q2: Feeling down, depressed or hopeless Not at all   PHQ-2 Score 0     PHQ9:       9/17/2019     9:00 AM 5/23/2022     1:48 PM 7/10/2023    12:59 PM 8/16/2023     1:29 PM 9/25/2023     3:48 PM 10/26/2023     7:45 AM 12/15/2023    12:42 PM   PHQ-9 SCORE   PHQ-9 Total Score MyChart   9 (Mild depression) 3 (Minimal depression) 2 (Minimal depression) 5 (Mild depression) 1 (Minimal depression)   PHQ-9 Total Score 0 1 9 3 2 5 1     GAD2:       7/10/2023    12:59 PM 10/26/2023     8:03 AM   PARI-2   Feeling nervous, anxious, or on edge 1 1   Not being able to stop or control worrying 1 0   PARI-2 Total Score 2 1         ASSESSMENT: Current Emotional / Mental Status (status of significant  symptoms):   Risk status (Self / Other harm or suicidal ideation)   Patient denies current fears or concerns for personal safety.   Patient denies current or recent suicidal ideation or behaviors.   Patient denies current or recent homicidal ideation or behaviors.   Patient denies current or recent self injurious behavior or ideation.   Patient denies other safety concerns.   Patient reports there has been no change in risk factors since their last session.     Patient reports there has been no change in protective factors since their last session.     Recommended that patient call 911 or go to the local ED should there be a change in any of these risk factors.     Appearance:   Appropriate    Eye Contact:   Good    Psychomotor Behavior: Normal    Attitude:   Cooperative    Orientation:   Person Place Time Situation   Speech    Rate / Production: Normal/ Responsive    Volume:  Normal    Mood:    Depressed    Affect:    Appropriate    Thought Content:  Clear    Thought Form:  Coherent  Logical    Insight:    Good      Medication Review:   No changes to current psychiatric medication(s)     Medication Compliance:   Yes     Changes in Health Issues:   None reported     Chemical Use Review:   Substance Use: Chemical use reviewed, no active concerns identified      Tobacco Use: No current tobacco use.      Diagnosis:  1. Moderate episode of recurrent major depressive disorder (H)      Collateral Reports Completed:   Not Applicable    PLAN: (Patient Tasks / Therapist Tasks / Other):  Patient will read the handout sent via [employer] on CBT  Patient will keep up with physical activities: try some short stretching at work up to 1 minute hourly.  take some short works around the work place at up to 5 minutes each time. Patients next visit is on 12/22/2023.    RAND Howard  ______________________________________________________________________    Individual Treatment Plan    Patient's Name: Roland Sandoval  Date  Of Birth: 1995    Date of Creation: 11/14/2023  Date Treatment Plan Last Reviewed/Revised: 11/14/2023    DSM5 Diagnoses: 296.32 (F33.1) Major Depressive Disorder, Recurrent Episode, Moderate _ and With anxious distress or 300.02 (F41.1) Generalized Anxiety Disorder  Psychosocial / Contextual Factors:  Feelings around rejection, not belonging for so long that it has been affecting his sleep and his thinking.  .   PROMIS (reviewed every 90 days): 27    Referral / Collaboration:  Referral to another professional/service is not indicated at this time..    Anticipated number of session for this episode of care: 9-12 sessions  Anticipation frequency of session: Biweekly  Anticipated Duration of each session: 38-52 minutes  Treatment plan will be reviewed in 90 days or when goals have been changed.     MeasurableTreatment Goal(s) related to diagnosis / functional impairment(s)  Goal 1: Patient will stabilize anxiety level while increasing ability to function on a daily basis.    I will know I've met my goal when patient is able to challenge any cognitive traps as they present at least at 80 % of the time by the next review(90 days)     Objective #A (Patient Action)    Patient will use thought-stopping strategy daily to reduce intrusive thoughts.  Status: New - Date: 11/14/20023      Intervention(s)  Therapist will teach distraction skills. Using the 5 senses .    Objective #B  Patient will identify at least 4 fears / thoughts that contribute to feeling anxious.  Status: New - Date: 11/14/2023      Intervention(s)  Therapist will teach CBT modality and coping skills to challenge any cognitive traps such as the 3 Cs    Goal 2: Patient will alleviate depressed mood and return to previous level of effective functioning    I will know I've met my goal when my energy level have increased at least at 65 % on daily basis by the next review( 90 days)    Objective #A (Patient Action)    Status: New - Date: 11/14/2023    Patient  will Decrease frequency and intensity of feeling down, depressed, hopeless.  Intervention(s)  Therapist will provide educational materials on mindfulness .    Objective #B  Patient will Improve concentration, focus, and mindfulness in daily activities .    Status: New - Date: 11/14/2023    Intervention(s)  Therapist will provide space to share and process thoughts and feelings related to current stressors .    Patient has reviewed and agreed to the above plan.      RAND Howard  November 14, 2023   Answers submitted by the patient for this visit:  Patient Health Questionnaire (Submitted on 12/15/2023)  If you checked off any problems, how difficult have these problems made it for you to do your work, take care of things at home, or get along with other people?: Not difficult at all  PHQ9 TOTAL SCORE: 1

## 2023-12-22 ENCOUNTER — OFFICE VISIT (OUTPATIENT)
Dept: PSYCHOLOGY | Facility: CLINIC | Age: 28
End: 2023-12-22
Payer: COMMERCIAL

## 2023-12-22 DIAGNOSIS — F33.1 MODERATE EPISODE OF RECURRENT MAJOR DEPRESSIVE DISORDER (H): Primary | ICD-10-CM

## 2023-12-22 PROCEDURE — 90837 PSYTX W PT 60 MINUTES: CPT | Performed by: SOCIAL WORKER

## 2023-12-22 ASSESSMENT — PATIENT HEALTH QUESTIONNAIRE - PHQ9
10. IF YOU CHECKED OFF ANY PROBLEMS, HOW DIFFICULT HAVE THESE PROBLEMS MADE IT FOR YOU TO DO YOUR WORK, TAKE CARE OF THINGS AT HOME, OR GET ALONG WITH OTHER PEOPLE: SOMEWHAT DIFFICULT
SUM OF ALL RESPONSES TO PHQ QUESTIONS 1-9: 6
SUM OF ALL RESPONSES TO PHQ QUESTIONS 1-9: 6

## 2023-12-22 NOTE — PROGRESS NOTES
[facility]                                     Progress Note    Patient Name: Roland Sandoval  Date: 12/22/2023         Service Type: Individual      Session Start Time: 13:01  Session End Time: 13:56     Session Length: 55    Session #: 3    Attendees: Client    Service Modality:  Video Visit:      Provider verified identity through the following two step process.  Patient provided:  Patient is known previously to provider    Telemedicine Visit: The patient's condition can be safely assessed and treated via synchronous audio and visual telemedicine encounter.      Reason for Telemedicine Visit: Services only offered telehealth    Originating Site (Patient Location): Patient's place of employment    Distant Site (Provider Location): [facility] [place]    Consent:  The patient/guardian has verbally consented to: the potential risks and benefits of telemedicine (video visit) versus in person care; bill my insurance or make self-payment for services provided; and responsibility for payment of non-covered services.     Patient would like the video invitation sent by:  My Chart    Mode of Communication:  Video Conference via [employer]    Distant Location (Provider):  On-site    As the provider I attest to compliance with applicable laws and regulations related to telemedicine.    DATA  Interactive Complexity: Yes, visit entailed Interactive Complexity evidenced by: In person today. Time to process and receive support he needed.     Crisis: No     Progress Since Last Session (Related to Symptoms / Goals / Homework):   Symptoms: No change ; still feels depressed    Homework: Partially completed      Episode of Care Goals: Satisfactory progress - ACTION (Actively working towards change); Intervened by reinforcing change plan / affirming steps taken. Has not noted the change.     Current / Ongoing Stressors and Concerns: Patient and writer processed his  thoughts and feelings around his concerns. Having lost his mom at age 8 to cancer. Having see her laying in the coffin and also having had no role model, mother figure has been making him feel sad. Only a  played that role and he felt it. Brought the concepts such as a avoidance, fearful, trust,and disorganized thinking. These were coming from a test he has taken related to attachment. Patient wants to know how his mom's passing has in his early age has affected him and changed the way he makes decision. Has two older a half sisters in 30s and 40s from his mom. Has 2  bio brothers at age 19 and 21. He gets along well with each one of them. Father did  a good job playing both roles after mom's death. Despite this, he feels sense of sadness and grief still after all these years. Patient and writer discussed how to focus on the here and now to be stronger both physically and emotionally before he can process his past experiences. Hi next visit is in 2 weeks. Will be spending some time with his sister who lives around here and his partner. Will focus more on physical activities like going to the gym. No safety concern reported today.      Treatment Objective(s) Addressed in This Session:   use thought-stopping strategy daily to reduce intrusive thoughts  Decrease frequency and intensity of feeling down, depressed, hopeless  Identify negative self-talk and behaviors: challenge core beliefs, myths, and actions  Provide space to process these thoughts     Intervention:    Introduction CBT modality; person centered.     Assessments completed prior to visit 10/30/2023    The following assessments were completed by patient for this visit:  PHQ2:       11/10/2021     2:32 PM   PHQ-2 ( 1999 [employer])   Q1: Little interest or pleasure in doing things 0   Q2: Feeling down, depressed or hopeless 0   PHQ-2 Score 0   Q1: Little interest or pleasure in doing things Not at all   Q2: Feeling down, depressed or hopeless  Not at all   PHQ-2 Score 0     PHQ9:       5/23/2022     1:48 PM 7/10/2023    12:59 PM 8/16/2023     1:29 PM 9/25/2023     3:48 PM 10/26/2023     7:45 AM 12/15/2023    12:42 PM 12/22/2023    12:29 PM   PHQ-9 SCORE   PHQ-9 Total Score MyChart  9 (Mild depression) 3 (Minimal depression) 2 (Minimal depression) 5 (Mild depression) 1 (Minimal depression) 6 (Mild depression)   PHQ-9 Total Score 1 9 3 2 5 1 6     GAD2:       7/10/2023    12:59 PM 10/26/2023     8:03 AM   PARI-2   Feeling nervous, anxious, or on edge 1 1   Not being able to stop or control worrying 1 0   PARI-2 Total Score 2 1         ASSESSMENT: Current Emotional / Mental Status (status of significant symptoms):   Risk status (Self / Other harm or suicidal ideation)   Patient denies current fears or concerns for personal safety.   Patient denies current or recent suicidal ideation or behaviors.   Patient denies current or recent homicidal ideation or behaviors.   Patient denies current or recent self injurious behavior or ideation.   Patient denies other safety concerns.   Patient reports there has been no change in risk factors since their last session.     Patient reports there has been no change in protective factors since their last session.     Recommended that patient call 911 or go to the local ED should there be a change in any of these risk factors.     Appearance:   Appropriate    Eye Contact:   Good    Psychomotor Behavior: Normal    Attitude:   Cooperative    Orientation:   Person Place Time Situation   Speech    Rate / Production: Normal/ Responsive    Volume:  Normal    Mood:    Depressed    Affect:    Appropriate    Thought Content:  Clear    Thought Form:  Coherent  Logical    Insight:    Good      Medication Review:   No changes to current psychiatric medication(s)     Medication Compliance:   Yes     Changes in Health Issues:   None reported     Chemical Use Review:   Substance Use: Chemical use reviewed, no active concerns identified       Tobacco Use: No current tobacco use.      Diagnosis:  1. Moderate episode of recurrent major depressive disorder (H)      Collateral Reports Completed:   Not Applicable    PLAN: (Patient Tasks / Therapist Tasks / Other):  Patient will read the handout sent via [employer] on CBT  Patient will keep up with physical activities: try some short stretching at work up to 1 minute hourly.  take some short works around the work place at up to 5   minutes each time.  Patient will spend the [place] time with his sister and his partner.   Patients next visit is on 1/05/2023    ALBERT HowardSW  ______________________________________________________________________    Individual Treatment Plan    Patient's Name: Roland Sandoval  YOB: 1995    Date of Creation: 11/14/2023  Date Treatment Plan Last Reviewed/Revised: 11/14/2023    DSM5 Diagnoses: 296.32 (F33.1) Major Depressive Disorder, Recurrent Episode, Moderate _ and With anxious distress or 300.02 (F41.1) Generalized Anxiety Disorder  Psychosocial / Contextual Factors:  Feelings around rejection, not belonging for so long that it has been affecting his sleep and his thinking.  .   PROMIS (reviewed every 90 days): 27    Referral / Collaboration:  Referral to another professional/service is not indicated at this time..    Anticipated number of session for this episode of care: 9-12 sessions  Anticipation frequency of session: Biweekly  Anticipated Duration of each session: 38-52 minutes  Treatment plan will be reviewed in 90 days or when goals have been changed.     MeasurableTreatment Goal(s) related to diagnosis / functional impairment(s)  Goal 1: Patient will stabilize anxiety level while increasing ability to function on a daily basis.    I will know I've met my goal when patient is able to challenge any cognitive traps as they present at least at 80 % of the time by the next review(90 days)     Objective #A (Patient Action)    Patient will use  thought-stopping strategy daily to reduce intrusive thoughts.  Status: New - Date: 11/14/20023      Intervention(s)  Therapist will teach distraction skills. Using the 5 senses .    Objective #B  Patient will identify at least 4 fears / thoughts that contribute to feeling anxious.  Status: New - Date: 11/14/2023      Intervention(s)  Therapist will teach CBT modality and coping skills to challenge any cognitive traps such as the 3 Cs    Goal 2: Patient will alleviate depressed mood and return to previous level of effective functioning    I will know I've met my goal when my energy level have increased at least at 65 % on daily basis by the next review( 90 days)    Objective #A (Patient Action)    Status: New - Date: 11/14/2023    Patient will Decrease frequency and intensity of feeling down, depressed, hopeless.  Intervention(s)  Therapist will provide educational materials on mindfulness .    Objective #B  Patient will Improve concentration, focus, and mindfulness in daily activities .    Status: New - Date: 11/14/2023    Intervention(s)  Therapist will provide space to share and process thoughts and feelings related to current stressors .    Patient has reviewed and agreed to the above plan.      RAND Howard  November 14, 2023   Answers submitted by the patient for this visit:  Patient Health Questionnaire (Submitted on 12/15/2023)  If you checked off any problems, how difficult have these problems made it for you to do your work, take care of things at home, or get along with other people?: Not difficult at all  PHQ9 TOTAL SCORE: 1    Answers submitted by the patient for this visit:  Patient Health Questionnaire (Submitted on 12/22/2023)  If you checked off any problems, how difficult have these problems made it for you to do your work, take care of things at home, or get along with other people?: Somewhat difficult  PHQ9 TOTAL SCORE: 6

## 2024-01-05 ENCOUNTER — VIRTUAL VISIT (OUTPATIENT)
Dept: PSYCHOLOGY | Facility: CLINIC | Age: 29
End: 2024-01-05
Payer: COMMERCIAL

## 2024-01-05 DIAGNOSIS — F33.1 MODERATE EPISODE OF RECURRENT MAJOR DEPRESSIVE DISORDER (H): Primary | ICD-10-CM

## 2024-01-05 PROCEDURE — 90837 PSYTX W PT 60 MINUTES: CPT | Mod: 95 | Performed by: SOCIAL WORKER

## 2024-01-05 ASSESSMENT — PATIENT HEALTH QUESTIONNAIRE - PHQ9
SUM OF ALL RESPONSES TO PHQ QUESTIONS 1-9: 8
5. POOR APPETITE OR OVEREATING: SEVERAL DAYS

## 2024-01-05 ASSESSMENT — ANXIETY QUESTIONNAIRES
6. BECOMING EASILY ANNOYED OR IRRITABLE: SEVERAL DAYS
GAD7 TOTAL SCORE: 6
3. WORRYING TOO MUCH ABOUT DIFFERENT THINGS: SEVERAL DAYS
7. FEELING AFRAID AS IF SOMETHING AWFUL MIGHT HAPPEN: NOT AT ALL
1. FEELING NERVOUS, ANXIOUS, OR ON EDGE: SEVERAL DAYS
IF YOU CHECKED OFF ANY PROBLEMS ON THIS QUESTIONNAIRE, HOW DIFFICULT HAVE THESE PROBLEMS MADE IT FOR YOU TO DO YOUR WORK, TAKE CARE OF THINGS AT HOME, OR GET ALONG WITH OTHER PEOPLE: SOMEWHAT DIFFICULT
2. NOT BEING ABLE TO STOP OR CONTROL WORRYING: SEVERAL DAYS
GAD7 TOTAL SCORE: 6
5. BEING SO RESTLESS THAT IT IS HARD TO SIT STILL: SEVERAL DAYS

## 2024-02-01 ENCOUNTER — VIRTUAL VISIT (OUTPATIENT)
Dept: PSYCHOLOGY | Facility: CLINIC | Age: 29
End: 2024-02-01
Payer: COMMERCIAL

## 2024-02-01 DIAGNOSIS — F33.1 MODERATE EPISODE OF RECURRENT MAJOR DEPRESSIVE DISORDER (H): Primary | ICD-10-CM

## 2024-02-01 PROCEDURE — 90834 PSYTX W PT 45 MINUTES: CPT | Mod: 95 | Performed by: SOCIAL WORKER

## 2024-02-01 NOTE — PROGRESS NOTES
[facility]                                     Progress Note    Patient Name: Roland Sandoval  Date: 2/01/2024         Service Type: Individual      Session Start Time: 13:08 Session End Time: 13:52     Session Length: 44    Session #: 5    Attendees: Client    Service Modality:  Video Visit: doximity and phone calls      Provider verified identity through the following two step process.  Patient provided:  Patient is known previously to provider    Telemedicine Visit: The patient's condition can be safely assessed and treated via synchronous audio and visual telemedicine encounter.      Reason for Telemedicine Visit: Services only offered telehealth    Originating Site (Patient Location): Patient's place of employment    Distant Site (Provider Location): Provider Remote Setting- Home Office    Consent:  The patient/guardian has verbally consented to: the potential risks and benefits of telemedicine (video visit) versus in person care; bill my insurance or make self-payment for services provided; and responsibility for payment of non-covered services.     Patient would like the video invitation sent by:  Text to cell phone: [phone]    Mode of Communication:  Video Conference via [employer]    Distant Location (Provider):  Off-site    As the provider I attest to compliance with applicable laws and regulations related to telemedicine.    DATA  Interactive Complexity: No   Crisis: No     Progress Since Last Session (Related to Symptoms / Goals / Homework):   Symptoms: No change ; less motivation    Homework: Partially completed      Episode of Care Goals: Satisfactory progress - ACTION (Actively working towards change); Intervened by reinforcing change plan / affirming steps taken. Building the level of self awareness.      Current / Ongoing Stressors and Concerns: Patient has forgotten his time for therapy. Has been finding less motivation, less interest in things. Work has anil busy due to  recent changes. This affect the routine he is used to like kick box. Discussed option to improve these areas.  Discussed how to practice to stay in here and now. Can try SMART goals to help see his own progress. No safety concerns. His next visit is on 2/23.     Treatment Objective(s) Addressed in This Session:   Identify negative self-talk and behaviors: challenge core beliefs, myths, and actions  Provide space to process these thoughts     Intervention:    Introduction CBT modality; person centered.     Assessments completed prior to visit 10/30/2023    The following assessments were completed by patient for this visit:  PHQ2:       11/10/2021     2:32 PM   PHQ-2 ( 1999 [employer])   Q1: Little interest or pleasure in doing things 0   Q2: Feeling down, depressed or hopeless 0   PHQ-2 Score 0   Q1: Little interest or pleasure in doing things Not at all   Q2: Feeling down, depressed or hopeless Not at all   PHQ-2 Score 0     PHQ9:       8/16/2023     1:29 PM 9/25/2023     3:48 PM 10/26/2023     7:45 AM 12/15/2023    12:42 PM 12/22/2023    12:29 PM 1/5/2024     1:39 PM 2/1/2024     1:10 PM   PHQ-9 SCORE   PHQ-9 Total Score MyChart 3 (Minimal depression) 2 (Minimal depression) 5 (Mild depression) 1 (Minimal depression) 6 (Mild depression)  1 (Minimal depression)   PHQ-9 Total Score 3 2 5 1 6 8 1     GAD2:       7/10/2023    12:59 PM 10/26/2023     8:03 AM 2/1/2024     1:09 PM   PARI-2   Feeling nervous, anxious, or on edge 1 1 0   Not being able to stop or control worrying 1 0 0   PARI-2 Total Score 2 1 0         ASSESSMENT: Current Emotional / Mental Status (status of significant symptoms):   Risk status (Self / Other harm or suicidal ideation)   Patient denies current fears or concerns for personal safety.   Patient denies current or recent suicidal ideation or behaviors.   Patient denies current or recent homicidal ideation or behaviors.   Patient denies current or recent self injurious behavior or ideation.   Patient  denies other safety concerns.   Patient reports there has been no change in risk factors since their last session.     Patient reports there has been no change in protective factors since their last session.     Recommended that patient call 911 or go to the local ED should there be a change in any of these risk factors.     Appearance:   Appropriate    Eye Contact:   Good    Psychomotor Behavior: Normal    Attitude:   Cooperative    Orientation:   Person Place Time Situation   Speech    Rate / Production: Normal/ Responsive    Volume:  Normal    Mood:    Depressed    Affect:    Appropriate    Thought Content:  Clear    Thought Form:  Coherent  Logical    Insight:    Good      Medication Review:   No changes to current psychiatric medication(s)     Medication Compliance:   Yes     Changes in Health Issues:   None reported     Chemical Use Review:   Substance Use: Chemical use reviewed, no active concerns identified      Tobacco Use: No current tobacco use.      Diagnosis:  1. Moderate episode of recurrent major depressive disorder (H)      Collateral Reports Completed:   Not Applicable    PLAN: (Patient Tasks / Therapist Tasks / Other):  Patient will focus on the here and now each day.   Patient will keep up with his exercise routine.   Patient will develop a SMART goal for this year  Patients next visit is on 2/23/2024    RAND Howard  ______________________________________________________________________    Individual Treatment Plan    Patient's Name: Roland Sandoval  YOB: 1995    Date of Creation: 11/14/2023  Date Treatment Plan Last Reviewed/Revised: 11/14/2023    DSM5 Diagnoses: 296.32 (F33.1) Major Depressive Disorder, Recurrent Episode, Moderate _ and With anxious distress or 300.02 (F41.1) Generalized Anxiety Disorder  Psychosocial / Contextual Factors:  Feelings around rejection, not belonging for so long that it has been affecting his sleep and his thinking.  .   PROMIS  (reviewed every 90 days): 27    Referral / Collaboration:  Referral to another professional/service is not indicated at this time..    Anticipated number of session for this episode of care: 9-12 sessions  Anticipation frequency of session: Biweekly  Anticipated Duration of each session: 38-52 minutes  Treatment plan will be reviewed in 90 days or when goals have been changed.     MeasurableTreatment Goal(s) related to diagnosis / functional impairment(s)  Goal 1: Patient will stabilize anxiety level while increasing ability to function on a daily basis.    I will know I've met my goal when patient is able to challenge any cognitive traps as they present at least at 80 % of the time by the next review(90 days)     Objective #A (Patient Action)    Patient will use thought-stopping strategy daily to reduce intrusive thoughts.  Status: New - Date: 11/14/20023      Intervention(s)  Therapist will teach distraction skills. Using the 5 senses .    Objective #B  Patient will identify at least 4 fears / thoughts that contribute to feeling anxious.  Status: New - Date: 11/14/2023      Intervention(s)  Therapist will teach CBT modality and coping skills to challenge any cognitive traps such as the 3 Cs    Goal 2: Patient will alleviate depressed mood and return to previous level of effective functioning    I will know I've met my goal when my energy level have increased at least at 65 % on daily basis by the next review( 90 days)    Objective #A (Patient Action)    Status: New - Date: 11/14/2023    Patient will Decrease frequency and intensity of feeling down, depressed, hopeless.  Intervention(s)  Therapist will provide educational materials on mindfulness .    Objective #B  Patient will Improve concentration, focus, and mindfulness in daily activities .    Status: New - Date: 11/14/2023    Intervention(s)  Therapist will provide space to share and process thoughts and feelings related to current stressors .    Patient has  reviewed and agreed to the above plan.      Tosin Moore, LICSW  November 14, 2023   Answers submitted by the patient for this visit:  Patient Health Questionnaire (Submitted on 12/15/2023)  If you checked off any problems, how difficult have these problems made it for you to do your work, take care of things at home, or get along with other people?: Not difficult at all  PHQ9 TOTAL SCORE: 1    Answers submitted by the patient for this visit:  Patient Health Questionnaire (Submitted on 12/22/2023)  If you checked off any problems, how difficult have these problems made it for you to do your work, take care of things at home, or get along with other people?: Somewhat difficult  PHQ9 TOTAL SCORE: 6    Answers submitted by the patient for this visit:  Patient Health Questionnaire (Submitted on 2/1/2024)  If you checked off any problems, how difficult have these problems made it for you to do your work, take care of things at home, or get along with other people?: Not difficult at all  PHQ9 TOTAL SCORE: 1

## 2024-02-23 ENCOUNTER — VIRTUAL VISIT (OUTPATIENT)
Dept: PSYCHOLOGY | Facility: CLINIC | Age: 29
End: 2024-02-23
Payer: COMMERCIAL

## 2024-02-23 DIAGNOSIS — F33.1 MODERATE EPISODE OF RECURRENT MAJOR DEPRESSIVE DISORDER (H): Primary | ICD-10-CM

## 2024-02-23 PROCEDURE — 90837 PSYTX W PT 60 MINUTES: CPT | Mod: 95 | Performed by: SOCIAL WORKER

## 2024-02-23 NOTE — PROGRESS NOTES
[facility]                                     Progress Note    Patient Name: Roland Sandoval  Date: 2/23/2024         Service Type: Individual      Session Start Time: 13:02 Session End Time: 13:56     Session Length: 54    Session #: 6    Attendees: Client    Service Modality:  Video Visit: doximity and phone calls      Provider verified identity through the following two step process.  Patient provided:  Patient is known previously to provider    Telemedicine Visit: The patient's condition can be safely assessed and treated via synchronous audio and visual telemedicine encounter.      Reason for Telemedicine Visit: Services only offered telehealth    Originating Site (Patient Location): Patient's place of employment    Distant Site (Provider Location): [facility] [place]    Consent:  The patient/guardian has verbally consented to: the potential risks and benefits of telemedicine (video visit) versus in person care; bill my insurance or make self-payment for services provided; and responsibility for payment of non-covered services.     Patient would like the video invitation sent by:  My Chart    Mode of Communication:  Video Conference via Amwell    Distant Location (Provider):  On-site    As the provider I attest to compliance with applicable laws and regulations related to telemedicine.    DATA  Interactive Complexity: Yes, visit entailed Interactive Complexity evidenced by:  needed more time to process the following: cars being broke in. Much going around . Dreams that are hard to understand and affecting his thoughts and sleep.     Crisis: No     Progress Since Last Session (Related to Symptoms / Goals / Homework):   Symptoms: Improving some motivation    Homework: Partially completed      Episode of Care Goals: Satisfactory progress - ACTION (Actively working towards change); Intervened by reinforcing change plan / affirming steps taken.  Working on positive outlook of things     Current / Ongoing Stressors and Concerns: Patient noted some concerns around some dreams that are scary in nature. Shared  they are not paranoia but just scary dreams like water coming from walls and snakes that were scary. Though patient shared he does not expect to know what this means really but just to talk about it and be reminded about focusing on what he can identify with his senses. Will be focusing on his self care. Has a good sleep as long as he does not think about people braking in to take or destroy his car or sisters. This happened the last couple of weeks. Will find some distraction activities with his boyfriend.No other concern today. Work is good. The team was served a good breakfast which he finds very motivated. Will return in 2 weeks.      Treatment Objective(s) Addressed in This Session:   Identify negative self-talk and behaviors: challenge core beliefs, myths, and actions  Provide space to process these thoughts     Intervention:    Introduction CBT modality; person centered.     Assessments completed prior to visit 10/30/2023    The following assessments were completed by patient for this visit:  PHQ2:       11/10/2021     2:32 PM   PHQ-2 ( 1999 [employer])   Q1: Little interest or pleasure in doing things 0   Q2: Feeling down, depressed or hopeless 0   PHQ-2 Score 0   Q1: Little interest or pleasure in doing things Not at all   Q2: Feeling down, depressed or hopeless Not at all   PHQ-2 Score 0     PHQ9:       8/16/2023     1:29 PM 9/25/2023     3:48 PM 10/26/2023     7:45 AM 12/15/2023    12:42 PM 12/22/2023    12:29 PM 1/5/2024     1:39 PM 2/1/2024     1:10 PM   PHQ-9 SCORE   PHQ-9 Total Score MyChart 3 (Minimal depression) 2 (Minimal depression) 5 (Mild depression) 1 (Minimal depression) 6 (Mild depression)  1 (Minimal depression)   PHQ-9 Total Score 3 2 5 1 6 8 1     GAD2:       7/10/2023    12:59 PM 10/26/2023     8:03 AM 2/1/2024     1:09 PM   PARI-2    Feeling nervous, anxious, or on edge 1 1 0   Not being able to stop or control worrying 1 0 0   PARI-2 Total Score 2 1 0         ASSESSMENT: Current Emotional / Mental Status (status of significant symptoms):   Risk status (Self / Other harm or suicidal ideation)   Patient denies current fears or concerns for personal safety.   Patient denies current or recent suicidal ideation or behaviors.   Patient denies current or recent homicidal ideation or behaviors.   Patient denies current or recent self injurious behavior or ideation.   Patient denies other safety concerns.   Patient reports there has been no change in risk factors since their last session.     Patient reports there has been no change in protective factors since their last session.     Recommended that patient call 911 or go to the local ED should there be a change in any of these risk factors.     Appearance:   Appropriate    Eye Contact:   Good    Psychomotor Behavior: Normal    Attitude:   Cooperative    Orientation:   Person Place Time Situation   Speech    Rate / Production: Normal/ Responsive    Volume:  Normal    Mood:    Normal   Affect:    Appropriate    Thought Content:  Clear    Thought Form:  Coherent  Logical    Insight:    Good      Medication Review:   No changes to current psychiatric medication(s)     Medication Compliance:   Yes     Changes in Health Issues:   None reported     Chemical Use Review:   Substance Use: Chemical use reviewed, no active concerns identified      Tobacco Use: No current tobacco use.      Diagnosis:  1. Moderate episode of recurrent major depressive disorder (H)      Collateral Reports Completed:   Not Applicable    PLAN: (Patient Tasks / Therapist Tasks / Other):  Patient will visit the [place] this weekend to distract him mind and learn about his heritage.   Patient will keep up with healthy styles discussed.   Patient's next visit is in 2 weeks.     Tosin Moore  LICSW  ______________________________________________________________________    Individual Treatment Plan    Patient's Name: Roland Sandoval  YOB: 1995    Date of Creation: 11/14/2023  Date Treatment Plan Last Reviewed/Revised: 11/14/2023    DSM5 Diagnoses: 296.32 (F33.1) Major Depressive Disorder, Recurrent Episode, Moderate _ and With anxious distress or 300.02 (F41.1) Generalized Anxiety Disorder  Psychosocial / Contextual Factors:  Feelings around rejection, not belonging for so long that it has been affecting his sleep and his thinking.  .   PROMIS (reviewed every 90 days): 27    Referral / Collaboration:  Referral to another professional/service is not indicated at this time..    Anticipated number of session for this episode of care: 9-12 sessions  Anticipation frequency of session: Biweekly  Anticipated Duration of each session: 38-52 minutes  Treatment plan will be reviewed in 90 days or when goals have been changed.     MeasurableTreatment Goal(s) related to diagnosis / functional impairment(s)  Goal 1: Patient will stabilize anxiety level while increasing ability to function on a daily basis.    I will know I've met my goal when patient is able to challenge any cognitive traps as they present at least at 80 % of the time by the next review(90 days)     Objective #A (Patient Action)    Patient will use thought-stopping strategy daily to reduce intrusive thoughts.  Status: New - Date: 11/14/20023      Intervention(s)  Therapist will teach distraction skills. Using the 5 senses .    Objective #B  Patient will identify at least 4 fears / thoughts that contribute to feeling anxious.  Status: New - Date: 11/14/2023      Intervention(s)  Therapist will teach CBT modality and coping skills to challenge any cognitive traps such as the 3 Cs    Goal 2: Patient will alleviate depressed mood and return to previous level of effective functioning    I will know I've met my goal when my energy level  have increased at least at 65 % on daily basis by the next review( 90 days)    Objective #A (Patient Action)    Status: New - Date: 11/14/2023    Patient will Decrease frequency and intensity of feeling down, depressed, hopeless.  Intervention(s)  Therapist will provide educational materials on mindfulness .    Objective #B  Patient will Improve concentration, focus, and mindfulness in daily activities .    Status: New - Date: 11/14/2023    Intervention(s)  Therapist will provide space to share and process thoughts and feelings related to current stressors .    Patient has reviewed and agreed to the above plan.      Tosin Moore [facility]  November 14, 2023   Answers submitted by the patient for this visit:  Patient Health Questionnaire (Submitted on 12/15/2023)  If you checked off any problems, how difficult have these problems made it for you to do your work, take care of things at home, or get along with other people?: Not difficult at all  PHQ9 TOTAL SCORE: 1    Answers submitted by the patient for this visit:  Patient Health Questionnaire (Submitted on 12/22/2023)  If you checked off any problems, how difficult have these problems made it for you to do your work, take care of things at home, or get along with other people?: Somewhat difficult  PHQ9 TOTAL SCORE: 6    Answers submitted by the patient for this visit:  Patient Health Questionnaire (Submitted on 2/1/2024)  If you checked off any problems, how difficult have these problems made it for you to do your work, take care of things at home, or get along with other people?: Not difficult at all  PHQ9 TOTAL SCORE: 1

## 2024-02-25 ENCOUNTER — HEALTH MAINTENANCE LETTER (OUTPATIENT)
Age: 29
End: 2024-02-25

## 2024-03-07 ENCOUNTER — VIRTUAL VISIT (OUTPATIENT)
Dept: PSYCHIATRY | Facility: CLINIC | Age: 29
End: 2024-03-07
Payer: COMMERCIAL

## 2024-03-07 DIAGNOSIS — F32.1 CURRENT MODERATE EPISODE OF MAJOR DEPRESSIVE DISORDER, UNSPECIFIED WHETHER RECURRENT (H): ICD-10-CM

## 2024-03-07 PROCEDURE — 99213 OFFICE O/P EST LOW 20 MIN: CPT | Mod: 95 | Performed by: NURSE PRACTITIONER

## 2024-03-07 RX ORDER — SERTRALINE HYDROCHLORIDE 25 MG/1
25 TABLET, FILM COATED ORAL DAILY
Qty: 90 TABLET | Refills: 2 | Status: SHIPPED | OUTPATIENT
Start: 2024-03-07 | End: 2024-09-09

## 2024-03-07 ASSESSMENT — PATIENT HEALTH QUESTIONNAIRE - PHQ9
SUM OF ALL RESPONSES TO PHQ QUESTIONS 1-9: 1
SUM OF ALL RESPONSES TO PHQ QUESTIONS 1-9: 1
10. IF YOU CHECKED OFF ANY PROBLEMS, HOW DIFFICULT HAVE THESE PROBLEMS MADE IT FOR YOU TO DO YOUR WORK, TAKE CARE OF THINGS AT HOME, OR GET ALONG WITH OTHER PEOPLE: NOT DIFFICULT AT ALL
SUM OF ALL RESPONSES TO PHQ QUESTIONS 1-9: 1

## 2024-03-07 ASSESSMENT — PAIN SCALES - GENERAL: PAINLEVEL: NO PAIN (0)

## 2024-03-07 NOTE — PROGRESS NOTES
"  The patient has been notified of following:      \"This virtual  visit will be conducted via a call between you and your physician/provider. We have found that certain health care needs can be provided without the need for a physical exam.  This service lets us provide the care you need virtually/via video   If a prescription is necessary we can send it directly to your pharmacy.  If lab work is needed we can place an order for that and you can then stop by our lab to have the test done at a later time.     Virtual/Video visits are billed at different rates depending on your insurance coverage.Some insurers they may be billed the same as an in-person visit.  Please reach out to your insurance provider with any questions.    Patient has given verbal consent for virtual  visit : Yes      Ho w would you like to obtain your AVS? Mail a copy  If the video visit is dropped, the invitation should be resent by: Send to e-mail at: [email or web address]  Will anyone else be joining your video visit? No        Psychiatric  Out- Patient  Follow Up Progress Note  Date of visit:3/7/2024           Discussion of Care and Treatment Recommendations:   This is a 29 year old male with a history of depression presenting to the clinic today for follow-up appointment .      Last visit 09/25/2024.  Recommendation at last visit .  1. Continue Zoloft 25 mg daily - MDD  2.Highly recommend increase   Psychotherapy session to weekly or biweekly: P3.Recommend abstain from THC- repots he has significantly reduced use and is working on  Total abstinence   4. RTC-  6 months  call in between visits with any questions or concerns   Patient and I reviewed diagnosis and treatment plan and patient agrees with following recommendations:  Ongoing education given regarding diagnostic and treatment options with adequate verbalization of understanding.  Plan   1. Continue Zoloft 25 mg daily - MDD  2.Highly recommend increase   Psychotherapy session to " "weekly or biweekly: P3.Recommend abstain from THC- repots he has significantly reduced use and is working on  Total abstinence   4. RTC-  6 months  call in between visits with any questions or concerns          DIagnoses:     Moderate episode of recurrent major depressive disorder      Patient Active Problem List   Diagnosis    Moderate episode of recurrent major depressive disorder (H)    Low HDL (under 40)    Herpes genitalis in men             Chief Complaint / Subjective:    Chief complaint: Depression     History of Present Illness:   Per patient statement : He now has a new job for a [employer] company and he is enjoying his new job.  Depression and anxiety are well-managed at this time.  He offers no new concerns.        Answers submitted by the patient for this visit:  Patient Health Questionnaire (Submitted on 3/7/2024)  If you checked off any problems, how difficult have these problems made it for you to do your work, take care of things at home, or get along with other people?: Not difficult at all  PHQ9 TOTAL SCORE: 1      Mental Status Examination:   Appearance: Well groomed, good eye contact   Orientation: Patient alert and oriented to person, place, time, and situation  Reliability:  Patient appears to be an adequate historian.    Behavior: cooperative   Speech: Speech is spontaneous and coherent, with a normal rate, rhythm and tone.    Language:There are no difficulties with expressive or receptive language as observed throughout the interview.    Mood: Described as \"ok\".    Affect: congruent   Judgement: Able to make basic decision regarding safety.  Insight: Good awareness of physical and mental health conditions and aware of needs around care for these.  Gait and station: unable to assess  Thought process: Logical   Thought content: No evidence of delusions or paranoia.    Hallucinations : No evidence of any hallucination  Thought content: No evidence of delusions or paranoia.   Suicidal /Homical " Ideations:  No thoughts of self harm or suicide. No thoughts of harming others.  Associations: Connected  Fund of knowledge: Average  Attention / Concentration: Able to remain focused during the interview with minimal distractibility or need for redirection.  Short Term Memory: Grossly intact as evidence by client recalling themes and ideas discussed.  Long Term Memory: Intact  Motor Status: unable to asse    Drug/treatment history and current pattern of use:   Denies      Medication changes: See Above   Medication adherence: compliant  Medication side effects: absent  Information about medications: Side effects, benefits and alternative treatments discussed and patient agrees .    Psychotherapy: Supportive therapy day-to-day living    Education: Diet, exercise, abstinence from drugs and alcohol, patient will not drive if sedated and medications or  under influence of any substance    Lab Results:   Personally reviewed and discussed with the patient    Lab Results   Component Value Date    WBC 4.3 09/26/2023    HGB 15.0 09/26/2023    HCT 46.9 09/26/2023     09/26/2023    CHOL 195 05/23/2022    TRIG 92 05/23/2022    HDL 40 05/23/2022    ALT 22 09/26/2023    AST 23 09/26/2023     09/26/2023    BUN 15.1 09/26/2023    CO2 27 09/26/2023       Vital signs:  There were no vitals taken for this visit.  Telemedicine visit-no vital signs completed  Allergies: Patient has no known allergies.         Medications:     Current Outpatient Medications   Medication    sertraline (ZOLOFT) 25 MG tablet     No current facility-administered medications for this visit.         Medication adherence: Reviewed risk/benefits of medication , Patient able to verbalize understanding of side effects and Patient verbally consents to taking medications      PSYCHOEDUCATION:  Medication side effects and alternatives reviewed. Health promotion activities recommended and reviewed today. All questions addressed. Education and counseling  completed regarding risks and benefits of medications and psychotherapy options.  Consent provided by patient/guardian  Call the psychiatric nurse line with medication questions or concerns at [phone].  MyChart may be used to communicate with your provider, but this is not intended to be used for emergencies.  SEROTONIN SYNDROME:  Discussed risks of Serotonin syndrome (ie, serotonin toxicity) which is a potentially life-threatening condition associated with increased serotonergic activity in the central nervous system (CNS). It is seen with therapeutic medication use, inadvertent interactions between drugs, and intentional self-poisoning. Serotonin syndrome may involve a spectrum of clinical findings, which often include mental status changes, autonomic hyperactivity, and neuromuscular abnormalities.    STIMULANT THERAPY: Side effects discussed including but not limited to cardiac (including HTN, tachycardia, sudden death), motor/tic, appetite/growth, mood lability and sleep disruption. This is a controlled substance with risk for abuse, need to keep in a safe keep place and cannot replace lost scripts  HARM REDUCTION:  Discussions regarding effects of mood altering substances, alcohol and cannabis, on mood and that approach is harm reduction, will continue to prescribe meds as they work to cut back use.    SAFETY:  We all care about your loved one's safety. To reduce the risk of self-harm, remove access to all:  Firearms, Medicines (both prescribed and over-the-counter), Knives and other sharp objects, Ropes and like materials, and Alcohol  SLEEP HYGIENE: establish a sleep routine, limit screen time 1 hour prior to bed, use bed for sleep only, take sleep/medications on time (including sleepy time tea, trazadone or herbal treatments such as melatonin), aroma therapy, limit caffeine/sugar, yoga, guided imagery, stretch, meditation, limit naps to 20 minutes, make a temperature change in the room, white noise, be  mindful of slowing down breathing, take a warm bath/shower, frequently wash sheets, and journaling.   Medlineplus.gov is information for patients.  It is run by the National Library of Medicine and it contains information about all disorders, diseases and all medications.              Review of Systems:      ROS:    Subjective Data Only- Tele-Health Visit    10 point ROS was negative except for the items listed in HPI.      Crisis Resources:    Present to the Emergency Department as needed or call after hours crisis line at [phone] or [phone].   [place] Crisis Text Line: Text [place] to 645106.  Suicide LifeLine Chat: suicidefreshbag.org/chat/.  National Suicide Prevention Lifeline: 973.223.2845 (TTY: 316.636.9052). Call anytime for help.  (www.suicidepreventionPlasticellline.org)  National [place] on Mental Illness (www.livan.org): 523.463.5012 or 921-436-7080.  Mental Health Association (www.mentalhealth.org): 927.229.9194 or 240-821-5712.      Coordination of Care:   More than 30 minutes spent on this visit  with more than 50% of time spent on coordination of care including: Educating patient about diagnosis, prognosis, side effects and benefits of medications, diet, exercise.  Time also spent providing supportive therapy regarding above issues.    Video-Visit Details    Type of service:  Video Visit    Originating Location (pt. Location): Home    Distant Location (provider location): Providers Remote Office     Platform used for Video Visit: Rebeca      This note was created using a dictation system. All typing errors or contextual distortion is unintentional and software inherent.  Start Time : 1600  End time : 1620

## 2024-03-07 NOTE — PATIENT INSTRUCTIONS
"The [facility]  What to Expect  Here's what to expect in the [facility] Psych[facility] Program.   About the program  You'll be meeting with a psychiatric doctor to check your mental health. A psychiatric doctor helps you deal with troubling thoughts and feelings by giving you medicine. They'll make sure you know the plan for your care. You may see them for a long time. When you're feeling better, they may refer you back to seeing your family doctor.   If you have any questions, we'll be glad to talk to you.  About visits  Be open  At your visits, please talk openly about your problems. It may feel hard, but it's the best way for us to help you.  Cancelling visits  If you can't come to your visit, please call us right away at [phone]. If you don't cancel at least 24 hours (1 full day) before your visit, that's \"late cancellation.\"  Not showing up for your visits  Being very late is the same as not showing up. You'll be a \"no show\" if:  You're more than 15 minutes late for a 30-minute (half hour) visit.  You're more than 30 minutes late for a 60-minute (full hour) visit.  If you cancel late or don't show up 2 times within 6 months, we may end your care.  Getting help between visits  If you need help between visits, you can call us Monday to Friday from 8 a.m. to 4:30 p.m. at [phone].  Emergency care  Call 911 or go to the nearest emergency department if your life or someone else's life is in danger.  Call 988 anytime to reach the national Suicide and Crisis hotline.  Medicine refills  To refill your medicine, call your pharmacy. You can also call [facility]'s Behavioral Access at [phone], Monday to Friday, 8 a.m. to 4:30 p.m. It can take 1 to 3 business days to get a refill.   Forms, letters, and tests  You may have papers to fill out, like FMLA, short-term disability, and workability. We can help you with these forms at your visits, but you must have an appointment. You may need more " than 1 visit for this, to be in an intensive therapy program, or both.  Before we can give you medicine for ADHD, we may refer you to get tested for it or confirm it another way.  We may not be able to give you an emotional support animal letter.  We don't do mental health checks ordered by the court.   We don't do mental health testing, but we can refer you to get tested.   Thank you for choosing us for your care.  For informational purposes only. Not to replace the advice of your health care provider. Copyright   2022 [facility]. All rights reserved. Idea2 [id number] - 12/22      After Visit Summary   Continue medications as prescribed  Have your pharmacy contact us for a refill if you are running low on medications (We may ask you to come into clinic to get a refill from the nurse  No Alcohol or drug use  No driving if sedated  Call the clinic with any questions or concerns   Reach out for help if you feel like hurting yourself or others ([facility] [phone]: [address], [address] or [facility] Suicide Hotline   [phone] , call 911 or go to nearest Emergency room     Crisis Resources:    Present to the Emergency Department as needed or call after hours crisis line at [phone] or [phone].   [place] Crisis Text Line: Text [place] to 908971.  Suicide LifeLine Chat: suicidepreventionlifeline.org/chat/.  National Suicide Prevention Lifeline: 959.128.5158 (TTY: 906.416.5084). Call anytime for help.  (www.suicidepreventionlifeline.org)  National [place] on Mental Illness (www.livan.org): 692.213.6598 or 913-457-6097.  Mental Health Association (www.mentalhealth.org): 195.344.1901 or 345-974-3131.       Follow up as directed, for your appointments, per your After Visit Summary Form.

## 2024-03-07 NOTE — NURSING NOTE
Is the patient currently in the state of [place]? YES    Visit mode:VIDEO    If the visit is dropped, the patient can be reconnected by: VIDEO VISIT: Text to cell phone:   Telephone Information:   Mobile [phone]       Will anyone else be joining the visit? No  (If patient encounters technical issues they should call [phone])    How would you like to obtain your AVS? MyChart    Are changes needed to the allergy or medication list? No    Rooming Documentation: Assigned questionnaire(s) completed .    Reason for visit: RECHECK     DENISE Allen

## 2024-03-08 ENCOUNTER — VIRTUAL VISIT (OUTPATIENT)
Dept: PSYCHOLOGY | Facility: CLINIC | Age: 29
End: 2024-03-08
Payer: COMMERCIAL

## 2024-03-08 DIAGNOSIS — F41.1 GAD (GENERALIZED ANXIETY DISORDER): Primary | ICD-10-CM

## 2024-03-08 PROCEDURE — 90834 PSYTX W PT 45 MINUTES: CPT | Mod: 95 | Performed by: SOCIAL WORKER

## 2024-03-08 ASSESSMENT — PATIENT HEALTH QUESTIONNAIRE - PHQ9
5. POOR APPETITE OR OVEREATING: NOT AT ALL
10. IF YOU CHECKED OFF ANY PROBLEMS, HOW DIFFICULT HAVE THESE PROBLEMS MADE IT FOR YOU TO DO YOUR WORK, TAKE CARE OF THINGS AT HOME, OR GET ALONG WITH OTHER PEOPLE: NOT DIFFICULT AT ALL
SUM OF ALL RESPONSES TO PHQ QUESTIONS 1-9: 1

## 2024-03-08 ASSESSMENT — ANXIETY QUESTIONNAIRES
3. WORRYING TOO MUCH ABOUT DIFFERENT THINGS: MORE THAN HALF THE DAYS
GAD7 TOTAL SCORE: 7
GAD7 TOTAL SCORE: 7
6. BECOMING EASILY ANNOYED OR IRRITABLE: SEVERAL DAYS
2. NOT BEING ABLE TO STOP OR CONTROL WORRYING: NOT AT ALL
5. BEING SO RESTLESS THAT IT IS HARD TO SIT STILL: SEVERAL DAYS
1. FEELING NERVOUS, ANXIOUS, OR ON EDGE: SEVERAL DAYS
IF YOU CHECKED OFF ANY PROBLEMS ON THIS QUESTIONNAIRE, HOW DIFFICULT HAVE THESE PROBLEMS MADE IT FOR YOU TO DO YOUR WORK, TAKE CARE OF THINGS AT HOME, OR GET ALONG WITH OTHER PEOPLE: SOMEWHAT DIFFICULT
7. FEELING AFRAID AS IF SOMETHING AWFUL MIGHT HAPPEN: MORE THAN HALF THE DAYS

## 2024-03-08 NOTE — PROGRESS NOTES
[facility]                                     Progress Note    Patient Name: Roland Sandoval  Date: 3/08/2024         Service Type: Individual      Session Start Time: 12:05 Session End Time: 12:57     Session Length: 52    Session #:7    Attendees: Client    Service Modality:  Video Visit: doximity and phone calls      Provider verified identity through the following two step process.  Patient provided:  Patient is known previously to provider    Telemedicine Visit: The patient's condition can be safely assessed and treated via synchronous audio and visual telemedicine encounter.      Reason for Telemedicine Visit: Services only offered telehealth    Originating Site (Patient Location): Patient's place of employment    Distant Site (Provider Location): [facility] [place]    Consent:  The patient/guardian has verbally consented to: the potential risks and benefits of telemedicine (video visit) versus in person care; bill my insurance or make self-payment for services provided; and responsibility for payment of non-covered services.     Patient would like the video invitation sent by:  My Chart    Mode of Communication:  Video Conference via [employer]    Distant Location (Provider):  On-site    As the provider I attest to compliance with applicable laws and regulations related to telemedicine.    DATA  Interactive Complexity: Yes, visit entailed Interactive Complexity evidenced by:  needed more time to process the following: cars being broke in. Much going around . Dreams that are hard to understand and affecting his thoughts and sleep.     Crisis: No     Progress Since Last Session (Related to Symptoms / Goals / Homework):   Symptoms: Improving some motivation    Homework: Partially completed      Episode of Care Goals: Satisfactory progress - ACTION (Actively working towards change); Intervened by reinforcing change plan / affirming steps taken.  Working on positive outlook of things     Current / Ongoing Stressors and Concerns: Patient had a his birthday on [birth year]. His partner surprised him with friends to celebrate his Leap Birthday. Shared how happy he was after many years with no smile. Shared how it might really be possible to be happy as long as he continues to redefine happiness in his own terms. Has been also working and doing his kick box to feel better. Finds the last 3 months being better especially with depression. Working on anxiety which is brought by some dreams he has had some weeks ago related to water from walls and how it was terrifying him. Discussed how to focus on self and and fact when he wakes up from a such dream. Will also get some tools to help challenge some thoughts that might appears to be unclear and yet powerful to cause him feel depressed and lose interest in what he loves. Patient reported no other concerns today. His  next visit is in 2 weeks.      Treatment Objective(s) Addressed in This Session:   use thought-stopping strategy daily to reduce intrusive thoughts  Provide space to process these thoughts     Intervention:    Introduction CBT modality; person centered.     Assessments completed prior to visit 10/30/2023    The following assessments were completed by patient for this visit:  PHQ2:       11/10/2021     2:32 PM   PHQ-2 ( 1999 [employer])   Q1: Little interest or pleasure in doing things 0   Q2: Feeling down, depressed or hopeless 0   PHQ-2 Score 0   Q1: Little interest or pleasure in doing things Not at all   Q2: Feeling down, depressed or hopeless Not at all   PHQ-2 Score 0     PHQ9:       9/25/2023     3:48 PM 10/26/2023     7:45 AM 12/15/2023    12:42 PM 12/22/2023    12:29 PM 1/5/2024     1:39 PM 2/1/2024     1:10 PM 3/7/2024     3:41 PM   PHQ-9 SCORE   PHQ-9 Total Score MyChart 2 (Minimal depression) 5 (Mild depression) 1 (Minimal depression) 6 (Mild depression)  1 (Minimal depression) 1 (Minimal depression)    PHQ-9 Total Score 2 5 1 6 8 1 1    1    1     GAD2:       7/10/2023    12:59 PM 10/26/2023     8:03 AM 2/1/2024     1:09 PM 3/7/2024     3:42 PM   PARI-2   Feeling nervous, anxious, or on edge 1 1 0 0   Not being able to stop or control worrying 1 0 0 0   PARI-2 Total Score 2 1 0 0    0    0         ASSESSMENT: Current Emotional / Mental Status (status of significant symptoms):   Risk status (Self / Other harm or suicidal ideation)   Patient denies current fears or concerns for personal safety.   Patient denies current or recent suicidal ideation or behaviors.   Patient denies current or recent homicidal ideation or behaviors.   Patient denies current or recent self injurious behavior or ideation.   Patient denies other safety concerns.   Patient reports there has been no change in risk factors since their last session.     Patient reports there has been no change in protective factors since their last session.     Recommended that patient call 911 or go to the local ED should there be a change in any of these risk factors.     Appearance:   Appropriate    Eye Contact:   Good    Psychomotor Behavior: Normal    Attitude:   Cooperative    Orientation:   Person Place Time Situation   Speech    Rate / Production: Normal/ Responsive    Volume:  Normal    Mood:    Anxious    Affect:    Appropriate    Thought Content:  Clear    Thought Form:  Coherent  Logical    Insight:    Good      Medication Review:   No changes to current psychiatric medication(s)- just saw his psychiatrist this week.      Medication Compliance:   Yes     Changes in Health Issues:   None reported     Chemical Use Review:   Substance Use: Chemical use reviewed, no active concerns identified      Tobacco Use: No current tobacco use.      Diagnosis:  1. PARI (generalized anxiety disorder)      Collateral Reports Completed:   Not Applicable    PLAN: (Patient Tasks / Therapist Tasks / Other):  Patient will practice the CBT skills being sent via my chart to  challenge any cognitive distortions  Patient's next visit is in 2 weeks.    Tosin Moore, LICSW  ______________________________________________________________________    Individual Treatment Plan    Patient's Name: Roland Sandoval  YOB: 1995    Date of Creation: 11/14/2023  Date Treatment Plan Last Reviewed/Revised: 3/08/2024    DSM5 Diagnoses: 296.32 (F33.1) Major Depressive Disorder, Recurrent Episode, Moderate _ and With anxious distress or 300.02 (F41.1) Generalized Anxiety Disorder  Psychosocial / Contextual Factors:  Feelings around rejection, not belonging for so long that it has been affecting his sleep and his thinking.  .     PROMIS (reviewed every 90 days): 27    Referral / Collaboration:  Referral to another professional/service is not indicated at this time..    Anticipated number of session for this episode of care: 9-12 sessions  Anticipation frequency of session: Biweekly  Anticipated Duration of each session: 38-52 minutes  Treatment plan will be reviewed in 90 days or when goals have been changed.     MeasurableTreatment Goal(s) related to diagnosis / functional impairment(s)  Goal 1: Patient will stabilize anxiety level while increasing ability to function on a daily basis.    I will know I've met my goal when patient is able to challenge any cognitive traps as they present at least at 80 % of the time by the next review(90 days)     Objective #A (Patient Action)    Patient will use thought-stopping strategy daily to reduce intrusive thoughts.  Status: Continued - Date(s): 3/08/2024    Intervention(s)  Therapist will teach distraction skills. Using the 5 senses .    Objective #B  Patient will identify at least 4 fears / thoughts that contribute to feeling anxious.  Status: Continued - Date(s): 3/08/2024    Intervention(s)  Therapist will teach CBT modality and coping skills to challenge any cognitive traps such as the 3 Cs    Goal 2: Patient will alleviate depressed mood  and return to previous level of effective functioning    I will know I've met my goal when my energy level have increased at least at 70 % on daily basis by the next review( 90 days)    Objective #A (Patient Action)    Status: Continued - Date(s): 3/08/2024  Patient will Decrease frequency and intensity of feeling down, depressed, hopeless.  Intervention(s)  Therapist will provide educational materials on mindfulness .    Objective #B  Patient will Improve concentration, focus, and mindfulness in daily activities .    Status: Continued - Date(s): 3/08/2024  Intervention(s)  Therapist will provide space to share and process thoughts and feelings related to current stressors .    Patient has reviewed and agreed to the above plan.      RAND Howard  March 08 2024   Answers submitted by the patient for this visit:  Patient Health Questionnaire (Submitted on 12/15/2023)  If you checked off any problems, how difficult have these problems made it for you to do your work, take care of things at home, or get along with other people?: Not difficult at all  PHQ9 TOTAL SCORE: 1    Answers submitted by the patient for this visit:  Patient Health Questionnaire (Submitted on 12/22/2023)  If you checked off any problems, how difficult have these problems made it for you to do your work, take care of things at home, or get along with other people?: Somewhat difficult  PHQ9 TOTAL SCORE: 6    Answers submitted by the patient for this visit:  Patient Health Questionnaire (Submitted on 2/1/2024)  If you checked off any problems, how difficult have these problems made it for you to do your work, take care of things at home, or get along with other people?: Not difficult at all  PHQ9 TOTAL SCORE: 1    Answers submitted by the patient for this visit:  Patient Health Questionnaire (Submitted on 3/8/2024)  If you checked off any problems, how difficult have these problems made it for you to do your work, take care of things at  home, or get along with other people?: Not difficult at all  PHQ9 TOTAL SCORE: 1

## 2024-03-22 ENCOUNTER — OFFICE VISIT (OUTPATIENT)
Dept: PSYCHOLOGY | Facility: CLINIC | Age: 29
End: 2024-03-22
Payer: COMMERCIAL

## 2024-03-22 DIAGNOSIS — F41.1 GAD (GENERALIZED ANXIETY DISORDER): Primary | ICD-10-CM

## 2024-03-22 PROCEDURE — 90837 PSYTX W PT 60 MINUTES: CPT | Performed by: SOCIAL WORKER

## 2024-03-22 ASSESSMENT — ANXIETY QUESTIONNAIRES
3. WORRYING TOO MUCH ABOUT DIFFERENT THINGS: MORE THAN HALF THE DAYS
1. FEELING NERVOUS, ANXIOUS, OR ON EDGE: SEVERAL DAYS
5. BEING SO RESTLESS THAT IT IS HARD TO SIT STILL: NOT AT ALL
GAD7 TOTAL SCORE: 7
7. FEELING AFRAID AS IF SOMETHING AWFUL MIGHT HAPPEN: SEVERAL DAYS
2. NOT BEING ABLE TO STOP OR CONTROL WORRYING: MORE THAN HALF THE DAYS
IF YOU CHECKED OFF ANY PROBLEMS ON THIS QUESTIONNAIRE, HOW DIFFICULT HAVE THESE PROBLEMS MADE IT FOR YOU TO DO YOUR WORK, TAKE CARE OF THINGS AT HOME, OR GET ALONG WITH OTHER PEOPLE: SOMEWHAT DIFFICULT
GAD7 TOTAL SCORE: 7
6. BECOMING EASILY ANNOYED OR IRRITABLE: SEVERAL DAYS

## 2024-03-22 ASSESSMENT — PATIENT HEALTH QUESTIONNAIRE - PHQ9
SUM OF ALL RESPONSES TO PHQ QUESTIONS 1-9: 1
SUM OF ALL RESPONSES TO PHQ QUESTIONS 1-9: 1
5. POOR APPETITE OR OVEREATING: NOT AT ALL
10. IF YOU CHECKED OFF ANY PROBLEMS, HOW DIFFICULT HAVE THESE PROBLEMS MADE IT FOR YOU TO DO YOUR WORK, TAKE CARE OF THINGS AT HOME, OR GET ALONG WITH OTHER PEOPLE: SOMEWHAT DIFFICULT

## 2024-03-22 NOTE — PROGRESS NOTES
[facility]                                     Progress Note    Patient Name: Roland Sandoval  Date: 3/22/2024         Service Type: Individual      Session Start Time: 12:02 Session End Time: 12:57     Session Length: 55    Session #:8    Attendees: Client    Service Modality:  In-person      Provider verified identity through the following two step process.  Patient provided:  Patient is known previously to provider    Telemedicine Visit: The patient's condition can be safely assessed and treated via synchronous audio and visual telemedicine encounter.      Reason for Telemedicine Visit:  in person    Originating Site (Patient Location): [facility]:  [facility]    Distant Site (Provider Location): [facility] [place]    Consent:  The patient/guardian has verbally consented to: the potential risks and benefits of telemedicine (video visit) versus in person care; bill my insurance or make self-payment for services provided; and responsibility for payment of non-covered services.     Patient would like the video invitation sent by:   in- person    Mode of Communication:   was seen in- person today    Distant Location (Provider):  On-site    As the provider I attest to compliance with applicable laws and regulations related to telemedicine.    DATA  Extended Session (53+ minutes):   - Patient's presenting concerns require more intensive intervention than could be completed within the usual service  Interactive Complexity: No  Crisis: No      Progress Since Last Session (Related to Symptoms / Goals / Homework):   Symptoms: Improving some motivation    Homework: Partially completed      Episode of Care Goals: Satisfactory progress - ACTION (Actively working towards change); Intervened by reinforcing change plan / affirming steps taken. Working on increasing confidence.     Current / Ongoing Stressors and Concerns: Patient and writer met  ion person. Discussed about the need of increasing self confidence and accessing his inner resources to get there. Spent time discussing the use of the confidence scale 0-10 with some examples. Finds he feels good when he focuses on small achievements and on positive things that happen in his life.  Developed a safety plan as required even though has not had any. Expressed understanding to the reasons of the safety plan. Doing well in most of his areas of functioning. Loves his job and has been trying to increase social interactions. His  next visit is in 2 weeks,      Treatment Objective(s) Addressed in This Session:   use thought-stopping strategy daily to reduce intrusive thoughts  Provided space to process these thoughts     Intervention:  Safety assessment: developed a safety Plan today  CBT: reviewed the use of the 3 Cs and reinforced them- practicing the confidence scale    Assessments completed prior to visit 10/30/2023    The following assessments were completed by patient for this visit:  PHQ2:       11/10/2021     2:32 PM   PHQ-2 ( 1999 [employer])   Q1: Little interest or pleasure in doing things 0   Q2: Feeling down, depressed or hopeless 0   PHQ-2 Score 0   Q1: Little interest or pleasure in doing things Not at all   Q2: Feeling down, depressed or hopeless Not at all   PHQ-2 Score 0     PHQ9:       10/26/2023     7:45 AM 12/15/2023    12:42 PM 12/22/2023    12:29 PM 1/5/2024     1:39 PM 2/1/2024     1:10 PM 3/7/2024     3:41 PM 3/22/2024    10:09 AM   PHQ-9 SCORE   PHQ-9 Total Score MyChart 5 (Mild depression) 1 (Minimal depression) 6 (Mild depression)  1 (Minimal depression) 1 (Minimal depression) 1 (Minimal depression)   PHQ-9 Total Score 5 1 6 8 1 1    1    1 1     GAD2:       7/10/2023    12:59 PM 10/26/2023     8:03 AM 2/1/2024     1:09 PM 3/7/2024     3:42 PM 3/22/2024    10:10 AM   PARI-2   Feeling nervous, anxious, or on edge 1 1 0 0 1   Not being able to stop or control worrying 1 0 0 0 0   PARI-2  Total Score 2 1 0 0    0    0 1         ASSESSMENT: Current Emotional / Mental Status (status of significant symptoms):   Risk status (Self / Other harm or suicidal ideation)   Patient denies current fears or concerns for personal safety.   Patient denies current or recent suicidal ideation or behaviors.   Patient denies current or recent homicidal ideation or behaviors.   Patient denies current or recent self injurious behavior or ideation.   Patient denies other safety concerns.   Patient reports there has been no change in risk factors since their last session.     Patient reports there has been no change in protective factors since their last session.     Recommended that patient call 911 or go to the local ED should there be a change in any of these risk factors.     Appearance:   Appropriate    Eye Contact:   Good    Psychomotor Behavior: Normal    Attitude:   Cooperative    Orientation:   Person Place Time Situation   Speech    Rate / Production: Normal/ Responsive    Volume:  Normal    Mood:    Anxious    Affect:    Appropriate    Thought Content:  Clear    Thought Form:  Coherent  Logical    Insight:    Good      Medication Review:   No changes to current psychiatric medication(s)     Medication Compliance:   Yes     Changes in Health Issues:   None reported     Chemical Use Review:   Substance Use: Chemical use reviewed, no active concerns identified      Tobacco Use: No current tobacco use.      Diagnosis:  1. PARI (generalized anxiety disorder)        Collateral Reports Completed:   Not Applicable    PLAN: (Patient Tasks / Therapist Tasks / Other):  Patient will practice the CBT skills being sent via my chart to challenge any cognitive distortions  Patient will practice the use of the 0-10 confidence scale to increase his self confidence  Patient's next visit is in 2 weeks.    RAND Howard  ______________________________________________________________________    Individual Treatment  Plan    Patient's Name: Roland Sandoval  YOB: 1995    Date of Creation: 11/14/2023  Date Treatment Plan Last Reviewed/Revised: 3/08/2024    DSM5 Diagnoses: 296.32 (F33.1) Major Depressive Disorder, Recurrent Episode, Moderate _ and With anxious distress or 300.02 (F41.1) Generalized Anxiety Disorder  Psychosocial / Contextual Factors:  Feelings around rejection, not belonging for so long that it has been affecting his sleep and his thinking.  .     PROMIS (reviewed every 90 days): 27    Referral / Collaboration:  Referral to another professional/service is not indicated at this time..    Anticipated number of session for this episode of care: 9-12 sessions  Anticipation frequency of session: Biweekly  Anticipated Duration of each session: 38-52 minutes  Treatment plan will be reviewed in 90 days or when goals have been changed.     MeasurableTreatment Goal(s) related to diagnosis / functional impairment(s)  Goal 1: Patient will stabilize anxiety level while increasing ability to function on a daily basis.    I will know I've met my goal when patient is able to challenge any cognitive traps as they present at least at 80 % of the time by the next review(90 days)     Objective #A (Patient Action)    Patient will use thought-stopping strategy daily to reduce intrusive thoughts.  Status: Continued - Date(s): 3/08/2024    Intervention(s)  Therapist will teach distraction skills. Using the 5 senses .    Objective #B  Patient will identify at least 4 fears / thoughts that contribute to feeling anxious.  Status: Continued - Date(s): 3/08/2024    Intervention(s)  Therapist will teach CBT modality and coping skills to challenge any cognitive traps such as the 3 Cs    Goal 2: Patient will alleviate depressed mood and return to previous level of effective functioning    I will know I've met my goal when my energy level have increased at least at 70 % on daily basis by the next review( 90 days)    Objective #A  (Patient Action)    Status: Continued - Date(s): 3/08/2024  Patient will Decrease frequency and intensity of feeling down, depressed, hopeless.  Intervention(s)  Therapist will provide educational materials on mindfulness .    Objective #B  Patient will Improve concentration, focus, and mindfulness in daily activities .    Status: Continued - Date(s): 3/08/2024  Intervention(s)  Therapist will provide space to share and process thoughts and feelings related to current stressors .    Patient has reviewed and agreed to the above plan.      Tosin Moore [facility]  March 08 2024   Answers submitted by the patient for this visit:  Patient Health Questionnaire (Submitted on 12/15/2023)  If you checked off any problems, how difficult have these problems made it for you to do your work, take care of things at home, or get along with other people?: Not difficult at all  PHQ9 TOTAL SCORE: 1    Answers submitted by the patient for this visit:  Patient Health Questionnaire (Submitted on 12/22/2023)  If you checked off any problems, how difficult have these problems made it for you to do your work, take care of things at home, or get along with other people?: Somewhat difficult  PHQ9 TOTAL SCORE: 6    Answers submitted by the patient for this visit:  Patient Health Questionnaire (Submitted on 2/1/2024)  If you checked off any problems, how difficult have these problems made it for you to do your work, take care of things at home, or get along with other people?: Not difficult at all  PHQ9 TOTAL SCORE: 1    Answers submitted by the patient for this visit:  Patient Health Questionnaire (Submitted on 3/8/2024)  If you checked off any problems, how difficult have these problems made it for you to do your work, take care of things at home, or get along with other people?: Not difficult at all  PHQ9 TOTAL SCORE: 1    Answers submitted by the patient for this visit:  Patient Health Questionnaire (Submitted on 3/22/2024)  If you  checked off any problems, how difficult have these problems made it for you to do your work, take care of things at home, or get along with other people?: Somewhat difficult  PHQ9 TOTAL SCORE: 1

## 2024-04-05 ENCOUNTER — VIRTUAL VISIT (OUTPATIENT)
Dept: PSYCHOLOGY | Facility: CLINIC | Age: 29
End: 2024-04-05
Payer: COMMERCIAL

## 2024-04-05 DIAGNOSIS — F33.1 MODERATE EPISODE OF RECURRENT MAJOR DEPRESSIVE DISORDER (H): Primary | ICD-10-CM

## 2024-04-05 PROCEDURE — 90834 PSYTX W PT 45 MINUTES: CPT | Mod: 95 | Performed by: SOCIAL WORKER

## 2024-04-05 ASSESSMENT — PATIENT HEALTH QUESTIONNAIRE - PHQ9
SUM OF ALL RESPONSES TO PHQ QUESTIONS 1-9: 2
10. IF YOU CHECKED OFF ANY PROBLEMS, HOW DIFFICULT HAVE THESE PROBLEMS MADE IT FOR YOU TO DO YOUR WORK, TAKE CARE OF THINGS AT HOME, OR GET ALONG WITH OTHER PEOPLE: NOT DIFFICULT AT ALL
SUM OF ALL RESPONSES TO PHQ QUESTIONS 1-9: 2

## 2024-04-05 NOTE — PROGRESS NOTES
[facility]                                     Progress Note    Patient Name: Roland Sandoval  Date: 4/05/2024         Service Type: Individual      Session Start Time: 12:14 Session End Time: 12:57     Session Length: 43    Session #:9    Attendees: Client    Service Modality:  In-person      Provider verified identity through the following two step process.  Patient provided:  Patient is known previously to provider    Telemedicine Visit: The patient's condition can be safely assessed and treated via synchronous audio and visual telemedicine encounter.      Reason for Telemedicine Visit: Services only offered telehealth    Originating Site (Patient Location): Patient's home    Distant Site (Provider Location): Provider Remote Setting- Home Office    Consent:  The patient/guardian has verbally consented to: the potential risks and benefits of telemedicine (video visit) versus in person care; bill my insurance or make self-payment for services provided; and responsibility for payment of non-covered services.     Patient would like the video invitation sent by:  My Chart    Mode of Communication:  Amwell    Distant Location (Provider):  Off-site    As the provider I attest to compliance with applicable laws and regulations related to telemedicine.    DATA  Interactive Complexity: No  Crisis: No      Progress Since Last Session (Related to Symptoms / Goals / Homework):   Symptoms: Worsening depressed, sad    Homework: Partially completed      Episode of Care Goals: Satisfactory progress - ACTION (Actively working towards change); Intervened by reinforcing change plan / affirming steps taken. Working on increasing confidence and reducing self blame.     Current / Ongoing Stressors and Concerns: Patient joined on video instead of coming in today. Shared sadness and depressed mood after letting go over a week ago and being sick with flu like symptoms since las Saturday. Though these two are not  linked, he stated, since his partner got sick before patient lost the job, he notes it has been difficult for him to feel strong and keep going. He is to apply for new job. Plans to apply for at least 5 jobs a week. Discussed tips to improve his sleep as he noted losing his job has been much related to his sleep. Was warned about being late. He shared it was difficult for him to get up earlier consistently as he also goes to be at different times. Will start developing a sleep schedule that he might use to help improve his sleep before he can address that with his providers. Partner is helping with coverage of some expenses while he is looking for a new job. 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 Will return in 2 weeks.      Treatment Objective(s) Addressed in This Session:   Identify negative self-talk and behaviors: challenge core beliefs, myths, and actions  Processed his thoughts and feelings related to his job loss     Intervention:  Safety assessment: safety Plan was reviewed today  CBT: reviewed the use of the 3 Cs and reinforced them- practicing the confidence scale    Assessments completed prior to visit 10/30/2023    The following assessments were completed by patient for this visit:  PHQ2:       11/10/2021     2:32 PM   PHQ-2 ( 1999 [employer])   Q1: Little interest or pleasure in doing things 0   Q2: Feeling down, depressed or hopeless 0   PHQ-2 Score 0   Q1: Little interest or pleasure in doing things Not at all   Q2: Feeling down, depressed or hopeless Not at all   PHQ-2 Score 0     PHQ9:       12/15/2023    12:42 PM 12/22/2023    12:29 PM 1/5/2024     1:39 PM 2/1/2024     1:10 PM 3/7/2024     3:41 PM 3/22/2024    10:09 AM 4/5/2024    12:11 PM   PHQ-9 SCORE   PHQ-9 Total Score MyChart 1 (Minimal depression) 6 (Mild depression)  1 (Minimal depression) 1 (Minimal depression) 1 (Minimal depression) 2 (Minimal depression)   PHQ-9 Total Score 1 6 8 1 1    1     1 1 2     GAD2:       7/10/2023    12:59 PM 10/26/2023     8:03 AM 2/1/2024     1:09 PM 3/7/2024     3:42 PM 3/22/2024    10:10 AM 4/5/2024    12:11 PM   PARI-2   Feeling nervous, anxious, or on edge 1 1 0 0 1 0   Not being able to stop or control worrying 1 0 0 0 0 1   PARI-2 Total Score 2 1 0 0    0    0 1 1         ASSESSMENT: Current Emotional / Mental Status (status of significant symptoms):   Risk status (Self / Other harm or suicidal ideation)   Patient denies current fears or concerns for personal safety.   Patient denies current or recent suicidal ideation or behaviors.   Patient denies current or recent homicidal ideation or behaviors.   Patient denies current or recent self injurious behavior or ideation.   Patient denies other safety concerns.   Patient reports there has been no change in risk factors since their last session.     Patient reports there has been no change in protective factors since their last session.     Recommended that patient call 911 or go to the local ED should there be a change in any of these risk factors.     Appearance:   Appropriate    Eye Contact:   Good    Psychomotor Behavior: Normal    Attitude:   Cooperative    Orientation:   All   Speech    Rate / Production: Normal/ Responsive    Volume:  Normal    Mood:    Depressed    Affect:    Appropriate    Thought Content:  Clear    Thought Form:  Coherent  Logical    Insight:    Good      Medication Review:   No changes to current psychiatric medication(s)     Medication Compliance:   Yes     Changes in Health Issues:   None reported     Chemical Use Review:   Substance Use: Chemical use reviewed, no active concerns identified      Tobacco Use: No current tobacco use.      Diagnosis:  1. Moderate episode of recurrent major depressive disorder (H)      Collateral Reports Completed:   Not Applicable    PLAN: (Patient Tasks / Therapist Tasks / Other):  Patient will practice the tips dicussed to improve his sleep patterns   Patient  plans to complete at least 5 jobs/ week   Patient's next visit is in 2 weeks  RAND Howard  ______________________________________________________________________    Individual Treatment Plan    Patient's Name: Roland Sandoval  YOB: 1995    Date of Creation: 11/14/2023  Date Treatment Plan Last Reviewed/Revised: 3/08/2024    DSM5 Diagnoses: 296.32 (F33.1) Major Depressive Disorder, Recurrent Episode, Moderate _ and With anxious distress or 300.02 (F41.1) Generalized Anxiety Disorder  Psychosocial / Contextual Factors:  Feelings around rejection, not belonging for so long that it has been affecting his sleep and his thinking.  .     PROMIS (reviewed every 90 days): 27    Referral / Collaboration:  Referral to another professional/service is not indicated at this time..    Anticipated number of session for this episode of care: 9-12 sessions  Anticipation frequency of session: Biweekly  Anticipated Duration of each session: 38-52 minutes  Treatment plan will be reviewed in 90 days or when goals have been changed.     MeasurableTreatment Goal(s) related to diagnosis / functional impairment(s)  Goal 1: Patient will stabilize anxiety level while increasing ability to function on a daily basis.    I will know I've met my goal when patient is able to challenge any cognitive traps as they present at least at 80 % of the time by the next review(90 days)     Objective #A (Patient Action)    Patient will use thought-stopping strategy daily to reduce intrusive thoughts.  Status: Continued - Date(s): 3/08/2024    Intervention(s)  Therapist will teach distraction skills. Using the 5 senses .    Objective #B  Patient will identify at least 4 fears / thoughts that contribute to feeling anxious.  Status: Continued - Date(s): 3/08/2024    Intervention(s)  Therapist will teach CBT modality and coping skills to challenge any cognitive traps such as the 3 Cs    Goal 2: Patient will alleviate depressed  mood and return to previous level of effective functioning    I will know I've met my goal when my energy level have increased at least at 70 % on daily basis by the next review( 90 days)    Objective #A (Patient Action)    Status: Continued - Date(s): 3/08/2024  Patient will Decrease frequency and intensity of feeling down, depressed, hopeless.  Intervention(s)  Therapist will provide educational materials on mindfulness .    Objective #B  Patient will Improve concentration, focus, and mindfulness in daily activities .    Status: Continued - Date(s): 3/08/2024  Intervention(s)  Therapist will provide space to share and process thoughts and feelings related to current stressors .    Patient has reviewed and agreed to the above plan.      RAND Howrad  March 08 2024   Answers submitted by the patient for this visit:  Patient Health Questionnaire (Submitted on 12/15/2023)  If you checked off any problems, how difficult have these problems made it for you to do your work, take care of things at home, or get along with other people?: Not difficult at all  PHQ9 TOTAL SCORE: 1    Answers submitted by the patient for this visit:  Patient Health Questionnaire (Submitted on 12/22/2023)  If you checked off any problems, how difficult have these problems made it for you to do your work, take care of things at home, or get along with other people?: Somewhat difficult  PHQ9 TOTAL SCORE: 6    Answers submitted by the patient for this visit:  Patient Health Questionnaire (Submitted on 2/1/2024)  If you checked off any problems, how difficult have these problems made it for you to do your work, take care of things at home, or get along with other people?: Not difficult at all  PHQ9 TOTAL SCORE: 1    Answers submitted by the patient for this visit:  Patient Health Questionnaire (Submitted on 3/8/2024)  If you checked off any problems, how difficult have these problems made it for you to do your work, take care of things  at home, or get along with other people?: Not difficult at all  PHQ9 TOTAL SCORE: 1    Answers submitted by the patient for this visit:  Patient Health Questionnaire (Submitted on 3/22/2024)  If you checked off any problems, how difficult have these problems made it for you to do your work, take care of things at home, or get along with other people?: Somewhat difficult  PHQ9 TOTAL SCORE: 1    Answers submitted by the patient for this visit:  Patient Health Questionnaire (Submitted on 4/5/2024)  If you checked off any problems, how difficult have these problems made it for you to do your work, take care of things at home, or get along with other people?: Not difficult at all  PHQ9 TOTAL SCORE: 2

## 2024-04-19 ENCOUNTER — VIRTUAL VISIT (OUTPATIENT)
Dept: PSYCHOLOGY | Facility: CLINIC | Age: 29
End: 2024-04-19
Payer: COMMERCIAL

## 2024-04-19 DIAGNOSIS — F33.1 MODERATE EPISODE OF RECURRENT MAJOR DEPRESSIVE DISORDER (H): Primary | ICD-10-CM

## 2024-04-19 DIAGNOSIS — F43.21 UNRESOLVED GRIEF: ICD-10-CM

## 2024-04-19 PROCEDURE — 90834 PSYTX W PT 45 MINUTES: CPT | Mod: 95 | Performed by: SOCIAL WORKER

## 2024-04-19 ASSESSMENT — PATIENT HEALTH QUESTIONNAIRE - PHQ9
SUM OF ALL RESPONSES TO PHQ QUESTIONS 1-9: 1
10. IF YOU CHECKED OFF ANY PROBLEMS, HOW DIFFICULT HAVE THESE PROBLEMS MADE IT FOR YOU TO DO YOUR WORK, TAKE CARE OF THINGS AT HOME, OR GET ALONG WITH OTHER PEOPLE: NOT DIFFICULT AT ALL
SUM OF ALL RESPONSES TO PHQ QUESTIONS 1-9: 1

## 2024-04-20 NOTE — PROGRESS NOTES
[facility]                                     Progress Note    Patient Name: Roland Sandoval  Date: 4/19/2024         Service Type: Individual      Session Start Time: 10:02 Session End Time: 10:54     Session Length: 52    Session #:10    Attendees: Client    Service Modality:  virtual      Provider verified identity through the following two step process.  Patient provided:  Patient is known previously to provider    Telemedicine Visit: The patient's condition can be safely assessed and treated via synchronous audio and visual telemedicine encounter.      Reason for Telemedicine Visit: Services only offered telehealth    Originating Site (Patient Location): Patient's home    Distant Site (Provider Location): [facility]    Consent:  The patient/guardian has verbally consented to: the potential risks and benefits of telemedicine (video visit) versus in person care; bill my insurance or make self-payment for services provided; and responsibility for payment of non-covered services.     Patient would like the video invitation sent by:  My Chart    Mode of Communication:  Amwell    Distant Location (Provider):  On-site    As the provider I attest to compliance with applicable laws and regulations related to telemedicine.    DATA  Interactive Complexity: No  Crisis: No      Progress Since Last Session (Related to Symptoms / Goals / Homework):   Symptoms: Worsening depressed, sad, grieving    Homework: Partially completed      Episode of Care Goals: Satisfactory progress - ACTION (Actively working towards change); Intervened by reinforcing change plan / affirming steps taken. Low energy, no job yet, mom's anniversary     Current / Ongoing Stressors and Concerns: it has been hard for the patient finding a new job. Has been applying but no interviews yet. He also shared it is hard because his mom's anniversary is here. Has a plan to go to some  favorite corner [place]. He is planning to go on his own tomorrow. Mom's passed away 21 years ago.  Spent sometime processing his experiences and how she wishes to continue to honor his mom. He will think about April 21st, 21 years ago and complete a journal entry about this experience. Patient denied any safety concern. He notes finances are getting tight at home but his partner is very supportive. His next visit is in 2 weeks.      Treatment Objective(s) Addressed in This Session:   Feel less tired and more energy during the day   Process his loss at his mom's anniversary  Processed his thoughts and feelings related to his job loss     Intervention:  Safety assessment: safety Plan was reviewed today  CBT: reviewed the use of the 3 Cs and reinforced them- practicing the confidence scale    Assessments completed prior to visit 10/30/2023    The following assessments were completed by patient for this visit:  PHQ2:       11/10/2021     2:32 PM   PHQ-2 ( 1999 [employer])   Q1: Little interest or pleasure in doing things 0   Q2: Feeling down, depressed or hopeless 0   PHQ-2 Score 0   Q1: Little interest or pleasure in doing things Not at all   Q2: Feeling down, depressed or hopeless Not at all   PHQ-2 Score 0     PHQ9:       12/22/2023    12:29 PM 1/5/2024     1:39 PM 2/1/2024     1:10 PM 3/7/2024     3:41 PM 3/22/2024    10:09 AM 4/5/2024    12:11 PM 4/19/2024     9:47 AM   PHQ-9 SCORE   PHQ-9 Total Score MyChart 6 (Mild depression)  1 (Minimal depression) 1 (Minimal depression) 1 (Minimal depression) 2 (Minimal depression) 1 (Minimal depression)   PHQ-9 Total Score 6 8 1 1    1    1 1 2 1     GAD2:       7/10/2023    12:59 PM 10/26/2023     8:03 AM 2/1/2024     1:09 PM 3/7/2024     3:42 PM 3/22/2024    10:10 AM 4/5/2024    12:11 PM   PARI-2   Feeling nervous, anxious, or on edge 1 1 0 0 1 0   Not being able to stop or control worrying 1 0 0 0 0 1   PARI-2 Total Score 2 1 0 0    0    0 1 1         ASSESSMENT: Current  Emotional / Mental Status (status of significant symptoms):   Risk status (Self / Other harm or suicidal ideation)   Patient denies current fears or concerns for personal safety.   Patient denies current or recent suicidal ideation or behaviors.   Patient denies current or recent homicidal ideation or behaviors.   Patient denies current or recent self injurious behavior or ideation.   Patient denies other safety concerns.   Patient reports there has been no change in risk factors since their last session.     Patient reports there has been no change in protective factors since their last session.     Recommended that patient call 911 or go to the local ED should there be a change in any of these risk factors.     Appearance:   Appropriate    Eye Contact:   Good    Psychomotor Behavior: Normal    Attitude:   Cooperative    Orientation:   Person Place Time Situation   Speech    Rate / Production: Normal/ Responsive    Volume:  Normal    Mood:    Depressed    Affect:    Sad tearful    Thought Content:  Clear    Thought Form:  Coherent  Logical    Insight:    Good      Medication Review:   No changes to current psychiatric medication(s)     Medication Compliance:   Yes     Changes in Health Issues:   None reported     Chemical Use Review:   Substance Use: Chemical use reviewed, no active concerns identified      Tobacco Use: No current tobacco use.      Diagnosis:  1. Moderate episode of recurrent major depressive disorder (H)    2. Unresolved grief      Collateral Reports Completed:   Not Applicable    PLAN: (Patient Tasks / Therapist Tasks / Other):  Patient will continue to look for a job with at least 2 application a week  Patient will take some time to honor his mom on Saturday  Patient's next visit is in 2 weeks.  RAND Howard  ______________________________________________________________________    Individual Treatment Plan    Patient's Name: Roland Sandoval  YOB: 1995    Date of  Creation: 11/14/2023  Date Treatment Plan Last Reviewed/Revised: 3/08/2024    DSM5 Diagnoses: 296.32 (F33.1) Major Depressive Disorder, Recurrent Episode, Moderate _ and With anxious distress or 300.02 (F41.1) Generalized Anxiety Disorder  Psychosocial / Contextual Factors:  Feelings around rejection, not belonging for so long that it has been affecting his sleep and his thinking.  .     PROMIS (reviewed every 90 days): 27    Referral / Collaboration:  Referral to another professional/service is not indicated at this time..    Anticipated number of session for this episode of care: 9-12 sessions  Anticipation frequency of session: Biweekly  Anticipated Duration of each session: 38-52 minutes  Treatment plan will be reviewed in 90 days or when goals have been changed.     MeasurableTreatment Goal(s) related to diagnosis / functional impairment(s)  Goal 1: Patient will stabilize anxiety level while increasing ability to function on a daily basis.    I will know I've met my goal when patient is able to challenge any cognitive traps as they present at least at 80 % of the time by the next review(90 days)     Objective #A (Patient Action)    Patient will use thought-stopping strategy daily to reduce intrusive thoughts.  Status: Continued - Date(s): 3/08/2024    Intervention(s)  Therapist will teach distraction skills. Using the 5 senses .    Objective #B  Patient will identify at least 4 fears / thoughts that contribute to feeling anxious.  Status: Continued - Date(s): 3/08/2024    Intervention(s)  Therapist will teach CBT modality and coping skills to challenge any cognitive traps such as the 3 Cs    Goal 2: Patient will alleviate depressed mood and return to previous level of effective functioning    I will know I've met my goal when my energy level have increased at least at 70 % on daily basis by the next review( 90 days)    Objective #A (Patient Action)    Status: Continued - Date(s): 3/08/2024  Patient will  Decrease frequency and intensity of feeling down, depressed, hopeless.  Intervention(s)  Therapist will provide educational materials on mindfulness .    Objective #B  Patient will Improve concentration, focus, and mindfulness in daily activities .    Status: Continued - Date(s): 3/08/2024  Intervention(s)  Therapist will provide space to share and process thoughts and feelings related to current stressors .    Patient has reviewed and agreed to the above plan.    Tosin CurielALBERT leone  March 08 2024   Answers submitted by the patient for this visit:  Patient Health Questionnaire (Submitted on 12/15/2023)  If you checked off any problems, how difficult have these problems made it for you to do your work, take care of things at home, or get along with other people?: Not difficult at all  PHQ9 TOTAL SCORE: 1    Answers submitted by the patient for this visit:  Patient Health Questionnaire (Submitted on 12/22/2023)  If you checked off any problems, how difficult have these problems made it for you to do your work, take care of things at home, or get along with other people?: Somewhat difficult  PHQ9 TOTAL SCORE: 6    Answers submitted by the patient for this visit:  Patient Health Questionnaire (Submitted on 2/1/2024)  If you checked off any problems, how difficult have these problems made it for you to do your work, take care of things at home, or get along with other people?: Not difficult at all  PHQ9 TOTAL SCORE: 1    Answers submitted by the patient for this visit:  Patient Health Questionnaire (Submitted on 3/8/2024)  If you checked off any problems, how difficult have these problems made it for you to do your work, take care of things at home, or get along with other people?: Not difficult at all  PHQ9 TOTAL SCORE: 1    Answers submitted by the patient for this visit:  Patient Health Questionnaire (Submitted on 3/22/2024)  If you checked off any problems, how difficult have these problems made it for you to  do your work, take care of things at home, or get along with other people?: Somewhat difficult  PHQ9 TOTAL SCORE: 1    Answers submitted by the patient for this visit:  Patient Health Questionnaire (Submitted on 4/5/2024)  If you checked off any problems, how difficult have these problems made it for you to do your work, take care of things at home, or get along with other people?: Not difficult at all  PHQ9 TOTAL SCORE: 2    Answers submitted by the patient for this visit:  Patient Health Questionnaire (Submitted on 4/19/2024)  If you checked off any problems, how difficult have these problems made it for you to do your work, take care of things at home, or get along with other people?: Not difficult at all  PHQ9 TOTAL SCORE: 1

## 2024-05-03 ENCOUNTER — VIRTUAL VISIT (OUTPATIENT)
Dept: PSYCHOLOGY | Facility: CLINIC | Age: 29
End: 2024-05-03
Payer: COMMERCIAL

## 2024-05-03 DIAGNOSIS — F41.1 GAD (GENERALIZED ANXIETY DISORDER): Primary | ICD-10-CM

## 2024-05-03 PROCEDURE — 90834 PSYTX W PT 45 MINUTES: CPT | Mod: 95 | Performed by: SOCIAL WORKER

## 2024-05-03 NOTE — PROGRESS NOTES
[facility]                                     Progress Note    Patient Name: Roland Sandoval  Date: 5/03/2024         Service Type: Individual      Session Start Time: 10:03 Session End Time: 10:55     Session Length: 52    Session #:11    Attendees: Client    Service Modality:  virtual      Provider verified identity through the following two step process.  Patient provided:  Patient is known previously to provider    Telemedicine Visit: The patient's condition can be safely assessed and treated via synchronous audio and visual telemedicine encounter.      Reason for Telemedicine Visit: Services only offered telehealth    Originating Site (Patient Location): Patient's home    Distant Site (Provider Location): [facility] [place]    Consent:  The patient/guardian has verbally consented to: the potential risks and benefits of telemedicine (video visit) versus in person care; bill my insurance or make self-payment for services provided; and responsibility for payment of non-covered services.     Patient would like the video invitation sent by:  My Chart    Mode of Communication:  Amwell    Distant Location (Provider):  On-site    As the provider I attest to compliance with applicable laws and regulations related to telemedicine.    DATA  Interactive Complexity: No  Crisis: No      Progress Since Last Session (Related to Symptoms / Goals / Homework):   Symptoms: Improving less depressed mood, less anxious    Homework: Partially completed      Episode of Care Goals: Satisfactory progress - ACTION (Actively working towards change); Intervened by reinforcing change plan / affirming steps taken. In recovery from a bad cold. Resume his job application process.     Current / Ongoing Stressors and Concerns: Patient reported getting out of a bad cold that slowed him down from job searching and other activities. Has not heard from the jobs he applied for  yet. Though has been working on getting to know himself better. Notes some catastrophizing tendencies. Working on challenging these thoughts. Hopes to see how he is doing in this area after his trip to [place] with his boyfriend for a friend's baby shower. Patient learned about the current  [place]  Tuition for higher education. Agreed it is something he would like to explore. Did well taking care of his mom's ashes with his sister. [place] No other concerns today. He will be focusing on self care as well. Hi next visit is in 2 weeks.      Treatment Objective(s) Addressed in This Session:   Feel less tired and more energy during the day   Processed his thoughts and feelings related to his job loss     Intervention:  Safety assessment: safety Plan was reviewed today  CBT: reviewed the use of the 3 Cs and reinforced them- practicing the confidence scale    Assessments completed prior to visit 10/30/2023    The following assessments were completed by patient for this visit:  PHQ2:       11/10/2021     2:32 PM   PHQ-2 ( 1999 [employer])   Q1: Little interest or pleasure in doing things 0   Q2: Feeling down, depressed or hopeless 0   PHQ-2 Score 0   Q1: Little interest or pleasure in doing things Not at all   Q2: Feeling down, depressed or hopeless Not at all   PHQ-2 Score 0     PHQ9:       1/5/2024     1:39 PM 2/1/2024     1:10 PM 3/7/2024     3:41 PM 3/22/2024    10:09 AM 4/5/2024    12:11 PM 4/19/2024     9:47 AM 5/2/2024     9:41 AM   PHQ-9 SCORE   PHQ-9 Total Score MyChart  1 (Minimal depression) 1 (Minimal depression) 1 (Minimal depression) 2 (Minimal depression) 1 (Minimal depression) 1 (Minimal depression)   PHQ-9 Total Score 8 1 1    1    1 1 2 1 1     GAD2:       7/10/2023    12:59 PM 10/26/2023     8:03 AM 2/1/2024     1:09 PM 3/7/2024     3:42 PM 3/22/2024    10:10 AM 4/5/2024    12:11 PM   PARI-2   Feeling nervous, anxious, or on edge 1 1 0 0 1 0   Not being able to stop or control worrying  1 0 0 0 0 1   PARI-2 Total Score 2 1 0 0    0    0 1 1         ASSESSMENT: Current Emotional / Mental Status (status of significant symptoms):   Risk status (Self / Other harm or suicidal ideation)   Patient denies current fears or concerns for personal safety.   Patient denies current or recent suicidal ideation or behaviors.   Patient denies current or recent homicidal ideation or behaviors.   Patient denies current or recent self injurious behavior or ideation.   Patient denies other safety concerns.   Patient reports there has been no change in risk factors since their last session.     Patient reports there has been no change in protective factors since their last session.     Recommended that patient call 911 or go to the local ED should there be a change in any of these risk factors.     Appearance:   Appropriate    Eye Contact:   Good    Psychomotor Behavior: Normal    Attitude:   Cooperative    Orientation:   All   Speech    Rate / Production: Normal/ Responsive    Volume:  Normal    Mood:    Depressed    Affect:    Appropriate    Thought Content:  Clear    Thought Form:  Coherent  Logical    Insight:    Good      Medication Review:   No changes to current psychiatric medication(s)     Medication Compliance:   Yes     Changes in Health Issues:   None reported     Chemical Use Review:   Substance Use: Chemical use reviewed, no active concerns identified      Tobacco Use: No current tobacco use.      Diagnosis:  1. PARI (generalized anxiety disorder)      Collateral Reports Completed:   Not Applicable    PLAN: (Patient Tasks / Therapist Tasks / Other):  Patient will explore the information about  [place] higher education free tuition  Patient will continue to apply for jobs, at least 2 each week  Patient will reflect on today's discussion related to catastrophizing  Patient's next visit is in 2 weeks.   RAND Howard  ______________________________________________________________________    Individual  Treatment Plan    Patient's Name: Roland Sandoval  YOB: 1995    Date of Creation: 11/14/2023  Date Treatment Plan Last Reviewed/Revised: 3/08/2024    DSM5 Diagnoses: 296.32 (F33.1) Major Depressive Disorder, Recurrent Episode, Moderate _ and With anxious distress or 300.02 (F41.1) Generalized Anxiety Disorder  Psychosocial / Contextual Factors:  Feelings around rejection, not belonging for so long that it has been affecting his sleep and his thinking.  .     PROMIS (reviewed every 90 days): 27    Referral / Collaboration:  Referral to another professional/service is not indicated at this time..    Anticipated number of session for this episode of care: 9-12 sessions  Anticipation frequency of session: Biweekly  Anticipated Duration of each session: 38-52 minutes  Treatment plan will be reviewed in 90 days or when goals have been changed.     MeasurableTreatment Goal(s) related to diagnosis / functional impairment(s)  Goal 1: Patient will stabilize anxiety level while increasing ability to function on a daily basis.    I will know I've met my goal when patient is able to challenge any cognitive traps as they present at least at 80 % of the time by the next review(90 days)     Objective #A (Patient Action)    Patient will use thought-stopping strategy daily to reduce intrusive thoughts.  Status: Continued - Date(s): 3/08/2024    Intervention(s)  Therapist will teach distraction skills. Using the 5 senses .    Objective #B  Patient will identify at least 4 fears / thoughts that contribute to feeling anxious.  Status: Continued - Date(s): 3/08/2024    Intervention(s)  Therapist will teach CBT modality and coping skills to challenge any cognitive traps such as the 3 Cs    Goal 2: Patient will alleviate depressed mood and return to previous level of effective functioning    I will know I've met my goal when my energy level have increased at least at 70 % on daily basis by the next review( 90  days)    Objective #A (Patient Action)    Status: Continued - Date(s): 3/08/2024  Patient will Decrease frequency and intensity of feeling down, depressed, hopeless.  Intervention(s)  Therapist will provide educational materials on mindfulness .    Objective #B  Patient will Improve concentration, focus, and mindfulness in daily activities .    Status: Continued - Date(s): 3/08/2024  Intervention(s)  Therapist will provide space to share and process thoughts and feelings related to current stressors .    Patient has reviewed and agreed to the above plan.    ALBERT Howard  March 08 2024   Answers submitted by the patient for this visit:  Patient Health Questionnaire (Submitted on 12/15/2023)  If you checked off any problems, how difficult have these problems made it for you to do your work, take care of things at home, or get along with other people?: Not difficult at all  PHQ9 TOTAL SCORE: 1    Answers submitted by the patient for this visit:  Patient Health Questionnaire (Submitted on 12/22/2023)  If you checked off any problems, how difficult have these problems made it for you to do your work, take care of things at home, or get along with other people?: Somewhat difficult  PHQ9 TOTAL SCORE: 6    Answers submitted by the patient for this visit:  Patient Health Questionnaire (Submitted on 2/1/2024)  If you checked off any problems, how difficult have these problems made it for you to do your work, take care of things at home, or get along with other people?: Not difficult at all  PHQ9 TOTAL SCORE: 1    Answers submitted by the patient for this visit:  Patient Health Questionnaire (Submitted on 3/8/2024)  If you checked off any problems, how difficult have these problems made it for you to do your work, take care of things at home, or get along with other people?: Not difficult at all  PHQ9 TOTAL SCORE: 1    Answers submitted by the patient for this visit:  Patient Health Questionnaire (Submitted on  3/22/2024)  If you checked off any problems, how difficult have these problems made it for you to do your work, take care of things at home, or get along with other people?: Somewhat difficult  PHQ9 TOTAL SCORE: 1    Answers submitted by the patient for this visit:  Patient Health Questionnaire (Submitted on 4/5/2024)  If you checked off any problems, how difficult have these problems made it for you to do your work, take care of things at home, or get along with other people?: Not difficult at all  PHQ9 TOTAL SCORE: 2    Answers submitted by the patient for this visit:  Patient Health Questionnaire (Submitted on 4/19/2024)  If you checked off any problems, how difficult have these problems made it for you to do your work, take care of things at home, or get along with other people?: Not difficult at all  PHQ9 TOTAL SCORE: 1    Answers submitted by the patient for this visit:  Patient Health Questionnaire (Submitted on 5/2/2024)  If you checked off any problems, how difficult have these problems made it for you to do your work, take care of things at home, or get along with other people?: Not difficult at all  PHQ9 TOTAL SCORE: 1

## 2024-05-17 ENCOUNTER — VIRTUAL VISIT (OUTPATIENT)
Dept: PSYCHOLOGY | Facility: CLINIC | Age: 29
End: 2024-05-17
Payer: COMMERCIAL

## 2024-05-17 DIAGNOSIS — F41.1 GAD (GENERALIZED ANXIETY DISORDER): Primary | ICD-10-CM

## 2024-05-17 PROCEDURE — 90834 PSYTX W PT 45 MINUTES: CPT | Mod: 95 | Performed by: SOCIAL WORKER

## 2024-05-17 ASSESSMENT — PATIENT HEALTH QUESTIONNAIRE - PHQ9
10. IF YOU CHECKED OFF ANY PROBLEMS, HOW DIFFICULT HAVE THESE PROBLEMS MADE IT FOR YOU TO DO YOUR WORK, TAKE CARE OF THINGS AT HOME, OR GET ALONG WITH OTHER PEOPLE: NOT DIFFICULT AT ALL
SUM OF ALL RESPONSES TO PHQ QUESTIONS 1-9: 2
SUM OF ALL RESPONSES TO PHQ QUESTIONS 1-9: 2

## 2024-05-17 NOTE — PROGRESS NOTES
[facility]                                     Progress Note    Patient Name: Roland Sandoval  Date: 5/17/2024         Service Type: Individual      Session Start Time: 10:08 Session End Time: 10:55     Session Length: 47    Session #:12    Attendees: Client    Service Modality:  virtual      Provider verified identity through the following two step process.  Patient provided:  Patient is known previously to provider    Telemedicine Visit: The patient's condition can be safely assessed and treated via synchronous audio and visual telemedicine encounter.      Reason for Telemedicine Visit: Services only offered telehealth    Originating Site (Patient Location): Patient's home    Distant Site (Provider Location): [facility] [place]    Consent:  The patient/guardian has verbally consented to: the potential risks and benefits of telemedicine (video visit) versus in person care; bill my insurance or make self-payment for services provided; and responsibility for payment of non-covered services.     Patient would like the video invitation sent by:  My Chart    Mode of Communication:  Amwell    Distant Location (Provider):  On-site    As the provider I attest to compliance with applicable laws and regulations related to telemedicine.    DATA  Interactive Complexity: No  Crisis: No      Progress Since Last Session (Related to Symptoms / Goals / Homework):   Symptoms:  depressed mood     Homework: Partially completed      Episode of Care Goals: Satisfactory progress - ACTION (Actively working towards change); Intervened by reinforcing change plan / affirming steps taken. Concerns around not having a job, some times feeling depressed.      Current / Ongoing Stressors and Concerns: Patient shared some negative and disappointing experience a the [place]. He provided details that make him feel it is no longer  a place to him. Though he also shared this  has been an experience since early age. When he sees things that do not make him uncomfortable, he chooses to disconnect from the situation. Discussed options to challenge himself by facing the uncomfortable situation and to make sure changes are about him or if they are just for everyone. Patient will try Mother Concepcion's poem about  loving, caring anyway. Patient will continue to reflect on the discussion in sessions as he finds them helping to see things with a different lens. Plans to continue looking for jobs. Has had 2 job interviews. He will also make a decision around the higher education option with tuition free here in [place].  Patient's next visit is in 2 weeks.      Treatment Objective(s) Addressed in This Session:   Feel less tired and more energy during the day   Processed his thoughts and feelings around recent experience at [place]     Intervention:  Safety plan: reviewed  CBT: Challenging any ANTs as identified. Building self confidence and advocacy     Assessments completed prior to visit 10/30/2023    The following assessments were completed by patient for this visit:  PHQ2:       11/10/2021     2:32 PM   PHQ-2 ( 1999 [employer])   Q1: Little interest or pleasure in doing things 0   Q2: Feeling down, depressed or hopeless 0   PHQ-2 Score 0   Q1: Little interest or pleasure in doing things Not at all   Q2: Feeling down, depressed or hopeless Not at all   PHQ-2 Score 0     PHQ9:       2/1/2024     1:10 PM 3/7/2024     3:41 PM 3/22/2024    10:09 AM 4/5/2024    12:11 PM 4/19/2024     9:47 AM 5/2/2024     9:41 AM 5/17/2024    10:10 AM   PHQ-9 SCORE   PHQ-9 Total Score MyChart 1 (Minimal depression) 1 (Minimal depression) 1 (Minimal depression) 2 (Minimal depression) 1 (Minimal depression) 1 (Minimal depression) 2 (Minimal depression)   PHQ-9 Total Score 1 1    1    1 1 2 1 1 2     GAD2:       7/10/2023    12:59 PM 10/26/2023     8:03 AM 2/1/2024     1:09 PM 3/7/2024     3:42 PM 3/22/2024    10:10 AM 4/5/2024     12:11 PM   PARI-2   Feeling nervous, anxious, or on edge 1 1 0 0 1 0   Not being able to stop or control worrying 1 0 0 0 0 1   PARI-2 Total Score 2 1 0 0    0    0 1 1         ASSESSMENT: Current Emotional / Mental Status (status of significant symptoms):   Risk status (Self / Other harm or suicidal ideation)   Patient denies current fears or concerns for personal safety.   Patient denies current or recent suicidal ideation or behaviors.   Patient denies current or recent homicidal ideation or behaviors.   Patient denies current or recent self injurious behavior or ideation.   Patient denies other safety concerns.   Patient reports there has been no change in risk factors since their last session.     Patient reports there has been no change in protective factors since their last session.     Recommended that patient call 911 or go to the local ED should there be a change in any of these risk factors.     Appearance:   Appropriate    Eye Contact:   Good    Psychomotor Behavior: Normal    Attitude:   Cooperative    Orientation:   Person Place Time Situation   Speech    Rate / Production: Normal/ Responsive    Volume:  Normal    Mood:    Depressed    Affect:    Appropriate    Thought Content:  Clear    Thought Form:  Coherent  Logical    Insight:    Good      Medication Review:   No changes to current psychiatric medication(s)     Medication Compliance:   Yes     Changes in Health Issues:   None reported     Chemical Use Review:   Substance Use: Chemical use reviewed, no active concerns identified      Tobacco Use: No current tobacco use.      Diagnosis:  1. PARI (generalized anxiety disorder)      Collateral Reports Completed:   Not Applicable    PLAN: (Patient Tasks / Therapist Tasks / Other):  Patient will explore the information about  [place] higher education free tuition  Patient will complete at least 2 job applications/week  Patient will reflect on today's discussion related to his experience at the [employer] and  changes  Patient's next visit is in 2 weeks.    Tosin Moore, LICSW  ______________________________________________________________________    Individual Treatment Plan    Patient's Name: Roland Sandoval  YOB: 1995    Date of Creation: 11/14/2023  Date Treatment Plan Last Reviewed/Revised: 3/08/2024    DSM5 Diagnoses: 296.32 (F33.1) Major Depressive Disorder, Recurrent Episode, Moderate _ and With anxious distress or 300.02 (F41.1) Generalized Anxiety Disorder  Psychosocial / Contextual Factors:  Feelings around rejection, not belonging for so long that it has been affecting his sleep and his thinking.  .     PROMIS (reviewed every 90 days): 27    Referral / Collaboration:  Referral to another professional/service is not indicated at this time..    Anticipated number of session for this episode of care: 9-12 sessions  Anticipation frequency of session: Biweekly  Anticipated Duration of each session: 38-52 minutes  Treatment plan will be reviewed in 90 days or when goals have been changed.     MeasurableTreatment Goal(s) related to diagnosis / functional impairment(s)  Goal 1: Patient will stabilize anxiety level while increasing ability to function on a daily basis.    I will know I've met my goal when patient is able to challenge any cognitive traps as they present at least at 80 % of the time by the next review(90 days)     Objective #A (Patient Action)    Patient will use thought-stopping strategy daily to reduce intrusive thoughts.  Status: Continued - Date(s): 3/08/2024    Intervention(s)  Therapist will teach distraction skills. Using the 5 senses .    Objective #B  Patient will identify at least 4 fears / thoughts that contribute to feeling anxious.  Status: Continued - Date(s): 3/08/2024    Intervention(s)  Therapist will teach CBT modality and coping skills to challenge any cognitive traps such as the 3 Cs    Goal 2: Patient will alleviate depressed mood and return to previous level  of effective functioning    I will know I've met my goal when my energy level have increased at least at 70 % on daily basis by the next review( 90 days)    Objective #A (Patient Action)    Status: Continued - Date(s): 3/08/2024  Patient will Decrease frequency and intensity of feeling down, depressed, hopeless.  Intervention(s)  Therapist will provide educational materials on mindfulness .    Objective #B  Patient will Improve concentration, focus, and mindfulness in daily activities .    Status: Continued - Date(s): 3/08/2024  Intervention(s)  Therapist will provide space to share and process thoughts and feelings related to current stressors .    Patient has reviewed and agreed to the above plan.    Tosin Moore [facility]  March 08 2024   Answers submitted by the patient for this visit:  Patient Health Questionnaire (Submitted on 12/15/2023)  If you checked off any problems, how difficult have these problems made it for you to do your work, take care of things at home, or get along with other people?: Not difficult at all  PHQ9 TOTAL SCORE: 1    Answers submitted by the patient for this visit:  Patient Health Questionnaire (Submitted on 12/22/2023)  If you checked off any problems, how difficult have these problems made it for you to do your work, take care of things at home, or get along with other people?: Somewhat difficult  PHQ9 TOTAL SCORE: 6    Answers submitted by the patient for this visit:  Patient Health Questionnaire (Submitted on 2/1/2024)  If you checked off any problems, how difficult have these problems made it for you to do your work, take care of things at home, or get along with other people?: Not difficult at all  PHQ9 TOTAL SCORE: 1    Answers submitted by the patient for this visit:  Patient Health Questionnaire (Submitted on 3/8/2024)  If you checked off any problems, how difficult have these problems made it for you to do your work, take care of things at home, or get along with other  people?: Not difficult at all  PHQ9 TOTAL SCORE: 1    Answers submitted by the patient for this visit:  Patient Health Questionnaire (Submitted on 3/22/2024)  If you checked off any problems, how difficult have these problems made it for you to do your work, take care of things at home, or get along with other people?: Somewhat difficult  PHQ9 TOTAL SCORE: 1    Answers submitted by the patient for this visit:  Patient Health Questionnaire (Submitted on 4/5/2024)  If you checked off any problems, how difficult have these problems made it for you to do your work, take care of things at home, or get along with other people?: Not difficult at all  PHQ9 TOTAL SCORE: 2    Answers submitted by the patient for this visit:  Patient Health Questionnaire (Submitted on 4/19/2024)  If you checked off any problems, how difficult have these problems made it for you to do your work, take care of things at home, or get along with other people?: Not difficult at all  PHQ9 TOTAL SCORE: 1    Answers submitted by the patient for this visit:  Patient Health Questionnaire (Submitted on 5/2/2024)  If you checked off any problems, how difficult have these problems made it for you to do your work, take care of things at home, or get along with other people?: Not difficult at all  PHQ9 TOTAL SCORE: 1    Answers submitted by the patient for this visit:  Patient Health Questionnaire (Submitted on 5/17/2024)  If you checked off any problems, how difficult have these problems made it for you to do your work, take care of things at home, or get along with other people?: Not difficult at all  PHQ9 TOTAL SCORE: 2

## 2024-05-31 ENCOUNTER — VIRTUAL VISIT (OUTPATIENT)
Dept: PSYCHOLOGY | Facility: CLINIC | Age: 29
End: 2024-05-31
Payer: COMMERCIAL

## 2024-05-31 DIAGNOSIS — F33.1 MODERATE EPISODE OF RECURRENT MAJOR DEPRESSIVE DISORDER (H): Primary | ICD-10-CM

## 2024-05-31 PROCEDURE — 90834 PSYTX W PT 45 MINUTES: CPT | Mod: 95 | Performed by: SOCIAL WORKER

## 2024-05-31 ASSESSMENT — ANXIETY QUESTIONNAIRES
GAD7 TOTAL SCORE: 7
6. BECOMING EASILY ANNOYED OR IRRITABLE: SEVERAL DAYS
3. WORRYING TOO MUCH ABOUT DIFFERENT THINGS: SEVERAL DAYS
7. FEELING AFRAID AS IF SOMETHING AWFUL MIGHT HAPPEN: SEVERAL DAYS
GAD7 TOTAL SCORE: 7
5. BEING SO RESTLESS THAT IT IS HARD TO SIT STILL: SEVERAL DAYS
IF YOU CHECKED OFF ANY PROBLEMS ON THIS QUESTIONNAIRE, HOW DIFFICULT HAVE THESE PROBLEMS MADE IT FOR YOU TO DO YOUR WORK, TAKE CARE OF THINGS AT HOME, OR GET ALONG WITH OTHER PEOPLE: SOMEWHAT DIFFICULT
2. NOT BEING ABLE TO STOP OR CONTROL WORRYING: SEVERAL DAYS
1. FEELING NERVOUS, ANXIOUS, OR ON EDGE: SEVERAL DAYS

## 2024-05-31 ASSESSMENT — PATIENT HEALTH QUESTIONNAIRE - PHQ9
SUM OF ALL RESPONSES TO PHQ QUESTIONS 1-9: 1
10. IF YOU CHECKED OFF ANY PROBLEMS, HOW DIFFICULT HAVE THESE PROBLEMS MADE IT FOR YOU TO DO YOUR WORK, TAKE CARE OF THINGS AT HOME, OR GET ALONG WITH OTHER PEOPLE: NOT DIFFICULT AT ALL
SUM OF ALL RESPONSES TO PHQ QUESTIONS 1-9: 1
5. POOR APPETITE OR OVEREATING: SEVERAL DAYS

## 2024-05-31 NOTE — PROGRESS NOTES
[facility]                                     Progress Note    Patient Name: Roland Sandoval  Date: 5/31/2024         Service Type: Individual      Session Start Time: 14:14 Session End Time: 15:00     Session Length: 46    Session #:13    Attendees: Client    Service Modality:  virtual      Provider verified identity through the following two step process.  Patient provided:  Patient is known previously to provider    Telemedicine Visit: The patient's condition can be safely assessed and treated via synchronous audio and visual telemedicine encounter.      Reason for Telemedicine Visit: Services only offered telehealth    Originating Site (Patient Location): Patient's home    Distant Site (Provider Location): [facility]    Consent:  The patient/guardian has verbally consented to: the potential risks and benefits of telemedicine (video visit) versus in person care; bill my insurance or make self-payment for services provided; and responsibility for payment of non-covered services.     Patient would like the video invitation sent by:  My Chart    Mode of Communication:  Amwell    Distant Location (Provider):  On-site    As the provider I attest to compliance with applicable laws and regulations related to telemedicine.    DATA  Interactive Complexity: No  Crisis: No      Progress Since Last Session (Related to Symptoms / Goals / Homework):   Symptoms:  depressed mood, sleepy    Homework: Partially completed      Episode of Care Goals: Satisfactory progress - ACTION (Actively working towards change); Intervened by reinforcing change plan / affirming steps taken.  Ongoing concerns around job search causing much more depressed mood and sadness.     Current / Ongoing Stressors and Concerns: Patient reported having been experiencing some sleep issues.  He believes eating late must have affected his sleep last night which then affected his day.   So he had forgotten about his appointment.  Patient has not found a job.  Though he has been applying and getting some interviews.  Patient is determined to continue his job search.  In the meantime, patient has been making an effort to practice his coping skills challenging his thinking as necessary especially the ruminations and getting hard on himself.  He has also been active going to the gym and keeping up with healthy habits.  Patient agreed to continue to practice is a healthy lifestyles and continuing to work on psychological flexibility discussed today as opposed to inflexibility.  His next visit is in 2 weeks.  No safety concerns reported.     Treatment Objective(s) Addressed in This Session:   Feel less tired and more energy during the day   Processed his thoughts and feelings around recent experience at kick box     Intervention:  Safety plan: Reviewed today  CBT: Identifying cognitive traps and challenging them with the 3 Cs skill. Doing well in this area    Assessments completed prior to visit 10/30/2023    The following assessments were completed by patient for this visit:  PHQ2:       11/10/2021     2:32 PM   PHQ-2 ( 1999 [employer])   Q1: Little interest or pleasure in doing things 0   Q2: Feeling down, depressed or hopeless 0   PHQ-2 Score 0   Q1: Little interest or pleasure in doing things Not at all   Q2: Feeling down, depressed or hopeless Not at all   PHQ-2 Score 0     PHQ9:       3/7/2024     3:41 PM 3/22/2024    10:09 AM 4/5/2024    12:11 PM 4/19/2024     9:47 AM 5/2/2024     9:41 AM 5/17/2024    10:10 AM 5/31/2024     2:21 PM   PHQ-9 SCORE   PHQ-9 Total Score MyChart 1 (Minimal depression) 1 (Minimal depression) 2 (Minimal depression) 1 (Minimal depression) 1 (Minimal depression) 2 (Minimal depression) 1 (Minimal depression)   PHQ-9 Total Score 1    1    1 1 2 1 1 2 1     GAD2:       7/10/2023    12:59 PM 10/26/2023     8:03 AM 2/1/2024     1:09 PM 3/7/2024     3:42 PM 3/22/2024    10:10 AM  4/5/2024    12:11 PM   PARI-2   Feeling nervous, anxious, or on edge 1 1 0 0 1 0   Not being able to stop or control worrying 1 0 0 0 0 1   PARI-2 Total Score 2 1 0 0    0    0 1 1         ASSESSMENT: Current Emotional / Mental Status (status of significant symptoms):   Risk status (Self / Other harm or suicidal ideation)   Patient denies current fears or concerns for personal safety.   Patient denies current or recent suicidal ideation or behaviors.   Patient denies current or recent homicidal ideation or behaviors.   Patient denies current or recent self injurious behavior or ideation.   Patient denies other safety concerns.   Patient reports there has been no change in risk factors since their last session.     Patient reports there has been no change in protective factors since their last session.     Recommended that patient call 911 or go to the local ED should there be a change in any of these risk factors.     Appearance:   Appropriate    Eye Contact:   Good    Psychomotor Behavior: Normal    Attitude:   Cooperative    Orientation:   Person Place Time Situation   Speech    Rate / Production: Normal/ Responsive    Volume:  Normal    Mood:    Depressed    Affect:    Appropriate    Thought Content:  Clear    Thought Form:  Coherent  Logical    Insight:    Good      Medication Review:   No changes to current psychiatric medication(s)     Medication Compliance:   Yes     Changes in Health Issues:   None reported     Chemical Use Review:   Substance Use: Chemical use reviewed, no active concerns identified      Tobacco Use: No current tobacco use.      Diagnosis:  1. Moderate episode of recurrent major depressive disorder (H)      Collateral Reports Completed:   Not Applicable    PLAN: (Patient Tasks / Therapist Tasks / Other):  Patient will be focusing on psychological flexibility is versus inflexibility.    Patient will continue to apply for jobs at least 2 applications a week.    Patient next visit is in 2  weeks    Tosin Moisésteddyvianey, LICSW  ______________________________________________________________________    Individual Treatment Plan    Patient's Name: Roland Sandoval  YOB: 1995    Date of Creation: 11/14/2023  Date Treatment Plan Last Reviewed/Revised: 3/08/2024    DSM5 Diagnoses: 296.32 (F33.1) Major Depressive Disorder, Recurrent Episode, Moderate _ and With anxious distress or 300.02 (F41.1) Generalized Anxiety Disorder  Psychosocial / Contextual Factors:  Feelings around rejection, not belonging for so long that it has been affecting his sleep and his thinking.  .     PROMIS (reviewed every 90 days): 27    Referral / Collaboration:  Referral to another professional/service is not indicated at this time..    Anticipated number of session for this episode of care: 9-12 sessions  Anticipation frequency of session: Biweekly  Anticipated Duration of each session: 38-52 minutes  Treatment plan will be reviewed in 90 days or when goals have been changed.     MeasurableTreatment Goal(s) related to diagnosis / functional impairment(s)  Goal 1: Patient will stabilize anxiety level while increasing ability to function on a daily basis.    I will know I've met my goal when patient is able to challenge any cognitive traps as they present at least at 80 % of the time by the next review(90 days)     Objective #A (Patient Action)    Patient will use thought-stopping strategy daily to reduce intrusive thoughts.  Status: Continued - Date(s): 3/08/2024    Intervention(s)  Therapist will teach distraction skills. Using the 5 senses .    Objective #B  Patient will identify at least 4 fears / thoughts that contribute to feeling anxious.  Status: Continued - Date(s): 3/08/2024    Intervention(s)  Therapist will teach CBT modality and coping skills to challenge any cognitive traps such as the 3 Cs    Goal 2: Patient will alleviate depressed mood and return to previous level of effective functioning    I will  know I've met my goal when my energy level have increased at least at 70 % on daily basis by the next review( 90 days)    Objective #A (Patient Action)    Status: Continued - Date(s): 3/08/2024  Patient will Decrease frequency and intensity of feeling down, depressed, hopeless.  Intervention(s)  Therapist will provide educational materials on mindfulness .    Objective #B  Patient will Improve concentration, focus, and mindfulness in daily activities .    Status: Continued - Date(s): 3/08/2024  Intervention(s)  Therapist will provide space to share and process thoughts and feelings related to current stressors .    Patient has reviewed and agreed to the above plan.    Patriciodeborah Teresa, [facility]  March 08 2024   Answers submitted by the patient for this visit:  Patient Health Questionnaire (Submitted on 12/15/2023)  If you checked off any problems, how difficult have these problems made it for you to do your work, take care of things at home, or get along with other people?: Not difficult at all  PHQ9 TOTAL SCORE: 1    Answers submitted by the patient for this visit:  Patient Health Questionnaire (Submitted on 12/22/2023)  If you checked off any problems, how difficult have these problems made it for you to do your work, take care of things at home, or get along with other people?: Somewhat difficult  PHQ9 TOTAL SCORE: 6    Answers submitted by the patient for this visit:  Patient Health Questionnaire (Submitted on 2/1/2024)  If you checked off any problems, how difficult have these problems made it for you to do your work, take care of things at home, or get along with other people?: Not difficult at all  PHQ9 TOTAL SCORE: 1    Answers submitted by the patient for this visit:  Patient Health Questionnaire (Submitted on 3/8/2024)  If you checked off any problems, how difficult have these problems made it for you to do your work, take care of things at home, or get along with other people?: Not difficult at all  PHQ9  TOTAL SCORE: 1    Answers submitted by the patient for this visit:  Patient Health Questionnaire (Submitted on 3/22/2024)  If you checked off any problems, how difficult have these problems made it for you to do your work, take care of things at home, or get along with other people?: Somewhat difficult  PHQ9 TOTAL SCORE: 1    Answers submitted by the patient for this visit:  Patient Health Questionnaire (Submitted on 4/5/2024)  If you checked off any problems, how difficult have these problems made it for you to do your work, take care of things at home, or get along with other people?: Not difficult at all  PHQ9 TOTAL SCORE: 2    Answers submitted by the patient for this visit:  Patient Health Questionnaire (Submitted on 4/19/2024)  If you checked off any problems, how difficult have these problems made it for you to do your work, take care of things at home, or get along with other people?: Not difficult at all  PHQ9 TOTAL SCORE: 1    Answers submitted by the patient for this visit:  Patient Health Questionnaire (Submitted on 5/2/2024)  If you checked off any problems, how difficult have these problems made it for you to do your work, take care of things at home, or get along with other people?: Not difficult at all  PHQ9 TOTAL SCORE: 1    Answers submitted by the patient for this visit:  Patient Health Questionnaire (Submitted on 5/17/2024)  If you checked off any problems, how difficult have these problems made it for you to do your work, take care of things at home, or get along with other people?: Not difficult at all  PHQ9 TOTAL SCORE: 2    Answers submitted by the patient for this visit:  Patient Health Questionnaire (Submitted on 5/31/2024)  If you checked off any problems, how difficult have these problems made it for you to do your work, take care of things at home, or get along with other people?: Not difficult at all  PHQ9 TOTAL SCORE: 1

## 2024-06-11 ENCOUNTER — VIRTUAL VISIT (OUTPATIENT)
Dept: PSYCHOLOGY | Facility: CLINIC | Age: 29
End: 2024-06-11
Payer: COMMERCIAL

## 2024-06-11 DIAGNOSIS — F41.1 GAD (GENERALIZED ANXIETY DISORDER): Primary | ICD-10-CM

## 2024-06-11 PROCEDURE — 90834 PSYTX W PT 45 MINUTES: CPT | Mod: 95 | Performed by: SOCIAL WORKER

## 2024-06-11 ASSESSMENT — PATIENT HEALTH QUESTIONNAIRE - PHQ9
SUM OF ALL RESPONSES TO PHQ QUESTIONS 1-9: 4
10. IF YOU CHECKED OFF ANY PROBLEMS, HOW DIFFICULT HAVE THESE PROBLEMS MADE IT FOR YOU TO DO YOUR WORK, TAKE CARE OF THINGS AT HOME, OR GET ALONG WITH OTHER PEOPLE: NOT DIFFICULT AT ALL
SUM OF ALL RESPONSES TO PHQ QUESTIONS 1-9: 4

## 2024-06-11 NOTE — PROGRESS NOTES
[facility]                                     Progress Note    Patient Name: Roland Sandoval  Date: 6/11/2024         Service Type: Individual      Session Start Time: 15:12  Session End Time: 16:00     Session Length: 48    Session #:14    Attendees: Client    Service Modality:  virtual      Provider verified identity through the following two step process.  Patient provided:  Patient is known previously to provider    Telemedicine Visit: The patient's condition can be safely assessed and treated via synchronous audio and visual telemedicine encounter.      Reason for Telemedicine Visit: Services only offered telehealth    Originating Site (Patient Location): Patient's home    Distant Site (Provider Location): Provider Remote Setting- Home Office    Consent:  The patient/guardian has verbally consented to: the potential risks and benefits of telemedicine (video visit) versus in person care; bill my insurance or make self-payment for services provided; and responsibility for payment of non-covered services.     Patient would like the video invitation sent by:  My Chart    Mode of Communication:  Amwell    Distant Location (Provider):  Off-site    As the provider I attest to compliance with applicable laws and regulations related to telemedicine.    DATA  Interactive Complexity: No  Crisis: No      Progress Since Last Session (Related to Symptoms / Goals / Homework):   Symptoms:  Anxious, sad    Homework: Partially completed      Episode of Care Goals: Satisfactory progress - ACTION (Actively working towards change); Intervened by reinforcing change plan / affirming steps taken.  Ongoing concerns around job search causing much more depressed mood and sadness.     Current / Ongoing Stressors and Concerns: Patient has been experiencing some high level of stress.  He has no idea where his birth certificate might be. He believes he really placed it in a place that he usually put it and always  makes sure his important documents are in the same place. He shared sadness and confusion. Will need to reapply for a new one from [place]. Patient has not find a job yet. He continues to apply. He finds it is hard and he feels hopeful to keep up with the search. Has been trying to walk and be socially active. No safety concern today. His next visit is in 2 weeks.      Treatment Objective(s) Addressed in This Session:   practice deep breathing at least 3 times a day  Processed his thoughts and feelings around recent experience at kick box     Intervention:  Safety plan: Reviewed today  CBT: 3Cs as reviewed and reinforced    Assessments completed prior to visit 10/30/2023    The following assessments were completed by patient for this visit:  PHQ2:       11/10/2021     2:32 PM   PHQ-2 ( 1999 [employer])   Q1: Little interest or pleasure in doing things 0   Q2: Feeling down, depressed or hopeless 0   PHQ-2 Score 0   Q1: Little interest or pleasure in doing things Not at all   Q2: Feeling down, depressed or hopeless Not at all   PHQ-2 Score 0     PHQ9:       3/22/2024    10:09 AM 4/5/2024    12:11 PM 4/19/2024     9:47 AM 5/2/2024     9:41 AM 5/17/2024    10:10 AM 5/31/2024     2:21 PM 6/11/2024    10:54 AM   PHQ-9 SCORE   PHQ-9 Total Score MyChart 1 (Minimal depression) 2 (Minimal depression) 1 (Minimal depression) 1 (Minimal depression) 2 (Minimal depression) 1 (Minimal depression) 4 (Minimal depression)   PHQ-9 Total Score 1 2 1 1 2 1 4     GAD2:       7/10/2023    12:59 PM 10/26/2023     8:03 AM 2/1/2024     1:09 PM 3/7/2024     3:42 PM 3/22/2024    10:10 AM 4/5/2024    12:11 PM   PARI-2   Feeling nervous, anxious, or on edge 1 1 0 0 1 0   Not being able to stop or control worrying 1 0 0 0 0 1   PARI-2 Total Score 2 1 0 0    0    0 1 1       ASSESSMENT: Current Emotional / Mental Status (status of significant symptoms):   Risk status (Self / Other harm or suicidal ideation)   Patient denies current fears or concerns for  personal safety.   Patient denies current or recent suicidal ideation or behaviors.   Patient denies current or recent homicidal ideation or behaviors.   Patient denies current or recent self injurious behavior or ideation.   Patient denies other safety concerns.   Patient reports there has been no change in risk factors since their last session.     Patient reports there has been no change in protective factors since their last session.     Recommended that patient call 911 or go to the local ED should there be a change in any of these risk factors.     Appearance:   Appropriate    Eye Contact:   Good    Psychomotor Behavior: Normal    Attitude:   Cooperative    Orientation:   Person Place Time Situation   Speech    Rate / Production: Normal/ Responsive    Volume:  Normal    Mood:    Depressed    Affect:    Appropriate    Thought Content:  Clear    Thought Form:  Coherent  Logical    Insight:    Good      Medication Review:   No changes to current psychiatric medication(s)     Medication Compliance:   Yes     Changes in Health Issues:   None reported     Chemical Use Review:   Substance Use: Chemical use reviewed, no active concerns identified      Tobacco Use: No current tobacco use.      Diagnosis:  1. PARI (generalized anxiety disorder)      Collateral Reports Completed:   Not Applicable    PLAN: (Patient Tasks / Therapist Tasks / Other):  Patient will connect with the [place] [facility] about a new Birthday certificate   Patient will take walks to alleviate his depression  Patient next visit is in 2 weeks    RAND Howard  ______________________________________________________________________    Individual Treatment Plan    Patient's Name: Roland Sandoval  YOB: 1995    Date of Creation: 11/14/2023    Date Treatment Plan Last Reviewed/Revised: 6/11/2024    DSM5 Diagnoses: 296.32 (F33.1) Major Depressive Disorder, Recurrent Episode, Moderate _ and With anxious distress or 300.02  (F41.1) Generalized Anxiety Disorder  Psychosocial / Contextual Factors:  Feelings around rejection, not belonging for so long that it has been affecting his sleep and his thinking.  .     PROMIS (reviewed every 90 days): 27    Referral / Collaboration:  Referral to another professional/service is not indicated at this time..    Anticipated number of session for this episode of care: 9-12 sessions  Anticipation frequency of session: Biweekly  Anticipated Duration of each session: 38-52 minutes  Treatment plan will be reviewed in 90 days or when goals have been changed.     MeasurableTreatment Goal(s) related to diagnosis / functional impairment(s)  Goal 1: Patient will stabilize anxiety level while increasing ability to function on a daily basis.    I will know I've met my goal when patient is able to challenge any cognitive traps as they present at least at 80 % of the time by the next review(90 days)     Objective #A (Patient Action)    Patient will use thought-stopping strategy daily to reduce intrusive thoughts.  Status: Continued - Date(s): 6/11/2024    Intervention(s)  Therapist will teach distraction skills. Using the 5 senses .    Objective #B  Patient will identify at least 4 fears / thoughts that contribute to feeling anxious.  Status: Continued - Date(s): 6/11/2024    Intervention(s)  Therapist will teach CBT modality and coping skills to challenge any cognitive traps such as the 3 Cs    Goal 2: Patient will alleviate depressed mood and return to previous level of effective functioning    I will know I've met my goal when my energy level have increased at least at 70 % on daily basis by the next review( 90 days)    Objective #A (Patient Action)    Status: Continued - Date(s): 6/11/2024  Patient will Decrease frequency and intensity of feeling down, depressed, hopeless.  Intervention(s)  Therapist will provide educational materials on mindfulness .    Objective #B  Patient will Improve concentration,  focus, and mindfulness in daily activities .    Status: Continued - Date(s): 6/11/2024  Intervention(s)  Therapist will provide space to share and process thoughts and feelings related to current stressors .    Patient has reviewed and agreed to the above plan.    RAND Howard  June 11,2024  Answers submitted by the patient for this visit:  Patient Health Questionnaire (Submitted on 12/15/2023)  If you checked off any problems, how difficult have these problems made it for you to do your work, take care of things at home, or get along with other people?: Not difficult at all  PHQ9 TOTAL SCORE: 1    Answers submitted by the patient for this visit:  Patient Health Questionnaire (Submitted on 12/22/2023)  If you checked off any problems, how difficult have these problems made it for you to do your work, take care of things at home, or get along with other people?: Somewhat difficult  PHQ9 TOTAL SCORE: 6    Answers submitted by the patient for this visit:  Patient Health Questionnaire (Submitted on 2/1/2024)  If you checked off any problems, how difficult have these problems made it for you to do your work, take care of things at home, or get along with other people?: Not difficult at all  PHQ9 TOTAL SCORE: 1    Answers submitted by the patient for this visit:  Patient Health Questionnaire (Submitted on 3/8/2024)  If you checked off any problems, how difficult have these problems made it for you to do your work, take care of things at home, or get along with other people?: Not difficult at all  PHQ9 TOTAL SCORE: 1    Answers submitted by the patient for this visit:  Patient Health Questionnaire (Submitted on 3/22/2024)  If you checked off any problems, how difficult have these problems made it for you to do your work, take care of things at home, or get along with other people?: Somewhat difficult  PHQ9 TOTAL SCORE: 1    Answers submitted by the patient for this visit:  Patient Health Questionnaire  (Submitted on 4/5/2024)  If you checked off any problems, how difficult have these problems made it for you to do your work, take care of things at home, or get along with other people?: Not difficult at all  PHQ9 TOTAL SCORE: 2    Answers submitted by the patient for this visit:  Patient Health Questionnaire (Submitted on 4/19/2024)  If you checked off any problems, how difficult have these problems made it for you to do your work, take care of things at home, or get along with other people?: Not difficult at all  PHQ9 TOTAL SCORE: 1    Answers submitted by the patient for this visit:  Patient Health Questionnaire (Submitted on 5/2/2024)  If you checked off any problems, how difficult have these problems made it for you to do your work, take care of things at home, or get along with other people?: Not difficult at all  PHQ9 TOTAL SCORE: 1    Answers submitted by the patient for this visit:  Patient Health Questionnaire (Submitted on 5/17/2024)  If you checked off any problems, how difficult have these problems made it for you to do your work, take care of things at home, or get along with other people?: Not difficult at all  PHQ9 TOTAL SCORE: 2    Answers submitted by the patient for this visit:  Patient Health Questionnaire (Submitted on 5/31/2024)  If you checked off any problems, how difficult have these problems made it for you to do your work, take care of things at home, or get along with other people?: Not difficult at all  PHQ9 TOTAL SCORE: 1    Answers submitted by the patient for this visit:  Patient Health Questionnaire (Submitted on 6/11/2024)  If you checked off any problems, how difficult have these problems made it for you to do your work, take care of things at home, or get along with other people?: Not difficult at all  PHQ9 TOTAL SCORE: 4

## 2024-06-25 ENCOUNTER — VIRTUAL VISIT (OUTPATIENT)
Dept: PSYCHOLOGY | Facility: CLINIC | Age: 29
End: 2024-06-25
Payer: COMMERCIAL

## 2024-06-25 DIAGNOSIS — F33.1 MODERATE EPISODE OF RECURRENT MAJOR DEPRESSIVE DISORDER (H): Primary | ICD-10-CM

## 2024-06-25 PROCEDURE — 90834 PSYTX W PT 45 MINUTES: CPT | Mod: 95 | Performed by: SOCIAL WORKER

## 2024-06-25 ASSESSMENT — PATIENT HEALTH QUESTIONNAIRE - PHQ9
10. IF YOU CHECKED OFF ANY PROBLEMS, HOW DIFFICULT HAVE THESE PROBLEMS MADE IT FOR YOU TO DO YOUR WORK, TAKE CARE OF THINGS AT HOME, OR GET ALONG WITH OTHER PEOPLE: NOT DIFFICULT AT ALL
SUM OF ALL RESPONSES TO PHQ QUESTIONS 1-9: 0
SUM OF ALL RESPONSES TO PHQ QUESTIONS 1-9: 0

## 2024-06-25 NOTE — PROGRESS NOTES
[facility]                                     Progress Note    Patient Name: Roland Sandoval  Date: 6/25/2024         Service Type: Individual      Session Start Time: 13:02       Session End Time: 13:54     Session Length: 52    Session #:15    Attendees: Client    Service Modality:  virtual      Provider verified identity through the following two step process.  Patient provided:  Patient is known previously to provider    Telemedicine Visit: The patient's condition can be safely assessed and treated via synchronous audio and visual telemedicine encounter.      Reason for Telemedicine Visit: Services only offered telehealth    Originating Site (Patient Location): Patient's home    Distant Site (Provider Location): Provider Remote Setting- Home Office    Consent:  The patient/guardian has verbally consented to: the potential risks and benefits of telemedicine (video visit) versus in person care; bill my insurance or make self-payment for services provided; and responsibility for payment of non-covered services.     Patient would like the video invitation sent by:  My Chart; phone call,doximity    Mode of Communication:  Amwell;phone, doximity    Distant Location (Provider):  Off-site    As the provider I attest to compliance with applicable laws and regulations related to telemedicine.    DATA  Interactive Complexity: No  Crisis: No      Progress Since Last Session (Related to Symptoms / Goals / Homework):   Symptoms:  Anxious, sad    Homework: Partially completed      Episode of Care Goals: Satisfactory progress - ACTION (Actively working towards change); Intervened by reinforcing change plan / affirming steps taken.  Ongoing concerns around job search causing much more depressed mood and sadness.     Current / Ongoing Stressors and Concerns: Some  connection was observed. Patient and writer ended up moving to D[employer]im[employer] and phone call form amwell.  Patient expressed understanding about  this interruption. Reports increased level of depression. Has not been outside for 2 days. Has been trying to find a job. Found out the interview he had was about driving and he has no driving license yet. He is working on getting one.  Has been going to his kick box and finds support from his boyfriend. Shared the understanding of what to count. No safety concern. His next visit is in July. Will be contacted if a sooner opening is noted.      Treatment Objective(s) Addressed in This Session:   practice deep breathing at least 2 times a day  Processed his thoughts and feelings around recent experience at kick box     Intervention:  Safety plan:was reviewed in reinforced.     CBT: Invited him to continue to challenge some ANTs is noted    Assessments completed prior to visit 10/30/2023    The following assessments were completed by patient for this visit:  PHQ2:       11/10/2021     2:32 PM   PHQ-2 ( 1999 [employer])   Q1: Little interest or pleasure in doing things 0   Q2: Feeling down, depressed or hopeless 0   PHQ-2 Score 0   Q1: Little interest or pleasure in doing things Not at all   Q2: Feeling down, depressed or hopeless Not at all   PHQ-2 Score 0     PHQ9:       4/5/2024    12:11 PM 4/19/2024     9:47 AM 5/2/2024     9:41 AM 5/17/2024    10:10 AM 5/31/2024     2:21 PM 6/11/2024    10:54 AM 6/25/2024    12:53 PM   PHQ-9 SCORE   PHQ-9 Total Score MyChart 2 (Minimal depression) 1 (Minimal depression) 1 (Minimal depression) 2 (Minimal depression) 1 (Minimal depression) 4 (Minimal depression) 0   PHQ-9 Total Score 2 1 1 2 1 4 0     GAD2:       7/10/2023    12:59 PM 10/26/2023     8:03 AM 2/1/2024     1:09 PM 3/7/2024     3:42 PM 3/22/2024    10:10 AM 4/5/2024    12:11 PM   PARI-2   Feeling nervous, anxious, or on edge 1 1 0 0 1 0   Not being able to stop or control worrying 1 0 0 0 0 1   PARI-2 Total Score 2 1 0 0    0    0 1 1       ASSESSMENT: Current Emotional / Mental Status (status of significant symptoms):   Risk  status (Self / Other harm or suicidal ideation)   Patient denies current fears or concerns for personal safety.   Patient denies current or recent suicidal ideation or behaviors.   Patient denies current or recent homicidal ideation or behaviors.   Patient denies current or recent self injurious behavior or ideation.   Patient denies other safety concerns.   Patient reports there has been no change in risk factors since their last session.     Patient reports there has been no change in protective factors since their last session.     Recommended that patient call 911 or go to the local ED should there be a change in any of these risk factors.     Appearance:   Appropriate    Eye Contact:   Good    Psychomotor Behavior: Normal    Attitude:   Cooperative    Orientation:   Person Place Time Situation   Speech    Rate / Production: Normal/ Responsive    Volume:  Normal    Mood:    Depressed    Affect:    Appropriate    Thought Content:  Clear    Thought Form:  Coherent  Logical    Insight:    Good      Medication Review:   No changes to current psychiatric medication(s)     Medication Compliance:   Yes     Changes in Health Issues:   None reported     Chemical Use Review:   Substance Use: Chemical use reviewed, no active concerns identified      Tobacco Use: No current tobacco use.      Diagnosis:  1. Moderate episode of recurrent major depressive disorder (H)      Collateral Reports Completed:   Not Applicable    PLAN: (Patient Tasks / Therapist Tasks / Other):  Patient will keep up with his grounding techniques as needed  Patient will continue to complete for at least 2 jobs a week  Patient will return late in July. Will be contact if any opening is noted  RAND Howard  ______________________________________________________________________    Individual Treatment Plan    Patient's Name: Roland Sandoval  YOB: 1995    Date of Creation: 11/14/2023    Date Treatment Plan Last  Reviewed/Revised: 6/11/2024    DSM5 Diagnoses: 296.32 (F33.1) Major Depressive Disorder, Recurrent Episode, Moderate _ and With anxious distress or 300.02 (F41.1) Generalized Anxiety Disorder  Psychosocial / Contextual Factors:  Feelings around rejection, not belonging for so long that it has been affecting his sleep and his thinking.  .     PROMIS (reviewed every 90 days): 27    Referral / Collaboration:  Referral to another professional/service is not indicated at this time..    Anticipated number of session for this episode of care: 9-12 sessions  Anticipation frequency of session: Biweekly  Anticipated Duration of each session: 38-52 minutes  Treatment plan will be reviewed in 90 days or when goals have been changed.     MeasurableTreatment Goal(s) related to diagnosis / functional impairment(s)  Goal 1: Patient will stabilize anxiety level while increasing ability to function on a daily basis.    I will know I've met my goal when patient is able to challenge any cognitive traps as they present at least at 80 % of the time by the next review(90 days)     Objective #A (Patient Action)    Patient will use thought-stopping strategy daily to reduce intrusive thoughts.  Status: Continued - Date(s): 6/11/2024    Intervention(s)  Therapist will teach distraction skills. Using the 5 senses .    Objective #B  Patient will identify at least 4 fears / thoughts that contribute to feeling anxious.  Status: Continued - Date(s): 6/11/2024    Intervention(s)  Therapist will teach CBT modality and coping skills to challenge any cognitive traps such as the 3 Cs    Goal 2: Patient will alleviate depressed mood and return to previous level of effective functioning    I will know I've met my goal when my energy level have increased at least at 70 % on daily basis by the next review( 90 days)    Objective #A (Patient Action)    Status: Continued - Date(s): 6/11/2024  Patient will Decrease frequency and intensity of feeling down,  depressed, hopeless.  Intervention(s)  Therapist will provide educational materials on mindfulness .    Objective #B  Patient will Improve concentration, focus, and mindfulness in daily activities .    Status: Continued - Date(s): 6/11/2024  Intervention(s)  Therapist will provide space to share and process thoughts and feelings related to current stressors .    Patient has reviewed and agreed to the above plan.    [facility]  June 11,2024  Answers submitted by the patient for this visit:  Patient Health Questionnaire (Submitted on 12/15/2023)  If you checked off any problems, how difficult have these problems made it for you to do your work, take care of things at home, or get along with other people?: Not difficult at all  PHQ9 TOTAL SCORE: 1    Answers submitted by the patient for this visit:  Patient Health Questionnaire (Submitted on 12/22/2023)  If you checked off any problems, how difficult have these problems made it for you to do your work, take care of things at home, or get along with other people?: Somewhat difficult  PHQ9 TOTAL SCORE: 6    Answers submitted by the patient for this visit:  Patient Health Questionnaire (Submitted on 2/1/2024)  If you checked off any problems, how difficult have these problems made it for you to do your work, take care of things at home, or get along with other people?: Not difficult at all  PHQ9 TOTAL SCORE: 1    Answers submitted by the patient for this visit:  Patient Health Questionnaire (Submitted on 3/8/2024)  If you checked off any problems, how difficult have these problems made it for you to do your work, take care of things at home, or get along with other people?: Not difficult at all  PHQ9 TOTAL SCORE: 1    Answers submitted by the patient for this visit:  Patient Health Questionnaire (Submitted on 3/22/2024)  If you checked off any problems, how difficult have these problems made it for you to do your work, take care of things at home, or get  along with other people?: Somewhat difficult  PHQ9 TOTAL SCORE: 1    Answers submitted by the patient for this visit:  Patient Health Questionnaire (Submitted on 4/5/2024)  If you checked off any problems, how difficult have these problems made it for you to do your work, take care of things at home, or get along with other people?: Not difficult at all  PHQ9 TOTAL SCORE: 2    Answers submitted by the patient for this visit:  Patient Health Questionnaire (Submitted on 4/19/2024)  If you checked off any problems, how difficult have these problems made it for you to do your work, take care of things at home, or get along with other people?: Not difficult at all  PHQ9 TOTAL SCORE: 1    Answers submitted by the patient for this visit:  Patient Health Questionnaire (Submitted on 5/2/2024)  If you checked off any problems, how difficult have these problems made it for you to do your work, take care of things at home, or get along with other people?: Not difficult at all  PHQ9 TOTAL SCORE: 1    Answers submitted by the patient for this visit:  Patient Health Questionnaire (Submitted on 5/17/2024)  If you checked off any problems, how difficult have these problems made it for you to do your work, take care of things at home, or get along with other people?: Not difficult at all  PHQ9 TOTAL SCORE: 2    Answers submitted by the patient for this visit:  Patient Health Questionnaire (Submitted on 5/31/2024)  If you checked off any problems, how difficult have these problems made it for you to do your work, take care of things at home, or get along with other people?: Not difficult at all  PHQ9 TOTAL SCORE: 1    Answers submitted by the patient for this visit:  Patient Health Questionnaire (Submitted on 6/11/2024)  If you checked off any problems, how difficult have these problems made it for you to do your work, take care of things at home, or get along with other people?: Not difficult at all  PHQ9 TOTAL SCORE: 4    Answers  submitted by the patient for this visit:  Patient Health Questionnaire (Submitted on 6/25/2024)  If you checked off any problems, how difficult have these problems made it for you to do your work, take care of things at home, or get along with other people?: Not difficult at all  PHQ9 TOTAL SCORE: 0

## 2024-07-25 ENCOUNTER — VIRTUAL VISIT (OUTPATIENT)
Dept: PSYCHOLOGY | Facility: CLINIC | Age: 29
End: 2024-07-25
Payer: COMMERCIAL

## 2024-07-25 DIAGNOSIS — F33.1 MODERATE EPISODE OF RECURRENT MAJOR DEPRESSIVE DISORDER (H): Primary | ICD-10-CM

## 2024-07-25 PROCEDURE — 90834 PSYTX W PT 45 MINUTES: CPT | Mod: 95 | Performed by: SOCIAL WORKER

## 2024-07-25 ASSESSMENT — ANXIETY QUESTIONNAIRES
1. FEELING NERVOUS, ANXIOUS, OR ON EDGE: MORE THAN HALF THE DAYS
GAD7 TOTAL SCORE: 6
4. TROUBLE RELAXING: NOT AT ALL
IF YOU CHECKED OFF ANY PROBLEMS ON THIS QUESTIONNAIRE, HOW DIFFICULT HAVE THESE PROBLEMS MADE IT FOR YOU TO DO YOUR WORK, TAKE CARE OF THINGS AT HOME, OR GET ALONG WITH OTHER PEOPLE: NOT DIFFICULT AT ALL
2. NOT BEING ABLE TO STOP OR CONTROL WORRYING: MORE THAN HALF THE DAYS
7. FEELING AFRAID AS IF SOMETHING AWFUL MIGHT HAPPEN: NOT AT ALL
5. BEING SO RESTLESS THAT IT IS HARD TO SIT STILL: NOT AT ALL
GAD7 TOTAL SCORE: 6
3. WORRYING TOO MUCH ABOUT DIFFERENT THINGS: MORE THAN HALF THE DAYS
8. IF YOU CHECKED OFF ANY PROBLEMS, HOW DIFFICULT HAVE THESE MADE IT FOR YOU TO DO YOUR WORK, TAKE CARE OF THINGS AT HOME, OR GET ALONG WITH OTHER PEOPLE?: NOT DIFFICULT AT ALL
GAD7 TOTAL SCORE: 6
7. FEELING AFRAID AS IF SOMETHING AWFUL MIGHT HAPPEN: NOT AT ALL
6. BECOMING EASILY ANNOYED OR IRRITABLE: NOT AT ALL

## 2024-07-25 ASSESSMENT — PATIENT HEALTH QUESTIONNAIRE - PHQ9
SUM OF ALL RESPONSES TO PHQ QUESTIONS 1-9: 0
SUM OF ALL RESPONSES TO PHQ QUESTIONS 1-9: 0
10. IF YOU CHECKED OFF ANY PROBLEMS, HOW DIFFICULT HAVE THESE PROBLEMS MADE IT FOR YOU TO DO YOUR WORK, TAKE CARE OF THINGS AT HOME, OR GET ALONG WITH OTHER PEOPLE: NOT DIFFICULT AT ALL

## 2024-07-26 NOTE — PROGRESS NOTES
[facility]                                     Progress Note    Patient Name: Roland Sandoval  Date: 7/25/2024         Service Type: Individual      Session Start Time: 10:06      Session End Time: 10:56     Session Length: 50    Session #:16    Attendees: Client    Service Modality:  virtual      Provider verified identity through the following two step process.  Patient provided:  Patient is known previously to provider    Telemedicine Visit: The patient's condition can be safely assessed and treated via synchronous audio and visual telemedicine encounter.      Reason for Telemedicine Visit: Services only offered telehealth    Originating Site (Patient Location): Patient's home    Distant Site (Provider Location): Provider Remote Setting- Home Office    Consent:  The patient/guardian has verbally consented to: the potential risks and benefits of telemedicine (video visit) versus in person care; bill my insurance or make self-payment for services provided; and responsibility for payment of non-covered services.     Patient would like the video invitation sent by:  My Chart; phone call,doximity    Mode of Communication:  Amwell;phone, doximity    Distant Location (Provider):  Off-site    As the provider I attest to compliance with applicable laws and regulations related to telemedicine.    DATA  Interactive Complexity: No  Crisis: No      Progress Since Last Session (Related to Symptoms / Goals / Homework):   Symptoms:  less depressed    Homework: Partially completed      Episode of Care Goals: Satisfactory progress - ACTION (Actively working towards change); Intervened by reinforcing change plan / affirming steps taken.  Some progress that led to a job offer.      Current / Ongoing Stressors and Concerns:  Patient has been feeling less depressed after rescuing a job. He shared how happy he is now. Only his relief has been over shadowed by some behaviors from a some guys in his group. He  found he was not being treated well and decided to exit the group. He has expressed a need to process his thoughts and feelings around his sexual identity as he now finds it is needed more than other presented issues early in sessions.  Patient has been trying to distract negative thoughts communicating  with his boyfriend and other positive people. [place] driving [place] friend's event. No safety concern today.  His next visit is in 2 weeks.      Treatment Objective(s) Addressed in This Session:   practice deep breathing at least 2 times a day  Processed feelings around recent interactions with group members     Intervention:  Safety plan: Was reviewed and reinforced    CBT:Processed some thoughts around his recent treatment by some group members causing him to exiting the group    Assessments completed prior to visit 10/30/2023    The following assessments were completed by patient for this visit:  PHQ2:       11/10/2021     2:32 PM   PHQ-2 ( 1999 [employer])   Q1: Little interest or pleasure in doing things 0   Q2: Feeling down, depressed or hopeless 0   PHQ-2 Score 0   Q1: Little interest or pleasure in doing things Not at all   Q2: Feeling down, depressed or hopeless Not at all   PHQ-2 Score 0     PHQ9:       4/19/2024     9:47 AM 5/2/2024     9:41 AM 5/17/2024    10:10 AM 5/31/2024     2:21 PM 6/11/2024    10:54 AM 6/25/2024    12:53 PM 7/25/2024    10:06 AM   PHQ-9 SCORE   PHQ-9 Total Score MyChart 1 (Minimal depression) 1 (Minimal depression) 2 (Minimal depression) 1 (Minimal depression) 4 (Minimal depression) 0 0   PHQ-9 Total Score 1 1 2 1 4 0 0     GAD2:       7/10/2023    12:59 PM 10/26/2023     8:03 AM 2/1/2024     1:09 PM 3/7/2024     3:42 PM 3/22/2024    10:10 AM 4/5/2024    12:11 PM 7/25/2024    10:07 AM   PARI-2   Feeling nervous, anxious, or on edge 1 1 0 0 1 0 2   Not being able to stop or control worrying 1 0 0 0 0 1 2   PARI-2 Total Score 2 1 0 0    0    0 1 1 4       ASSESSMENT:  Current Emotional / Mental Status (status of significant symptoms):   Risk status (Self / Other harm or suicidal ideation)   Patient denies current fears or concerns for personal safety.   Patient denies current or recent suicidal ideation or behaviors.   Patient denies current or recent homicidal ideation or behaviors.   Patient denies current or recent self injurious behavior or ideation.   Patient denies other safety concerns.   Patient reports there has been no change in risk factors since their last session.     Patient reports there has been no change in protective factors since their last session.     Recommended that patient call 911 or go to the local ED should there be a change in any of these risk factors.     Appearance:   Appropriate    Eye Contact:   Good    Psychomotor Behavior: Normal    Attitude:   Cooperative    Orientation:   Person Place Time Situation   Speech    Rate / Production: Normal/ Responsive    Volume:  Normal    Mood:    Depressed    Affect:    Appropriate    Thought Content:  Clear    Thought Form:  Coherent  Logical    Insight:    Good      Medication Review:   No changes to current psychiatric medication(s)     Medication Compliance:   Yes     Changes in Health Issues:   None reported     Chemical Use Review:   Substance Use: Chemical use reviewed, no active concerns identified      Tobacco Use: No current tobacco use.      Diagnosis:  1. Moderate episode of recurrent major depressive disorder (H)      Collateral Reports Completed:   Not Applicable    PLAN: (Patient Tasks / Therapist Tasks / Other):  Patient will focus on organizing his daily life to start work with less stress  Patient will also keep up with healthy boundaries to help with his relationships.   Writer to look for a provider with the patient's choice  Patient's next visit is in 2 weeks.  RAND Howard  ______________________________________________________________________    Individual Treatment  Plan    Patient's Name: Roland Sandoval  YOB: 1995    Date of Creation: 11/14/2023    Date Treatment Plan Last Reviewed/Revised: 6/11/2024    DSM5 Diagnoses: 296.32 (F33.1) Major Depressive Disorder, Recurrent Episode, Moderate _ and With anxious distress or 300.02 (F41.1) Generalized Anxiety Disorder  Psychosocial / Contextual Factors:  Feelings around rejection, not belonging for so long that it has been affecting his sleep and his thinking.  .     PROMIS (reviewed every 90 days): 27    Referral / Collaboration:  Referral to another professional/service is not indicated at this time..    Anticipated number of session for this episode of care: 9-12 sessions  Anticipation frequency of session: Biweekly  Anticipated Duration of each session: 38-52 minutes  Treatment plan will be reviewed in 90 days or when goals have been changed.     MeasurableTreatment Goal(s) related to diagnosis / functional impairment(s)  Goal 1: Patient will stabilize anxiety level while increasing ability to function on a daily basis.    I will know I've met my goal when patient is able to challenge any cognitive traps as they present at least at 80 % of the time by the next review(90 days)     Objective #A (Patient Action)    Patient will use thought-stopping strategy daily to reduce intrusive thoughts.  Status: Continued - Date(s): 6/11/2024    Intervention(s)  Therapist will teach distraction skills. Using the 5 senses .    Objective #B  Patient will identify at least 4 fears / thoughts that contribute to feeling anxious.  Status: Continued - Date(s): 6/11/2024    Intervention(s)  Therapist will teach CBT modality and coping skills to challenge any cognitive traps such as the 3 Cs    Goal 2: Patient will alleviate depressed mood and return to previous level of effective functioning    I will know I've met my goal when my energy level have increased at least at 70 % on daily basis by the next review( 90 days)    Objective  #A (Patient Action)    Status: Continued - Date(s): 6/11/2024  Patient will Decrease frequency and intensity of feeling down, depressed, hopeless.  Intervention(s)  Therapist will provide educational materials on mindfulness .    Objective #B  Patient will Improve concentration, focus, and mindfulness in daily activities .    Status: Continued - Date(s): 6/11/2024  Intervention(s)  Therapist will provide space to share and process thoughts and feelings related to current stressors .    Patient has reviewed and agreed to the above plan.    RAND Howard  June 11,2024  Answers submitted by the patient for this visit:  Patient Health Questionnaire (Submitted on 12/15/2023)  If you checked off any problems, how difficult have these problems made it for you to do your work, take care of things at home, or get along with other people?: Not difficult at all  PHQ9 TOTAL SCORE: 1    Answers submitted by the patient for this visit:  Patient Health Questionnaire (Submitted on 12/22/2023)  If you checked off any problems, how difficult have these problems made it for you to do your work, take care of things at home, or get along with other people?: Somewhat difficult  PHQ9 TOTAL SCORE: 6    Answers submitted by the patient for this visit:  Patient Health Questionnaire (Submitted on 2/1/2024)  If you checked off any problems, how difficult have these problems made it for you to do your work, take care of things at home, or get along with other people?: Not difficult at all  PHQ9 TOTAL SCORE: 1    Answers submitted by the patient for this visit:  Patient Health Questionnaire (Submitted on 3/8/2024)  If you checked off any problems, how difficult have these problems made it for you to do your work, take care of things at home, or get along with other people?: Not difficult at all  PHQ9 TOTAL SCORE: 1    Answers submitted by the patient for this visit:  Patient Health Questionnaire (Submitted on 3/22/2024)  If you  checked off any problems, how difficult have these problems made it for you to do your work, take care of things at home, or get along with other people?: Somewhat difficult  PHQ9 TOTAL SCORE: 1    Answers submitted by the patient for this visit:  Patient Health Questionnaire (Submitted on 4/5/2024)  If you checked off any problems, how difficult have these problems made it for you to do your work, take care of things at home, or get along with other people?: Not difficult at all  PHQ9 TOTAL SCORE: 2    Answers submitted by the patient for this visit:  Patient Health Questionnaire (Submitted on 4/19/2024)  If you checked off any problems, how difficult have these problems made it for you to do your work, take care of things at home, or get along with other people?: Not difficult at all  PHQ9 TOTAL SCORE: 1    Answers submitted by the patient for this visit:  Patient Health Questionnaire (Submitted on 5/2/2024)  If you checked off any problems, how difficult have these problems made it for you to do your work, take care of things at home, or get along with other people?: Not difficult at all  PHQ9 TOTAL SCORE: 1    Answers submitted by the patient for this visit:  Patient Health Questionnaire (Submitted on 5/17/2024)  If you checked off any problems, how difficult have these problems made it for you to do your work, take care of things at home, or get along with other people?: Not difficult at all  PHQ9 TOTAL SCORE: 2    Answers submitted by the patient for this visit:  Patient Health Questionnaire (Submitted on 5/31/2024)  If you checked off any problems, how difficult have these problems made it for you to do your work, take care of things at home, or get along with other people?: Not difficult at all  PHQ9 TOTAL SCORE: 1    Answers submitted by the patient for this visit:  Patient Health Questionnaire (Submitted on 6/11/2024)  If you checked off any problems, how difficult have these problems made it for you to do  your work, take care of things at home, or get along with other people?: Not difficult at all  PHQ9 TOTAL SCORE: 4    Answers submitted by the patient for this visit:  Patient Health Questionnaire (Submitted on 6/25/2024)  If you checked off any problems, how difficult have these problems made it for you to do your work, take care of things at home, or get along with other people?: Not difficult at all  PHQ9 TOTAL SCORE: 0    Answers submitted by the patient for this visit:  Patient Health Questionnaire (Submitted on 7/25/2024)  If you checked off any problems, how difficult have these problems made it for you to do your work, take care of things at home, or get along with other people?: Not difficult at all  PHQ9 TOTAL SCORE: 0  PARI-7 (Submitted on 7/25/2024)  PARI 7 TOTAL SCORE: 6

## 2024-08-08 ENCOUNTER — VIRTUAL VISIT (OUTPATIENT)
Dept: PSYCHOLOGY | Facility: CLINIC | Age: 29
End: 2024-08-08
Payer: COMMERCIAL

## 2024-08-08 DIAGNOSIS — F33.1 MODERATE EPISODE OF RECURRENT MAJOR DEPRESSIVE DISORDER (H): Primary | ICD-10-CM

## 2024-08-08 DIAGNOSIS — F41.1 GAD (GENERALIZED ANXIETY DISORDER): ICD-10-CM

## 2024-08-08 PROCEDURE — 90834 PSYTX W PT 45 MINUTES: CPT | Mod: 95 | Performed by: SOCIAL WORKER

## 2024-08-08 ASSESSMENT — COLUMBIA-SUICIDE SEVERITY RATING SCALE - C-SSRS
1. HAVE YOU WISHED YOU WERE DEAD OR WISHED YOU COULD GO TO SLEEP AND NOT WAKE UP?: NO
TOTAL  NUMBER OF INTERRUPTED ATTEMPTS LIFETIME: NO
6. HAVE YOU EVER DONE ANYTHING, STARTED TO DO ANYTHING, OR PREPARED TO DO ANYTHING TO END YOUR LIFE?: NO
2. HAVE YOU ACTUALLY HAD ANY THOUGHTS OF KILLING YOURSELF?: NO
TOTAL  NUMBER OF ABORTED OR SELF INTERRUPTED ATTEMPTS LIFETIME: NO
ATTEMPT LIFETIME: NO

## 2024-08-08 ASSESSMENT — PATIENT HEALTH QUESTIONNAIRE - PHQ9
SUM OF ALL RESPONSES TO PHQ QUESTIONS 1-9: 0
10. IF YOU CHECKED OFF ANY PROBLEMS, HOW DIFFICULT HAVE THESE PROBLEMS MADE IT FOR YOU TO DO YOUR WORK, TAKE CARE OF THINGS AT HOME, OR GET ALONG WITH OTHER PEOPLE: NOT DIFFICULT AT ALL
SUM OF ALL RESPONSES TO PHQ QUESTIONS 1-9: 0

## 2024-08-08 NOTE — PROGRESS NOTES
Discharge Summary  Multiple Sessions    Client Name: Roland Sandoval MRN#: [id number] YOB: 1995    Discharge Date:   August 8, 2024    Service Modality: Video Visit:      Provider verified identity through the following two step process.  Patient provided:  Patient is known previously to provider    Telemedicine Visit: The patient's condition can be safely assessed and treated via synchronous audio and visual telemedicine encounter.      Reason for Telemedicine Visit: Services only offered telehealth    Originating Site (Patient Location): Patient's other car riding with boyfriend. Permission given to communicate    Distant Site (Provider Location): Provider Remote Setting- Home Office    Consent:  The patient/guardian has verbally consented to: the potential risks and benefits of telemedicine (video visit) versus in person care; bill my insurance or make self-payment for services provided; and responsibility for payment of non-covered services.     Patient would like the video invitation sent by:  My Chart    Mode of Communication:  Video Conference via Fancy    Distant Location (Provider):  Off-site    As the provider I attest to compliance with applicable laws and regulations related to telemedicine.    Service Type: Individual      Session Start Time: 12:06  Session End Time: 12:52      Session Length: 45 - 50     Session #: 17     Attendees: Patient was in a car with boyfriend bu he did not participate in the conversation.     Focus of Treatment Objective(s):  Client's presenting concerns included: Depressed Mood - increase motivation and anergy  Anxiety - challenge any cognitive traps as Identified  Stage of Change at time of Discharge: ACTION (Actively working towards change)    Medication Adherence:  Yes    Chemical Use:  Yes    Assessment: Current Emotional / Mental Status (status of significant symptoms):    Risk status (Self / Other harm or suicidal ideation)  Client  denies current fears or concerns for personal safety.  Client denies current or recent suicidal ideation or behaviors.  Client denies current or recent homicidal ideation or behaviors.  Client denies current or recent self injurious behavior or ideation.  Client denies other safety concerns.  A safety and risk management plan has not been developed at this time, however client was given the after-hours number should there be a change in any of these risk factors.    Appearance:   Appropriate   Eye Contact:   Good   Psychomotor Behavior: Normal   Attitude:   Cooperative   Orientation:   Person Place Time Situation  Speech   Rate / Production: Normal/ Responsive   Volume:  Normal   Mood:    Normal  Affect:    Appropriate   Thought Content:  Clear   Thought Form:  Coherent  Logical   Insight:   Good     DSM5 Diagnoses: (Sustained by DSM5 Criteria Listed Above)  Diagnoses: 296.32 (F33.1) Major Depressive Disorder, Recurrent Episode, Moderate _ and With anxious distress  300.02 (F41.1) Generalized Anxiety Disorder  Psychosocial & Contextual Factors: Unresolved grief, MDD, Anxiety, lack of sense of belonging leading to lack of confidence,  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 No SI and he has safety plan.  Assessments Completed:  The following assessments were completed by patient for this visit:  PHQ2:       11/10/2021     2:32 PM   PHQ-2 ( 1999 [employer])   Q1: Little interest or pleasure in doing things 0   Q2: Feeling down, depressed or hopeless 0   PHQ-2 Score 0   Q1: Little interest or pleasure in doing things Not at all   Q2: Feeling down, depressed or hopeless Not at all   PHQ-2 Score 0   PHQ9:       5/2/2024     9:41 AM 5/17/2024    10:10 AM 5/31/2024     2:21 PM 6/11/2024    10:54 AM 6/25/2024    12:53 PM 7/25/2024    10:06 AM 8/8/2024    11:53 AM   PHQ-9 SCORE   PHQ-9 Total Score MyChart 1 (Minimal depression) 2 (Minimal depression) 1 (Minimal  depression) 4 (Minimal depression) 0 0 0   PHQ-9 Total Score 1 2 1 4 0 0 0   GAD2:       10/26/2023     8:03 AM 2/1/2024     1:09 PM 3/7/2024     3:42 PM 3/22/2024    10:10 AM 4/5/2024    12:11 PM 7/25/2024    10:07 AM 8/8/2024    11:54 AM   PARI-2   Feeling nervous, anxious, or on edge 1 0 0 1 0 2 0   Not being able to stop or control worrying 0 0 0 0 1 2 0   PARI-2 Total Score 1 0 0    0    0 1 1 4 0   GAD7:       10/31/2023     6:05 PM 1/5/2024     1:39 PM 3/8/2024    12:33 PM 3/22/2024    12:29 PM 5/31/2024     3:47 PM 7/25/2024    10:07 AM   PARI-7 SCORE   Total Score      6 (mild anxiety)   Total Score 7 6 7 7 7 6   CAGE-AID:       10/26/2023     8:04 AM   CAGE-AID Total Score   Total Score 1   Total Score MyChart 1 (A total score of 2 or greater is considered clinically significant)   PROMIS 10-Global Health (all questions and answers displayed):       10/26/2023     8:04 AM 2/1/2024     1:11 PM 3/7/2024     3:43 PM 3/22/2024    10:13 AM 4/5/2024    12:13 PM 7/25/2024    10:08 AM 8/8/2024    11:55 AM   PROMIS 10   In general, would you say your health is: Good Very good Very good Very good Fair Fair Very good   In general, would you say your quality of life is: Fair Fair Good Fair Fair Fair Fair   In general, how would you rate your physical health? Excellent Very good Excellent Good Very good Very good Very good   In general, how would you rate your mental health, including your mood and your ability to think? Fair Fair Excellent Good Fair Fair Good   In general, how would you rate your satisfaction with your social activities and relationships? Fair Fair Good Fair Fair Good Good   In general, please rate how well you carry out your usual social activities and roles Good Fair Good Good Fair Good Good   To what extent are you able to carry out your everyday physical activities such as walking, climbing stairs, carrying groceries, or moving a chair? Completely Completely Completely Completely Completely  Completely Completely   In the past 7 days, how often have you been bothered by emotional problems such as feeling anxious, depressed, or irritable? Sometimes Sometimes Sometimes Sometimes Sometimes Sometimes Rarely   In the past 7 days, how would you rate your fatigue on average? Mild Mild Mild None None Mild None   In the past 7 days, how would you rate your pain on average, where 0 means no pain, and 10 means worst imaginable pain? 3 0 0 2 0 4 0   In general, would you say your health is: 3 4 4 4 2 2 4   In general, would you say your quality of life is: 2 2 3 2 2 2 2   In general, how would you rate your physical health? 5 4 5 3 4 4 4   In general, how would you rate your mental health, including your mood and your ability to think? 2 2 5 3 2 2 3   In general, how would you rate your satisfaction with your social activities and relationships? 2 2 3 2 2 3 3   In general, please rate how well you carry out your usual social activities and roles. (This includes activities at home, at work and in your community, and responsibilities as a parent, child, spouse, employee, friend, etc.) 3 2 3 3 2 3 3   To what extent are you able to carry out your everyday physical activities such as walking, climbing stairs, carrying groceries, or moving a chair? 5 5 5 5 5 5 5   In the past 7 days, how often have you been bothered by emotional problems such as feeling anxious, depressed, or irritable? 3 3 3 3 3 3 2   In the past 7 days, how would you rate your fatigue on average? 2 2 2 1 1 2 1   In the past 7 days, how would you rate your pain on average, where 0 means no pain, and 10 means worst imaginable pain? 3 0 0 2 0 4 0   Global Mental Health Score 9 9 14    14    14 10 9 10 12   Global Physical Health Score 18 18 19    19    19 17 19 16 19   PROMIS TOTAL - SUBSCORES 27 27 33    33    33 27 28 26 31   [place] Suicide Severity Rating Scale (Lifetime/Recent)      8/8/2024     1:59 PM   [place] Suicide Severity Rating  (Lifetime/Recent)   Q1 Wish to be Dead (Lifetime) N   Q2 Non-Specific Active Suicidal Thoughts (Lifetime) N   Actual Attempt (Lifetime) N   Has subject engaged in non-suicidal self-injurious behavior? (Lifetime) N   Interrupted Attempts (Lifetime) N   Aborted or Self-Interrupted Attempt (Lifetime) N   Preparatory Acts or Behavior (Lifetime) N   Calculated C-SSRS Risk Score (Lifetime/Recent) No Risk Indicated     Reason for Discharge:  Referred to a different provider specialized in reported needs: Jan Gilbert    Aftercare Plan:  Client agreed to follow safety contract after discharge  Patient will follow through the referral to a new provider as discussed.     Tosin Moore [facility]  August 8, 2024   Answers submitted by the patient for this visit:  Patient Health Questionnaire (Submitted on 8/8/2024)  If you checked off any problems, how difficult have these problems made it for you to do your work, take care of things at home, or get along with other people?: Not difficult at all  PHQ9 TOTAL SCORE: 0

## 2024-08-09 ENCOUNTER — VIRTUAL VISIT (OUTPATIENT)
Dept: PSYCHOLOGY | Facility: CLINIC | Age: 29
End: 2024-08-09
Payer: COMMERCIAL

## 2024-08-09 DIAGNOSIS — F41.1 GAD (GENERALIZED ANXIETY DISORDER): Primary | ICD-10-CM

## 2024-08-09 DIAGNOSIS — F33.1 MODERATE EPISODE OF RECURRENT MAJOR DEPRESSIVE DISORDER (H): ICD-10-CM

## 2024-08-09 PROCEDURE — 90837 PSYTX W PT 60 MINUTES: CPT | Mod: 95 | Performed by: COUNSELOR

## 2024-08-09 NOTE — PROGRESS NOTES
[facility]                                     Progress Note    Patient Name: Roland Sandoval  Date: 8/09/24       Service Type: Individual      Session Start Time: 2:04      Session End Time: 3:02     Session Length: 58 minutes    Session #: 1    Attendees: Client    Service Modality:  virtual, video      Provider verified identity through the following two step process.  Patient provided:  Patient is known previously to provider    Telemedicine Visit: The patient's condition can be safely assessed and treated via synchronous audio and visual telemedicine encounter.      Reason for Telemedicine Visit: Services only offered telehealth    Originating Site (Patient Location): Patient's home    Distant Site (Provider Location): Provider Remote Setting- Home Office    Consent:  The patient/guardian has verbally consented to: the potential risks and benefits of telemedicine (video visit) versus in person care; bill my insurance or make self-payment for services provided; and responsibility for payment of non-covered services.     Patient would like the video invitation sent by:  My Chart    Mode of Communication:  Amwell    Distant Location (Provider):  Off-site    As the provider I attest to compliance with applicable laws and regulations related to telemedicine.    DATA  Interactive Complexity: No  Crisis: No   Extended Session (53+ minutes): PROLONGED SERVICE IN THE OUTPATIENT SETTING REQUIRING DIRECT (FACE-TO-FACE) PATIENT CONTACT BEYOND THE USUAL SERVICE:    - Patient's presenting concerns require more intensive intervention than could be completed within the usual service    - Treatment protocol required additional time to complete, due to the nature of diagnosis being treated.  See Interventions section for details     Progress Since Last Session (Related to Symptoms / Goals / Homework):   Symptoms: Improving pt has been dealing with his rumination better    Homework:  Pt is restarting  "therapy, no relevant past homework to report      Episode of Care Goals: Satisfactory progress - PREPARATION (Decided to change - considering how); Intervened by negotiating a change plan and determining options / strategies for behavior change, identifying triggers, exploring social supports, and working towards setting a date to begin behavior change.       Current / Ongoing Stressors and Concerns:    Next Session: schedule in-person meeting and further follow-up schedule  Pt is currently seeking therapy due to challenges with navigating finding a accepting community to connect with here in [place]. Pt had a falling out with a [employer] group, which he was active in for 9 months, and after leaving it feels it was the right decision as this group felt like is was full of \"fake\" or unauthentic people. The person who invited him to the discord group was a long time friend and through this experience, realized this person was a fake friend, which reinforced this feeling. Despite this pt continues to ruminate on how he could have handled things differently. Pt identified that rumination has been a long standing challenge for him and since starting therapy it has gotten much better. Pt actively has been trying to find new connections IRL and online since this happened, with some success. Pt does find connecting with others online to be more challenging. Pt confirmed that he feels socially anxious and has challenges with making friends/connections in general as well. Pt expressed that he has trust issues because things feel like they backfire much of the time when he opens up to others, so he has a hard time letting people in or being vulnerable. Pt briefly talked about his he does have guilt around needing to \"do better\", and thinks this is rooted in perfectionism. Overall his perfectionism used to be much worse, but can still present barriers at times. Session went longer then expected as pt is transferring to a new therapist " (writer) and needing more time to process through things with them. Trx plan was updated/reviewed as well.      Treatment Objective(s) Addressed in This Session:   practice deep breathing at least 2 times a day  Processed feelings around recent interactions with group members     Intervention:   CBT: Reviewed how his stressors impact his MH    Motivational Interviewing    MI Intervention: Expressed Empathy/Understanding, Supported Autonomy, Collaboration, Evocation, Open-ended questions, Reflections: simple and complex, Change talk (evoked), and Reframe     Change Talk Expressed by the Patient: Desire to change Ability to change Reasons to change Committment to change Activation Taking steps    Provider Response to Change Talk: E - Evoked more info from patient about behavior change, A - Affirmed patient's thoughts, decisions, or attempts at behavior change, R - Reflected patient's change talk, and S - Summarized patient's change talk statements    Psychodynamic: Processed through internal experiences related to anxiety and trust/social connection  Solution Focused: Identified immediate areas of concern and potential barriers to success    Safety plan: Was reviewed and reinforced    Assessments:  PHQ2:       11/10/2021     2:32 PM   PHQ-2 ( 1999 [employer])   Q1: Little interest or pleasure in doing things 0   Q2: Feeling down, depressed or hopeless 0   PHQ-2 Score 0   Q1: Little interest or pleasure in doing things Not at all   Q2: Feeling down, depressed or hopeless Not at all   PHQ-2 Score 0     PHQ9:       5/2/2024     9:41 AM 5/17/2024    10:10 AM 5/31/2024     2:21 PM 6/11/2024    10:54 AM 6/25/2024    12:53 PM 7/25/2024    10:06 AM 8/8/2024    11:53 AM   PHQ-9 SCORE   PHQ-9 Total Score MyChart 1 (Minimal depression) 2 (Minimal depression) 1 (Minimal depression) 4 (Minimal depression) 0 0 0   PHQ-9 Total Score 1 2 1 4 0 0 0     GAD2:       10/26/2023     8:03 AM 2/1/2024     1:09 PM 3/7/2024     3:42 PM 3/22/2024     10:10 AM 4/5/2024    12:11 PM 7/25/2024    10:07 AM 8/8/2024    11:54 AM   PARI-2   Feeling nervous, anxious, or on edge 1 0 0 1 0 2 0   Not being able to stop or control worrying 0 0 0 0 1 2 0   PARI-2 Total Score 1 0 0    0    0 1 1 4 0     GAD7:       10/31/2023     6:05 PM 1/5/2024     1:39 PM 3/8/2024    12:33 PM 3/22/2024    12:29 PM 5/31/2024     3:47 PM 7/25/2024    10:07 AM   PARI-7 SCORE   Total Score      6 (mild anxiety)   Total Score 7 6 7 7 7 6     PROMIS 10-Global Health (only subscores and total score):       10/26/2023     8:04 AM 2/1/2024     1:11 PM 3/7/2024     3:43 PM 3/22/2024    10:13 AM 4/5/2024    12:13 PM 7/25/2024    10:08 AM 8/8/2024    11:55 AM   PROMIS-10 Scores Only   Global Mental Health Score 9 9 14    14    14 10 9 10 12   Global Physical Health Score 18 18 19    19    19 17 19 16 19   PROMIS TOTAL - SUBSCORES 27 27 33    33    33 27 28 26 31     [place] Suicide Severity Rating Scale (Lifetime/Recent)      8/8/2024     1:59 PM   [place] Suicide Severity Rating (Lifetime/Recent)   Q1 Wish to be Dead (Lifetime) N   Q2 Non-Specific Active Suicidal Thoughts (Lifetime) N   Actual Attempt (Lifetime) N   Has subject engaged in non-suicidal self-injurious behavior? (Lifetime) N   Interrupted Attempts (Lifetime) N   Aborted or Self-Interrupted Attempt (Lifetime) N   Preparatory Acts or Behavior (Lifetime) N   Calculated C-SSRS Risk Score (Lifetime/Recent) No Risk Indicated       ASSESSMENT: Current Emotional / Mental Status (status of significant symptoms):   Risk status (Self / Other harm or suicidal ideation)   Patient denies current fears or concerns for personal safety.   Patient denies current or recent suicidal ideation or behaviors.   Patient denies current or recent homicidal ideation or behaviors.   Patient denies current or recent self injurious behavior or ideation.   Patient denies other safety concerns.   Patient reports there has been no change in risk factors since their  "last session.     Patient reports there has been no change in protective factors since their last session.     Recommended that patient call 911 or go to the local ED should there be a change in any of these risk factors.     Appearance:   Appropriate    Eye Contact:   Good    Psychomotor Behavior: Normal    Attitude:   Cooperative    Orientation:   Person Place Time Situation   Speech    Rate / Production: Normal/ Responsive    Volume:  Normal    Mood:    Depressed    Affect:    Appropriate    Thought Content:  Clear    Thought Form:  Coherent  Logical    Insight:    Good      Medication Review:   No changes to current psychiatric medication(s)     Medication Compliance:   Yes     Changes in Health Issues:   None reported     Chemical Use Review:   Substance Use: Chemical use reviewed, no active concerns identified      Tobacco Use: No current tobacco use.      Diagnosis:  1. PARI (generalized anxiety disorder)    2. Moderate episode of recurrent major depressive disorder (H)        Collateral Reports Completed:   Not Applicable    PLAN: (Patient Tasks / Therapist Tasks / Other):  Remind self \"I've done good enough\" when rumination starts  Work on recognizing what you can and can't control  Practice giving self space and christie when feeling guilt    Troy GilbertWilliamson [facility]  ______________________________________________________________________    Individual Treatment Plan    Patient's Name: Roland Sandoval  YOB: 1995    Date of Creation: 11/14/2023    Date Treatment Plan Last Reviewed/Revised: 8/8/24    DSM5 Diagnoses: 296.32 (F33.1) Major Depressive Disorder, Recurrent Episode, Moderate _ and With anxious distress or 300.02 (F41.1) Generalized Anxiety Disorder  Psychosocial / Contextual Factors:  Feelings around rejection, not belonging for so long that it has been affecting his sleep and his thinking.    PROMIS (reviewed every 90 days):  PROMIS-10 Scores        4/5/2024    12:13 PM 7/25/2024    " 10:08 AM 8/8/2024    11:55 AM   PROMIS-10 Total Score w/o Sub Scores   PROMIS TOTAL - SUBSCORES 28 26 31     Referral / Collaboration:  Referral to another professional/service is not indicated at this time..    Anticipated number of session for this episode of care: 9-12 sessions  Anticipation frequency of session: Biweekly  Anticipated Duration of each session: 38-52 minutes  Treatment plan will be reviewed in 90 days or when goals have been changed.     MeasurableTreatment Goal(s) related to diagnosis / functional impairment(s)  Goal 1: Patient will stabilize anxiety level while increasing ability to function on a daily basis.    I will know I've met my goal when patient is able to challenge any cognitive traps as they present at least at 80 % of the time by the next review(90 days)     Objective #A (Patient Action)    Patient will use thought-stopping strategy daily to reduce intrusive thoughts.  Status: Continued - Date(s): 8/8/24    Intervention(s)  Therapist will teach distraction skills. Using the 5 senses .    Objective #B  Patient will identify at least 4 fears / thoughts that contribute to feeling anxious.  Status: Continued - Date(s): 8/8/24    Intervention(s)  Therapist will teach CBT modality and coping skills to challenge any cognitive traps such as the 3 Cs    Goal 2: Patient will alleviate depressed mood and return to previous level of effective functioning    I will know I've met my goal when my energy level have increased at least at 70 % on daily basis by the next review( 90 days)    Objective #A (Patient Action)    Status: Continued - Date(s): 8/8/24  Patient will Decrease frequency and intensity of feeling down, depressed, hopeless.  Intervention(s)  Therapist will provide educational materials on mindfulness .    Objective #B  Patient will Improve concentration, focus, and mindfulness in daily activities .    Status: Continued - Date(s): 8/8/24  Intervention(s)  Therapist will provide space to  share and process thoughts and feelings related to current stressors .    Patient has reviewed and agreed to the above plan.    Originally developed by RAND Howard  June 11, 2023  Reviewed/Continued by ILYA Mcfarland  August 8, 2024

## 2024-08-16 ENCOUNTER — VIRTUAL VISIT (OUTPATIENT)
Dept: PSYCHOLOGY | Facility: CLINIC | Age: 29
End: 2024-08-16
Payer: COMMERCIAL

## 2024-08-16 DIAGNOSIS — F41.1 GAD (GENERALIZED ANXIETY DISORDER): Primary | ICD-10-CM

## 2024-08-16 DIAGNOSIS — F33.1 MODERATE EPISODE OF RECURRENT MAJOR DEPRESSIVE DISORDER (H): ICD-10-CM

## 2024-08-16 PROCEDURE — 90834 PSYTX W PT 45 MINUTES: CPT | Mod: 95 | Performed by: COUNSELOR

## 2024-08-16 NOTE — PROGRESS NOTES
[facility]                                     Progress Note    Patient Name: Roland Sandoval  Date: 8/16/24       Service Type: Individual      Session Start Time: 5:10      Session End Time: 5:59     Session Length: 49 minutes    Session #: 2    Attendees: Client    Service Modality:  virtual, video      Provider verified identity through the following two step process.  Patient provided:  Patient is known previously to provider    Telemedicine Visit: The patient's condition can be safely assessed and treated via synchronous audio and visual telemedicine encounter.      Reason for Telemedicine Visit: Services only offered telehealth    Originating Site (Patient Location): Patient's home    Distant Site (Provider Location): Provider Remote Setting- Home Office    Consent:  The patient/guardian has verbally consented to: the potential risks and benefits of telemedicine (video visit) versus in person care; bill my insurance or make self-payment for services provided; and responsibility for payment of non-covered services.     Patient would like the video invitation sent by:  My Chart    Mode of Communication:  Amwell    Distant Location (Provider):  Off-site    As the provider I attest to compliance with applicable laws and regulations related to telemedicine.    DATA  Interactive Complexity: No  Crisis: No   Extended Session (53+ minutes): No     Progress Since Last Session (Related to Symptoms / Goals / Homework):   Symptoms: Improving pt has been dealing with resisting his rumination better    Homework: Partially completed      Episode of Care Goals: Satisfactory progress - PREPARATION (Decided to change - considering how); Intervened by negotiating a change plan and determining options / strategies for behavior change, identifying triggers, exploring social supports, and working towards setting a date to begin behavior change.       Current / Ongoing Stressors and Concerns:    Next  "Session: schedule in-person meeting and further follow-up schedule  Pt is currently seeking therapy due to challenges with navigating finding a accepting community to connect with here in [place]. Pt had a falling out with a discord group, which he was active in for 9 months, and after leaving it feels it was the right decision as this group felt like is was full of \"fake\" or unauthentic people. Since last session, pt reported that this week has been uneventful and has been feeling fine. Pt recounted the events that lead up to his fallout with the discord group and processed through how it made him feel. Pt reflected that in the end he felt singled out and wonders if this is actually the case. Pt acknowledged that he still feels like he is carrying a lot of big feelings around this and finds himself getting into hypothetical arguments in his head. Pt explored why this experienced has been so impactful for him and what would help him resolve these big feelings. Pt was able to verbalize that he would like to get closure on the situation and yet recognizes that this isn't likely to happen. Pt thinks that his social anxiety tends to feed into this as well. Psycho-ed on facts vs feelings was provided, which pt thought was a framing worth considering. Pt also confirmed that prior to today he hadn't had any rumination on this situation since last session.      Treatment Objective(s) Addressed in This Session:   practice deep breathing at least 2 times a day  Processed feelings around recent interactions with group members     Intervention:   CBT: Reviewed how his stressors impact his MH    Motivational Interviewing    MI Intervention: Expressed Empathy/Understanding, Supported Autonomy, Collaboration, Evocation, Open-ended questions, Reflections: simple and complex, Change talk (evoked), and Reframe     Change Talk Expressed by the Patient: Desire to change Ability to change Reasons to change Committment to change Activation " Taking steps    Provider Response to Change Talk: E - Evoked more info from patient about behavior change, A - Affirmed patient's thoughts, decisions, or attempts at behavior change, R - Reflected patient's change talk, and S - Summarized patient's change talk statements    Psychodynamic: Processed through internal experiences related to anxiety and trust/social connection  Solution Focused: Identified immediate areas of concern and potential barriers to success    Safety plan: Was reviewed and reinforced    Assessments:  PHQ2:       11/10/2021     2:32 PM   PHQ-2 ( 1999 [employer])   Q1: Little interest or pleasure in doing things 0   Q2: Feeling down, depressed or hopeless 0   PHQ-2 Score 0   Q1: Little interest or pleasure in doing things Not at all   Q2: Feeling down, depressed or hopeless Not at all   PHQ-2 Score 0     PHQ9:       5/2/2024     9:41 AM 5/17/2024    10:10 AM 5/31/2024     2:21 PM 6/11/2024    10:54 AM 6/25/2024    12:53 PM 7/25/2024    10:06 AM 8/8/2024    11:53 AM   PHQ-9 SCORE   PHQ-9 Total Score MyChart 1 (Minimal depression) 2 (Minimal depression) 1 (Minimal depression) 4 (Minimal depression) 0 0 0   PHQ-9 Total Score 1 2 1 4 0 0 0     GAD2:       10/26/2023     8:03 AM 2/1/2024     1:09 PM 3/7/2024     3:42 PM 3/22/2024    10:10 AM 4/5/2024    12:11 PM 7/25/2024    10:07 AM 8/8/2024    11:54 AM   PARI-2   Feeling nervous, anxious, or on edge 1 0 0 1 0 2 0   Not being able to stop or control worrying 0 0 0 0 1 2 0   PARI-2 Total Score 1 0 0    0    0 1 1 4 0     GAD7:       10/31/2023     6:05 PM 1/5/2024     1:39 PM 3/8/2024    12:33 PM 3/22/2024    12:29 PM 5/31/2024     3:47 PM 7/25/2024    10:07 AM   PARI-7 SCORE   Total Score      6 (mild anxiety)   Total Score 7 6 7 7 7 6     PROMIS 10-Global Health (only subscores and total score):       10/26/2023     8:04 AM 2/1/2024     1:11 PM 3/7/2024     3:43 PM 3/22/2024    10:13 AM 4/5/2024    12:13 PM 7/25/2024    10:08 AM 8/8/2024    11:55 AM    PROMIS-10 Scores Only   Global Mental Health Score 9 9 14    14    14 10 9 10 12   Global Physical Health Score 18 18 19    19    19 17 19 16 19   PROMIS TOTAL - SUBSCORES 27 27 33    33    33 27 28 26 31     [place] Suicide Severity Rating Scale (Lifetime/Recent)      8/8/2024     1:59 PM   [place] Suicide Severity Rating (Lifetime/Recent)   Q1 Wish to be Dead (Lifetime) N   Q2 Non-Specific Active Suicidal Thoughts (Lifetime) N   Actual Attempt (Lifetime) N   Has subject engaged in non-suicidal self-injurious behavior? (Lifetime) N   Interrupted Attempts (Lifetime) N   Aborted or Self-Interrupted Attempt (Lifetime) N   Preparatory Acts or Behavior (Lifetime) N   Calculated C-SSRS Risk Score (Lifetime/Recent) No Risk Indicated       ASSESSMENT: Current Emotional / Mental Status (status of significant symptoms):   Risk status (Self / Other harm or suicidal ideation)   Patient denies current fears or concerns for personal safety.   Patient denies current or recent suicidal ideation or behaviors.   Patient denies current or recent homicidal ideation or behaviors.   Patient denies current or recent self injurious behavior or ideation.   Patient denies other safety concerns.   Patient reports there has been no change in risk factors since their last session.     Patient reports there has been no change in protective factors since their last session.     Recommended that patient call 911 or go to the local ED should there be a change in any of these risk factors.     Appearance:   Appropriate    Eye Contact:   Good    Psychomotor Behavior: Normal    Attitude:   Cooperative    Orientation:   Person Place Time Situation   Speech    Rate / Production: Normal/ Responsive    Volume:  Normal    Mood:    Depressed    Affect:    Appropriate    Thought Content:  Clear    Thought Form:  Coherent  Logical    Insight:    Good      Medication Review:   No changes to current psychiatric medication(s)     Medication  "Compliance:   Yes     Changes in Health Issues:   None reported     Chemical Use Review:   Substance Use: Chemical use reviewed, no active concerns identified      Tobacco Use: No current tobacco use.      Diagnosis:  1. PARI (generalized anxiety disorder)    2. Moderate episode of recurrent major depressive disorder (H)      Collateral Reports Completed:   Not Applicable    PLAN: (Patient Tasks / Therapist Tasks / Other):  Check-in about facts vs feelings  Review social fallacies handout in [employer]  Work on not engaging in \"hypothetical arguments\"    Troy Gilbert, [facility]  ______________________________________________________________________    Individual Treatment Plan    Patient's Name: Roland Sandoval  YOB: 1995    Date of Creation: 11/14/2023    Date Treatment Plan Last Reviewed/Revised: 8/8/24    DSM5 Diagnoses: 296.32 (F33.1) Major Depressive Disorder, Recurrent Episode, Moderate _ and With anxious distress or 300.02 (F41.1) Generalized Anxiety Disorder  Psychosocial / Contextual Factors:  Feelings around rejection, not belonging for so long that it has been affecting his sleep and his thinking.    PROMIS (reviewed every 90 days):  PROMIS-10 Scores        4/5/2024    12:13 PM 7/25/2024    10:08 AM 8/8/2024    11:55 AM   PROMIS-10 Total Score w/o Sub Scores   PROMIS TOTAL - SUBSCORES 28 26 31     Referral / Collaboration:  Referral to another professional/service is not indicated at this time..    Anticipated number of session for this episode of care: 9-12 sessions  Anticipation frequency of session: Biweekly  Anticipated Duration of each session: 38-52 minutes  Treatment plan will be reviewed in 90 days or when goals have been changed.     MeasurableTreatment Goal(s) related to diagnosis / functional impairment(s)  Goal 1: Patient will stabilize anxiety level while increasing ability to function on a daily basis.    I will know I've met my goal when patient is able to challenge any " cognitive traps as they present at least at 80 % of the time by the next review(90 days)     Objective #A (Patient Action)    Patient will use thought-stopping strategy daily to reduce intrusive thoughts.  Status: Continued - Date(s): 8/8/24    Intervention(s)  Therapist will teach distraction skills. Using the 5 senses .    Objective #B  Patient will identify at least 4 fears / thoughts that contribute to feeling anxious.  Status: Continued - Date(s): 8/8/24    Intervention(s)  Therapist will teach CBT modality and coping skills to challenge any cognitive traps such as the 3 Cs    Goal 2: Patient will alleviate depressed mood and return to previous level of effective functioning    I will know I've met my goal when my energy level have increased at least at 70 % on daily basis by the next review( 90 days)    Objective #A (Patient Action)    Status: Continued - Date(s): 8/8/24  Patient will Decrease frequency and intensity of feeling down, depressed, hopeless.  Intervention(s)  Therapist will provide educational materials on mindfulness .    Objective #B  Patient will Improve concentration, focus, and mindfulness in daily activities .    Status: Continued - Date(s): 8/8/24  Intervention(s)  Therapist will provide space to share and process thoughts and feelings related to current stressors .    Patient has reviewed and agreed to the above plan.    Originally developed by RAND Howard  June 11, 2023  Reviewed/Continued by ILYA Mcfarland  August 8, 2024

## 2024-08-27 ENCOUNTER — OFFICE VISIT (OUTPATIENT)
Dept: PSYCHOLOGY | Facility: CLINIC | Age: 29
End: 2024-08-27
Payer: COMMERCIAL

## 2024-08-27 DIAGNOSIS — F33.1 MODERATE EPISODE OF RECURRENT MAJOR DEPRESSIVE DISORDER (H): ICD-10-CM

## 2024-08-27 DIAGNOSIS — F41.1 GAD (GENERALIZED ANXIETY DISORDER): Primary | ICD-10-CM

## 2024-08-27 PROCEDURE — 90834 PSYTX W PT 45 MINUTES: CPT | Performed by: COUNSELOR

## 2024-08-27 NOTE — PROGRESS NOTES
"[facility]                                     Progress Note    Patient Name: Roland Sandoval  Date: 8/27/24       Service Type: Individual      Session Start Time: 1:15pm      Session End Time: 2:04pm     Session Length: 51 minutes    Session #: 3    Attendees: Client    Service Modality:   In-person    DATA  Interactive Complexity: No  Crisis: No   Extended Session (53+ minutes): No     Progress Since Last Session (Related to Symptoms / Goals / Homework):   Symptoms: Improving pt has felt better recently    Homework: Partially completed      Episode of Care Goals: Satisfactory progress - ACTION (Actively working towards change); Intervened by reinforcing change plan / affirming steps taken.       Current / Ongoing Stressors and Concerns:    Next Session: schedule in-person meeting and further follow-up schedule, check in about thoughts on social fallacies  Pt is currently seeking therapy due to challenges with navigating finding a accepting community to connect with here in [place]. Pt had a falling out with a discord group, which he was active in for 9 months, and after leaving it feels it was the right decision as this group felt like is was full of \"fake\" or unauthentic people. Since last session, pt reported that he is doing well/better and that he has resolved his rent stressor by utilizing government assistance to get their rent covered through December of this year. Pt did review the social fallacies handout, finding that he agreed with most of it, and yet also noticed that he was trigger by it as well; which he wanted to explore more in the future. Pt had to interview, which he thinks went well, though he hasn't heard anything back yet and is planning to follow-up with them this week. Pt is not feeling optimistic about it so is already looking for other job opportunities. Pt went to a gathering of his [email or web address] group, it was at a [employer], and he brought food he cooked to share, " which went over really well. Pt brought his boyfriend with and that was good too. Pt felt it was a good social experience. Pt denied any rumination on his discord situation, though still finds himself feeling emotions around it when he talks about it. Pt discussed how he finds it challenging to not get frustrated with others when they make assumptions instead of addressing things directly with him. Pt explored how his direct communication style can sometimes be at odds with the dominate indirect communication style of the local dre, including how he has been working on this. Pt identified one the biggest barriers is being able to  on and read nonverbal/body language. Pt has been working on this through reading/self-educating and trying to be more appreciative/comfortable of silence in conversations. Pt briefly discussed his concerns about possible ASD traits, though after talking about it, this deficit is more likely connected to his family dynamics and sheltered upbringing. Psycho-ed on critical observations to work on his social anxiety and practice reading non-verbal language was provided, which pt felt was helpful.       Treatment Objective(s) Addressed in This Session:   identify 3 fears, thoughts, or stressors which contribute to feelings of anxiety  use thought-stopping strategy daily to reduce intrusive thoughts  Decrease frequency and intensity of feeling down, depressed, hopeless  Improve concentration, focus, and mindfulness in daily activities   identify at least 3 calming thoughts to use when anxious     Intervention:   CBT: Reviewed how he can challenge his internal-narrative around his MH symptoms    Motivational Interviewing    MI Intervention: Expressed Empathy/Understanding, Supported Autonomy, Collaboration, Evocation, Open-ended questions, Reflections: simple and complex, Change talk (evoked), and Reframe     Change Talk Expressed by the Patient: Desire to change Ability to change Reasons  to change Committment to change Activation Taking steps    Provider Response to Change Talk: E - Evoked more info from patient about behavior change, A - Affirmed patient's thoughts, decisions, or attempts at behavior change, R - Reflected patient's change talk, and S - Summarized patient's change talk statements    Psychodynamic: Processed through internal experiences related to anxiety and trust/social connection  Solution Focused: Identified immediate areas of concern and potential barriers to success    Safety plan: Was reviewed and reinforced    Assessments:  PHQ2:       11/10/2021     2:32 PM   PHQ-2 ( 1999 [employer])   Q1: Little interest or pleasure in doing things 0   Q2: Feeling down, depressed or hopeless 0   PHQ-2 Score 0   Q1: Little interest or pleasure in doing things Not at all   Q2: Feeling down, depressed or hopeless Not at all   PHQ-2 Score 0     PHQ9:       5/2/2024     9:41 AM 5/17/2024    10:10 AM 5/31/2024     2:21 PM 6/11/2024    10:54 AM 6/25/2024    12:53 PM 7/25/2024    10:06 AM 8/8/2024    11:53 AM   PHQ-9 SCORE   PHQ-9 Total Score MyChart 1 (Minimal depression) 2 (Minimal depression) 1 (Minimal depression) 4 (Minimal depression) 0 0 0   PHQ-9 Total Score 1 2 1 4 0 0 0     GAD2:       10/26/2023     8:03 AM 2/1/2024     1:09 PM 3/7/2024     3:42 PM 3/22/2024    10:10 AM 4/5/2024    12:11 PM 7/25/2024    10:07 AM 8/8/2024    11:54 AM   PARI-2   Feeling nervous, anxious, or on edge 1 0 0 1 0 2 0   Not being able to stop or control worrying 0 0 0 0 1 2 0   PARI-2 Total Score 1 0 0    0    0 1 1 4 0     GAD7:       10/31/2023     6:05 PM 1/5/2024     1:39 PM 3/8/2024    12:33 PM 3/22/2024    12:29 PM 5/31/2024     3:47 PM 7/25/2024    10:07 AM   PARI-7 SCORE   Total Score      6 (mild anxiety)   Total Score 7 6 7 7 7 6     PROMIS 10-Global Health (only subscores and total score):       10/26/2023     8:04 AM 2/1/2024     1:11 PM 3/7/2024     3:43 PM 3/22/2024    10:13 AM 4/5/2024    12:13 PM  7/25/2024    10:08 AM 8/8/2024    11:55 AM   PROMIS-10 Scores Only   Global Mental Health Score 9 9 14    14    14 10 9 10 12   Global Physical Health Score 18 18 19    19    19 17 19 16 19   PROMIS TOTAL - SUBSCORES 27 27 33    33    33 27 28 26 31     Campbell Suicide Severity Rating Scale (Lifetime/Recent)      8/8/2024     1:59 PM   Campbell Suicide Severity Rating (Lifetime/Recent)   Q1 Wish to be Dead (Lifetime) N   Q2 Non-Specific Active Suicidal Thoughts (Lifetime) N   Actual Attempt (Lifetime) N   Has subject engaged in non-suicidal self-injurious behavior? (Lifetime) N   Interrupted Attempts (Lifetime) N   Aborted or Self-Interrupted Attempt (Lifetime) N   Preparatory Acts or Behavior (Lifetime) N   Calculated C-SSRS Risk Score (Lifetime/Recent) No Risk Indicated       ASSESSMENT: Current Emotional / Mental Status (status of significant symptoms):   Risk status (Self / Other harm or suicidal ideation)   Patient denies current fears or concerns for personal safety.   Patient denies current or recent suicidal ideation or behaviors.   Patient denies current or recent homicidal ideation or behaviors.   Patient denies current or recent self injurious behavior or ideation.   Patient denies other safety concerns.   Patient reports there has been no change in risk factors since their last session.     Patient reports there has been no change in protective factors since their last session.     Recommended that patient call 911 or go to the local ED should there be a change in any of these risk factors.     Appearance:   Appropriate    Eye Contact:   Good    Psychomotor Behavior: Normal    Attitude:   Cooperative  Interested Friendly Pleasant   Orientation:   All   Speech    Rate / Production: Normal/ Responsive    Volume:  Normal    Mood:    Anxious  Normal   Affect:    Appropriate    Thought Content:  Clear    Thought Form:  Coherent  Logical    Insight:    Good  and Intellectual Insight     Medication  Review:   No changes to current psychiatric medication(s)     Medication Compliance:   Yes     Changes in Health Issues:   None reported     Chemical Use Review:   Substance Use: Chemical use reviewed, no active concerns identified      Tobacco Use: No current tobacco use.      Diagnosis:  1. PARI (generalized anxiety disorder)    2. Moderate episode of recurrent major depressive disorder (H)      Collateral Reports Completed:   Not Applicable    PLAN: (Patient Tasks / Therapist Tasks / Other):  Try critical observation around social anxiety/reading body language  Review social fallacies handout in mychart  Continue to read book on body language  Check-in if things are about you or more about the other person  Practice sitting in awkward silence    ILYA Mcfarland  ______________________________________________________________________    Individual Treatment Plan    Patient's Name: Roland Sandoval  YOB: 1995    Date of Creation: 11/14/2023    Date Treatment Plan Last Reviewed/Revised: 8/8/24    DSM5 Diagnoses: 296.32 (F33.1) Major Depressive Disorder, Recurrent Episode, Moderate _ and With anxious distress or 300.02 (F41.1) Generalized Anxiety Disorder  Psychosocial / Contextual Factors:  Feelings around rejection, not belonging for so long that it has been affecting his sleep and his thinking.    PROMIS (reviewed every 90 days):  PROMIS-10 Scores        4/5/2024    12:13 PM 7/25/2024    10:08 AM 8/8/2024    11:55 AM   PROMIS-10 Total Score w/o Sub Scores   PROMIS TOTAL - SUBSCORES 28 26 31     Referral / Collaboration:  Referral to another professional/service is not indicated at this time..    Anticipated number of session for this episode of care: 9-12 sessions  Anticipation frequency of session: Biweekly  Anticipated Duration of each session: 38-52 minutes  Treatment plan will be reviewed in 90 days or when goals have been changed.     MeasurableTreatment Goal(s) related to diagnosis /  functional impairment(s)  Goal 1: Patient will stabilize anxiety level while increasing ability to function on a daily basis.    I will know I've met my goal when patient is able to challenge any cognitive traps as they present at least at 80 % of the time by the next review(90 days)     Objective #A (Patient Action)    Patient will use thought-stopping strategy daily to reduce intrusive thoughts.  Status: Continued - Date(s): 8/8/24    Intervention(s)  Therapist will teach distraction skills. Using the 5 senses .    Objective #B  Patient will identify at least 4 fears / thoughts that contribute to feeling anxious.  Status: Continued - Date(s): 8/8/24    Intervention(s)  Therapist will teach CBT modality and coping skills to challenge any cognitive traps such as the 3 Cs    Goal 2: Patient will alleviate depressed mood and return to previous level of effective functioning    I will know I've met my goal when my energy level have increased at least at 70 % on daily basis by the next review( 90 days)    Objective #A (Patient Action)    Status: Continued - Date(s): 8/8/24  Patient will Decrease frequency and intensity of feeling down, depressed, hopeless.  Intervention(s)  Therapist will provide educational materials on mindfulness .    Objective #B  Patient will Improve concentration, focus, and mindfulness in daily activities .    Status: Continued - Date(s): 8/8/24  Intervention(s)  Therapist will provide space to share and process thoughts and feelings related to current stressors .    Patient has reviewed and agreed to the above plan.    Originally developed by RAND Howard  June 11, 2023  Reviewed/Continued by Troy Gilbert [facility]  August 8, 2024

## 2024-09-09 ENCOUNTER — VIRTUAL VISIT (OUTPATIENT)
Dept: PSYCHIATRY | Facility: CLINIC | Age: 29
End: 2024-09-09
Payer: COMMERCIAL

## 2024-09-09 DIAGNOSIS — F32.1 CURRENT MODERATE EPISODE OF MAJOR DEPRESSIVE DISORDER, UNSPECIFIED WHETHER RECURRENT (H): ICD-10-CM

## 2024-09-09 PROCEDURE — 99213 OFFICE O/P EST LOW 20 MIN: CPT | Mod: 95 | Performed by: NURSE PRACTITIONER

## 2024-09-09 RX ORDER — SERTRALINE HYDROCHLORIDE 25 MG/1
25 TABLET, FILM COATED ORAL DAILY
Qty: 90 TABLET | Refills: 2 | Status: SHIPPED | OUTPATIENT
Start: 2024-09-09

## 2024-09-09 ASSESSMENT — PAIN SCALES - GENERAL: PAINLEVEL: NO PAIN (0)

## 2024-09-09 NOTE — NURSING NOTE
Subjective:       History was provided by the patient and mother.  Gene Toledo is a 18 y.o. female here for evaluation of fever, sore throat, and vomiting. Symptoms began 2 days ago, with no improvement since that time. Associated symptoms include nonproductive cough. Patient denies  diarrhea .     Review of Systems  Pertinent items are noted in HPI     Objective:      Pulse 105   Temp 99 °F (37.2 °C) (Tympanic)   Wt 59.6 kg (131 lb 6.3 oz)   LMP 09/02/2024   SpO2 98%     General:   alert, appears stated age, and cooperative   HEENT:   ENT exam normal, no neck nodes or sinus tenderness   Neck:  no adenopathy, supple, symmetrical, trachea midline, and thyroid not enlarged, symmetric, no tenderness/mass/nodules.   Lungs:  clear to auscultation bilaterally   Heart:  regular rate and rhythm, S1, S2 normal, no murmur, click, rub or gallop   Abdomen:   soft, non-tender; bowel sounds normal; no masses,  no organomegaly   Skin:   reveals no rash      Extremities:   extremities normal, atraumatic, no cyanosis or edema      Neurological:  alert, oriented x 3, no defects noted in general exam.        Assessment:     1. Fever, unspecified fever cause    2. Nausea and vomiting, unspecified vomiting type      Unable to keep solids down, at risk of dehydration due to viral syndrome     Plan:     Gene was seen today for vomiting, sore throat and fever.    Diagnoses and all orders for this visit:    Fever, unspecified fever cause  -     POCT Strep A, Molecular  -     POCT Influenza A/B Molecular  -     POCT COVID-19 Rapid Screening    Nausea and vomiting, unspecified vomiting type  -     ondansetron (ZOFRAN-ODT) 4 MG TbDL; Take 2 tablets (8 mg total) by mouth 2 (two) times daily. for 15 doses  -     promethazine (PHENERGAN) 12.5 MG Tab; Take 1 tablet (12.5 mg total) by mouth every 6 (six) hours as needed (nausea).       Normal progression of disease discussed.  All questions answered.  Extra fluids  Analgesics as  Is the patient currently in the state of [place]? YES    Visit mode:VIDEO    If the visit is dropped, the patient can be reconnected by: VIDEO VISIT: Text to cell phone: [phone]    Will anyone else be joining the visit? NO      How would you like to obtain your AVS? MyChart    Are changes needed to the allergy or medication list? NO    Reason for visit: RECHECK         needed, dose reviewed.  Follow up as needed should symptoms fail to improve.       Kathya An MD  Pediatrics

## 2024-09-09 NOTE — NURSING NOTE
Current patient location: [address] [place] [id number]    Is the patient currently in the state of [place]? YES    Visit mode:VIDEO    If the visit is dropped, the patient can be reconnected by: VIDEO VISIT: Text to cell phone:   Telephone Information:   Mobile [phone]       Will anyone else be joining the visit? NO  (If patient encounters technical issues they should call [phone]    How would you like to obtain your AVS? MyChart    Are changes needed to the allergy or medication list? Pt stated no changes to allergies and Pt stated no med changes    Are refills needed on medications prescribed by this physician? YES    Rooming Documentation:  Questionnaire(s) completed      Reason for visit: RECHECK    Elizabeth WALKER

## 2024-09-09 NOTE — PROGRESS NOTES
"  The patient has been notified of following:      \"This virtual  visit will be conducted via a call between you and your physician/provider. We have found that certain health care needs can be provided without the need for a physical exam.  This service lets us provide the care you need virtually/via video   If a prescription is necessary we can send it directly to your pharmacy.  If lab work is needed we can place an order for that and you can then stop by our lab to have the test done at a later time.     Virtual/Video visits are billed at different rates depending on your insurance coverage.Some insurers they may be billed the same as an in-person visit.  Please reach out to your insurance provider with any questions.    Patient has given verbal consent for virtual  visit : Yes      Ho w would you like to obtain your AVS? Mail a copy  If the video visit is dropped, the invitation should be resent by: Send to e-mail at: [email or web address]  Will anyone else be joining your video visit? No            Psychiatric  Out- Patient  Follow Up Progress Note  Date of visit:         Discussion of Care and Treatment Recommendations:   This is a 29 year old male with a history of depression presenting to the clinic today for follow-up appointment . .      Last visit 03/07/2024.  Recommendation at last visit .  1. Continue Zoloft 25 mg daily - MDD  2.Highly recommend increase   Psychotherapy session to weekly or biweekly: P3.Recommend abstain from THC- repots he has significantly reduced use and is working on  Total abstinence   4. RTC-  6 months  call in between visits with any questions or concerns  Patient and I reviewed diagnosis and treatment plan and patient agrees with following recommendations:  Ongoing education given regarding diagnostic and treatment options with adequate verbalization of understanding.  Plan   1. Continue Zoloft 25 mg daily - MDD  2.Highly recommend increase   Psychotherapy session to weekly or " "biweekly: P3.Recommend abstain from THC- repots he has significantly reduced use and is working on  Total abstinence   4. RTC-  6 months  call in between visits with any questions or concerns         DIagnoses:     Moderate episode of recurrent major depressive disorder     Patient Active Problem List   Diagnosis    Moderate episode of recurrent major depressive disorder (H)    Low HDL (under 40)    Herpes genitalis in men             Chief Complaint / Subjective:    Chief complaint:  Depression     History of Present Illness:   Per patient statement : He has had a great summer.  Symptoms have been well-managed on a low-dose of sertraline.  No new concerns.  Was looking for a job for a long time and finally he has been hired for a job.  Likes and will be starting soon.  No new concerns today        Answers submitted by the patient for this visit:  Patient Health Questionnaire (Submitted on 9/9/2024)  If you checked off any problems, how difficult have these problems made it for you to do your work, take care of things at home, or get along with other people?: Not difficult at all  PHQ9 TOTAL SCORE: 1    Mental Status Examination:   Appearance: Well groomed, good eye contact   Orientation: Patient alert and oriented to person, place, time, and situation  Reliability:  Patient appears to be an adequate historian.    Behavior: cooperative   Speech: Speech is spontaneous and coherent, with a normal rate, rhythm and tone.    Language:There are no difficulties with expressive or receptive language as observed throughout the interview.    Mood: Described as \"ok\".    Affect: congruent   Judgement: Able to make basic decision regarding safety.  Insight: Good awareness of physical and mental health conditions and aware of needs around care for these.  Gait and station: unable to assess  Thought process: Logical   Thought content: No evidence of delusions or paranoia.    Hallucinations : No evidence of any " hallucination  Thought content: No evidence of delusions or paranoia.   Suicidal /Homical Ideations:  No thoughts of self harm or suicide. No thoughts of harming others.  Associations: Connected  Fund of knowledge: Average  Attention / Concentration: Able to remain focused during the interview with minimal distractibility or need for redirection.  Short Term Memory: Grossly intact as evidence by client recalling themes and ideas discussed.  Long Term Memory: Intact  Motor Status: unable to asse    Drug/treatment history and current pattern of use:   Denies      Medication changes: See Above   Medication adherence: compliant  Medication side effects: absent  Information about medications: Side effects, benefits and alternative treatments discussed and patient agrees .    Psychotherapy: Supportive therapy day-to-day living    Education: Diet, exercise, abstinence from drugs and alcohol, patient will not drive if sedated and medications or  under influence of any substance    Lab Results:   Personally reviewed and discussed with the patient    Lab Results   Component Value Date    WBC 4.3 09/26/2023    HGB 15.0 09/26/2023    HCT 46.9 09/26/2023     09/26/2023    CHOL 195 05/23/2022    TRIG 92 05/23/2022    HDL 40 05/23/2022    ALT 22 09/26/2023    AST 23 09/26/2023     09/26/2023    BUN 15.1 09/26/2023    CO2 27 09/26/2023       Vital signs:  There were no vitals taken for this visit.  Telemedicine visit-no vital signs completed  Allergies: Patient has no known allergies.         Medications:     Current Outpatient Medications   Medication Sig Dispense Refill    sertraline (ZOLOFT) 25 MG tablet Take 1 tablet (25 mg) by mouth daily. 90 tablet 2     No current facility-administered medications for this visit.         No current facility-administered medications for this visit.     No current facility-administered medications for this visit.         Medication adherence: Reviewed risk/benefits of medication ,  Patient able to verbalize understanding of side effects and Patient verbally consents to taking medications      PSYCHOEDUCATION:  Medication side effects and alternatives reviewed. Health promotion activities recommended and reviewed today. All questions addressed. Education and counseling completed regarding risks and benefits of medications and psychotherapy options.  Consent provided by patient/guardian  Call the psychiatric nurse line with medication questions or concerns at [phone].  MyChart may be used to communicate with your provider, but this is not intended to be used for emergencies.  SEROTONIN SYNDROME:  Discussed risks of Serotonin syndrome (ie, serotonin toxicity) which is a potentially life-threatening condition associated with increased serotonergic activity in the central nervous system (CNS). It is seen with therapeutic medication use, inadvertent interactions between drugs, and intentional self-poisoning. Serotonin syndrome may involve a spectrum of clinical findings, which often include mental status changes, autonomic hyperactivity, and neuromuscular abnormalities.    STIMULANT THERAPY: Side effects discussed including but not limited to cardiac (including HTN, tachycardia, sudden death), motor/tic, appetite/growth, mood lability and sleep disruption. This is a controlled substance with risk for abuse, need to keep in a safe keep place and cannot replace lost scripts  HARM REDUCTION:  Discussions regarding effects of mood altering substances, alcohol and cannabis, on mood and that approach is harm reduction, will continue to prescribe meds as they work to cut back use.    SAFETY:  We all care about your loved one's safety. To reduce the risk of self-harm, remove access to all:  Firearms, Medicines (both prescribed and over-the-counter), Knives and other sharp objects, Ropes and like materials, and Alcohol  SLEEP HYGIENE: establish a sleep routine, limit screen time 1 hour prior to bed, use  bed for sleep only, take sleep/medications on time (including sleepy time tea, trazadone or herbal treatments such as melatonin), aroma therapy, limit caffeine/sugar, yoga, guided imagery, stretch, meditation, limit naps to 20 minutes, make a temperature change in the room, white noise, be mindful of slowing down breathing, take a warm bath/shower, frequently wash sheets, and journaling.   Medlineplus.gov is information for patients.  It is run by the [employer] and it contains information about all disorders, diseases and all medications.              Review of Systems:      ROS:    Subjective Data Only- Tele-Health Visit    10 point ROS was negative except for the items listed in HPI.      Crisis Resources:    Present to the Emergency Department as needed or call after hours crisis line at [phone] or [phone].   [place] Crisis Text Line: Text [place] to 128674.  Suicide LifeLine Chat: suicideFlipaste.org/chat/.  [place] Suicide Prevention Lifeline: 664.954.7417 (TTY: 656.771.2436). Call anytime for help.  (www.suicidepreventionlifeline.org)  National [employer] on Mental Illness (www.livan.org): 718.280.7789 or 915-846-0054.  Mental Health Association (www.mentalhealth.org): 331.521.3800 or 036-456-4104.      Coordination of Care:   More than 50% of time spent on coordination of care including: Educating patient about diagnosis, prognosis, side effects and benefits of medications, diet, exercise.  Time also spent providing supportive therapy regarding above issues.    Video-Visit Details    Type of service:  Video Visit    Originating Location (pt. Location):  NO QUESTIONS    Distant Location (provider location): Providers Remote Office     Platform used for Video Visit: .  CONTINUE DULOXETINE IS NICE TO SEE YOU      Disclaimer: This note consists of symbols derived from keyboarding, dictation and/or voice recognition software. As a result, there may be errors in the script that  have gone undetected. Please consider this when interpreting information found in this chart.    Start Time : 1500  End time : 1521

## 2024-09-09 NOTE — PATIENT INSTRUCTIONS
"The [facility]  What to Expect  Here's what to expect in the Panel Psych[facility] Program.   About the program  You'll be meeting with a psychiatric doctor to check your mental health. A psychiatric doctor helps you deal with troubling thoughts and feelings by giving you medicine. They'll make sure you know the plan for your care. You may see them for a long time. When you're feeling better, they may refer you back to seeing your family doctor.   If you have any questions, we'll be glad to talk to you.  About visits  Be open  At your visits, please talk openly about your problems. It may feel hard, but it's the best way for us to help you.  Cancelling visits  If you can't come to your visit, please call us right away at [phone]. If you don't cancel at least 24 hours (1 full day) before your visit, that's \"late cancellation.\"  Not showing up for your visits  Being very late is the same as not showing up. You'll be a \"no show\" if:  You're more than 15 minutes late for a 30-minute (half hour) visit.  You're more than 30 minutes late for a 60-minute (full hour) visit.  If you cancel late or don't show up 2 times within 6 months, we may end your care.  Getting help between visits  If you need help between visits, you can call us Monday to Friday from 8 a.m. to 4:30 p.m. at [phone].  Emergency care  Call 911 or go to the nearest emergency department if your life or someone else's life is in danger.  Call 988 anytime to reach the national Suicide and Crisis hotline.  Medicine refills  To refill your medicine, call your pharmacy. You can also call [facility]'s [facility] at [phone], Monday to Friday, 8 a.m. to 4:30 p.m. It can take 1 to 3 business days to get a refill.   Forms, letters, and tests  You may have papers to fill out, like FMLA, short-term disability, and workability. We can help you with these forms at your visits, but you must have an appointment. You may need more " than 1 visit for this, to be in an intensive therapy program, or both.  Before we can give you medicine for ADHD, we may refer you to get tested for it or confirm it another way.  We may not be able to give you an emotional support animal letter.  We don't do mental health checks ordered by the court.   We don't do mental health testing, but we can refer you to get tested.   Thank you for choosing us for your care.  For informational purposes only. Not to replace the advice of your health care provider. Copyright   2022 [facility]. All rights reserved. [employer] 114483 - 12/22      After Visit Summary   Continue medications as prescribed  Have your pharmacy contact us for a refill if you are running low on medications (We may ask you to come into clinic to get a refill from the nurse  No Alcohol or drug use  No driving if sedated  Call the clinic with any questions or concerns   Reach out for help if you feel like hurting yourself or others ([facility] [phone]: [address] [facility], [address] or [facility] Suicide Hotline   [phone] , call 911 or go to nearest Emergency room     Crisis Resources:    Present to the Emergency Department as needed or call after hours crisis line at [phone] or [phone].   [place] Crisis Text Line: Text [place] to 448610.  Suicide LifeLine Chat: suicidepreventionlifeline.org/chat/.  National Suicide Prevention Lifeline: 677.470.3979 (TTY: 222.562.7127). Call anytime for help.  (www.suicidepreventionlifeline.org)  [employer] on Mental Illness (www.livan.org): 054-249-9932 or 224-222-2025.  Mental Health Association (www.mentalhealth.org): 404.264.1450 or 441-912-8672.       Follow up as directed, for your appointments, per your After Visit Summary Form.          Or what have you been after the summer

## 2024-09-26 ENCOUNTER — VIRTUAL VISIT (OUTPATIENT)
Dept: PSYCHOLOGY | Facility: CLINIC | Age: 29
End: 2024-09-26
Payer: COMMERCIAL

## 2024-09-26 DIAGNOSIS — F33.1 MODERATE EPISODE OF RECURRENT MAJOR DEPRESSIVE DISORDER (H): ICD-10-CM

## 2024-09-26 DIAGNOSIS — F41.1 GAD (GENERALIZED ANXIETY DISORDER): Primary | ICD-10-CM

## 2024-09-26 PROCEDURE — 90837 PSYTX W PT 60 MINUTES: CPT | Mod: 95 | Performed by: COUNSELOR

## 2024-09-26 NOTE — PROGRESS NOTES
[facility]                                     Progress Note    Patient Name: Roland Sandoval  Date: 9/26/24       Service Type: Individual      Session Start Time: 4:00 pm      Session End Time: 4:55 pm     Session Length: 55 minutes    Session #: 4    Attendees: Client    Service Modality:   Video Visit:      Provider verified identity through the following two step process.  Patient provided:  Patient is known previously to provider    Telemedicine Visit: The patient's condition can be safely assessed and treated via synchronous audio and visual telemedicine encounter.      Reason for Telemedicine Visit: Services only offered telehealth    Originating Site (Patient Location): Patient's other car    Distant Site (Provider Location): Provider Remote Setting- Home Office    Consent:  The patient/guardian has verbally consented to: the potential risks and benefits of telemedicine (video visit) versus in person care; bill my insurance or make self-payment for services provided; and responsibility for payment of non-covered services.     Patient would like the video invitation sent by:  My Chart    Mode of Communication:  Video Conference via Amwell    Distant Location (Provider):  Off-site    As the provider I attest to compliance with applicable laws and regulations related to telemedicine.    DATA  Interactive Complexity: No  Crisis: No   Extended Session (53+ minutes): No     Progress Since Last Session (Related to Symptoms / Goals / Homework):   Symptoms: Improving pt has been feeling better overall and less stressed    Homework: Partially completed      Episode of Care Goals: Satisfactory progress - PREPARATION (Decided to change - considering how); Intervened by negotiating a change plan and determining options / strategies for behavior change, identifying triggers, exploring social supports, and working towards setting a date to begin behavior change.       Current / Ongoing Stressors  "and Concerns:    Next Session: schedule in-person meeting and further follow-up schedule, check in about thoughts on social fallacies  Pt is currently seeking therapy due to challenges with navigating finding a accepting community to connect with here in [place]. Pt had a falling out with a discord group, which he was active in for 9 months, and after leaving it feels it was the right decision as this group felt like is was full of \"fake\" or unauthentic people. Since last session, pt reported that things have been going overall fairly well. Pt processed through the social fallacies the he reviewed as part of his homework. Pt reflected on how he reacted to reading the the GSF's and explored the questions they brought up for him. After a lengthy conversation talking through it pt felt that the social fallacies resonated with him and they made a lot more sense after thinking how they could apply to his situation. Pt recognized how he can take this knowledge and apply it to his other relationships as well to improve his interpersonal skills going forward as well. Pt mentioned that his kick-boxing group has been a good support for him and he has found that group doesn't drain him like other relationships have, which is very refreshing and is giving him the space to consider maybe they are his kind of people. Pt also was able to get a new job, which he is excited to start next month. Pt is also going on a trip with his boyfriend early next month before he starts his new job with is another thing he is looking forward to. Session went long then anticipated due to lack of recent visits and topic matter being process through.      Treatment Objective(s) Addressed in This Session:   identify 3 fears, thoughts, or stressors which contribute to feelings of anxiety  use thought-stopping strategy daily to reduce intrusive thoughts  Decrease frequency and intensity of feeling down, depressed, hopeless  Improve concentration, focus, and " mindfulness in daily activities   identify at least 3 calming thoughts to use when anxious     Intervention:   CBT: Reviewed how he can challenge his internal-narrative around his MH symptoms    Motivational Interviewing    MI Intervention: Expressed Empathy/Understanding, Supported Autonomy, Collaboration, Evocation, Open-ended questions, Reflections: simple and complex, Change talk (evoked), and Reframe     Change Talk Expressed by the Patient: Desire to change Ability to change Reasons to change Committment to change Activation Taking steps    Provider Response to Change Talk: E - Evoked more info from patient about behavior change, A - Affirmed patient's thoughts, decisions, or attempts at behavior change, R - Reflected patient's change talk, and S - Summarized patient's change talk statements    Psychodynamic: Processed through internal experiences related to anxiety and trust/social connection  Solution Focused: Identified immediate areas of concern and potential barriers to success    Safety plan: Was reviewed and reinforced    Assessments:  PHQ2:       11/10/2021     2:32 PM   PHQ-2 ( 1999 [employer])   Q1: Little interest or pleasure in doing things 0   Q2: Feeling down, depressed or hopeless 0   PHQ-2 Score 0   Q1: Little interest or pleasure in doing things Not at all   Q2: Feeling down, depressed or hopeless Not at all   PHQ-2 Score 0     PHQ9:       5/31/2024     2:21 PM 6/11/2024    10:54 AM 6/25/2024    12:53 PM 7/25/2024    10:06 AM 8/8/2024    11:53 AM 9/9/2024     4:20 AM 9/25/2024     5:53 PM   PHQ-9 SCORE   PHQ-9 Total Score MyChart 1 (Minimal depression) 4 (Minimal depression) 0 0 0 1 (Minimal depression) 0   PHQ-9 Total Score 1 4 0 0 0 1 0     GAD2:       2/1/2024     1:09 PM 3/7/2024     3:42 PM 3/22/2024    10:10 AM 4/5/2024    12:11 PM 7/25/2024    10:07 AM 8/8/2024    11:54 AM 9/9/2024     4:20 AM   PARI-2   Feeling nervous, anxious, or on edge 0 0 1 0 2 0 0   Not being able to stop or control  worrying 0 0 0 1 2 0 0   PARI-2 Total Score 0 0    0    0 1 1 4 0 0     GAD7:       10/31/2023     6:05 PM 1/5/2024     1:39 PM 3/8/2024    12:33 PM 3/22/2024    12:29 PM 5/31/2024     3:47 PM 7/25/2024    10:07 AM   PARI-7 SCORE   Total Score      6 (mild anxiety)   Total Score 7 6 7 7 7 6     PROMIS 10-Global Health (only subscores and total score):       2/1/2024     1:11 PM 3/7/2024     3:43 PM 3/22/2024    10:13 AM 4/5/2024    12:13 PM 7/25/2024    10:08 AM 8/8/2024    11:55 AM 9/9/2024     4:33 AM   PROMIS-10 Scores Only   Global Mental Health Score 9 14    14    14 10 9 10 12 11   Global Physical Health Score 18 19    19    19 17 19 16 19 18   PROMIS TOTAL - SUBSCORES 27 33    33    33 27 28 26 31 29     [place] Suicide Severity Rating Scale (Lifetime/Recent)      8/8/2024     1:59 PM   [place] Suicide Severity Rating (Lifetime/Recent)   Q1 Wish to be Dead (Lifetime) N   Q2 Non-Specific Active Suicidal Thoughts (Lifetime) N   Actual Attempt (Lifetime) N   Has subject engaged in non-suicidal self-injurious behavior? (Lifetime) N   Interrupted Attempts (Lifetime) N   Aborted or Self-Interrupted Attempt (Lifetime) N   Preparatory Acts or Behavior (Lifetime) N   Calculated C-SSRS Risk Score (Lifetime/Recent) No Risk Indicated       ASSESSMENT: Current Emotional / Mental Status (status of significant symptoms):   Risk status (Self / Other harm or suicidal ideation)   Patient denies current fears or concerns for personal safety.   Patient denies current or recent suicidal ideation or behaviors.   Patient denies current or recent homicidal ideation or behaviors.   Patient denies current or recent self injurious behavior or ideation.   Patient denies other safety concerns.   Patient reports there has been no change in risk factors since their last session.     Patient reports there has been no change in protective factors since their last session.     Recommended that patient call 911 or go to the local ED should  there be a change in any of these risk factors.     Appearance:   Appropriate    Eye Contact:   Good    Psychomotor Behavior: Normal    Attitude:   Cooperative  Interested Friendly Pleasant   Orientation:   All   Speech    Rate / Production: Normal/ Responsive    Volume:  Normal    Mood:    Anxious  Normal   Affect:    Appropriate    Thought Content:  Clear    Thought Form:  Coherent  Logical    Insight:    Good  and Intellectual Insight     Medication Review:   No changes to current psychiatric medication(s)     Medication Compliance:   Yes     Changes in Health Issues:   None reported     Chemical Use Review:   Substance Use: Chemical use reviewed, no active concerns identified      Tobacco Use: No current tobacco use.      Diagnosis:  1. PARI (generalized anxiety disorder)    2. Moderate episode of recurrent major depressive disorder (H)      Collateral Reports Completed:   Not Applicable    PLAN: (Patient Tasks / Therapist Tasks / Other):  Try critical observation around social anxiety/reading body language  Check-in if things are about you or more about the other person  Keep practicing sitting in awkward silence  Hang out with kick-boxing group  Attempt to apply GSF concepts to social interactions    Troy Gilbert [facility]  ______________________________________________________________________    Individual Treatment Plan    Patient's Name: Roland Sandoval  YOB: 1995    Date of Creation: 11/14/2023    Date Treatment Plan Last Reviewed/Revised: 8/8/24    DSM5 Diagnoses: 296.32 (F33.1) Major Depressive Disorder, Recurrent Episode, Moderate _ and With anxious distress or 300.02 (F41.1) Generalized Anxiety Disorder  Psychosocial / Contextual Factors:  Feelings around rejection, not belonging for so long that it has been affecting his sleep and his thinking.    PROMIS (reviewed every 90 days):  PROMIS-10 Scores        7/25/2024    10:08 AM 8/8/2024    11:55 AM 9/9/2024     4:33 AM   PROMIS-10 Total  Score w/o Sub Scores   PROMIS TOTAL - SUBSCORES 26 31 29     Referral / Collaboration:  Referral to another professional/service is not indicated at this time..    Anticipated number of session for this episode of care: 9-12 sessions  Anticipation frequency of session: Biweekly  Anticipated Duration of each session: 38-52 minutes  Treatment plan will be reviewed in 90 days or when goals have been changed.     MeasurableTreatment Goal(s) related to diagnosis / functional impairment(s)  Goal 1: Patient will stabilize anxiety level while increasing ability to function on a daily basis.    I will know I've met my goal when patient is able to challenge any cognitive traps as they present at least at 80 % of the time by the next review(90 days)     Objective #A (Patient Action)    Patient will use thought-stopping strategy daily to reduce intrusive thoughts.  Status: Continued - Date(s): 8/8/24    Intervention(s)  Therapist will teach distraction skills. Using the 5 senses .    Objective #B  Patient will identify at least 4 fears / thoughts that contribute to feeling anxious.  Status: Continued - Date(s): 8/8/24    Intervention(s)  Therapist will teach CBT modality and coping skills to challenge any cognitive traps such as the 3 Cs    Goal 2: Patient will alleviate depressed mood and return to previous level of effective functioning    I will know I've met my goal when my energy level have increased at least at 70 % on daily basis by the next review( 90 days)    Objective #A (Patient Action)    Status: Continued - Date(s): 8/8/24  Patient will Decrease frequency and intensity of feeling down, depressed, hopeless.  Intervention(s)  Therapist will provide educational materials on mindfulness .    Objective #B  Patient will Improve concentration, focus, and mindfulness in daily activities .    Status: Continued - Date(s): 8/8/24  Intervention(s)  Therapist will provide space to share and process thoughts and feelings related  to current stressors .    Patient has reviewed and agreed to the above plan.    Originally developed by RAND Howard  June 11, 2023  Reviewed/Continued by ILYA Mcfarland  August 8, 2024

## 2024-10-10 ENCOUNTER — VIRTUAL VISIT (OUTPATIENT)
Dept: PSYCHOLOGY | Facility: CLINIC | Age: 29
End: 2024-10-10

## 2024-10-10 DIAGNOSIS — F41.1 GAD (GENERALIZED ANXIETY DISORDER): Primary | ICD-10-CM

## 2024-10-10 DIAGNOSIS — F33.1 MODERATE EPISODE OF RECURRENT MAJOR DEPRESSIVE DISORDER (H): ICD-10-CM

## 2024-10-10 PROCEDURE — 99207 PR NO CHARGE LOS: CPT | Mod: 95 | Performed by: COUNSELOR

## 2024-10-10 NOTE — PROGRESS NOTES
[facility]                                     Progress Note    Patient Name: Roland Sandoval  Date: 10/10/24       Service Type: Individual      Session Start Time: 2:01 pm      Session End Time: 2:14 pm     Session Length: 13 minutes    Session #: 5    Attendees: Client    Service Modality:   Video Visit:      Provider verified identity through the following two step process.  Patient provided:  Patient is known previously to provider    Telemedicine Visit: The patient's condition can be safely assessed and treated via synchronous audio and visual telemedicine encounter.      Reason for Telemedicine Visit: Services only offered telehealth    Originating Site (Patient Location): Patient's other car    Distant Site (Provider Location): Provider Remote Setting- Home Office    Consent:  The patient/guardian has verbally consented to: the potential risks and benefits of telemedicine (video visit) versus in person care; bill my insurance or make self-payment for services provided; and responsibility for payment of non-covered services.     Patient would like the video invitation sent by:  My Chart    Mode of Communication:  Video Conference via Amwell    Distant Location (Provider):  Off-site    As the provider I attest to compliance with applicable laws and regulations related to telemedicine.    DATA  Interactive Complexity: No  Crisis: No   Extended Session (53+ minutes): No     Progress Since Last Session (Related to Symptoms / Goals / Homework):   Symptoms: Improving pt has been feeling better overall and less stressed    Homework: Partially completed      Episode of Care Goals: Satisfactory progress - PREPARATION (Decided to change - considering how); Intervened by negotiating a change plan and determining options / strategies for behavior change, identifying triggers, exploring social supports, and working towards setting a date to begin behavior change.       Current / Ongoing Stressors  "and Concerns:    Next Session: schedule in-person meeting and further follow-up schedule, check in about thoughts on social fallacies  Pt is currently seeking therapy due to challenges with navigating finding a accepting community to connect with here in [place]. Pt had a falling out with a discord group, which he was active in for 9 months, and after leaving it feels it was the right decision as this group felt like is was full of \"fake\" or unauthentic people. Since last session, pt has been doing overall better, with some [place] his new job. 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 Pt did not get to go see his dad in [place] which was disappointing, and yet his boyfriend was able to provide emotional support which made him feel better. Pt got his insurance paperwork a couple days ago and is getting it submitted this week, so he rescheduled today's visit for after when he should have insurance again later this month. Due to lack of insurance pt was unable to meet for a billable session, but wanted to check-in and provide update on insurance situation.      Treatment Objective(s) Addressed in This Session:   identify 3 fears, thoughts, or stressors which contribute to feelings of anxiety  use thought-stopping strategy daily to reduce intrusive thoughts  Decrease frequency and intensity of feeling down, depressed, hopeless  Improve concentration, focus, and mindfulness in daily activities   identify at least 3 calming thoughts to use when anxious     Intervention:   CBT: Reviewed how he can challenge his internal-narrative around his MH symptoms    Motivational Interviewing    MI Intervention: Expressed Empathy/Understanding, Supported Autonomy, " Collaboration, Evocation, Open-ended questions, Reflections: simple and complex, Change talk (evoked), and Reframe     Change Talk Expressed by the Patient: Desire to change Ability to change Reasons to change Committment to change Activation Taking steps    Provider Response to Change Talk: E - Evoked more info from patient about behavior change, A - Affirmed patient's thoughts, decisions, or attempts at behavior change, R - Reflected patient's change talk, and S - Summarized patient's change talk statements    Psychodynamic: Processed through internal experiences related to anxiety and trust/social connection  Solution Focused: Identified immediate areas of concern and potential barriers to success    Safety plan: Was reviewed and reinforced    Assessments:  PHQ2:       11/10/2021     2:32 PM   PHQ-2 ( 1999 [employer])   Q1: Little interest or pleasure in doing things 0   Q2: Feeling down, depressed or hopeless 0   PHQ-2 Score 0   Q1: Little interest or pleasure in doing things Not at all   Q2: Feeling down, depressed or hopeless Not at all   PHQ-2 Score 0     PHQ9:       5/31/2024     2:21 PM 6/11/2024    10:54 AM 6/25/2024    12:53 PM 7/25/2024    10:06 AM 8/8/2024    11:53 AM 9/9/2024     4:20 AM 9/25/2024     5:53 PM   PHQ-9 SCORE   PHQ-9 Total Score MyChart 1 (Minimal depression) 4 (Minimal depression) 0 0 0 1 (Minimal depression) 0   PHQ-9 Total Score 1 4 0 0 0 1 0     GAD2:       2/1/2024     1:09 PM 3/7/2024     3:42 PM 3/22/2024    10:10 AM 4/5/2024    12:11 PM 7/25/2024    10:07 AM 8/8/2024    11:54 AM 9/9/2024     4:20 AM   PARI-2   Feeling nervous, anxious, or on edge 0 0 1 0 2 0 0   Not being able to stop or control worrying 0 0 0 1 2 0 0   PARI-2 Total Score 0 0    0    0 1 1 4 0 0     GAD7:       10/31/2023     6:05 PM 1/5/2024     1:39 PM 3/8/2024    12:33 PM 3/22/2024    12:29 PM 5/31/2024     3:47 PM 7/25/2024    10:07 AM   PARI-7 SCORE   Total Score      6 (mild anxiety)   Total Score 7 6 7 7 7 6      PROMIS 10-Global Health (only subscores and total score):       2/1/2024     1:11 PM 3/7/2024     3:43 PM 3/22/2024    10:13 AM 4/5/2024    12:13 PM 7/25/2024    10:08 AM 8/8/2024    11:55 AM 9/9/2024     4:33 AM   PROMIS-10 Scores Only   Global Mental Health Score 9 14    14    14 10 9 10 12 11   Global Physical Health Score 18 19    19    19 17 19 16 19 18   PROMIS TOTAL - SUBSCORES 27 33    33    33 27 28 26 31 29     [place] Suicide Severity Rating Scale (Lifetime/Recent)      8/8/2024     1:59 PM   [place] Suicide Severity Rating (Lifetime/Recent)   Q1 Wish to be Dead (Lifetime) N   Q2 Non-Specific Active Suicidal Thoughts (Lifetime) N   Actual Attempt (Lifetime) N   Has subject engaged in non-suicidal self-injurious behavior? (Lifetime) N   Interrupted Attempts (Lifetime) N   Aborted or Self-Interrupted Attempt (Lifetime) N   Preparatory Acts or Behavior (Lifetime) N   Calculated C-SSRS Risk Score (Lifetime/Recent) No Risk Indicated       ASSESSMENT: Current Emotional / Mental Status (status of significant symptoms):   Risk status (Self / Other harm or suicidal ideation)   Patient denies current fears or concerns for personal safety.   Patient denies current or recent suicidal ideation or behaviors.   Patient denies current or recent homicidal ideation or behaviors.   Patient denies current or recent self injurious behavior or ideation.   Patient denies other safety concerns.   Patient reports there has been no change in risk factors since their last session.     Patient reports there has been no change in protective factors since their last session.     Recommended that patient call 911 or go to the local ED should there be a change in any of these risk factors.     Appearance:   Appropriate    Eye Contact:   Good    Psychomotor Behavior: Normal    Attitude:   Cooperative  Interested Friendly Pleasant   Orientation:   All   Speech    Rate / Production: Normal/ Responsive    Volume:  Normal     Mood:    Anxious  Normal   Affect:    Appropriate    Thought Content:  Clear    Thought Form:  Coherent  Logical    Insight:    Good  and Intellectual Insight     Medication Review:   No changes to current psychiatric medication(s)     Medication Compliance:   Yes     Changes in Health Issues:   None reported     Chemical Use Review:   Substance Use: Chemical use reviewed, no active concerns identified      Tobacco Use: No current tobacco use.      Diagnosis:  1. PARI (generalized anxiety disorder)    2. Moderate episode of recurrent major depressive disorder (H)        Collateral Reports Completed:   Not Applicable    PLAN: (Patient Tasks / Therapist Tasks / Other):  Try critical observation around social anxiety/reading body language  Check-in if things are about you or more about the other person  Keep practicing sitting in awkward silence  Hang out with kick-boxing group  Attempt to apply GSF concepts to social interactions    Troy Gilbert [facility]  ______________________________________________________________________    Individual Treatment Plan    Patient's Name: Roland Sandoval  YOB: 1995    Date of Creation: 11/14/2023    Date Treatment Plan Last Reviewed/Revised: 8/8/24    DSM5 Diagnoses: 296.32 (F33.1) Major Depressive Disorder, Recurrent Episode, Moderate _ and With anxious distress or 300.02 (F41.1) Generalized Anxiety Disorder  Psychosocial / Contextual Factors:  Feelings around rejection, not belonging for so long that it has been affecting his sleep and his thinking.    PROMIS (reviewed every 90 days):  PROMIS-10 Scores        7/25/2024    10:08 AM 8/8/2024    11:55 AM 9/9/2024     4:33 AM   PROMIS-10 Total Score w/o Sub Scores   PROMIS TOTAL - SUBSCORES 26 31 29     Referral / Collaboration:  Referral to another professional/service is not indicated at this time..    Anticipated number of session for this episode of care: 9-12 sessions  Anticipation frequency of session:  Biweekly  Anticipated Duration of each session: 38-52 minutes  Treatment plan will be reviewed in 90 days or when goals have been changed.     MeasurableTreatment Goal(s) related to diagnosis / functional impairment(s)  Goal 1: Patient will stabilize anxiety level while increasing ability to function on a daily basis.    I will know I've met my goal when patient is able to challenge any cognitive traps as they present at least at 80 % of the time by the next review(90 days)     Objective #A (Patient Action)    Patient will use thought-stopping strategy daily to reduce intrusive thoughts.  Status: Continued - Date(s): 8/8/24    Intervention(s)  Therapist will teach distraction skills. Using the 5 senses .    Objective #B  Patient will identify at least 4 fears / thoughts that contribute to feeling anxious.  Status: Continued - Date(s): 8/8/24    Intervention(s)  Therapist will teach CBT modality and coping skills to challenge any cognitive traps such as the 3 Cs    Goal 2: Patient will alleviate depressed mood and return to previous level of effective functioning    I will know I've met my goal when my energy level have increased at least at 70 % on daily basis by the next review( 90 days)    Objective #A (Patient Action)    Status: Continued - Date(s): 8/8/24  Patient will Decrease frequency and intensity of feeling down, depressed, hopeless.  Intervention(s)  Therapist will provide educational materials on mindfulness .    Objective #B  Patient will Improve concentration, focus, and mindfulness in daily activities .    Status: Continued - Date(s): 8/8/24  Intervention(s)  Therapist will provide space to share and process thoughts and feelings related to current stressors .    Patient has reviewed and agreed to the above plan.    Originally developed by RAND Howard  June 11, 2023  Reviewed/Continued by ILYA Mcfarland  August 8, 2024

## 2024-10-24 ENCOUNTER — DOCUMENTATION ONLY (OUTPATIENT)
Dept: BEHAVIORAL HEALTH | Facility: HOSPITAL | Age: 29
End: 2024-10-24

## 2024-10-24 NOTE — PROGRESS NOTES
Therapist (writer) entered the virtual session at the scheduled time of the appointment. Pt did not arrive on time and therapist called pt 10 minutes into the appointment, leaving a VM prompting them to join via ALOSKO or call the office or reach out through ALOSKO if they were having an issue. Pt was encouraged to attend his next follow-up session if he couldn't make today and to cancel 24 hour in advance in the future if he couldn't make his appointment. Therapist waited an addition 10-15 minutes for pt to join and when they did not the session was cancelled.   0 0 = independent

## 2024-11-07 ENCOUNTER — VIRTUAL VISIT (OUTPATIENT)
Dept: PSYCHOLOGY | Facility: CLINIC | Age: 29
End: 2024-11-07

## 2024-11-07 DIAGNOSIS — F41.1 GAD (GENERALIZED ANXIETY DISORDER): Primary | ICD-10-CM

## 2024-11-07 PROCEDURE — 99207 PR NO CHARGE LOS: CPT | Mod: 95 | Performed by: COUNSELOR

## 2024-11-07 NOTE — PROGRESS NOTES
[facility]                                     Progress Note    Patient Name: Roland Sandoval  Date: 11/07/24       Service Type: Individual      Session Start Time: 4:02 pm      Session End Time: 4:16 pm     Session Length: 14 minutes    Session #: 6    Attendees: Client    Service Modality:   Video Visit:      Provider verified identity through the following two step process.  Patient provided:  Patient is known previously to provider    Telemedicine Visit: The patient's condition can be safely assessed and treated via synchronous audio and visual telemedicine encounter.      Reason for Telemedicine Visit: Services only offered telehealth    Originating Site (Patient Location): Patient's other car    Distant Site (Provider Location): Provider Remote Setting- Home Office    Consent:  The patient/guardian has verbally consented to: the potential risks and benefits of telemedicine (video visit) versus in person care; bill my insurance or make self-payment for services provided; and responsibility for payment of non-covered services.     Patient would like the video invitation sent by:  My Chart    Mode of Communication:  Video Conference via Amwell    Distant Location (Provider):  Off-site    As the provider I attest to compliance with applicable laws and regulations related to telemedicine.    DATA  Interactive Complexity: No  Crisis: No   Extended Session (53+ minutes): No     Progress Since Last Session (Related to Symptoms / Goals / Homework):   Symptoms: Improving pt has been feeling better overall and less stressed    Homework: Partially completed      Episode of Care Goals: Satisfactory progress - PREPARATION (Decided to change - considering how); Intervened by negotiating a change plan and determining options / strategies for behavior change, identifying triggers, exploring social supports, and working towards setting a date to begin behavior change.       Current / Ongoing Stressors  "and Concerns:    Next Session: check in about feelings of loss and upcoming holiday challenges.  Pt is currently seeking therapy due to challenges with navigating finding a accepting community to connect with here in [place]. Pt had a falling out with a discord group, which he was active in for 9 months, and after leaving it feels it was the right decision as this group felt like is was full of \"fake\" or unauthentic people. Since last session, pt's insurance has still not kicked in and he has been feeling \"out of sorts\" with some recent live events. Pt clarified learning that his friend  yesterday and he has been grieving over this loss. Pt's friend passed from health complications, somewhat suddenly, and pt has been worried about the family his friend left behind. Pt recognizes that one of the challenges with this situation, is that his friend is back in [place] and he is in [place]. Pt's employer has been very supportive of pt and he has been able to take some time off work. Pt thinks that this has been compounded by the results of the recent presidential election, which has been stressful for him as well. Pt expressed that he feels he has processed through Trumps election, and the big concern the uncertainty for the future he feels. Pt was encouraged to check-in with himself about potential catastrophising or fortune telling around the uncertainty, as that can feed into the stress/anxiety of the unknown, which pt agreed with. Pt did start his new job and is finding that trying to stay organized is that hardest part, which is a factor he is finding can lead him to feeling overwhelmed. Pt's insurance has not kicked in yet and he agreed to follow-up with that before next session. Due to lack of insurance pt was unable to meet for a billable session, but wanted to check-in due to the recent big events that happened this week.      Treatment Objective(s) Addressed in This Session:   identify 3 fears, thoughts, or stressors which " contribute to feelings of anxiety  use thought-stopping strategy daily to reduce intrusive thoughts  Decrease frequency and intensity of feeling down, depressed, hopeless  Improve concentration, focus, and mindfulness in daily activities   identify at least 3 calming thoughts to use when anxious     Intervention:   CBT: Reviewed how he can challenge his internal-narrative around his MH symptoms    Motivational Interviewing    [place] Intervention: Expressed Empathy/Understanding, Supported Autonomy, Collaboration, Evocation, Open-ended questions, Reflections: simple and complex, Change talk (evoked), and Reframe     Change Talk Expressed by the Patient: Desire to change Ability to change Reasons to change Committment to change Activation Taking steps    Provider Response to Change Talk: E - Evoked more info from patient about behavior change, A - Affirmed patient's thoughts, decisions, or attempts at behavior change, R - Reflected patient's change talk, and S - Summarized patient's change talk statements    Psychodynamic: Processed through internal experiences related to anxiety and trust/social connection  Solution Focused: Identified immediate areas of concern and potential barriers to success    Safety plan: Was reviewed and reinforced    Assessments:  PHQ2:       11/10/2021     2:32 PM   PHQ-2 ( 1999 [employer])   Q1: Little interest or pleasure in doing things 0    Q2: Feeling down, depressed or hopeless 0    PHQ-2 Score 0   Q1: Little interest or pleasure in doing things Not at all   Q2: Feeling down, depressed or hopeless Not at all   PHQ-2 Score 0       Patient-reported     PHQ9:       6/11/2024    10:54 AM 6/25/2024    12:53 PM 7/25/2024    10:06 AM 8/8/2024    11:53 AM 9/9/2024     4:20 AM 9/25/2024     5:53 PM 11/7/2024     3:58 PM   PHQ-9 SCORE   PHQ-9 Total Score MyChart 4 (Minimal depression) 0 0 0 1 (Minimal depression) 0 0   PHQ-9 Total Score 4 0 0 0 1 0 0        Patient-reported     GAD2:       2/1/2024      1:09 PM 3/7/2024     3:42 PM 3/22/2024    10:10 AM 4/5/2024    12:11 PM 7/25/2024    10:07 AM 8/8/2024    11:54 AM 9/9/2024     4:20 AM   PARI-2   Feeling nervous, anxious, or on edge 0  0  1  0  2  0  0    Not being able to stop or control worrying 0  0  0  1  2  0  0    PARI-2 Total Score 0 0    0    0 1 1 4 0 0       Patient-reported    Multiple values from one day are sorted in reverse-chronological order     GAD7:       10/31/2023     6:05 PM 1/5/2024     1:39 PM 3/8/2024    12:33 PM 3/22/2024    12:29 PM 5/31/2024     3:47 PM 7/25/2024    10:07 AM   PARI-7 SCORE   Total Score      6 (mild anxiety)   Total Score 7 6 7 7 7 6     PROMIS 10-Global Health (only subscores and total score):       2/1/2024     1:11 PM 3/7/2024     3:43 PM 3/22/2024    10:13 AM 4/5/2024    12:13 PM 7/25/2024    10:08 AM 8/8/2024    11:55 AM 9/9/2024     4:33 AM   PROMIS-10 Scores Only   Global Mental Health Score 9 14    14    14 10 9 10 12 11   Global Physical Health Score 18 19    19    19 17 19 16 19 18   PROMIS TOTAL - SUBSCORES 27 33    33    33 27 28 26 31 29     [place] Suicide Severity Rating Scale (Lifetime/Recent)      8/8/2024     1:59 PM   [place] Suicide Severity Rating (Lifetime/Recent)   Q1 Wish to be Dead (Lifetime) N   Q2 Non-Specific Active Suicidal Thoughts (Lifetime) N   Actual Attempt (Lifetime) N   Has subject engaged in non-suicidal self-injurious behavior? (Lifetime) N   Interrupted Attempts (Lifetime) N   Aborted or Self-Interrupted Attempt (Lifetime) N   Preparatory Acts or Behavior (Lifetime) N   Calculated C-SSRS Risk Score (Lifetime/Recent) No Risk Indicated       ASSESSMENT: Current Emotional / Mental Status (status of significant symptoms):   Risk status (Self / Other harm or suicidal ideation)   Patient denies current fears or concerns for personal safety.   Patient denies current or recent suicidal ideation or behaviors.   Patient denies current or recent homicidal ideation or behaviors.   Patient denies  current or recent self injurious behavior or ideation.   Patient denies other safety concerns.   Patient reports there has been no change in risk factors since their last session.     Patient reports there has been no change in protective factors since their last session.     Recommended that patient call 911 or go to the local ED should there be a change in any of these risk factors.     Appearance:   Appropriate    Eye Contact:   Good    Psychomotor Behavior: Normal    Attitude:   Cooperative  Interested Friendly Pleasant   Orientation:   All   Speech    Rate / Production: Normal/ Responsive    Volume:  Normal    Mood:    Anxious  Normal   Affect:    Appropriate    Thought Content:  Clear    Thought Form:  Coherent  Logical    Insight:    Good  and Intellectual Insight     Medication Review:   No changes to current psychiatric medication(s)     Medication Compliance:   Yes     Changes in Health Issues:   None reported     Chemical Use Review:   Substance Use: Chemical use reviewed, no active concerns identified      Tobacco Use: No current tobacco use.      Diagnosis:  1. PARI (generalized anxiety disorder)        Collateral Reports Completed:   Not Applicable    PLAN: (Patient Tasks / Therapist Tasks / Other):  Try critical observation around social anxiety/reading body language  Check-in with self about cognitive distortions if feeling anxious/stressed  Attend kick-boxing as a coping skill  Continue to apply GSF concepts to social interactions    ILYA Mcfarland  ______________________________________________________________________    Individual Treatment Plan    Patient's Name: Roland Sandoval  YOB: 1995    Date of Creation: 11/14/2023    Date Treatment Plan Last Reviewed/Revised: 8/8/24    DSM5 Diagnoses: 296.32 (F33.1) Major Depressive Disorder, Recurrent Episode, Moderate _ and With anxious distress or 300.02 (F41.1) Generalized Anxiety Disorder  Psychosocial / Contextual Factors:   Feelings around rejection, not belonging for so long that it has been affecting his sleep and his thinking.    PROMIS (reviewed every 90 days):  PROMIS-10 Scores        7/25/2024    10:08 AM 8/8/2024    11:55 AM 9/9/2024     4:33 AM   PROMIS-10 Total Score w/o Sub Scores   PROMIS TOTAL - SUBSCORES 26 31 29     Referral / Collaboration:  Referral to another professional/service is not indicated at this time..    Anticipated number of session for this episode of care: 9-12 sessions  Anticipation frequency of session: Biweekly  Anticipated Duration of each session: 38-52 minutes  Treatment plan will be reviewed in 90 days or when goals have been changed.     MeasurableTreatment Goal(s) related to diagnosis / functional impairment(s)  Goal 1: Patient will stabilize anxiety level while increasing ability to function on a daily basis.    I will know I've met my goal when patient is able to challenge any cognitive traps as they present at least at 80 % of the time by the next review(90 days)     Objective #A (Patient Action)    Patient will use thought-stopping strategy daily to reduce intrusive thoughts.  Status: Continued - Date(s): 8/8/24    Intervention(s)  Therapist will teach distraction skills. Using the 5 senses .    Objective #B  Patient will identify at least 4 fears / thoughts that contribute to feeling anxious.  Status: Continued - Date(s): 8/8/24    Intervention(s)  Therapist will teach CBT modality and coping skills to challenge any cognitive traps such as the 3 Cs    Goal 2: Patient will alleviate depressed mood and return to previous level of effective functioning    I will know I've met my goal when my energy level have increased at least at 70 % on daily basis by the next review( 90 days)    Objective #A (Patient Action)    Status: Continued - Date(s): 8/8/24  Patient will Decrease frequency and intensity of feeling down, depressed, hopeless.  Intervention(s)  Therapist will provide educational materials  on mindfulness .    Objective #B  Patient will Improve concentration, focus, and mindfulness in daily activities .    Status: Continued - Date(s): 8/8/24  Intervention(s)  Therapist will provide space to share and process thoughts and feelings related to current stressors .    Patient has reviewed and agreed to the above plan.    Originally developed by RAND Howard  June 11, 2023  Reviewed/Continued by Troy [facility]  August 8, 2024

## 2024-11-11 NOTE — PROGRESS NOTES
[facility]                                     Progress Note    Patient Name: Roland Sandoval  Date: 1/05/2024         Service Type: Individual      Session Start Time: 13:07  Session End Time: 14:00     Session Length: 53    Session #: 4    Attendees: Client    Service Modality:  Video Visit: doximity and phone calls      Provider verified identity through the following two step process.  Patient provided:  Patient is known previously to provider    Telemedicine Visit: The patient's condition can be safely assessed and treated via synchronous audio and visual telemedicine encounter.      Reason for Telemedicine Visit: Services only offered telehealth    Originating Site (Patient Location): Patient's place of employment    Distant Site (Provider Location): Provider Remote Setting- Home Office    Consent:  The patient/guardian has verbally consented to: the potential risks and benefits of telemedicine (video visit) versus in person care; bill my insurance or make self-payment for services provided; and responsibility for payment of non-covered services.     Patient would like the video invitation sent by:  Text to cell phone: [phone]    Mode of Communication:  Video Conference via [employer]    Distant Location (Provider):  Off-site    As the provider I attest to compliance with applicable laws and regulations related to telemedicine.    DATA  Interactive Complexity: Yes, visit entailed Interactive Complexity evidenced by: difficulties with connection. Patient could not access [employer]. Email was sent, [employer] was used. The visit was then completed over the phone.     Crisis: No     Progress Since Last Session (Related to Symptoms / Goals / Homework):   Symptoms: No change ; less days of depressed mood    Homework: Partially completed      Episode of Care Goals: Satisfactory progress - ACTION (Actively working towards change); Intervened by reinforcing change plan / affirming steps taken.  Speech Therapy      Visit Type: Treatment  -  Swallow  SUBJECTIVE  TODAY: Patient upright in chair when speech arrived to continue aggressive swallow treatment. Pt alert and holding conversation with speech therapist.     PATIENT HISTORY/REASON FOR ADMISSION: Sachin Aaron is a 79 year old male with a past medical history of diabetes mellitus, hypertension, hyperlipidemia, atrial flutter on Coumadin, DVT/PE, GERD, TIA and multiple other medical problems was brought into the emergency room as he was noted to have slurred speech with facial droop by his wife.  He was also having a headache.  In the ED, he denied any weakness to his extremities, numbness or vision changes.  CT of the head was done that showed a moderately large right frontal intraparenchymal hemorrhage measuring up to 4.5 cm with mild local mass effect.  No midline shift.  Neurosurgery has been contacted and patient was given vitamin K and Kcentra. He was also started on Cardene drip for tight blood pressure control.    SOCIAL HX: Patient lives in home with wife Tiffany in [place], [place].     SPEECH THERAPY HX:   11/1/2024: VFSS done, patient aspirated regular thin and mildly thick liquids therefore ST recommended NPO, NG tube was placed for means of nutrition  1/27/2022: 30/30 on [facility]      OBJECTIVE      Swallow    Numbers listed with consistency indicate IDDSI diet level.    Pureed (4)   - Amount given: 4 oz   - Oral phase: impaired, slow oral transit   - Pharyngeal phase: multiple swallows noted, delayed throat clear and immediate throat clear    Mildly thick (2)   - Amount given: 2 oz   - Oral: impaired, slow oral transit   - Pharyngeal phase: immediate throat clear and multiple swallows noted    Oral/Pharyngeal Exercises  Continued focus of sessions on bolus driven exercise program to increase speed, coordination, and motoric output of the swallowing muscular system through use of direct swallowing therapy with food/bolus as weight and resistance.   -  Has not noted the change.     Current / Ongoing Stressors and Concerns: Patient was able to work on his thoughts most of the time. Only he had 2 days where he felt more depression. Today though he slept longer than usual. Has been working except today as he felt sick of something like a cold. He did have some times for exercise and wants to keep this habits this year. Will also start working on a SMART goal this year to help monitory is progress. His next visit is in 2 weeks.      Treatment Objective(s) Addressed in This Session:   Decrease frequency and intensity of feeling down, depressed, hopeless  Identify negative self-talk and behaviors: challenge core beliefs, myths, and actions  Provide space to process these thoughts     Intervention:    Introduction CBT modality; person centered.     Assessments completed prior to visit 10/30/2023    The following assessments were completed by patient for this visit:  PHQ2:       11/10/2021     2:32 PM   PHQ-2 ( [employer])   Q1: Little interest or pleasure in doing things 0   Q2: Feeling down, depressed or hopeless 0   PHQ-2 Score 0   Q1: Little interest or pleasure in doing things Not at all   Q2: Feeling down, depressed or hopeless Not at all   PHQ-2 Score 0     PHQ9:       7/10/2023    12:59 PM 8/16/2023     1:29 PM 9/25/2023     3:48 PM 10/26/2023     7:45 AM 12/15/2023    12:42 PM 12/22/2023    12:29 PM 1/5/2024     1:39 PM   PHQ-9 SCORE   PHQ-9 Total Score MyChart 9 (Mild depression) 3 (Minimal depression) 2 (Minimal depression) 5 (Mild depression) 1 (Minimal depression) 6 (Mild depression)    PHQ-9 Total Score 9 3 2 5 1 6 8     GAD2:       7/10/2023    12:59 PM 10/26/2023     8:03 AM   PARI-2   Feeling nervous, anxious, or on edge 1 1   Not being able to stop or control worrying 1 0   PARI-2 Total Score 2 1         ASSESSMENT: Current Emotional / Mental Status (status of significant symptoms):   Risk status (Self / Other harm or suicidal ideation)   Patient denies  Raw number of swallows: ~x40   - Percentage of swallows with no clinical indicators of aspiration: pt presenting with frequent throat clearing, but per VFSS not always consistent reliable indicator of aspiration   - Highest level of food hierarchy achieved: Mildly Thick Liquids    - Swallow techniques or cues used during therapy hard and fast.                 ASSESSMENT  Impairments: swallowing, attention/concentration and memory  Functional Limitations: eating/drinking, expression of ideas/needs, finance management, medication management, communication/cognition and comprehension  Patient seen for continued dysphagia rehabilitation with focus on bolus driven intervention utilizing hard and fast swallow with puree and mildly thick liquids. See above for details on PO and observations made by ST. Continue with diet recommendations listed below.     Patient is continuing to improve, alert and cooperative. Able to recall swallow \"rules\" of hard and fast. Patient still not ready for oral diet as endurance remains a limiting factor at this time. Plan to repeat VFSS on Wednesday.       Discharge Recommendations  Recommendation for Discharge Location: [facility] [place]: Home with outpatient therapy  Recommendation for Discharge Support: [facility]: 24 Hour assist            Rehab Potential: good    Therapy Participation: This patient participated in all scheduled speech therapy time this session.    Education:   - Present and ready to learn: patient  Education provided during session:  - dysphagia  - Results of above outlined education: Verbalizes understanding    Patient at end of session:    - location: in bed    - hand off to: nurse        I was in the immediate presence of the student and directed the student’s performance of the services. I am responsible for all treatment, assessment, documentation, and billing rendered for this patient.   Suma Frazier, SLP    PLAN  ADD ICE CHIP PROTOCOL:    Recommend the provision of ice  chips under the following parameters to improve oral hygiene, increase comfort, improve hydration, and preserve swallow function (Michael, Julia, & Keith, 2023; Tonya Batres, & Joan 2016).     Patient may consume 1-2 ice chips, 5-10x, 3x per day, per shift with constant supervision after receiving oral care with toothbrush & toothpaste.     References:   Michael HANSEN., Julia FERRARA, & Keith ROGERS (2023). Implementation of a free water protocol at a long term acute care hospital. Scientific Reports, 13(1). https://doi.org/10.1038/u43340-425-04701-4    ANIKET Batres, GEORGIANA Castaneda, & Joan, N.SULMA. (2016). Implementing the free water protocol does not result in aspiration pneumonia in carefully selected patients with dysphagia: A systematic review. Dysphagia 32, 345-361. https://doi.org/10.1007/k27389-231-5951-3      Aggressive dysphagia rehab (MDTP/bolus driven intervention with puree, can also try mildly thick liquids vs ice chips for various sensorimotor stimuli)  Anticipate repeat VFSS Wednesday 11/13    SLP Frequency: 30/30 min x5-6/week (Te[employer] W)          Interventions:  Communication/cognition therapy and dysphagia therapy    Plan/Goal Agreement:  Patient agrees with goals and treatment plan      RECOMMENDATIONS     -Diet:          *nothing by mouth (except 2oz of puree, x3/day with nursing staff)    -Additional Swallow Recommendations:         *continue alternative means of nutrition    -Consult:         *ENT (notified MD--hoarse voice, not improving)      GOALS  Review date: 11/13/2024  Long Term Goals: to be met by discharge from rehab program.  COGNITIVE GOALS:     Patient will sustain attention despite mild distractions over 5-10 mins and 80% accuracy.    Patient will be able to recall 4 items after a 5 minute delay with min cues and 80% accuracy.    Pt will improve memory for new learning following 5 minute delay with 80% accuracy using memory strategies to promote improved understanding of medical course  current fears or concerns for personal safety.   Patient denies current or recent suicidal ideation or behaviors.   Patient denies current or recent homicidal ideation or behaviors.   Patient denies current or recent self injurious behavior or ideation.   Patient denies other safety concerns.   Patient reports there has been no change in risk factors since their last session.     Patient reports there has been no change in protective factors since their last session.     Recommended that patient call 911 or go to the local ED should there be a change in any of these risk factors.     Appearance:   Appropriate    Eye Contact:   Good    Psychomotor Behavior: Normal    Attitude:   Cooperative    Orientation:   Person Place Time Situation   Speech    Rate / Production: Normal/ Responsive    Volume:  Normal    Mood:    Depressed    Affect:    Appropriate    Thought Content:  Clear    Thought Form:  Coherent  Logical    Insight:    Good      Medication Review:   No changes to current psychiatric medication(s)     Medication Compliance:   Yes     Changes in Health Issues:   None reported     Chemical Use Review:   Substance Use: Chemical use reviewed, no active concerns identified      Tobacco Use: No current tobacco use.      Diagnosis:  1. Moderate episode of recurrent major depressive disorder (H)      Collateral Reports Completed:   Not Applicable    PLAN: (Patient Tasks / Therapist Tasks / Other):  Patient will read the handout sent via [employer] on CBT  Patient will keep up with his exercise routine.   Patient will develop a SMART goal for this year  Patients next visit is on 2/01/2024    RAND Howard  ______________________________________________________________________    Individual Treatment Plan    Patient's Name: Roland Sandoval  YOB: 1995    Date of Creation: 11/14/2023  Date Treatment Plan Last Reviewed/Revised: 11/14/2023    DSM5 Diagnoses: 296.32 (F33.1) Major Depressive  Disorder, Recurrent Episode, Moderate _ and With anxious distress or 300.02 (F41.1) Generalized Anxiety Disorder  Psychosocial / Contextual Factors:  Feelings around rejection, not belonging for so long that it has been affecting his sleep and his thinking.  .   PROMIS (reviewed every 90 days): 27    Referral / Collaboration:  Referral to another professional/service is not indicated at this time..    Anticipated number of session for this episode of care: 9-12 sessions  Anticipation frequency of session: Biweekly  Anticipated Duration of each session: 38-52 minutes  Treatment plan will be reviewed in 90 days or when goals have been changed.     MeasurableTreatment Goal(s) related to diagnosis / functional impairment(s)  Goal 1: Patient will stabilize anxiety level while increasing ability to function on a daily basis.    I will know I've met my goal when patient is able to challenge any cognitive traps as they present at least at 80 % of the time by the next review(90 days)     Objective #A (Patient Action)    Patient will use thought-stopping strategy daily to reduce intrusive thoughts.  Status: New - Date: 11/14/20023      Intervention(s)  Therapist will teach distraction skills. Using the 5 senses .    Objective #B  Patient will identify at least 4 fears / thoughts that contribute to feeling anxious.  Status: New - Date: 11/14/2023      Intervention(s)  Therapist will teach CBT modality and coping skills to challenge any cognitive traps such as the 3 Cs    Goal 2: Patient will alleviate depressed mood and return to previous level of effective functioning    I will know I've met my goal when my energy level have increased at least at 65 % on daily basis by the next review( 90 days)    Objective #A (Patient Action)    Status: New - Date: 11/14/2023    Patient will Decrease frequency and intensity of feeling down, depressed, hopeless.  Intervention(s)  Therapist will provide educational materials on mindfulness  and care.    Patient will complete generative naming tasks by listing >11 items in a category within a 1 min time frame.     Patient will complete confontational naming task with min cues and 80% accuracy.    Patient will demonstrate insight into deficits by identifying areas of impairment (physical or cognitive) that would negatively impact independent functioning and safety with 80% accuracy.    SWALLOW GOALS:  Patient will participate in Videofluoroscopic Swallow Study (VFSS) for objective assessment of oropharyngeal swallow function, to r/o aspiration, and determine least restrictive safe diet by 11/7.  MET 11/6    SWALLOW GOALS:  Patient will consume therapeutic trials of bolus driven intervention (consider ice chips, thin via tsp/cup, mildly thick via tsp/cup, mod thick via tsp/cup, and/or puree) with adequate oral phase and without evidence of suspected aspiration with speech therapist only, to determine readiness for oral diet.    Patient will recall and demonstrate use of swallowing guidelines and compensatory strategies during functional snack/meal with min-mod cues to further improve safety with oral intake    Patient will complete oropharyngeal exercises including effortful swallow, multiple swallows, IOPI, super/supragolttic swallow x2-3 per day, to maximize oropharyngeal function, as identified as \"impaired\" on objective VFSS.            Therapy procedure time and total treatment time can be found documented on the Time Entry flowsheet   .    Objective #B  Patient will Improve concentration, focus, and mindfulness in daily activities .    Status: New - Date: 11/14/2023    Intervention(s)  Therapist will provide space to share and process thoughts and feelings related to current stressors .    Patient has reviewed and agreed to the above plan.      RAND Howard  November 14, 2023   Answers submitted by the patient for this visit:  Patient Health Questionnaire (Submitted on 12/15/2023)  If you checked off any problems, how difficult have these problems made it for you to do your work, take care of things at home, or get along with other people?: Not difficult at all  PHQ9 TOTAL SCORE: 1    Answers submitted by the patient for this visit:  Patient Health Questionnaire (Submitted on 12/22/2023)  If you checked off any problems, how difficult have these problems made it for you to do your work, take care of things at home, or get along with other people?: Somewhat difficult  PHQ9 TOTAL SCORE: 6

## 2024-11-21 ENCOUNTER — VIRTUAL VISIT (OUTPATIENT)
Dept: PSYCHOLOGY | Facility: CLINIC | Age: 29
End: 2024-11-21

## 2024-11-21 DIAGNOSIS — F43.21 UNRESOLVED GRIEF: Primary | ICD-10-CM

## 2024-11-21 ASSESSMENT — PATIENT HEALTH QUESTIONNAIRE - PHQ9
SUM OF ALL RESPONSES TO PHQ QUESTIONS 1-9: 2
10. IF YOU CHECKED OFF ANY PROBLEMS, HOW DIFFICULT HAVE THESE PROBLEMS MADE IT FOR YOU TO DO YOUR WORK, TAKE CARE OF THINGS AT HOME, OR GET ALONG WITH OTHER PEOPLE: SOMEWHAT DIFFICULT
SUM OF ALL RESPONSES TO PHQ QUESTIONS 1-9: 2

## 2024-11-21 NOTE — PROGRESS NOTES
[facility]                                     Progress Note    Patient Name: Roland Sandoval  Date: 11/21/24       Service Type: Individual      Session Start Time: 4:08 pm      Session End Time: 4:17 pm     Session Length: 9 minutes    Session #: 7    Attendees: Client    Service Modality:   Video Visit:      Provider verified identity through the following two step process.  Patient provided:  Patient is known previously to provider    Telemedicine Visit: The patient's condition can be safely assessed and treated via synchronous audio and visual telemedicine encounter.      Reason for Telemedicine Visit: Services only offered telehealth    Originating Site (Patient Location): Patient's other car    Distant Site (Provider Location): Provider Remote Setting- Home Office    Consent:  The patient/guardian has verbally consented to: the potential risks and benefits of telemedicine (video visit) versus in person care; bill my insurance or make self-payment for services provided; and responsibility for payment of non-covered services.     Patient would like the video invitation sent by:  My Chart    Mode of Communication:  Video Conference via Amwell    Distant Location (Provider):  Off-site    As the provider I attest to compliance with applicable laws and regulations related to telemedicine.    DATA  Interactive Complexity: No  Crisis: No   Extended Session (53+ minutes): No     Progress Since Last Session (Related to Symptoms / Goals / Homework):   Symptoms: Improving pt has been feeling better overall and less stressed    Homework: Partially completed      Episode of Care Goals: Satisfactory progress - PREPARATION (Decided to change - considering how); Intervened by negotiating a change plan and determining options / strategies for behavior change, identifying triggers, exploring social supports, and working towards setting a date to begin behavior change.       Current / Ongoing Stressors  "and Concerns:    Pt is currently seeking therapy due to challenges with navigating finding a accepting community to connect with here in [place]. Pt had a falling out with a discord group, which he was active in for 9 months, and after leaving it feels it was the right decision as this group felt like is was full of \"fake\" or unauthentic people. Since last session, pt's insurance has still not kicked in which he is actively work on still and agreed to reach out once he get it confirmed that he is insured, but until then therapy will be suspended. Pt did attend his friend , which was beautiful and full of good energy. Pt noted that he was grieving while at the  and yet it felt less intense then compared to when he first learned about his friend's passing. While in [place] pt was able to help his friend's wife/kids out a bit which felt good for him too. The  was earlier this week and so it is still feeling fresh. Pt is thinking of going back to visit his friends gave in  during pride month. This  has been the main focus for him these past couple weeks and otherwise things have been going about the same. Due to lack of insurance pt was unable to meet for a billable session.     Treatment Objective(s) Addressed in This Session:   identify 3 fears, thoughts, or stressors which contribute to feelings of anxiety  use thought-stopping strategy daily to reduce intrusive thoughts  Decrease frequency and intensity of feeling down, depressed, hopeless  Improve concentration, focus, and mindfulness in daily activities   identify at least 3 calming thoughts to use when anxious     Intervention:   CBT: Reviewed how he can challenge his internal-narrative around his MH symptoms    Motivational Interviewing    [place] Intervention: Expressed Empathy/Understanding, Supported Autonomy, Collaboration, Evocation, Open-ended questions, Reflections: simple and complex, Change talk (evoked), and Reframe     Change Talk " Expressed by the Patient: Desire to change Ability to change Reasons to change Committment to change Activation Taking steps    Provider Response to Change Talk: E - Evoked more info from patient about behavior change, A - Affirmed patient's thoughts, decisions, or attempts at behavior change, R - Reflected patient's change talk, and S - Summarized patient's change talk statements    Psychodynamic: Processed through internal experiences related to anxiety and trust/social connection  Solution Focused: Identified immediate areas of concern and potential barriers to success    Safety plan: Was reviewed and reinforced    Assessments:  PHQ2:       11/10/2021     2:32 PM   PHQ-2 ( 1999 [employer])   Q1: Little interest or pleasure in doing things 0    Q2: Feeling down, depressed or hopeless 0    PHQ-2 Score 0   Q1: Little interest or pleasure in doing things Not at all   Q2: Feeling down, depressed or hopeless Not at all   PHQ-2 Score 0       Patient-reported     PHQ9:       6/25/2024    12:53 PM 7/25/2024    10:06 AM 8/8/2024    11:53 AM 9/9/2024     4:20 AM 9/25/2024     5:53 PM 11/7/2024     3:58 PM 11/21/2024     1:26 PM   PHQ-9 SCORE   PHQ-9 Total Score MyChart 0 0 0 1 (Minimal depression) 0 0 2 (Minimal depression)   PHQ-9 Total Score 0 0 0 1 0 0  2        Patient-reported     GAD2:       2/1/2024     1:09 PM 3/7/2024     3:42 PM 3/22/2024    10:10 AM 4/5/2024    12:11 PM 7/25/2024    10:07 AM 8/8/2024    11:54 AM 9/9/2024     4:20 AM   PARI-2   Feeling nervous, anxious, or on edge 0  0  1  0  2  0  0    Not being able to stop or control worrying 0  0  0  1  2  0  0    PARI-2 Total Score 0 0    0    0 1 1 4 0 0       Patient-reported    Multiple values from one day are sorted in reverse-chronological order     GAD7:       10/31/2023     6:05 PM 1/5/2024     1:39 PM 3/8/2024    12:33 PM 3/22/2024    12:29 PM 5/31/2024     3:47 PM 7/25/2024    10:07 AM   PARI-7 SCORE   Total Score      6 (mild anxiety)   Total Score 7 6 7 7  7 6     PROMIS 10-Global Health (only subscores and total score):       2/1/2024     1:11 PM 3/7/2024     3:43 PM 3/22/2024    10:13 AM 4/5/2024    12:13 PM 7/25/2024    10:08 AM 8/8/2024    11:55 AM 9/9/2024     4:33 AM   PROMIS-10 Scores Only   Global Mental Health Score 9 14    14    14 10 9 10 12 11   Global Physical Health Score 18 19    19    19 17 19 16 19 18   PROMIS TOTAL - SUBSCORES 27 33    33    33 27 28 26 31 29     [place] Suicide Severity Rating Scale (Lifetime/Recent)      8/8/2024     1:59 PM   [place] Suicide Severity Rating (Lifetime/Recent)   Q1 Wish to be Dead (Lifetime) N   Q2 Non-Specific Active Suicidal Thoughts (Lifetime) N   Actual Attempt (Lifetime) N   Has subject engaged in non-suicidal self-injurious behavior? (Lifetime) N   Interrupted Attempts (Lifetime) N   Aborted or Self-Interrupted Attempt (Lifetime) N   Preparatory Acts or Behavior (Lifetime) N   Calculated C-SSRS Risk Score (Lifetime/Recent) No Risk Indicated       ASSESSMENT: Current Emotional / Mental Status (status of significant symptoms):   Risk status (Self / Other harm or suicidal ideation)   Patient denies current fears or concerns for personal safety.   Patient denies current or recent suicidal ideation or behaviors.   Patient denies current or recent homicidal ideation or behaviors.   Patient denies current or recent self injurious behavior or ideation.   Patient denies other safety concerns.   Patient reports there has been no change in risk factors since their last session.     Patient reports there has been no change in protective factors since their last session.     Recommended that patient call 911 or go to the local ED should there be a change in any of these risk factors.     Appearance:   Appropriate    Eye Contact:   Good    Psychomotor Behavior: Normal    Attitude:   Cooperative  Interested Friendly Pleasant   Orientation:   All   Speech    Rate / Production: Normal/ Responsive    Volume:  Normal     Mood:    Anxious  Normal   Affect:    Appropriate    Thought Content:  Clear    Thought Form:  Coherent  Logical    Insight:    Good  and Intellectual Insight     Medication Review:   No changes to current psychiatric medication(s)     Medication Compliance:   Yes     Changes in Health Issues:   None reported     Chemical Use Review:   Substance Use: Chemical use reviewed, no active concerns identified      Tobacco Use: No current tobacco use.      Diagnosis:  No diagnosis found.      Collateral Reports Completed:   Not Applicable    PLAN: (Patient Tasks / Therapist Tasks / Other):  Try critical observation around social anxiety/reading body language  Check-in with self about cognitive distortions if feeling anxious/stressed  Attend kick-boxing as a coping skill  Try tools discussed in therapy to work through lingering grief    Troy Gilbert, [facility]  ______________________________________________________________________    Individual Treatment Plan    Patient's Name: Roland Sandoval  YOB: 1995    Date of Creation: 11/14/2023    Date Treatment Plan Last Reviewed/Revised: 8/8/24    DSM5 Diagnoses: 296.32 (F33.1) Major Depressive Disorder, Recurrent Episode, Moderate _ and With anxious distress or 300.02 (F41.1) Generalized Anxiety Disorder  Psychosocial / Contextual Factors:  Feelings around rejection, not belonging for so long that it has been affecting his sleep and his thinking.    PROMIS (reviewed every 90 days):  PROMIS-10 Scores        7/25/2024    10:08 AM 8/8/2024    11:55 AM 9/9/2024     4:33 AM   PROMIS-10 Total Score w/o Sub Scores   PROMIS TOTAL - SUBSCORES 26 31 29     Referral / Collaboration:  Referral to another professional/service is not indicated at this time..    Anticipated number of session for this episode of care: 9-12 sessions  Anticipation frequency of session: Biweekly  Anticipated Duration of each session: 38-52 minutes  Treatment plan will be reviewed in 90 days or  when goals have been changed.     MeasurableTreatment Goal(s) related to diagnosis / functional impairment(s)  Goal 1: Patient will stabilize anxiety level while increasing ability to function on a daily basis.    I will know I've met my goal when patient is able to challenge any cognitive traps as they present at least at 80 % of the time by the next review(90 days)     Objective #A (Patient Action)    Patient will use thought-stopping strategy daily to reduce intrusive thoughts.  Status: Continued - Date(s): 8/8/24    Intervention(s)  Therapist will teach distraction skills. Using the 5 senses .    Objective #B  Patient will identify at least 4 fears / thoughts that contribute to feeling anxious.  Status: Continued - Date(s): 8/8/24    Intervention(s)  Therapist will teach CBT modality and coping skills to challenge any cognitive traps such as the 3 Cs    Goal 2: Patient will alleviate depressed mood and return to previous level of effective functioning    I will know I've met my goal when my energy level have increased at least at 70 % on daily basis by the next review( 90 days)    Objective #A (Patient Action)    Status: Continued - Date(s): 8/8/24  Patient will Decrease frequency and intensity of feeling down, depressed, hopeless.  Intervention(s)  Therapist will provide educational materials on mindfulness .    Objective #B  Patient will Improve concentration, focus, and mindfulness in daily activities .    Status: Continued - Date(s): 8/8/24  Intervention(s)  Therapist will provide space to share and process thoughts and feelings related to current stressors .    Patient has reviewed and agreed to the above plan.    Originally developed by RAND Howard  June 11, 2023  Reviewed/Continued by Troy Gilbert [facility]  August 8, 2024

## 2025-01-09 ENCOUNTER — VIRTUAL VISIT (OUTPATIENT)
Dept: PSYCHIATRY | Facility: CLINIC | Age: 30
End: 2025-01-09
Payer: COMMERCIAL

## 2025-01-09 DIAGNOSIS — F32.1 CURRENT MODERATE EPISODE OF MAJOR DEPRESSIVE DISORDER, UNSPECIFIED WHETHER RECURRENT (H): ICD-10-CM

## 2025-01-09 RX ORDER — SERTRALINE HYDROCHLORIDE 25 MG/1
25 TABLET, FILM COATED ORAL DAILY
Qty: 90 TABLET | Refills: 2 | Status: SHIPPED | OUTPATIENT
Start: 2025-01-09

## 2025-01-09 ASSESSMENT — PATIENT HEALTH QUESTIONNAIRE - PHQ9
SUM OF ALL RESPONSES TO PHQ QUESTIONS 1-9: 1
SUM OF ALL RESPONSES TO PHQ QUESTIONS 1-9: 1
10. IF YOU CHECKED OFF ANY PROBLEMS, HOW DIFFICULT HAVE THESE PROBLEMS MADE IT FOR YOU TO DO YOUR WORK, TAKE CARE OF THINGS AT HOME, OR GET ALONG WITH OTHER PEOPLE: NOT DIFFICULT AT ALL
SUM OF ALL RESPONSES TO PHQ QUESTIONS 1-9: 1
10. IF YOU CHECKED OFF ANY PROBLEMS, HOW DIFFICULT HAVE THESE PROBLEMS MADE IT FOR YOU TO DO YOUR WORK, TAKE CARE OF THINGS AT HOME, OR GET ALONG WITH OTHER PEOPLE: NOT DIFFICULT AT ALL
SUM OF ALL RESPONSES TO PHQ QUESTIONS 1-9: 1

## 2025-01-09 ASSESSMENT — PAIN SCALES - GENERAL: PAINLEVEL_OUTOF10: NO PAIN (0)

## 2025-01-09 NOTE — PROGRESS NOTES
"  The patient has been notified of following:      \"This virtual  visit will be conducted via a call between you and your physician/provider. We have found that certain health care needs can be provided without the need for a physical exam.  This service lets us provide the care you need virtually/via video   If a prescription is necessary we can send it directly to your pharmacy.  If lab work is needed we can place an order for that and you can then stop by our lab to have the test done at a later time.     Virtual/Video visits are billed at different rates depending on your insurance coverage.Some insurers they may be billed the same as an in-person visit.  Please reach out to your insurance provider with any questions.    Patient has given verbal consent for virtual  visit : Yes      Ho w would you like to obtain your AVS? Mail a copy  If the video visit is dropped, the invitation should be resent by: Send to e-mail at: [email or web address]  Will anyone else be joining your video visit? No            Psychiatric  Out- Patient  Follow Up Progress Note  Date of visit:1/9/2025           Discussion of Care and Treatment Recommendations:   This is a 29 year old male with  a history of depression presenting to the clinic today for follow-up appointment . . .      Last visit 09/09/2024.  Recommendation at last visit .  1. Continue Zoloft 25 mg daily - MDD  2.Highly recommend increase   Psychotherapy session to weekly or biweekly: P3.Recommend abstain from THC- repots he has significantly reduced use and is working on  Total abstinence   4. RTC-  6 months  call in between visits with any questions or concerns  Patient and I reviewed diagnosis and treatment plan and patient agrees with following recommendations:  Ongoing education given regarding diagnostic and treatment options with adequate verbalization of understanding.  Plan     1. Continue Zoloft 25 mg daily - MDD  2.Highly recommend increase   Psychotherapy " "session to weekly or biweekly: P3.Recommend abstain from THC- repots he has significantly reduced use and is working on  Total abstinence   4. RTC-  6 months  call in between visits with any questions or concerns       DIagnoses:     Moderate episode of recurrent major depressive disorder     Patient Active Problem List   Diagnosis    Moderate episode of recurrent major depressive disorder (H)    Low HDL (under 40)    Herpes genitalis in men             Chief Complaint / Subjective:    Chief complaint: Depression    History of Present Illness:   Patient has been compliant with current medications denies side effects.  He had a wonderful holiday season.  Continues to be stable on the low-dose of sertraline at 25 mg daily.  No new concerns today.  Patient to continue on current medication      Answers submitted by the patient for this visit:  Patient Health Questionnaire (Submitted on 1/9/2025)  If you checked off any problems, how difficult have these problems made it for you to do your work, take care of things at home, or get along with other people?: Not difficult at all  PHQ9 TOTAL SCORE: 1      Mental Status Examination:   Appearance: Well groomed, good eye contact   Orientation: Patient alert and oriented to person, place, time, and situation  Reliability:  Patient appears to be an adequate historian.    Behavior: cooperative   Speech: Speech is spontaneous and coherent, with a normal rate, rhythm and tone.    Language:There are no difficulties with expressive or receptive language as observed throughout the interview.    Mood: Described as \"ok\".    Affect: congruent   Judgement: Able to make basic decision regarding safety.  Insight: Good awareness of physical and mental health conditions and aware of needs around care for these.  Gait and station: unable to assess  Thought process: Logical   Thought content: No evidence of delusions or paranoia.    Hallucinations : No evidence of any hallucination  Thought " content: No evidence of delusions or paranoia.   Suicidal /Homical Ideations:  No thoughts of self harm or suicide. No thoughts of harming others.  Associations: Connected  Fund of knowledge: Average  Attention / Concentration: Able to remain focused during the interview with minimal distractibility or need for redirection.  Short Term Memory: Grossly intact as evidence by client recalling themes and ideas discussed.  Long Term Memory: Intact  Motor Status: unable to asse    Drug/treatment history and current pattern of use:   Denies      Medication changes: See Above   Medication adherence: compliant  Medication side effects: absent  Information about medications: Side effects, benefits and alternative treatments discussed and patient agrees .    Psychotherapy: Supportive therapy day-to-day living    Education: Diet, exercise, abstinence from drugs and alcohol, patient will not drive if sedated and medications or  under influence of any substance    Lab Results:   Personally reviewed and discussed with the patient    Lab Results   Component Value Date    WBC 4.3 09/26/2023    HGB 15.0 09/26/2023    HCT 46.9 09/26/2023     09/26/2023    CHOL 195 05/23/2022    TRIG 92 05/23/2022    HDL 40 05/23/2022    ALT 22 09/26/2023    AST 23 09/26/2023     09/26/2023    BUN 15.1 09/26/2023    CO2 27 09/26/2023       Vital signs:  There were no vitals taken for this visit.  Telemedicine visit-no vital signs completed  Allergies: Patient has no known allergies.         Medications:     Current Outpatient Medications   Medication Sig Dispense Refill    sertraline (ZOLOFT) 25 MG tablet Take 1 tablet (25 mg) by mouth daily. 90 tablet 2     No current facility-administered medications for this visit.       No current facility-administered medications for this visit.     No current facility-administered medications for this visit.         Medication adherence: Reviewed risk/benefits of medication , Patient able to  verbalize understanding of side effects and Patient verbally consents to taking medications      PSYCHOEDUCATION:  Medication side effects and alternatives reviewed. Health promotion activities recommended and reviewed today. All questions addressed. Education and counseling completed regarding risks and benefits of medications and psychotherapy options.  Consent provided by patient/guardian  Call the psychiatric nurse line with medication questions or concerns at [phone].  MyChart may be used to communicate with your provider, but this is not intended to be used for emergencies.  SEROTONIN SYNDROME:  Discussed risks of Serotonin syndrome (ie, serotonin toxicity) which is a potentially life-threatening condition associated with increased serotonergic activity in the central nervous system (CNS). It is seen with therapeutic medication use, inadvertent interactions between drugs, and intentional self-poisoning. Serotonin syndrome may involve a spectrum of clinical findings, which often include mental status changes, autonomic hyperactivity, and neuromuscular abnormalities.    STIMULANT THERAPY: Side effects discussed including but not limited to cardiac (including HTN, tachycardia, sudden death), motor/tic, appetite/growth, mood lability and sleep disruption. This is a controlled substance with risk for abuse, need to keep in a safe keep place and cannot replace lost scripts  HARM REDUCTION:  Discussions regarding effects of mood altering substances, alcohol and cannabis, on mood and that approach is harm reduction, will continue to prescribe meds as they work to cut back use.    SAFETY:  We all care about your loved one's safety. To reduce the risk of self-harm, remove access to all:  Firearms, Medicines (both prescribed and over-the-counter), Knives and other sharp objects, Ropes and like materials, and Alcohol  SLEEP HYGIENE: establish a sleep routine, limit screen time 1 hour prior to bed, use bed for sleep  only, take sleep/medications on time (including sleepy time tea, trazadone or herbal treatments such as melatonin), aroma therapy, limit caffeine/sugar, yoga, guided imagery, stretch, meditation, limit naps to 20 minutes, make a temperature change in the room, white noise, be mindful of slowing down breathing, take a warm bath/shower, frequently wash sheets, and journaling.   Medlineplus.gov is information for patients.  It is run by the [employer] and it contains information about all disorders, diseases and all medications.              Review of Systems:      ROS:    Subjective Data Only- Tele-Health Visit    10 point ROS was negative except for the items listed in HPI.      Crisis Resources:    Present to the Emergency Department as needed or call after hours crisis line at [phone] or [phone].   [place] Crisis Text Line: Text [place] to 961105.  Suicide LifeLine Chat: suicideduuin.org/chat/.  [place] Suicide Prevention Lifeline: 499.192.6978 (TTY: 772.173.8522). Call anytime for help.  (www.suicidepreventionlifeline.org)  National [place] on Mental Illness (www.livan.org): 386.251.3098 or 089-467-3175.  Mental Health Association (www.mentalhealth.org): 446.506.8354 or 531-452-0363.      Coordination of Care:   More than 50% of time spent on coordination of care including: Educating patient about diagnosis, prognosis, side effects and benefits of medications, diet, exercise.  Time also spent providing supportive therapy regarding above issues.    Video-Visit Details    Type of service:  Video Visit    Originating Location (pt. Location): Home    Distant Location (provider location): Providers Remote Office     Platform used for Video Visit: Rebeca      Disclaimer: This note consists of symbols derived from keyboarding, dictation and/or voice recognition software. As a result, there may be errors in the script that have gone undetected. Please consider this when interpreting  information found in this chart.    Start Time : 1600  End time : 1612

## 2025-01-10 ENCOUNTER — VIRTUAL VISIT (OUTPATIENT)
Dept: PSYCHOLOGY | Facility: CLINIC | Age: 30
End: 2025-01-10
Payer: COMMERCIAL

## 2025-01-10 DIAGNOSIS — F33.1 MODERATE EPISODE OF RECURRENT MAJOR DEPRESSIVE DISORDER (H): Primary | ICD-10-CM

## 2025-01-10 DIAGNOSIS — F41.1 GAD (GENERALIZED ANXIETY DISORDER): ICD-10-CM

## 2025-01-10 PROCEDURE — 90837 PSYTX W PT 60 MINUTES: CPT | Mod: 95 | Performed by: COUNSELOR

## 2025-01-10 NOTE — PROGRESS NOTES
[facility]                                     Progress Note    Patient Name: Roland Sandoval  Date: 1/10/25       Service Type: Individual      Session Start Time: 5:02 pm      Session End Time: 5:56 pm     Session Length: 54 minutes    Session #: 8    Attendees: Client    Service Modality:   Video Visit:      Provider verified identity through the following two step process.  Patient provided:  Patient is known previously to provider    Telemedicine Visit: The patient's condition can be safely assessed and treated via synchronous audio and visual telemedicine encounter.      Reason for Telemedicine Visit: Services only offered telehealth    Originating Site (Patient Location): Patient's home    Distant Site (Provider Location): Provider Remote Setting- Home Office    Consent:  The patient/guardian has verbally consented to: the potential risks and benefits of telemedicine (video visit) versus in person care; bill my insurance or make self-payment for services provided; and responsibility for payment of non-covered services.     Patient would like the video invitation sent by:  My Chart    Mode of Communication:  Video Conference via [employer]    Distant Location (Provider):  Off-site    As the provider I attest to compliance with applicable laws and regulations related to telemedicine.    DATA  Interactive Complexity: No  Crisis: No   Extended Session (53+ minutes): PROLONGED SERVICE IN THE OUTPATIENT SETTING REQUIRING DIRECT (FACE-TO-FACE) PATIENT CONTACT BEYOND THE USUAL SERVICE:    - Longer session due to limited access to mental health appointments and necessity to address patient's distress / complexity    - Patient's presenting concerns require more intensive intervention than could be completed within the usual service     Progress Since Last Session (Related to Symptoms / Goals / Homework):   Symptoms: Worsening pt has been feeling emotionally numb    Homework: Achieved / completed  "to satisfaction      Episode of Care Goals: Satisfactory progress - PREPARATION (Decided to change - considering how); Intervened by negotiating a change plan and determining options / strategies for behavior change, identifying triggers, exploring social supports, and working towards setting a date to begin behavior change.       Current / Ongoing Stressors and Concerns:    Pt is currently seeking therapy due to challenges with navigating finding an accepting community to connect with here in [place]. Pt had a falling out with a [employer] group, which he was active in for 9 months, and after leaving it feels it was the right decision as this group felt like is was full of \"fake\" or unauthentic people. Since last session, pt has been dealing with some big life stressors and acknowledges that he has been a bit emotionally numb. 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 Pt is still working with [employer] as a  and it has felt like a good fit still, but  hasn't decided if he is going to return next year yet. Pt is still connecting with his [employer] group, which has been a good thing. 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  Pt processed through his big stressor, which has been getting a warning of potential eviction. Pt explained that because he had to go to [place] for his friend's , they fell behind on their rent which they need to catch up on by the end of the month or are being threatened with possible eviction. Pt confirmed that they, he and his partner, have a plan to try to pay it off by the deadline, including reaching out to an assistance program. Pt does have some anxiety about this being the second time it has happened and needing to request assistance, so he " "isn't sure if they will be able to get assistance through the program they used last time. Pt noted that he should probably be more worried or anxious about the situation, and yet right now he feels more numb then anything. Pt thinks that this is how he has dealt with high stress situations in the past, where he will use \"numbing out\" as a way to make it so he doesn't feel overwhelmed and instead can act. Pt also clarified that he has been in this situation before and \"survived\" that, which is probably another factor in why he doesn't feel as anxious/worried as his partner. Pt also thinks that this may be an evolution of his past behavior of running from his problem, though in this case he doesn't feel like physically running but acknowledges that he is emotionally retreating; which he doesn't think he likes. Strategies to address this were explored, including trying to engage more with his partner with empathy and think about what he/they will need to do if they get kicked out (ie plan B). Session went longer then anticipated due to presenting concerns and lack of recent visits. Trx plan was updated.     Treatment Objective(s) Addressed in This Session:   identify 3 fears, thoughts, or stressors which contribute to feelings of anxiety  use thought-stopping strategy daily to reduce intrusive thoughts  Decrease frequency and intensity of feeling down, depressed, hopeless  Improve concentration, focus, and mindfulness in daily activities   identify at least 3 calming thoughts to use when anxious     Intervention:   CBT: Reviewed how he can challenge his internal-narrative around his MH symptoms    Motivational Interviewing    MI Intervention: Expressed Empathy/Understanding, Supported Autonomy, Collaboration, Evocation, Open-ended questions, Reflections: simple and complex, Change talk (evoked), and Reframe     Change Talk Expressed by the Patient: Desire to change Ability to change Reasons to change Need to change " Committment to change Activation Taking steps    Provider Response to Change Talk: E - Evoked more info from patient about behavior change, A - Affirmed patient's thoughts, decisions, or attempts at behavior change, R - Reflected patient's change talk, and S - Summarized patient's change talk statements    Psychodynamic: Processed through internal experiences related to anxiety and trust/social connection  Solution Focused: Identified immediate areas of concern and potential barriers to success    Safety plan: Was reviewed and reinforced    Assessments:  PHQ2:       11/10/2021     2:32 PM   PHQ-2 ( 1999 [employer])   Q1: Little interest or pleasure in doing things 0    Q2: Feeling down, depressed or hopeless 0    PHQ-2 Score 0   Q1: Little interest or pleasure in doing things Not at all   Q2: Feeling down, depressed or hopeless Not at all   PHQ-2 Score 0       Proxy-reported     PHQ9:       7/25/2024    10:06 AM 8/8/2024    11:53 AM 9/9/2024     4:20 AM 9/25/2024     5:53 PM 11/7/2024     3:58 PM 11/21/2024     1:26 PM 1/9/2025     1:38 PM   PHQ-9 SCORE   PHQ-9 Total Score MyChart 0 0 1 (Minimal depression) 0 0 2 (Minimal depression) 1 (Minimal depression)   PHQ-9 Total Score 0 0 1 0 0  2  1        Patient-reported     GAD2:       3/7/2024     3:42 PM 3/22/2024    10:10 AM 4/5/2024    12:11 PM 7/25/2024    10:07 AM 8/8/2024    11:54 AM 9/9/2024     4:20 AM 1/9/2025     1:38 PM   PARI-2   Feeling nervous, anxious, or on edge 0 1 0 2 0 0 1   Not being able to stop or control worrying 0 0 1 2 0 0 1   PARI-2 Total Score 0    0    0 1 1 4 0 0 2        Patient-reported     GAD7:       10/31/2023     6:05 PM 1/5/2024     1:39 PM 3/8/2024    12:33 PM 3/22/2024    12:29 PM 5/31/2024     3:47 PM 7/25/2024    10:07 AM   PARI-7 SCORE   Total Score      6 (mild anxiety)   Total Score 7 6 7 7 7 6     PROMIS 10-Global Health (only subscores and total score):       3/7/2024     3:43 PM 3/22/2024    10:13 AM 4/5/2024    12:13 PM 7/25/2024     10:08 AM 8/8/2024    11:55 AM 9/9/2024     4:33 AM 1/9/2025     1:39 PM   PROMIS-10 Scores Only   Global Mental Health Score 14    14    14 10 9 10 12 11 12    Global Physical Health Score 19    19    19 17 19 16 19 18 17    PROMIS TOTAL - SUBSCORES 33    33    33 27 28 26 31 29 29        Patient-reported     [place] Suicide Severity Rating Scale (Lifetime/Recent)      8/8/2024     1:59 PM   [place] Suicide Severity Rating (Lifetime/Recent)   Q1 Wish to be Dead (Lifetime) N   Q2 Non-Specific Active Suicidal Thoughts (Lifetime) N   Actual Attempt (Lifetime) N   Has subject engaged in non-suicidal self-injurious behavior? (Lifetime) N   Interrupted Attempts (Lifetime) N   Aborted or Self-Interrupted Attempt (Lifetime) N   Preparatory Acts or Behavior (Lifetime) N   Calculated C-SSRS Risk Score (Lifetime/Recent) No Risk Indicated       ASSESSMENT: Current Emotional / Mental Status (status of significant symptoms):   Risk status (Self / Other harm or suicidal ideation)   Patient denies current fears or concerns for personal safety.   Patient denies current or recent suicidal ideation or behaviors.   Patient denies current or recent homicidal ideation or behaviors.   Patient denies current or recent self injurious behavior or ideation.   Patient denies other safety concerns.   Patient reports there has been no change in risk factors since their last session.     Patient reports there has been no change in protective factors since their last session.     Recommended that patient call 911 or go to the local ED should there be a change in any of these risk factors     Appearance:   Appropriate    Eye Contact:   Good    Psychomotor Behavior: Normal    Attitude:   Cooperative  Interested Friendly Pleasant   Orientation:   All   Speech    Rate / Production: Normal/ Responsive Talkative    Volume:  Normal    Mood:    Normal Ambivalence   Affect:    Blunted  Flat    Thought Content:  Clear    Thought Form:  Coherent   Logical    Insight:    Fair  and Intellectual Insight     Medication Review:   No changes to current psychiatric medication(s)     Medication Compliance:   Yes     Changes in Health Issues:   None reported     Chemical Use Review:   Substance Use: Chemical use reviewed, no active concerns identified      Tobacco Use: No current tobacco use.      Diagnosis:  1. Moderate episode of recurrent major depressive disorder (H)    2. PARI (generalized anxiety disorder)      Collateral Reports Completed:   Not Applicable    PLAN: (Patient Tasks / Therapist Tasks / Other):  Check-in with self about rent situation and think about plan B  Try tools discussed in therapy to engage with anxiety/worry more  Practice connecting with partner through empathy and being more emotionally available  Continue to connect with [employer] group    Troy Gilbert, [facility]  ______________________________________________________________________    Individual Treatment Plan    Patient's Name: Roland Sandoval  YOB: 1995    Date of Creation: 11/14/2023    Date Treatment Plan Last Reviewed/Revised: 1/10/25    DSM5 Diagnoses: 296.32 (F33.1) Major Depressive Disorder, Recurrent Episode, Moderate _ and With anxious distress or 300.02 (F41.1) Generalized Anxiety Disorder  Psychosocial / Contextual Factors:  Feelings around rejection, not belonging for so long that it has been affecting his sleep and his thinking.    PROMIS (reviewed every 90 days):  PROMIS-10 Scores        8/8/2024    11:55 AM 9/9/2024     4:33 AM 1/9/2025     1:39 PM   PROMIS-10 Total Score w/o Sub Scores   PROMIS TOTAL - SUBSCORES 31 29 29        Patient-reported     Referral / Collaboration:  Referral to another professional/service is not indicated at this time..    Anticipated number of session for this episode of care: 9-12 sessions  Anticipation frequency of session: Biweekly  Anticipated Duration of each session: 38-52 minutes  Treatment plan will be reviewed in  90 days or when goals have been changed.     MeasurableTreatment Goal(s) related to diagnosis / functional impairment(s)  Goal 1: Patient will stabilize anxiety level while increasing ability to function on a daily basis.    I will know I've met my goal when patient is able to challenge any cognitive traps as they present at least at 80 % of the time by the next review(90 days)     Objective #A (Patient Action)    Patient will use thought-stopping strategy daily to reduce intrusive thoughts.  Status: Completed - Date: 1/10/25      Intervention(s)  Therapist will teach distraction skills. Using the 5 senses .    Objective #B  Patient will identify at least 4 fears / thoughts that contribute to feeling anxious.  Status: Completed - Date: 1/10/25      Intervention(s)  Therapist will teach CBT modality and coping skills to challenge any cognitive traps such as the 3 Cs    Goal 2: Patient will alleviate depressed mood and return to previous level of effective functioning    I will know I've met my goal when my energy level have increased at least at 70 % on daily basis by the next review( 90 days)    Objective #A (Patient Action)    Status: Continued - Date(s): 1/10/25  Patient will Decrease frequency and intensity of feeling down, depressed, hopeless.  Intervention(s)  Therapist will provide educational materials on mindfulness .    Objective #B  Patient will Improve concentration, focus, and mindfulness in daily activities .    Status: Continued - Date(s): 1/10/25  Intervention(s)  Therapist will provide space to share and process thoughts and feelings related to current stressors .    Patient has reviewed and agreed to the above plan.    Originally developed by RAND Howard  June 11, 2023  Reviewed/Continued by ILYA Mcfarland  August 8, 2024

## 2025-02-27 ASSESSMENT — PATIENT HEALTH QUESTIONNAIRE - PHQ9
SUM OF ALL RESPONSES TO PHQ QUESTIONS 1-9: 3
SUM OF ALL RESPONSES TO PHQ QUESTIONS 1-9: 3
10. IF YOU CHECKED OFF ANY PROBLEMS, HOW DIFFICULT HAVE THESE PROBLEMS MADE IT FOR YOU TO DO YOUR WORK, TAKE CARE OF THINGS AT HOME, OR GET ALONG WITH OTHER PEOPLE: NOT DIFFICULT AT ALL

## 2025-02-28 ENCOUNTER — VIRTUAL VISIT (OUTPATIENT)
Dept: PSYCHOLOGY | Facility: CLINIC | Age: 30
End: 2025-02-28
Payer: COMMERCIAL

## 2025-02-28 DIAGNOSIS — F41.1 GAD (GENERALIZED ANXIETY DISORDER): Primary | ICD-10-CM

## 2025-02-28 DIAGNOSIS — F33.1 MODERATE EPISODE OF RECURRENT MAJOR DEPRESSIVE DISORDER (H): ICD-10-CM

## 2025-02-28 PROCEDURE — 90834 PSYTX W PT 45 MINUTES: CPT | Mod: 95 | Performed by: COUNSELOR

## 2025-02-28 NOTE — PROGRESS NOTES
[facility]                                     Progress Note    Patient Name: Roland Sandoval  Date: 2/28/25       Service Type: Individual      Session Start Time: 4:08 pm      Session End Time: 5:00 pm     Session Length: 52 minutes    Session #: 11    Attendees: Client    Service Modality:   Video Visit:      Provider verified identity through the following two step process.  Patient provided:  Patient is known previously to provider    Telemedicine Visit: The patient's condition can be safely assessed and treated via synchronous audio and visual telemedicine encounter.      Reason for Telemedicine Visit: Services only offered telehealth    Originating Site (Patient Location): Patient's home    Distant Site (Provider Location): Provider Remote Setting- Home Office    Consent:  The patient/guardian has verbally consented to: the potential risks and benefits of telemedicine (video visit) versus in person care; bill my insurance or make self-payment for services provided; and responsibility for payment of non-covered services.     Patient would like the video invitation sent by:  My Chart    Mode of Communication:  Video Conference via Amwell    Distant Location (Provider):  Off-site    As the provider I attest to compliance with applicable laws and regulations related to telemedicine.    DATA  Interactive Complexity: No  Crisis: No   Extended Session (53+ minutes): No     Progress Since Last Session (Related to Symptoms / Goals / Homework):   Symptoms: Improving pt has been managing his anxiety better    Homework: Achieved / completed to satisfaction      Episode of Care Goals: Satisfactory progress - ACTION (Actively working towards change); Intervened by reinforcing change plan / affirming steps taken.       Current / Ongoing Stressors and Concerns:    Pt is currently seeking therapy due to challenges with navigating finding an accepting community to connect with here in [place]. Pt had a  "falling out with a discord group, which he was active in for 9 months, and after leaving it feels it was the right decision as this group felt like is was full of \"fake\" or unauthentic people. Since last session, pt reported that his anxiety has been much better and he has been doing well with taking space from the new and social media. Pt reconnected with a friend who has went awol, learning that they were dealing with a lot of life changes and this resulted in them falling out of touch with a lot of people. Pt did get an open invitation to stay with this friend in [place] if things were to get back in the [place] for pt as being a member of the LGBTQ community. Pt confirmed that there has not been any other homophobia incidents, which has reaffirmed to him that his previous concern was being more driven by current events and his anxious brain then objective evidence. Pt did go out last weekend with friends, which was actually really fun and he is looking forward to going out again this weekend for his birthday. Pt's sister and his niece came over yesterday to wish him a happy birthday which felt really good. Pt verbalized how he is feeling a sense of stillness now, which feels weird as he hasn't had a moment like this is a long time. Pt explored how taking a break from information gathering has been part of this stillness and how it could show that taking a break is not intrinsically a bad or risky thing. Pt agreed that this week overall has been a good week and that focusing on himself has been a good choice. Pt discussed how he continues to feel somewhat lost in his life and his uncertainty around this makes it hard for him to feel empowered to take risks to figure this out at times. 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 Pt did identify that " there is some uncertainty when it comes to making these kind of choices which is another layer that can become a barrier.      Treatment Objective(s) Addressed in This Session:   identify 3 fears, thoughts, or stressors which contribute to feelings of anxiety  use thought-stopping strategy daily to reduce intrusive thoughts  Decrease frequency and intensity of feeling down, depressed, hopeless  Improve concentration, focus, and mindfulness in daily activities   identify at least 3 calming thoughts to use when anxious     Intervention:   CBT: Reviewed how he can challenge his internal-narrative around his MH symptoms    Motivational Interviewing    MI Intervention: Expressed Empathy/Understanding, Supported Autonomy, Collaboration, Evocation, Open-ended questions, Reflections: simple and complex, Change talk (evoked), and Reframe     Change Talk Expressed by the Patient: Desire to change Ability to change Reasons to change Need to change Committment to change Activation Taking steps    Provider Response to Change Talk: E - Evoked more info from patient about behavior change, A - Affirmed patient's thoughts, decisions, or attempts at behavior change, R - Reflected patient's change talk, and S - Summarized patient's change talk statements    Psychodynamic: Processed through internal experiences related to anxiety and trust/social connection  Solution Focused: Identified immediate areas of concern and potential barriers to success    Safety plan: Was reviewed and reinforced    Assessments:  PHQ2:       11/10/2021     2:32 PM   PHQ-2 ( 1999 [employer])   Q1: Little interest or pleasure in doing things 0    Q2: Feeling down, depressed or hopeless 0    PHQ-2 Score 0   Q1: Little interest or pleasure in doing things Not at all   Q2: Feeling down, depressed or hopeless Not at all   PHQ-2 Score 0       Proxy-reported     PHQ9:       9/9/2024     4:20 AM 9/25/2024     5:53 PM 11/7/2024     3:58 PM 11/21/2024     1:26 PM 1/9/2025      1:38 PM 2/21/2025     9:19 AM 2/27/2025     4:34 PM   PHQ-9 SCORE   PHQ-9 Total Score MyChart 1 (Minimal depression) 0 0 2 (Minimal depression) 1 (Minimal depression) 2 (Minimal depression) 3 (Minimal depression)   PHQ-9 Total Score 1 0 0  2  1  2  3        Patient-reported     GAD2:       3/7/2024     3:42 PM 3/22/2024    10:10 AM 4/5/2024    12:11 PM 7/25/2024    10:07 AM 8/8/2024    11:54 AM 9/9/2024     4:20 AM 1/9/2025     1:38 PM   PARI-2   Feeling nervous, anxious, or on edge 0 1 0 2 0 0 1   Not being able to stop or control worrying 0 0 1 2 0 0 1   PARI-2 Total Score 0    0    0 1 1 4 0 0 2        Patient-reported     GAD7:       10/31/2023     6:05 PM 1/5/2024     1:39 PM 3/8/2024    12:33 PM 3/22/2024    12:29 PM 5/31/2024     3:47 PM 7/25/2024    10:07 AM   PARI-7 SCORE   Total Score      6 (mild anxiety)   Total Score 7 6 7 7 7 6     PROMIS 10-Global Health (only subscores and total score):       3/7/2024     3:43 PM 3/22/2024    10:13 AM 4/5/2024    12:13 PM 7/25/2024    10:08 AM 8/8/2024    11:55 AM 9/9/2024     4:33 AM 1/9/2025     1:39 PM   PROMIS-10 Scores Only   Global Mental Health Score 14    14    14 10 9 10 12 11 12    Global Physical Health Score 19    19    19 17 19 16 19 18 17    PROMIS TOTAL - SUBSCORES 33    33    33 27 28 26 31 29 29        Patient-reported     [place] Suicide Severity Rating Scale (Lifetime/Recent)      8/8/2024     1:59 PM   [place] Suicide Severity Rating (Lifetime/Recent)   1. Wish to be Dead (Lifetime) N   2. Non-Specific Active Suicidal Thoughts (Lifetime) N   Actual Attempt (Lifetime) N   Has subject engaged in non-suicidal self-injurious behavior? (Lifetime) N   Interrupted Attempts (Lifetime) N   Aborted or Self-Interrupted Attempt (Lifetime) N   Preparatory Acts or Behavior (Lifetime) N   Calculated C-SSRS Risk Score (Lifetime/Recent) No Risk Indicated       ASSESSMENT: Current Emotional / Mental Status (status of significant symptoms):   Risk status (Self /  Other harm or suicidal ideation)   Patient denies current fears or concerns for personal safety.   Patient denies current or recent suicidal ideation or behaviors.   Patient denies current or recent homicidal ideation or behaviors.   Patient denies current or recent self injurious behavior or ideation.   Patient denies other safety concerns.   Patient reports there has been no change in risk factors since their last session.     Patient reports there has been no change in protective factors since their last session.     Recommended that patient call 911 or go to the local ED should there be a change in any of these risk factors     Appearance:   Appropriate    Eye Contact:   Good    Psychomotor Behavior: Normal    Attitude:   Cooperative  Interested Friendly Pleasant   Orientation:   All   Speech    Rate / Production: Normal/ Responsive    Volume:  Normal    Mood:    Anxious  Normal   Affect:    Appropriate    Thought Content:  Clear    Thought Form:  Coherent  Logical    Insight:    Good  and Intellectual Insight     Medication Review:   No changes to current psychiatric medication(s)     Medication Compliance:   Yes     Changes in Health Issues:   None reported     Chemical Use Review:   Substance Use: Chemical use reviewed, no active concerns identified      Tobacco Use: No current tobacco use.      Diagnosis:  1. PARI (generalized anxiety disorder)    2. Moderate episode of recurrent major depressive disorder (H)      Collateral Reports Completed:   Not Applicable    PLAN: (Patient Tasks / Therapist Tasks / Other):  Keep looking into actionable thing you can do if feeling like you need to do something  Watch pickle ball videos sent by friend so you can go this weekend  Go out to show with friends  Continue to manage news intake and social media engagement    ILYA Mcfarland  ______________________________________________________________________    Individual Treatment Plan    Patient's Name: Roland HOLLAND  Jaime  YOB: 1995    Date of Creation: 11/14/2023    Date Treatment Plan Last Reviewed/Revised: 1/10/25    DSM5 Diagnoses: 296.32 (F33.1) Major Depressive Disorder, Recurrent Episode, Moderate _ and With anxious distress or 300.02 (F41.1) Generalized Anxiety Disorder  Psychosocial / Contextual Factors:  Feelings around rejection, not belonging for so long that it has been affecting his sleep and his thinking.    PROMIS (reviewed every 90 days):  PROMIS-10 Scores        8/8/2024    11:55 AM 9/9/2024     4:33 AM 1/9/2025     1:39 PM   PROMIS-10 Total Score w/o Sub Scores   PROMIS TOTAL - SUBSCORES 31 29 29        Patient-reported     Referral / Collaboration:  Referral to another professional/service is not indicated at this time..    Anticipated number of session for this episode of care: 9-12 sessions  Anticipation frequency of session: Biweekly  Anticipated Duration of each session: 38-52 minutes  Treatment plan will be reviewed in 90 days or when goals have been changed.     MeasurableTreatment Goal(s) related to diagnosis / functional impairment(s)  Goal 1: Patient will stabilize anxiety level while increasing ability to function on a daily basis.    I will know I've met my goal when patient is able to challenge any cognitive traps as they present at least at 80 % of the time by the next review(90 days)     Objective #A (Patient Action)    Patient will use thought-stopping strategy daily to reduce intrusive thoughts.  Status: Completed - Date: 1/10/25      Intervention(s)  Therapist will teach distraction skills. Using the 5 senses .    Objective #B  Patient will identify at least 4 fears / thoughts that contribute to feeling anxious.  Status: Completed - Date: 1/10/25      Intervention(s)  Therapist will teach CBT modality and coping skills to challenge any cognitive traps such as the 3 Cs    Goal 2: Patient will alleviate depressed mood and return to previous level of effective functioning    I  will know I've met my goal when my energy level have increased at least at 70 % on daily basis by the next review( 90 days)    Objective #A (Patient Action)    Status: Continued - Date(s): 1/10/25  Patient will Decrease frequency and intensity of feeling down, depressed, hopeless.  Intervention(s)  Therapist will provide educational materials on mindfulness .    Objective #B  Patient will Improve concentration, focus, and mindfulness in daily activities .    Status: Continued - Date(s): 1/10/25  Intervention(s)  Therapist will provide space to share and process thoughts and feelings related to current stressors .    Patient has reviewed and agreed to the above plan.    Originally developed by RAND Howard  June 11, 2023  Reviewed/Continued by ILYA Mcfarland  August 8, 2024

## 2025-03-09 ENCOUNTER — HEALTH MAINTENANCE LETTER (OUTPATIENT)
Age: 30
End: 2025-03-09

## 2025-03-29 ENCOUNTER — HOSPITAL ENCOUNTER (EMERGENCY)
Facility: HOSPITAL | Age: 30
Discharge: HOME | End: 2025-03-29
Payer: COMMERCIAL

## 2025-03-29 VITALS
WEIGHT: 209 LBS | SYSTOLIC BLOOD PRESSURE: 144 MMHG | BODY MASS INDEX: 30.96 KG/M2 | TEMPERATURE: 98.7 F | HEIGHT: 69 IN | HEART RATE: 79 BPM | RESPIRATION RATE: 18 BRPM | DIASTOLIC BLOOD PRESSURE: 87 MMHG | OXYGEN SATURATION: 100 %

## 2025-03-29 DIAGNOSIS — Z20.2 STD EXPOSURE: ICD-10-CM

## 2025-03-29 LAB — HIV 1+2 AB+HIV1 P24 AG SERPL QL IA: NONREACTIVE

## 2025-03-29 PROCEDURE — 36415 COLL VENOUS BLD VENIPUNCTURE: CPT

## 2025-03-29 PROCEDURE — 87389 HIV-1 AG W/HIV-1&-2 AB AG IA: CPT

## 2025-03-29 PROCEDURE — 250N000009 HC RX 250

## 2025-03-29 PROCEDURE — 99284 EMERGENCY DEPT VISIT MOD MDM: CPT | Mod: 25

## 2025-03-29 PROCEDURE — 86780 TREPONEMA PALLIDUM: CPT

## 2025-03-29 PROCEDURE — 87491 CHLMYD TRACH DNA AMP PROBE: CPT

## 2025-03-29 PROCEDURE — 250N000011 HC RX IP 250 OP 636

## 2025-03-29 PROCEDURE — 96372 THER/PROPH/DIAG INJ SC/IM: CPT

## 2025-03-29 PROCEDURE — 87591 N.GONORRHOEAE DNA AMP PROB: CPT

## 2025-03-29 RX ORDER — DOXYCYCLINE 100 MG/1
100 CAPSULE ORAL 2 TIMES DAILY
Qty: 14 CAPSULE | Refills: 0 | Status: SHIPPED | OUTPATIENT
Start: 2025-03-29 | End: 2025-04-05

## 2025-03-29 RX ADMIN — LIDOCAINE HYDROCHLORIDE 500 MG: 10 INJECTION, SOLUTION INFILTRATION; PERINEURAL at 15:04

## 2025-03-29 ASSESSMENT — COLUMBIA-SUICIDE SEVERITY RATING SCALE - C-SSRS
2. HAVE YOU ACTUALLY HAD ANY THOUGHTS OF KILLING YOURSELF IN THE PAST MONTH?: NO
1. IN THE PAST MONTH, HAVE YOU WISHED YOU WERE DEAD OR WISHED YOU COULD GO TO SLEEP AND NOT WAKE UP?: NO
6. HAVE YOU EVER DONE ANYTHING, STARTED TO DO ANYTHING, OR PREPARED TO DO ANYTHING TO END YOUR LIFE?: NO

## 2025-03-29 ASSESSMENT — ACTIVITIES OF DAILY LIVING (ADL)
ADLS_ACUITY_SCORE: 41
ADLS_ACUITY_SCORE: 41

## 2025-03-29 NOTE — DISCHARGE INSTRUCTIONS
We treated you prophylactically for both gonorrhea and chlamydia. While you are taking the antibiotics, I recommend abstaining from sexual intercourse. Once the antibiotics are finished, please practice safe sex with condoms. Please return to the ED with any new or worsening symptoms.

## 2025-03-29 NOTE — ED PROVIDER NOTES
Emergency Department Encounter   NAME: Roland Sandoval  AGE: 30 year old male   YOB: 1995 ;   MRN: [id number] ;    ED PROVIDER: Savanna Mckeon PA-C    PCP: No Ref-Primary, Physician    Evaluation Date & Time:   3/29/2025  1:52 PM    CHIEF COMPLAINT:  Exposure to STD      FINAL IMPRESSION:    ICD-10-CM    1. STD exposure  Z20.2             IMPRESSION AND PLAN   MDM: Roland Sandoval is a 30 year old male with a pertinent history of MDD, genital herpes who presents to the ED by walk-in for evaluation of STD exposure.  Patient reports he was exposed to gonorrhea of the throat and received a call today regarding the exposure.  Patient is requesting testing for this today.  He otherwise denies throat pain, open lesions, testicular pain or swelling, penile discharge.    Vitals stable. On exam patient is well-appearing in no acute distress.  Mouth is dry but no pharyngeal erythema, edema or exudates.  Uvula midline.  No obvious lesions.  Remainder of exam as detailed below.  I discussed options with patient including prophylactic antibiotic treatment with both Rocephin and doxycycline to concomitantly treat for chlamydia.  Patient is in agreement with this.  He also is wanting all STD testing done including HIV, syphilis as well as gonorrhea/chlamydia from the urethra with urine sample.  Patient declines herpes testing as he is already tested positive for this. These test were obtained and I discussed with patient that if anything is positive he will be called and started on appropriate medications as necessary.  In the meantime, I recommended he abstain from sexual intercourse until he finishes a course of antibiotics to prevent any further spread of infection.  I also did discuss safe sex practices with him to which she expressed understanding. All questions and concerns addressed. Patient is overall agreeable to this plan and feels comfortable with discharge at this time.       Medical Decision  Making  I obtained history from Patient  Discharge. I prescribed additional prescription strength medication(s) as charted. See documentation for any additional details.    MIPS (CTPE, Dental pain, Luke, Sinusitis, Asthma/COPD, Head Trauma): Not Applicable    SEPSIS: None      ED COURSE:  2:00 PM I met and introduced myself to the patient. I gathered initial history and performed my physical exam. We discussed plan for initial workup.    3:15 PM I rechecked the patient and discussed results, discharge, follow up, and reasons to return to the ED.           MEDICATIONS GIVEN IN THE EMERGENCY DEPARTMENT:  Medications   cefTRIAXone (ROCEPHIN) 500 mg in lidocaine injection (500 mg Intramuscular $Given 3/29/25 1504)         NEW PRESCRIPTIONS STARTED AT TODAY'S ED VISIT:  Discharge Medication List as of 3/29/2025  3:15 PM        START taking these medications    Details   doxycycline hyclate (VIBRAMYCIN) 100 MG capsule Take 1 capsule (100 mg) by mouth 2 times daily for 7 days., Disp-14 capsule, R-0, E-Prescribe               BRIEF HPI   Patient information was obtained from: patient   Use of Intrepreter: N/A     Jose Manueljitendra Sandoval is a 30 year old male with a pertinent history of herpes who presents to the ED by walk-in for evaluation of gonorrhea exposure.     Patient was informed of an exposure to oral gonorrhea on 3/27. His last sexual encounter with any partner was 3/28. Patient reports being asymptomatic at this time. Denies pharyngitis, throat or oral lesions, penile discharge or lesions, or testicular pain. Patient would like to be tested for other STDs including HIV and chlamydia. He has a history of herpes so declines testing for this today. Patient is amenable to prophylactic treatment for presumed chlamydia and gonorrhea.      REVIEW OF SYSTEMS:  Pertinent positive and negative symptoms per HPI.       MEDICAL HISTORY     No past medical history on file.    No past surgical history on file.    Family History  "  Problem Relation Age of Onset    Anxiety Disorder Mother     Hypertension Father        Social History     Tobacco Use    Smoking status: Never     Passive exposure: Past    Smokeless tobacco: Never   Vaping Use    Vaping status: Never Used   Substance Use Topics    Alcohol use: No    Drug use: No         PHYSICAL EXAM     First Vitals:  Patient Vitals for the past 24 hrs:   BP Temp Pulse Resp SpO2 Height Weight   03/29/25 1458 -- -- -- -- -- 1.753 m (5' 9\") --   03/29/25 1350 (!) 144/87 98.7  F (37.1  C) 79 18 100 % -- 94.8 kg (209 lb)       PHYSICAL EXAM:  Physical Exam  Vitals and nursing note reviewed.   Constitutional:       General: He is not in acute distress.     Appearance: Normal appearance. He is normal weight. He is not ill-appearing or toxic-appearing.   HENT:      Head: Normocephalic and atraumatic.      Mouth/Throat:      Lips: Pink.      Mouth: Mucous membranes are moist. No oral lesions.      Dentition: Normal dentition.      Tongue: No lesions.      Palate: No lesions.      Pharynx: Oropharynx is clear. Uvula midline. No oropharyngeal exudate or posterior oropharyngeal erythema.      Tonsils: No tonsillar exudate or tonsillar abscesses.   Eyes:      Conjunctiva/sclera: Conjunctivae normal.   Cardiovascular:      Rate and Rhythm: Normal rate.   Pulmonary:      Effort: Pulmonary effort is normal.   Musculoskeletal:         General: Normal range of motion.      Cervical back: Neck supple.   Skin:     General: Skin is warm and dry.   Neurological:      General: No focal deficit present.      Mental Status: He is alert and oriented to person, place, and time.   Psychiatric:         Mood and Affect: Mood normal.          RESULTS     LAB:  All pertinent labs reviewed and interpreted  Labs Ordered and Resulted from Time of ED Arrival to Time of ED Departure - No data to display      RADIOLOGY:  No orders to display         Nubia CHEW am serving as a scribe to document services personally " performed by Savanna Mckeon PA-C, based on my observation and the provider's statements to me. I, Savanna Mkceon PA-C attest that Nubia Meyer is acting in a scribe capacity, has observed my performance of the services and has documented them in accordance with my direction.       Savanna Mckeon PA-C  Emergency Medicine   [facility] EMERGENCY DEPARTMENT       Savanna Mckeon PA-C  03/29/25 2939

## 2025-03-29 NOTE — ED TRIAGE NOTES
Exposure to STD. Denies symptoms.     Triage Assessment (Adult)       Row Name 03/29/25 0533          Triage Assessment    Airway WDL WDL        Respiratory WDL    Respiratory WDL WDL        Skin Circulation/Temperature WDL    Skin Circulation/Temperature WDL WDL        Cardiac WDL    Cardiac WDL WDL        Peripheral/Neurovascular WDL    Peripheral Neurovascular WDL WDL        Cognitive/Neuro/Behavioral WDL    Cognitive/Neuro/Behavioral WDL WDL        [place] Coma Scale    Best Eye Response 4-->(E4) spontaneous     Best Motor Response 6-->(M6) obeys commands     Best Verbal Response 5-->(V5) oriented     [place] Coma Scale Score 15

## 2025-03-30 LAB
C TRACH DNA SPEC QL PROBE+SIG AMP: NEGATIVE
C TRACH DNA SPEC QL PROBE+SIG AMP: NEGATIVE
N GONORRHOEA DNA SPEC QL NAA+PROBE: NEGATIVE
N GONORRHOEA DNA SPEC QL NAA+PROBE: NEGATIVE
SPECIMEN TYPE: NORMAL
SPECIMEN TYPE: NORMAL
T PALLIDUM AB SER QL: NONREACTIVE

## 2025-04-04 ENCOUNTER — VIRTUAL VISIT (OUTPATIENT)
Dept: PSYCHOLOGY | Facility: CLINIC | Age: 30
End: 2025-04-04
Payer: COMMERCIAL

## 2025-04-04 DIAGNOSIS — F41.1 GAD (GENERALIZED ANXIETY DISORDER): Primary | ICD-10-CM

## 2025-04-04 DIAGNOSIS — F33.1 MODERATE EPISODE OF RECURRENT MAJOR DEPRESSIVE DISORDER (H): ICD-10-CM

## 2025-04-04 PROCEDURE — 90834 PSYTX W PT 45 MINUTES: CPT | Mod: 95 | Performed by: COUNSELOR

## 2025-04-04 NOTE — PROGRESS NOTES
[facility]                                     Progress Note    Patient Name: Roland Sandoval  Date: 4/04/25       Service Type: Individual      Session Start Time: 1:08 pm      Session End Time: 1:58 pm     Session Length: 50 minutes    Session #: 13    Attendees: Client attended alone    Service Modality:   Video Visit:      Provider verified identity through the following two step process.  Patient provided:  Patient is known previously to provider    Telemedicine Visit: The patient's condition can be safely assessed and treated via synchronous audio and visual telemedicine encounter.      Reason for Telemedicine Visit: Services only offered telehealth    Originating Site (Patient Location): Patient's home    Distant Site (Provider Location): Provider Remote Setting- Home Office    Consent:  The patient/guardian has verbally consented to: the potential risks and benefits of telemedicine (video visit) versus in person care; bill my insurance or make self-payment for services provided; and responsibility for payment of non-covered services.     Patient would like the video invitation sent by:  My Chart    Mode of Communication:  Video Conference via [employer]well    Distant Location (Provider):  Off-site    As the provider I attest to compliance with applicable laws and regulations related to telemedicine.    DATA  Interactive Complexity: No  Crisis: No   Extended Session (53+ minutes): No     Progress Since Last Session (Related to Symptoms / Goals / Homework):   Symptoms: Improving pt has been managing his anxiety better    Homework: Achieved / completed to satisfaction      Episode of Care Goals: Satisfactory progress - ACTION (Actively working towards change); Intervened by reinforcing change plan / affirming steps taken.       Current / Ongoing Stressors and Concerns:    Pt is currently seeking therapy due to challenges with navigating finding an accepting community to connect with here in  "[place]. Pt had a falling out with a discord group, which he was active in for 9 months, and after leaving it feels it was the right decision as this group felt like is was full of \"fake\" or unauthentic people. Since last session, pt has been feeling more anxious then usual after hearing about all the funding cuts that are happening and he has been thinking about his own job security. Pt did talk with his bosses about it recently and they did assure him that as of right now it looks like he should have a job for the next two years at least. Pt's boyfriend also has an upcoming surgery at the end of the month, which is also causing him some anxiety. Pt wants to take some time off work to support his boyfriend as well, which is somewhat anxiety provoking since he has needed to take so much time already this year. Pt has had some positive experiences, including getting back into reading and his boyfriend having some good interactions with his co-workers when they met at a recent work event. Pt went out with friends for an anime night and ended up meeting some new people, who invited him to attend a part this month. Pt is starting to feel more like he is find a sense of community locally and that has been a really nice feeling. Pt reported he attended his boyfriend's cousin's  which went well and it was nice to meet more of his family. Pt reflected on his emotional experience of it, expressing that he contains his emotions at  and sometimes worries that others think he isn't feeling because he doesn't get emotional. Pt did confirm that he was able to connect with his boyfriend's grief and his family, he just worries that he is getting desensitized to death/grief. After talking through it pt acknowledged that it is probably related to him putting up emotional walls while at funerals, as he doesn't want to feel the way he did at his mom's  when he was 8. Pt has been feeling about the same when it comes to " Control4 and been successful in using his coping skills to not doom scroll. Pt has found a new coping skills where he can compile info that he finds and then post it, so he can feel like he is getting important information out to others instead of doom-scrolling.      Treatment Objective(s) Addressed in This Session:   identify 3 fears, thoughts, or stressors which contribute to feelings of anxiety  use thought-stopping strategy daily to reduce intrusive thoughts  Decrease frequency and intensity of feeling down, depressed, hopeless  Improve concentration, focus, and mindfulness in daily activities   identify at least 3 calming thoughts to use when anxious     Intervention:   CBT: Reviewed how he can challenge his internal-narrative around his MH symptoms    Motivational Interviewing    MI Intervention: Expressed Empathy/Understanding, Supported Autonomy, Collaboration, Evocation, Open-ended questions, Reflections: simple and complex, Change talk (evoked), and Reframe     Change Talk Expressed by the Patient: Desire to change Ability to change Reasons to change Need to change Committment to change Activation Taking steps    Provider Response to Change Talk: E - Evoked more info from patient about behavior change, A - Affirmed patient's thoughts, decisions, or attempts at behavior change, R - Reflected patient's change talk, and S - Summarized patient's change talk statements    Psychodynamic: Processed through internal experiences related to anxiety and trust/social connection  Solution Focused: Identified immediate areas of concern and potential barriers to success    Safety plan: Was reviewed and reinforced    Assessments:  PHQ2:       11/10/2021     2:32 PM   PHQ-2 ( 1999 [employer])   Q1: Little interest or pleasure in doing things 0    Q2: Feeling down, depressed or hopeless 0    PHQ-2 Score 0   Q1: Little interest or pleasure in doing things Not at all   Q2: Feeling down, depressed or hopeless Not at all   PHQ-2  Score 0       Proxy-reported     PHQ9:       9/25/2024     5:53 PM 11/7/2024     3:58 PM 11/21/2024     1:26 PM 1/9/2025     1:38 PM 2/21/2025     9:19 AM 2/27/2025     4:34 PM 3/14/2025     3:11 PM   PHQ-9 SCORE   PHQ-9 Total Score MyChart 0 0 2 (Minimal depression) 1 (Minimal depression) 2 (Minimal depression) 3 (Minimal depression) 2 (Minimal depression)   PHQ-9 Total Score 0 0  2  1  2  3  2        Patient-reported     GAD2:       3/7/2024     3:42 PM 3/22/2024    10:10 AM 4/5/2024    12:11 PM 7/25/2024    10:07 AM 8/8/2024    11:54 AM 9/9/2024     4:20 AM 1/9/2025     1:38 PM   PARI-2   Feeling nervous, anxious, or on edge 0 1 0 2 0 0 1   Not being able to stop or control worrying 0 0 1 2 0 0 1   PARI-2 Total Score 0    0    0 1 1 4 0 0 2        Patient-reported     GAD7:       10/31/2023     6:05 PM 1/5/2024     1:39 PM 3/8/2024    12:33 PM 3/22/2024    12:29 PM 5/31/2024     3:47 PM 7/25/2024    10:07 AM   PARI-7 SCORE   Total Score      6 (mild anxiety)   Total Score 7 6 7 7 7 6     PROMIS 10-Global Health (only subscores and total score):       3/7/2024     3:43 PM 3/22/2024    10:13 AM 4/5/2024    12:13 PM 7/25/2024    10:08 AM 8/8/2024    11:55 AM 9/9/2024     4:33 AM 1/9/2025     1:39 PM   PROMIS-10 Scores Only   Global Mental Health Score 14    14    14 10 9 10 12 11 12    Global Physical Health Score 19    19    19 17 19 16 19 18 17    PROMIS TOTAL - SUBSCORES 33    33    33 27 28 26 31 29 29        Patient-reported     [place] Suicide Severity Rating Scale (Lifetime/Recent)      8/8/2024     1:59 PM 3/29/2025     1:51 PM   [place] Suicide Severity Rating (Lifetime/Recent)   Q1 Wished to be Dead (Past Month)  0-->no   Q2 Suicidal Thoughts (Past Month)  0-->no   Q6 Suicide Behavior (Lifetime)  0-->no   Level of Risk per Screen  no risks indicated   1. Wish to be Dead (Lifetime) N    2. Non-Specific Active Suicidal Thoughts (Lifetime) N    Actual Attempt (Lifetime) N    Has subject engaged in non-suicidal  self-injurious behavior? (Lifetime) N    Interrupted Attempts (Lifetime) N    Aborted or Self-Interrupted Attempt (Lifetime) N    Preparatory Acts or Behavior (Lifetime) N    Calculated C-SSRS Risk Score (Lifetime/Recent) No Risk Indicated        ASSESSMENT: Current Emotional / Mental Status (status of significant symptoms):   Risk status (Self / Other harm or suicidal ideation)   Patient denies current fears or concerns for personal safety.   Patient denies current or recent suicidal ideation or behaviors.   Patient denies current or recent homicidal ideation or behaviors.   Patient denies current or recent self injurious behavior or ideation.   Patient denies other safety concerns.   Patient reports there has been no change in risk factors since their last session.     Patient reports there has been no change in protective factors since their last session.     Recommended that patient call 911 or go to the local ED should there be a change in any of these risk factors     Appearance:   Appropriate    Eye Contact:   Good    Psychomotor Behavior: Normal    Attitude:   Cooperative  Interested Friendly Pleasant   Orientation:   All   Speech    Rate / Production: Normal/ Responsive Talkative    Volume:  Normal    Mood:    Anxious  Normal   Affect:    Appropriate    Thought Content:  Clear    Thought Form:  Coherent  Logical    Insight:    Good  and Intellectual Insight     Medication Review:   No changes to current psychiatric medication(s)     Medication Compliance:   Yes     Changes in Health Issues:   None reported     Chemical Use Review:   Substance Use: Chemical use reviewed, no active concerns identified      Tobacco Use: No current tobacco use.      Diagnosis:  1. PARI (generalized anxiety disorder)    2. Moderate episode of recurrent major depressive disorder (H)      Collateral Reports Completed:   Not Applicable    PLAN: (Patient Tasks / Therapist Tasks / Other):  Keep doing the actionable things you can do  when feeling motivated, making your compilation   Continue to connect with friends, attend activities   Keep working on self-care and living your life when politics comes up  Follow-up with dental appointment    Troy Gilbert, [facility]  ______________________________________________________________________    Individual Treatment Plan    Patient's Name: Roland Sandoval  YOB: 1995    Date of Creation: 11/14/2023    Date Treatment Plan Last Reviewed/Revised: 1/10/25    DSM5 Diagnoses: 296.32 (F33.1) Major Depressive Disorder, Recurrent Episode, Moderate _ and With anxious distress or 300.02 (F41.1) Generalized Anxiety Disorder  Psychosocial / Contextual Factors:  Feelings around rejection, not belonging for so long that it has been affecting his sleep and his thinking.    PROMIS (reviewed every 90 days):  PROMIS-10 Scores        8/8/2024    11:55 AM 9/9/2024     4:33 AM 1/9/2025     1:39 PM   PROMIS-10 Total Score w/o Sub Scores   PROMIS TOTAL - SUBSCORES 31 29 29        Patient-reported     Referral / Collaboration:  Referral to another professional/service is not indicated at this time..    Anticipated number of session for this episode of care: 9-12 sessions  Anticipation frequency of session: Biweekly  Anticipated Duration of each session: 38-52 minutes  Treatment plan will be reviewed in 90 days or when goals have been changed.     MeasurableTreatment Goal(s) related to diagnosis / functional impairment(s)  Goal 1: Patient will stabilize anxiety level while increasing ability to function on a daily basis.    I will know I've met my goal when patient is able to challenge any cognitive traps as they present at least at 80 % of the time by the next review(90 days)     Objective #A (Patient Action)    Patient will use thought-stopping strategy daily to reduce intrusive thoughts.  Status: Completed - Date: 1/10/25      Intervention(s)  Therapist will teach distraction skills. Using the 5 senses  .    Objective #B  Patient will identify at least 4 fears / thoughts that contribute to feeling anxious.  Status: Completed - Date: 1/10/25      Intervention(s)  Therapist will teach CBT modality and coping skills to challenge any cognitive traps such as the 3 Cs    Goal 2: Patient will alleviate depressed mood and return to previous level of effective functioning    I will know I've met my goal when my energy level have increased at least at 70 % on daily basis by the next review( 90 days)    Objective #A (Patient Action)    Status: Continued - Date(s): 1/10/25  Patient will Decrease frequency and intensity of feeling down, depressed, hopeless.  Intervention(s)  Therapist will provide educational materials on mindfulness .    Objective #B  Patient will Improve concentration, focus, and mindfulness in daily activities .    Status: Continued - Date(s): 1/10/25  Intervention(s)  Therapist will provide space to share and process thoughts and feelings related to current stressors .    Patient has reviewed and agreed to the above plan.    Originally developed by RAND Howard  June 11, 2023  Reviewed/Continued by ILYA Mcfarland  August 8, 2024

## 2025-04-29 SDOH — HEALTH STABILITY: PHYSICAL HEALTH: ON AVERAGE, HOW MANY DAYS PER WEEK DO YOU ENGAGE IN MODERATE TO STRENUOUS EXERCISE (LIKE A BRISK WALK)?: 2 DAYS

## 2025-04-29 SDOH — HEALTH STABILITY: PHYSICAL HEALTH: ON AVERAGE, HOW MANY MINUTES DO YOU ENGAGE IN EXERCISE AT THIS LEVEL?: 20 MIN

## 2025-04-29 ASSESSMENT — SOCIAL DETERMINANTS OF HEALTH (SDOH): HOW OFTEN DO YOU GET TOGETHER WITH FRIENDS OR RELATIVES?: PATIENT DECLINED

## 2025-04-30 ENCOUNTER — OFFICE VISIT (OUTPATIENT)
Dept: FAMILY MEDICINE | Facility: CLINIC | Age: 30
End: 2025-04-30
Payer: COMMERCIAL

## 2025-04-30 VITALS
OXYGEN SATURATION: 98 % | WEIGHT: 197 LBS | DIASTOLIC BLOOD PRESSURE: 65 MMHG | HEIGHT: 70 IN | TEMPERATURE: 98.1 F | BODY MASS INDEX: 28.2 KG/M2 | SYSTOLIC BLOOD PRESSURE: 126 MMHG | RESPIRATION RATE: 16 BRPM | HEART RATE: 79 BPM

## 2025-04-30 DIAGNOSIS — F33.1 MODERATE EPISODE OF RECURRENT MAJOR DEPRESSIVE DISORDER (H): ICD-10-CM

## 2025-04-30 DIAGNOSIS — Z00.00 ANNUAL PHYSICAL EXAM: Primary | ICD-10-CM

## 2025-04-30 LAB
CHOLEST SERPL-MCNC: 187 MG/DL
FASTING STATUS PATIENT QL REPORTED: YES
FASTING STATUS PATIENT QL REPORTED: YES
GLUCOSE SERPL-MCNC: 83 MG/DL (ref 70–99)
HDLC SERPL-MCNC: 34 MG/DL
LDLC SERPL CALC-MCNC: 131 MG/DL
NONHDLC SERPL-MCNC: 153 MG/DL
TRIGL SERPL-MCNC: 112 MG/DL

## 2025-04-30 PROCEDURE — 90472 IMMUNIZATION ADMIN EACH ADD: CPT | Performed by: PHYSICIAN ASSISTANT

## 2025-04-30 PROCEDURE — 90651 9VHPV VACCINE 2/3 DOSE IM: CPT | Performed by: PHYSICIAN ASSISTANT

## 2025-04-30 PROCEDURE — 90746 HEPB VACCINE 3 DOSE ADULT IM: CPT | Performed by: PHYSICIAN ASSISTANT

## 2025-04-30 PROCEDURE — 3078F DIAST BP <80 MM HG: CPT | Performed by: PHYSICIAN ASSISTANT

## 2025-04-30 PROCEDURE — 36415 COLL VENOUS BLD VENIPUNCTURE: CPT | Performed by: PHYSICIAN ASSISTANT

## 2025-04-30 PROCEDURE — 3074F SYST BP LT 130 MM HG: CPT | Performed by: PHYSICIAN ASSISTANT

## 2025-04-30 PROCEDURE — 82947 ASSAY GLUCOSE BLOOD QUANT: CPT | Performed by: PHYSICIAN ASSISTANT

## 2025-04-30 PROCEDURE — 80061 LIPID PANEL: CPT | Performed by: PHYSICIAN ASSISTANT

## 2025-04-30 PROCEDURE — 99395 PREV VISIT EST AGE 18-39: CPT | Mod: 25 | Performed by: PHYSICIAN ASSISTANT

## 2025-04-30 PROCEDURE — 91320 SARSCV2 VAC 30MCG TRS-SUC IM: CPT | Performed by: PHYSICIAN ASSISTANT

## 2025-04-30 PROCEDURE — 90471 IMMUNIZATION ADMIN: CPT | Performed by: PHYSICIAN ASSISTANT

## 2025-04-30 PROCEDURE — 90480 ADMN SARSCOV2 VAC 1/ONLY CMP: CPT | Performed by: PHYSICIAN ASSISTANT

## 2025-04-30 NOTE — PATIENT INSTRUCTIONS
Patient Education   Preventive Care Advice   This is general advice given by our system to help you stay healthy. However, your care team may have specific advice just for you. Please talk to your care team about your preventive care needs.  Nutrition  Eat 5 or more servings of fruits and vegetables each day.  Try wheat bread, brown rice and whole grain pasta (instead of white bread, rice, and pasta).  Get enough calcium and vitamin D. Check the label on foods and aim for 100% of the RDA (recommended daily allowance).  Lifestyle  Exercise at least 150 minutes each week  (30 minutes a day, 5 days a week).  Do muscle strengthening activities 2 days a week. These help control your weight and prevent disease.  No smoking.  Wear sunscreen to prevent skin cancer.  Have a dental exam and cleaning every 6 months.  Yearly exams  See your health care team every year to talk about:  Any changes in your health.  Any medicines your care team has prescribed.  Preventive care, family planning, and ways to prevent chronic diseases.  Shots (vaccines)   HPV shots (up to age 26), if you've never had them before.  Hepatitis B shots (up to age 59), if you've never had them before.  COVID-19 shot: Get this shot when it's due.  Flu shot: Get a flu shot every year.  Tetanus shot: Get a tetanus shot every 10 years.  Pneumococcal, hepatitis A, and RSV shots: Ask your care team if you need these based on your risk.  Shingles shot (for age 50 and up)  General health tests  Diabetes screening:  Starting at age 35, Get screened for diabetes at least every 3 years.  If you are younger than age 35, ask your care team if you should be screened for diabetes.  Cholesterol test: At age 39, start having a cholesterol test every 5 years, or more often if advised.  Bone density scan (DEXA): At age 50, ask your care team if you should have this scan for osteoporosis (brittle bones).  Hepatitis C: Get tested at least once in your life.  STIs (sexually  transmitted infections)  Before age 24: Ask your care team if you should be screened for STIs.  After age 24: Get screened for STIs if you're at risk. You are at risk for STIs (including HIV) if:  You are sexually active with more than one person.  You don't use condoms every time.  You or a partner was diagnosed with a sexually transmitted infection.  If you are at risk for HIV, ask about PrEP medicine to prevent HIV.  Get tested for HIV at least once in your life, whether you are at risk for HIV or not.  Cancer screening tests  Cervical cancer screening: If you have a cervix, begin getting regular cervical cancer screening tests starting at age 21.  Breast cancer scan (mammogram): If you've ever had breasts, begin having regular mammograms starting at age 40. This is a scan to check for breast cancer.  Colon cancer screening: It is important to start screening for colon cancer at age 45.  Have a colonoscopy test every 10 years (or more often if you're at risk) Or, ask your provider about stool tests like a FIT test every year or Cologuard test every 3 years.  To learn more about your testing options, visit:   .  For help making a decision, visit:   https://bit.ly/fk99800.  Prostate cancer screening test: If you have a prostate, ask your care team if a prostate cancer screening test (PSA) at age 55 is right for you.  Lung cancer screening: If you are a current or former smoker ages 50 to 80, ask your care team if ongoing lung cancer screenings are right for you.  For informational purposes only. Not to replace the advice of your health care provider. Copyright   2023 [facility]. All rights reserved. Clinically reviewed by the [facility]. Monitor Backlinks [id number] - REV 01/24.  Learning About Stress  What is stress?     Stress is your body's response to a hard situation. Your body can have a physical, emotional, or mental response. Stress is a fact of life for most people, and it  affects everyone differently. What causes stress for you may not be stressful for someone else.  A lot of things can cause stress. You may feel stress when you go on a job interview, take a test, or run a race. This kind of short-term stress is normal and even useful. It can help you if you need to work hard or react quickly. For example, stress can help you finish an important job on time.  Long-term stress is caused by ongoing stressful situations or events. Examples of long-term stress include long-term health problems, ongoing problems at work, or conflicts in your family. Long-term stress can harm your health.  How does stress affect your health?  When you are stressed, your body responds as though you are in danger. It makes hormones that speed up your heart, make you breathe faster, and give you a burst of energy. This is called the fight-or-flight stress response. If the stress is over quickly, your body goes back to normal and no harm is done.  But if stress happens too often or lasts too long, it can have bad effects. Long-term stress can make you more likely to get sick, and it can make symptoms of some diseases worse. If you tense up when you are stressed, you may develop neck, shoulder, or low back pain. Stress is linked to high blood pressure and heart disease.  Stress also harms your emotional health. It can make you gonzalez, tense, or depressed. Your relationships may suffer, and you may not do well at work or school.  What can you do to manage stress?  You can try these things to help manage stress:   Do something active. Exercise or activity can help reduce stress. Walking is a great way to get started. Even everyday activities such as housecleaning or yard work can help.  Try yoga or yoni chi. These techniques combine exercise and meditation. You may need some training at first to learn them.  Do something you enjoy. For example, listen to music or go to a movie. Practice your hobby or do volunteer  "work.  Meditate. This can help you relax, because you are not worrying about what happened before or what may happen in the future.  Do guided imagery. Imagine yourself in any setting that helps you feel calm. You can use online videos, books, or a teacher to guide you.  Do breathing exercises. For example:  From a standing position, bend forward from the waist with your knees slightly bent. Let your arms dangle close to the floor.  Breathe in slowly and deeply as you return to a standing position. Roll up slowly and lift your head last.  Hold your breath for just a few seconds in the standing position.  Breathe out slowly and bend forward from the waist.  Let your feelings out. Talk, laugh, cry, and express anger when you need to. Talking with supportive friends or family, a counselor, or a renae leader about your feelings is a healthy way to relieve stress. Avoid discussing your feelings with people who make you feel worse.  Write. It may help to write about things that are bothering you. This helps you find out how much stress you feel and what is causing it. When you know this, you can find better ways to cope.  What can you do to prevent stress?  You might try some of these things to help prevent stress:  Manage your time. This helps you find time to do the things you want and need to do.  Get enough sleep. Your body recovers from the stresses of the day while you are sleeping.  Get support. Your family, friends, and community can make a difference in how you experience stress.  Limit your news feed. Avoid or limit time on social media or news that may make you feel stressed.  Do something active. Exercise or activity can help reduce stress. Walking is a great way to get started.  Where can you learn more?  Go to https://www.[employer].net/patiented  Enter N032 in the search box to learn more about \"Learning About Stress.\"  Current as of: October 24, 2024  Content Version: 14.4 2024-2025 [employer].   Care instructions adapted under license by your healthcare professional. If you have questions about a medical condition or this instruction, always ask your healthcare professional. [employer] disclaims any warranty or liability for your use of this information.    Substance Use Disorder: Care Instructions  Overview     You can improve your life and health by stopping your use of alcohol or drugs. When you don't drink or use drugs, you may feel and sleep better. You may get along better with your family, friends, and coworkers. There are medicines and programs that can help with substance use disorder.  How can you care for yourself at home?  Here are some ways to help you stay sober and prevent relapse.  If you have been given medicine to help keep you sober or reduce your cravings, be sure to take it exactly as prescribed.  Talk to your doctor about programs that can help you stop using drugs or drinking alcohol.  Do not keep alcohol or drugs in your home.  Plan ahead. Think about what you'll say if other people ask you to drink or use drugs. Try not to spend time with people who drink or use drugs.  Use the time and money spent on drinking or drugs to do something that's important to you.  Preventing a relapse  Have a plan to deal with relapse. Learn to recognize changes in your thinking that lead you to drink or use drugs. Get help before you start to drink or use drugs again.  Try to stay away from situations, friends, or places that may lead you to drink or use drugs.  If you feel the need to drink alcohol or use drugs again, seek help right away. Call a trusted friend or family member. Some people get support from organizations such as Narcotics Anonymous or [employer] or from treatment facilities.  If you relapse, get help as soon as you can. Some people make a plan with another person that outlines what they want that person to do for them if they relapse. The plan usually includes  how to handle the relapse and who to notify in case of relapse.  Don't give up. Remember that a relapse doesn't mean that you have failed. Use the experience to learn the triggers that lead you to drink or use drugs. Then quit again. Recovery is a lifelong process. Many people have several relapses before they are able to quit for good.  Follow-up care is a key part of your treatment and safety. Be sure to make and go to all appointments, and call your doctor if you are having problems. It's also a good idea to know your test results and keep a list of the medicines you take.  When should you call for help?   Call 911  anytime you think you may need emergency care. For example, call if you or someone else:    Has overdosed or has withdrawal signs. Be sure to tell the emergency workers that you are or someone else is using or trying to quit using drugs. Overdose or withdrawal signs may include:  Losing consciousness.  Seizure.  Seeing or hearing things that aren't there (hallucinations).     Is thinking or talking about suicide or harming others.   Where to get help 24 hours a day, 7 days a week   If you or someone you know talks about suicide, self-harm, a mental health crisis, a substance use crisis, or any other kind of emotional distress, get help right away. You can:    Call the Suicide and Crisis Lifeline at 739.     Call 4-923-038-TALK (1-441.780.6187).     Text HOME to 392661 to access the Crisis Text Line.   Consider saving these numbers in your phone.  Go to [employer]org for more information or to chat online.  Call your doctor now or seek immediate medical care if:    You are having withdrawal symptoms. These may include nausea or vomiting, sweating, shakiness, and anxiety.   Watch closely for changes in your health, and be sure to contact your doctor if:    You have a relapse.     You need more help or support to stop.   Where can you learn more?  Go to https://www.healthwise.net/patiented  Enter H573  "in the search box to learn more about \"Substance Use Disorder: Care Instructions.\"  Current as of: August 20, 2024  Content Version: 14.4    6699-7607 [employer].   Care instructions adapted under license by your healthcare professional. If you have questions about a medical condition or this instruction, always ask your healthcare professional. [employer] disclaims any warranty or liability for your use of this information.    Safer Sex: Care Instructions  Overview  Safer sex is a way to reduce your risk of getting a sexually transmitted infection (STI). It can also help prevent pregnancy.  Several products can help you practice safer sex and reduce your chance of STIs. One of the best is a condom. There are internal and external condoms. You can use a special rubber sheet (dental dam) for protection during oral sex. Disposable gloves can keep your hands from touching blood, semen, or other body fluids that can carry infections.  Remember that birth control methods such as diaphragms, IUDs, foams, and birth control pills do not stop you from getting STIs.  Follow-up care is a key part of your treatment and safety. Be sure to make and go to all appointments, and call your doctor if you are having problems. It's also a good idea to know your test results and keep a list of the medicines you take.  How can you care for yourself at home?  Think about getting vaccinated to help prevent hepatitis A, hepatitis B, and human papillomavirus (HPV). They can be spread through sex.  Use a condom every time you have sex. Use an external condom, which goes on the penis. Or use an internal condom, which goes into the vagina or anus.  Make sure you use the right size external condom. A condom that's too small can break easily. A condom that's too big can slip off during sex.  Use a new condom each time you have sex. Be careful not to poke a hole in the condom when you open the wrapper.  Don't use an internal " "condom and an external condom at the same time.  Never use petroleum jelly (such as Vaseline), grease, hand lotion, baby oil, or anything with oil in it. These products can make holes in the condom.  After intercourse, hold the edge of the condom as you remove it. This will help keep semen from spilling out of the condom.  Do not have sex with anyone who has symptoms of an STI, such as sores on the genitals or mouth.  Do not drink a lot of alcohol or use drugs before sex.  Limit your sex partners. Sex with one partner who has sex only with you can reduce your risk of getting an STI.  Don't share sex toys. But if you do share them, use a condom and clean the sex toys between each use.  Talk to any partners before you have sex. Talk about what you feel comfortable with and whether you have any boundaries with sex. And find out if your partner or partners may be at risk for any STI. Keep in mind that a person may be able to spread an STI even if they do not have symptoms. You and any partners may want to get tested for STIs.  Where can you learn more?  Go to https://www.[employer].net/patiented  Enter B608 in the search box to learn more about \"Safer Sex: Care Instructions.\"  Current as of: April 30, 2024  Content Version: 14.4    2417-6204 [employer].   Care instructions adapted under license by your healthcare professional. If you have questions about a medical condition or this instruction, always ask your healthcare professional. [employer] disclaims any warranty or liability for your use of this information.       "

## 2025-04-30 NOTE — PROGRESS NOTES
SUBJECTIVE    Roland Sandoval is a 30 year old male who presents for an annual exam.    Other concerns today:  No Concerns    Patient Active Problem List    Diagnosis Date Noted    Low HDL (under 40) 11/10/2021     Priority: Medium    Moderate episode of recurrent major depressive disorder (H) 12/03/2017     Priority: Medium    Herpes genitalis in men 12/02/2017     Priority: Medium        Immunization History   Administered Date(s) Administered    COVID-19 MONOVALENT 12+ (Pfizer) 08/30/2021, 09/20/2021    COVID-19 Monovalent 12+ (Pfizer 2022) 05/23/2022    HPV9 (Gardasil) 05/23/2022    Hepatitis B, Adult (Energix-B/Recombivax HB) 11/10/2021    TDAP (Adacel,Boostrix) 12/05/2017       Current Outpatient Medications   Medication Sig Dispense Refill    sertraline (ZOLOFT) 25 MG tablet Take 1 tablet (25 mg) by mouth daily. 90 tablet 2     No current facility-administered medications for this visit.       No past medical history on file.    No past surgical history on file.     Family History   Problem Relation Age of Onset    Anxiety Disorder Mother     Hypertension Father               4/29/2025   General Health   How would you rate your overall physical health? Good   Feel stress (tense, anxious, or unable to sleep) To some extent   (!) STRESS CONCERN      4/29/2025   Nutrition   Three or more servings of calcium each day? (!) NO   Diet: Regular (no restrictions)   How many servings of fruit and vegetables per day? (!) 0-1   How many sweetened beverages each day? 0-1         4/29/2025   Exercise   Days per week of moderate/strenous exercise 2 days   Average minutes spent exercising at this level 20 min   (!) EXERCISE CONCERN      4/29/2025   Social Factors   Frequency of gathering with friends or relatives Patient declined   Worry food won't last until get money to buy more No   Food not last or not have enough money for food? No   Do you have housing? (Housing is defined as stable permanent housing and does not  "include staying outside in a car, in a tent, in an abandoned building, in an overnight shelter, or couch-surfing.) Yes   Are you worried about losing your housing? No   Lack of transportation? No   Unable to get utilities (heat,electricity)? No         4/29/2025   Dental   Dentist two times every year? (!) DECLINE         Today's PHQ-9 Score:       4/18/2025     3:56 PM   PHQ-9 SCORE   PHQ-9 Total Score MyChart 1 (Minimal depression)   PHQ-9 Total Score 1        Patient-reported           4/29/2025   Substance Use   Alcohol more than 3/day or more than 7/wk No   Do you use any other substances recreationally? (!) CANNABIS PRODUCTS    (!) BATH SALTS       Multiple values from one day are sorted in reverse-chronological order     Social History     Tobacco Use    Smoking status: Never     Passive exposure: Past    Smokeless tobacco: Never   Vaping Use    Vaping status: Never Used   Substance Use Topics    Alcohol use: No    Drug use: No         4/29/2025   STI Screening   New sexual partner(s) since last STI/HIV test? (!) YES          4/29/2025   Contraception/Family Planning   Questions about contraception or family planning No     Reviewed and updated as needed this visit by Provider                  OBJECTIVE    Vitals:    04/30/25 0719   BP: 126/65   BP Location: Right arm   Patient Position: Sitting   Cuff Size: Adult Regular   Pulse: 79   Resp: 16   Temp: 98.1  F (36.7  C)   TempSrc: Temporal   SpO2: 98%   Weight: 89.4 kg (197 lb)   Height: 1.773 m (5' 9.8\")       Body mass index is 28.43 kg/m .    Physical Exam:  General: patient is alert and oriented x 3, in no apparent distress  HEENT, Thyroid, Lymphatic, Cardiac, Pulmonary, GI, Musculoskeletal, Skin, and Neuro exams were completed today and grossly normal  : Deferred, no concerns    Today's labs pending.      ASSESSMENT and PLAN  1. Annual physical exam (Primary)  Health maintenance is discussed with patient as appropriate for age and risk " factors.  Screening labs ordered and I will follow-up with results.  Vaccines updated today include HPV, COVID, hepatitis B.  We discussed limiting marijuana use.  We discussed avoiding other drug use.  He declines STD screening, had that done 1 month ago.  - Lipid Profile  - Glucose; Future    2. Moderate episode of recurrent major depressive disorder (H)  Chronic, stable.  Following with mental health.  Taking sertraline.  Feels like he is generally doing well.      This dictation uses voice recognition software, which may contain typographical errors.

## 2025-04-30 NOTE — PROGRESS NOTES
Prior to immunization administration, verified patients identity using patient s name and date of birth. Please see Immunization Activity for additional information.     Screening Questionnaire for Adult Immunization    Are you sick today?   No   Do you have allergies to medications, food, a vaccine component or latex?   No   Have you ever had a serious reaction after receiving a vaccination?   No   Do you have a long-term health problem with heart, lung, kidney, or metabolic disease (e.g., diabetes), asthma, a blood disorder, no spleen, complement component deficiency, a cochlear implant, or a spinal fluid leak?  Are you on long-term aspirin therapy?   No   Do you have cancer, leukemia, HIV/AIDS, or any other immune system problem?   No   Do you have a parent, brother, or sister with an immune system problem?   No   In the past 3 months, have you taken medications that affect  your immune system, such as prednisone, other steroids, or anticancer drugs; drugs for the treatment of rheumatoid arthritis, Crohn s disease, or psoriasis; or have you had radiation treatments?   No   Have you had a seizure, or a brain or other nervous system problem?   No   During the past year, have you received a transfusion of blood or blood    products, or been given immune (gamma) globulin or antiviral drug?   No   For women: Are you pregnant or is there a chance you could become       pregnant during the next month?   No   Have you received any vaccinations in the past 4 weeks?   No     Immunization questionnaire answers were all negative.      Patient instructed to remain in clinic for 15 minutes afterwards, and to report any adverse reactions.     Screening performed by Wendi Celeste CMA on 4/30/2025 at 7:20 AM.

## 2025-05-23 ENCOUNTER — TRANSFERRED RECORDS (OUTPATIENT)
Dept: HEALTH INFORMATION MANAGEMENT | Facility: CLINIC | Age: 30
End: 2025-05-23
Payer: COMMERCIAL

## 2025-05-28 ENCOUNTER — HOSPITAL ENCOUNTER (EMERGENCY)
Facility: HOSPITAL | Age: 30
Discharge: HOME | End: 2025-05-29
Attending: EMERGENCY MEDICINE
Payer: COMMERCIAL

## 2025-05-28 DIAGNOSIS — B34.9 VIRAL SYNDROME: ICD-10-CM

## 2025-05-28 DIAGNOSIS — R10.9 RIGHT FLANK PAIN: ICD-10-CM

## 2025-05-28 LAB
ALBUMIN SERPL BCG-MCNC: 3.9 G/DL (ref 3.5–5.2)
ALBUMIN UR-MCNC: NEGATIVE MG/DL
ALP SERPL-CCNC: 77 U/L (ref 40–150)
ALT SERPL W P-5'-P-CCNC: 29 U/L (ref 0–70)
ANION GAP SERPL CALCULATED.3IONS-SCNC: 10 MMOL/L (ref 7–15)
APPEARANCE UR: CLEAR
AST SERPL W P-5'-P-CCNC: 30 U/L (ref 0–45)
BASOPHILS # BLD AUTO: 0 10E3/UL (ref 0–0.2)
BASOPHILS NFR BLD AUTO: 0 %
BILIRUB DIRECT SERPL-MCNC: 0.09 MG/DL (ref 0–0.3)
BILIRUB SERPL-MCNC: 0.4 MG/DL
BILIRUB UR QL STRIP: NEGATIVE
BUN SERPL-MCNC: 10.5 MG/DL (ref 6–20)
CALCIUM SERPL-MCNC: 9.1 MG/DL (ref 8.8–10.4)
CHLORIDE SERPL-SCNC: 104 MMOL/L (ref 98–107)
COLOR UR AUTO: ABNORMAL
CREAT SERPL-MCNC: 1.26 MG/DL (ref 0.67–1.17)
EGFRCR SERPLBLD CKD-EPI 2021: 79 ML/MIN/1.73M2
EOSINOPHIL # BLD AUTO: 0 10E3/UL (ref 0–0.7)
EOSINOPHIL NFR BLD AUTO: 0 %
ERYTHROCYTE [DISTWIDTH] IN BLOOD BY AUTOMATED COUNT: 14.1 % (ref 10–15)
FLUAV RNA SPEC QL NAA+PROBE: NEGATIVE
FLUBV RNA RESP QL NAA+PROBE: NEGATIVE
GLUCOSE SERPL-MCNC: 90 MG/DL (ref 70–99)
GLUCOSE UR STRIP-MCNC: NEGATIVE MG/DL
HCO3 SERPL-SCNC: 26 MMOL/L (ref 22–29)
HCT VFR BLD AUTO: 47.3 % (ref 40–53)
HGB BLD-MCNC: 15.1 G/DL (ref 13.3–17.7)
HGB UR QL STRIP: NEGATIVE
IMM GRANULOCYTES # BLD: 0 10E3/UL
IMM GRANULOCYTES NFR BLD: 0 %
KETONES UR STRIP-MCNC: NEGATIVE MG/DL
LEUKOCYTE ESTERASE UR QL STRIP: NEGATIVE
LIPASE SERPL-CCNC: 42 U/L (ref 13–60)
LYMPHOCYTES # BLD AUTO: 1 10E3/UL (ref 0.8–5.3)
LYMPHOCYTES NFR BLD AUTO: 28 %
MAGNESIUM SERPL-MCNC: 1.8 MG/DL (ref 1.7–2.3)
MCH RBC QN AUTO: 27.2 PG (ref 26.5–33)
MCHC RBC AUTO-ENTMCNC: 31.9 G/DL (ref 31.5–36.5)
MCV RBC AUTO: 85 FL (ref 78–100)
MONOCYTES # BLD AUTO: 0.5 10E3/UL (ref 0–1.3)
MONOCYTES NFR BLD AUTO: 14 %
MUCOUS THREADS #/AREA URNS LPF: PRESENT /LPF
NEUTROPHILS # BLD AUTO: 2.1 10E3/UL (ref 1.6–8.3)
NEUTROPHILS NFR BLD AUTO: 58 %
NITRATE UR QL: NEGATIVE
NRBC # BLD AUTO: 0 10E3/UL
NRBC BLD AUTO-RTO: 0 /100
PH UR STRIP: 5.5 [PH] (ref 5–7)
PLATELET # BLD AUTO: 177 10E3/UL (ref 150–450)
POTASSIUM SERPL-SCNC: 4 MMOL/L (ref 3.4–5.3)
PROT SERPL-MCNC: 6.6 G/DL (ref 6.4–8.3)
RBC # BLD AUTO: 5.56 10E6/UL (ref 4.4–5.9)
RBC URINE: <1 /HPF
RSV RNA SPEC NAA+PROBE: NEGATIVE
SARS-COV-2 RNA RESP QL NAA+PROBE: NEGATIVE
SODIUM SERPL-SCNC: 140 MMOL/L (ref 135–145)
SP GR UR STRIP: 1.02 (ref 1–1.03)
UROBILINOGEN UR STRIP-MCNC: NORMAL MG/DL
WBC # BLD AUTO: 3.7 10E3/UL (ref 4–11)
WBC URINE: 1 /HPF

## 2025-05-28 PROCEDURE — 250N000011 HC RX IP 250 OP 636

## 2025-05-28 PROCEDURE — 36415 COLL VENOUS BLD VENIPUNCTURE: CPT

## 2025-05-28 PROCEDURE — 81001 URINALYSIS AUTO W/SCOPE: CPT

## 2025-05-28 PROCEDURE — 250N000011 HC RX IP 250 OP 636: Mod: JZ

## 2025-05-28 PROCEDURE — 96375 TX/PRO/DX INJ NEW DRUG ADDON: CPT

## 2025-05-28 PROCEDURE — 99285 EMERGENCY DEPT VISIT HI MDM: CPT | Mod: 25

## 2025-05-28 PROCEDURE — 85014 HEMATOCRIT: CPT

## 2025-05-28 PROCEDURE — 83690 ASSAY OF LIPASE: CPT

## 2025-05-28 PROCEDURE — 83735 ASSAY OF MAGNESIUM: CPT

## 2025-05-28 PROCEDURE — 96361 HYDRATE IV INFUSION ADD-ON: CPT

## 2025-05-28 PROCEDURE — 96374 THER/PROPH/DIAG INJ IV PUSH: CPT | Mod: 59

## 2025-05-28 PROCEDURE — 87637 SARSCOV2&INF A&B&RSV AMP PRB: CPT

## 2025-05-28 PROCEDURE — 258N000003 HC RX IP 258 OP 636

## 2025-05-28 PROCEDURE — 250N000013 HC RX MED GY IP 250 OP 250 PS 637

## 2025-05-28 PROCEDURE — 82435 ASSAY OF BLOOD CHLORIDE: CPT

## 2025-05-28 PROCEDURE — 82248 BILIRUBIN DIRECT: CPT

## 2025-05-28 RX ORDER — IOPAMIDOL 755 MG/ML
90 INJECTION, SOLUTION INTRAVASCULAR ONCE
Status: COMPLETED | OUTPATIENT
Start: 2025-05-28 | End: 2025-05-28

## 2025-05-28 RX ORDER — DIPHENHYDRAMINE HYDROCHLORIDE 50 MG/ML
25 INJECTION, SOLUTION INTRAMUSCULAR; INTRAVENOUS ONCE
Status: COMPLETED | OUTPATIENT
Start: 2025-05-28 | End: 2025-05-28

## 2025-05-28 RX ORDER — METOCLOPRAMIDE HYDROCHLORIDE 5 MG/ML
10 INJECTION INTRAMUSCULAR; INTRAVENOUS ONCE
Status: COMPLETED | OUTPATIENT
Start: 2025-05-28 | End: 2025-05-28

## 2025-05-28 RX ORDER — ACETAMINOPHEN 325 MG/1
975 TABLET ORAL ONCE
Status: COMPLETED | OUTPATIENT
Start: 2025-05-28 | End: 2025-05-28

## 2025-05-28 RX ADMIN — ACETAMINOPHEN 975 MG: 325 TABLET ORAL at 18:14

## 2025-05-28 RX ADMIN — METOCLOPRAMIDE 10 MG: 5 INJECTION, SOLUTION INTRAMUSCULAR; INTRAVENOUS at 18:08

## 2025-05-28 RX ADMIN — SODIUM CHLORIDE 1000 ML: 0.9 INJECTION, SOLUTION INTRAVENOUS at 18:07

## 2025-05-28 RX ADMIN — IOPAMIDOL 90 ML: 755 INJECTION, SOLUTION INTRAVENOUS at 22:54

## 2025-05-28 RX ADMIN — DIPHENHYDRAMINE HYDROCHLORIDE 25 MG: 50 INJECTION, SOLUTION INTRAMUSCULAR; INTRAVENOUS at 18:11

## 2025-05-28 ASSESSMENT — ACTIVITIES OF DAILY LIVING (ADL)
ADLS_ACUITY_SCORE: 41

## 2025-05-28 ASSESSMENT — COLUMBIA-SUICIDE SEVERITY RATING SCALE - C-SSRS
2. HAVE YOU ACTUALLY HAD ANY THOUGHTS OF KILLING YOURSELF IN THE PAST MONTH?: NO
6. HAVE YOU EVER DONE ANYTHING, STARTED TO DO ANYTHING, OR PREPARED TO DO ANYTHING TO END YOUR LIFE?: NO
1. IN THE PAST MONTH, HAVE YOU WISHED YOU WERE DEAD OR WISHED YOU COULD GO TO SLEEP AND NOT WAKE UP?: NO

## 2025-05-28 NOTE — ED TRIAGE NOTES
Fever, body aches, back pain, headache for a few days. Pt took a friends gabapentin earlier today with some relief.     Triage Assessment (Adult)       Row Name 05/28/25 7686          Triage Assessment    Airway WDL WDL        Respiratory WDL    Respiratory WDL WDL        Skin Circulation/Temperature WDL    Skin Circulation/Temperature WDL WDL        Cardiac WDL    Cardiac WDL WDL        Peripheral/Neurovascular WDL    Peripheral Neurovascular WDL WDL        Cognitive/Neuro/Behavioral WDL    Cognitive/Neuro/Behavioral WDL WDL

## 2025-05-28 NOTE — Clinical Note
Roland Sandoval was seen and treated in our emergency department on 5/28/2025.  He may return to work on 06/02/2025.       If you have any questions or concerns, please don't hesitate to call.      Sarah Ravi PA-C

## 2025-05-28 NOTE — ED PROVIDER NOTES
Emergency Department Encounter   NAME: Roland Sandoval ; AGE: 30 year old male ; YOB: 1995 ; MRN: [id number] ; PCP: No Ref-Primary, Physician   ED PROVIDER: Nubia Thompson PA-C    Evaluation Date & Time:   5/28/2025  4:55 PM    CHIEF COMPLAINT:  Back Pain      Impression and Plan   MDM: Roland Sandoval is a 30 year old male who presents to the ED for evaluation of bodyaches and fever.  Reports on Monday symptoms started with a bilateral frontal headache.  Yesterday developed fever and bodyaches.  Noting diffuse back pain and right sided flank pain with some abdominal pain.  Denies any back trauma or injuries.  Denies radiation down the legs.  Denies saddle anesthesia or incontinence.  No history of back injuries, surgeries.  No nausea or vomiting.  No sore throat or ear pain.  Reports cough on Monday (2 days ago) that is now resolved.  Otherwise healthy no chronic medical conditions.   He took a gabapentin from his fiance earlier today for headache with some relief.   On exam patient has tenderness to the right flank.  No midline tenderness.  He is neurologically intact.  He is febrile at 100.3.    Differential for headache including acute intracranial bleed, infectious cause such as meningitis, space-occupying lesion, tension or migraine headache.  He has no nuchal rigidity, rashes, posterior headache to suggest meningitis.  Low suspicion for acute intracranial bleed or tumor.  No history of trauma, sudden thunderclap onset.  Differential for his right-sided flank pain including infected stone, pyelonephritis, musculoskeletal, also considered acute spinal trauma, epidural abscess, spinal cord compression.  No history of back surgeries or midline spinal tenderness to suggest epidural abscess.   No history of trauma to suggest fracture.   No red flag symptoms such as weakness, numbness, incontinence to suggest spinal cord injury.     CBC without any leukocytosis or anemia.  BMP is remarkable  for mildly elevated creatinine 1.26.  LFTs normal.  Lipase normal.  Magnesium normal.  Viral swabs negative.  CT abdomen pelvis ordered for evaluation of stone versus pyelonephritis versus appendicitis or other acute abdominal pathology.    CT imaging pending on the time of my shift end.  Patient signed out to Sarah Ravi for further evaluation and workup. If CT normal can likely discharge with supportive cares for viral illess and follow up with PCP.     Medical Decision Making      Patient signed out to Sarah Ravi PA-C     MIPS (CTPE, Dental pain, Luke, Sinusitis, Asthma/COPD, Head Trauma): Not Applicable    SEPSIS: None  Critical Care: 0    ED COURSE:  5:20 PM I met and introduced myself to the patient. I gathered initial history and performed my physical exam. We discussed plan for initial workup.    I rechecked the patient and discussed results, discharge, follow up, and reasons to return to the ED.     At the conclusion of the encounter I discussed the results of all the tests and the disposition. The questions were answered. The patient or family acknowledged understanding and was agreeable with the care plan.    FINAL IMPRESSION:  No diagnosis found.      MEDICATIONS GIVEN IN THE EMERGENCY DEPARTMENT:  Medications   sodium chloride 0.9% BOLUS 1,000 mL (0 mLs Intravenous Stopped 5/28/25 1905)   acetaminophen (TYLENOL) tablet 975 mg (975 mg Oral $Given 5/28/25 1814)   metoclopramide (REGLAN) injection 10 mg (10 mg Intravenous $Given 5/28/25 1808)   diphenhydrAMINE (BENADRYL) injection 25 mg (25 mg Intravenous $Given 5/28/25 1811)         NEW PRESCRIPTIONS STARTED AT TODAY'S ED VISIT:  New Prescriptions    No medications on file         HPI   Use of Intrepreter: N/A     Roland SKYLER Sandoval is a 30 year old male with a pertinent history of none who presents to the ED by private vehicle for evaluation of back pain.         Medical History     No past medical history on file.    No past surgical history on  file.    Family History   Problem Relation Age of Onset    Anxiety Disorder Mother     Hypertension Father        Social History     Tobacco Use    Smoking status: Never     Passive exposure: Past    Smokeless tobacco: Never   Vaping Use    Vaping status: Never Used   Substance Use Topics    Alcohol use: No    Drug use: No       sertraline (ZOLOFT) 25 MG tablet          Physical Exam     First Vitals:  Patient Vitals for the past 24 hrs:   BP Temp Temp src Pulse Resp SpO2 Weight   05/28/25 2015 114/78 -- -- 80 20 97 % --   05/28/25 1906 137/89 -- -- 91 20 98 % --   05/28/25 1836 (!) 156/92 -- -- 89 20 97 % --   05/28/25 1830 -- -- -- 97 -- 97 % --   05/28/25 1815 (!) 154/106 -- -- 105 20 98 % --   05/28/25 1639 132/84 100.3  F (37.9  C) Oral 90 20 97 % 89.4 kg (197 lb)         PHYSICAL EXAM:   Physical Exam    Constitutional: alert,  no acute distress,  not ill-appearing  Head: normocephalic, atraumatic  Eyes: EOM intact   Neck: ROM normal  Cardio: regular rate, regular rhythm, no murmurs   Pulmonary: effort normal, lung sounds normal, no wheezing, crackles, or rales    Back: No midline tenderness, tender over the right flank and lumbar musculature, no overlying skin changes or rashes  Abdominal: flat, no distention  MSK: no obvious swelling or deformity  Skin: no visible rashes or erythema   Neuro: no gross focal deficit, acting per baseline, neurologically intact  Psychiatric: mood and behavior within normal limit    Results     LAB:  All pertinent labs reviewed and interpreted  Labs Ordered and Resulted from Time of ED Arrival to Time of ED Departure   ROUTINE UA WITH MICROSCOPIC REFLEX TO CULTURE - Abnormal       Result Value    Color Urine Light Yellow      Appearance Urine Clear      Glucose Urine Negative      Bilirubin Urine Negative      Ketones Urine Negative      Specific Gravity Urine 1.020      Blood Urine Negative      pH Urine 5.5      Protein Albumin Urine Negative      Urobilinogen Urine Normal       Nitrite Urine Negative      Leukocyte Esterase Urine Negative      Mucus Urine Present (*)     RBC Urine <1      WBC Urine 1     BASIC METABOLIC PANEL - Abnormal    Sodium 140      Potassium 4.0      Chloride 104      Carbon Dioxide (CO2) 26      Anion Gap 10      Urea Nitrogen 10.5      Creatinine 1.26 (*)     GFR Estimate 79      Calcium 9.1      Glucose 90     CBC WITH PLATELETS AND DIFFERENTIAL - Abnormal    WBC Count 3.7 (*)     RBC Count 5.56      Hemoglobin 15.1      Hematocrit 47.3      MCV 85      MCH 27.2      MCHC 31.9      RDW 14.1      Platelet Count 177      % Neutrophils 58      % Lymphocytes 28      % Monocytes 14      % Eosinophils 0      % Basophils 0      % Immature Granulocytes 0      NRBCs per 100 WBC 0      Absolute Neutrophils 2.1      Absolute Lymphocytes 1.0      Absolute Monocytes 0.5      Absolute Eosinophils 0.0      Absolute Basophils 0.0      Absolute Immature Granulocytes 0.0      Absolute NRBCs 0.0     INFLUENZA A/B, RSV AND SARS-COV2 PCR - Normal    Influenza A PCR Negative      Influenza B PCR Negative      RSV PCR Negative      SARS CoV2 PCR Negative     HEPATIC FUNCTION PANEL - Normal    Protein Total 6.6      Albumin 3.9      Bilirubin Total 0.4      Alkaline Phosphatase 77      AST 30      ALT 29      Bilirubin Direct 0.09     MAGNESIUM - Normal    Magnesium 1.8     LIPASE - Normal    Lipase 42          RADIOLOGY:  CT Abdomen Pelvis w Contrast    (Results Pending)         PROCEDURES:  None    Nubia Thompson PA-C   Emergency Medicine   [facility] EMERGENCY DEPARTMENT       Nubia Thompson PA-C  05/28/25 2052

## 2025-05-29 VITALS
WEIGHT: 197 LBS | HEART RATE: 75 BPM | DIASTOLIC BLOOD PRESSURE: 75 MMHG | OXYGEN SATURATION: 99 % | BODY MASS INDEX: 28.43 KG/M2 | SYSTOLIC BLOOD PRESSURE: 139 MMHG | TEMPERATURE: 99 F | RESPIRATION RATE: 20 BRPM

## 2025-05-29 NOTE — DISCHARGE INSTRUCTIONS
No acute cause of your right-sided flank pain and bodyaches.  Suspect a virus.  Take ibuprofen and Tylenol as needed for fevers and headaches.  If you develop any neck stiffness, new rashes, high fevers not decreasing with Tylenol ibuprofen, severe pain in the right flank, urinary symptoms, care for evaluation.  Otherwise follow-up with your primary care doctor for recheck in a few days.    Tylenol (Acetaminophen) Discharge Instructions:  You may take over-the-counter, Tylenol (acetaminophen) every 4-6 hours as needed for pain.  Take no more than 4000 mg of Tylenol in a 24-hour period for adults, maximum dosing for children is based on weight - please look at medication label.    Avoid taking more than 1 acetaminophen-containing product at a time and be aware that many over-the-counter medications contain a combination of acetaminophen and other products.  If you are taking Tylenol in addition to a combination product please keep track of your daily acetaminophen dose to make sure you do not exceed the recommended 4000 mg.  Taking too much acetaminophen can cause permanent damage to your liver.    Ibuprofen/Naproxen Discharge Instructions:  You may take ibuprofen (600 mg) every 6 hours as needed for pain control.  The maximum dose of (ibuprofen is 3200 mg ) in a 24-hour period for adults, maximum dosing for children is based on weight - please look at medication label.     Take this medication with food to prevent stomach irritation.  With long-term use this medication can irritate the stomach causing pain and lead to development of a stomach ulcer.  If you notice stomach pain or vomiting of coffee-ground colored vomit or blood, please be seen by a healthcare provider.  Attempt to use this medication for the shortest time possible.      Follow-up with your primary care provider for reevaluation and ongoing management, especially if your symptoms persist.    Return to the emergency department for any new or worsening  symptoms.

## 2025-05-29 NOTE — ED PROVIDER NOTES
Emergency Department Midlevel Supervisory Note     I had a face to face encounter with this patient seen by the Advanced Practice Provider ([facility]). I personally made/approved the management plan and take responsibility for the patient management. I personally saw patient and performed a substantive portion of the visit including all aspects of the medical decision making.     ED Course:  11:17 PM Sarah Ravi PA-C staffed patient with me. I agree with their assessment and plan of management, and I will see the patient.  *** I met with the patient to introduce myself, gather additional history, perform my initial exam, and discuss the plan.     Brief HPI:     Roland Sandoval is a 30 year old male who presents for evaluation of right flank pain, fever/chills, headache since Monday.     I, Shelton Roth, am serving as a scribe to document services personally performed by Coral Portillo MD, based on my observations and the provider's statements to me.   I, Coral Portillo MD attest that Shelton Roth was acting in a scribe capacity, has observed my performance of the services and has documented them in accordance with my direction.    Brief Physical Exam: /88   Pulse 70   Temp 99  F (37.2  C) (Oral)   Resp 18   Wt 89.4 kg (197 lb)   SpO2 99%   BMI 28.43 kg/m    Constitutional:  Alert, in no acute distress  EYES: Conjunctivae clear  HENT:  Atraumatic  Respiratory:  Respirations even, unlabored, in no acute respiratory distress  Cardiovascular:  Regular rate and rhythm, good peripheral perfusion  GI: Soft, non-distended, non-tender  Musculoskeletal:  Moves all 4 extremities equally, grossly symmetrical strength  Integument: Warm & dry. No appreciable rash, erythema.  Neurologic:  Alert & oriented, speech clear and fluent, no focal deficits noted  Psych: Normal mood and affect       MDM:  ***       1. Right flank pain        Consults:  I discussed the patient with *** who recommends ***    Labs and  Imaging:  Results for orders placed or performed during the hospital encounter of 05/28/25   CT Abdomen Pelvis w Contrast    Impression    IMPRESSION:   1.  No inflammatory change, bowel obstruction or abscess.   Influenza A/B, RSV and SARS-CoV2 PCR (COVID-19) Nasopharyngeal    Specimen: Nasopharyngeal; Swab   Result Value Ref Range    Influenza A PCR Negative Negative    Influenza B PCR Negative Negative    RSV PCR Negative Negative    SARS CoV2 PCR Negative Negative   UA with Microscopic reflex to Culture    Specimen: Urine, Clean Catch   Result Value Ref Range    Color Urine Light Yellow Colorless, Straw, Light Yellow, Yellow    Appearance Urine Clear Clear    Glucose Urine Negative Negative mg/dL    Bilirubin Urine Negative Negative    Ketones Urine Negative Negative mg/dL    Specific Gravity Urine 1.020 1.001 - 1.030    Blood Urine Negative Negative    pH Urine 5.5 5.0 - 7.0    Protein Albumin Urine Negative Negative mg/dL    Urobilinogen Urine Normal Normal mg/dL    Nitrite Urine Negative Negative    Leukocyte Esterase Urine Negative Negative    Mucus Urine Present (A) None Seen /LPF    RBC Urine <1 <=2 /HPF    WBC Urine 1 <=5 /HPF   Hepatic function panel   Result Value Ref Range    Protein Total 6.6 6.4 - 8.3 g/dL    Albumin 3.9 3.5 - 5.2 g/dL    Bilirubin Total 0.4 <=1.2 mg/dL    Alkaline Phosphatase 77 40 - 150 U/L    AST 30 0 - 45 U/L    ALT 29 0 - 70 U/L    Bilirubin Direct 0.09 0.00 - 0.30 mg/dL   Result Value Ref Range    Magnesium 1.8 1.7 - 2.3 mg/dL   Result Value Ref Range    Lipase 42 13 - 60 U/L   Basic metabolic panel   Result Value Ref Range    Sodium 140 135 - 145 mmol/L    Potassium 4.0 3.4 - 5.3 mmol/L    Chloride 104 98 - 107 mmol/L    Carbon Dioxide (CO2) 26 22 - 29 mmol/L    Anion Gap 10 7 - 15 mmol/L    Urea Nitrogen 10.5 6.0 - 20.0 mg/dL    Creatinine 1.26 (H) 0.67 - 1.17 mg/dL    GFR Estimate 79 >60 mL/min/1.73m2    Calcium 9.1 8.8 - 10.4 mg/dL    Glucose 90 70 - 99 mg/dL   CBC with  platelets and differential   Result Value Ref Range    WBC Count 3.7 (L) 4.0 - 11.0 10e3/uL    RBC Count 5.56 4.40 - 5.90 10e6/uL    Hemoglobin 15.1 13.3 - 17.7 g/dL    Hematocrit 47.3 40.0 - 53.0 %    MCV 85 78 - 100 fL    MCH 27.2 26.5 - 33.0 pg    MCHC 31.9 31.5 - 36.5 g/dL    RDW 14.1 10.0 - 15.0 %    Platelet Count 177 150 - 450 10e3/uL    % Neutrophils 58 %    % Lymphocytes 28 %    % Monocytes 14 %    % Eosinophils 0 %    % Basophils 0 %    % Immature Granulocytes 0 %    NRBCs per 100 WBC 0 <1 /100    Absolute Neutrophils 2.1 1.6 - 8.3 10e3/uL    Absolute Lymphocytes 1.0 0.8 - 5.3 10e3/uL    Absolute Monocytes 0.5 0.0 - 1.3 10e3/uL    Absolute Eosinophils 0.0 0.0 - 0.7 10e3/uL    Absolute Basophils 0.0 0.0 - 0.2 10e3/uL    Absolute Immature Granulocytes 0.0 <=0.4 10e3/uL    Absolute NRBCs 0.0 10e3/uL       I have reviewed the relevant laboratory studies above.    I independently interpreted the following imaging study(s):   ***    EKG: I reviewed and independently interpreted the patient's EKG, with comments made as listed below. Please see scanned EKG for full report.   ***    Procedures:  I was present for the key portions of procedures documented in [facility]/midlevel note, see midlevel note for further details.    Coral Portillo MD  [facility] EMERGENCY DEPARTMENT  [address] [place] [address] [id number] [phone]

## 2025-05-29 NOTE — ED PROVIDER NOTES
ED SIGN OUT    NAME: Roland Sandoval  AGE: 30 year old male  YOB: 1995  MRN: [id number]  EVALUATION DATE & TIME: 2025  4:55 PM      Chief Complaint   Patient presents with    Back Pain       ED COURSE:  8:04 PM Patient was signed out to me at change of shift by Nubia Thompson PA-C. Please see their note for full HPI, exam and plan.  10:55 PM Attempted to evaluate and update patient. Patient finally getting CT scan now.  11:15 PM Met with patient for initial evaluation and exam. Discussed CT results and possible options. Patient still with RLQ abdominal tenderness. Staffed patient with Dr. Portillo.  11:35 PM Re-discussed case with Dr. Portillo. Plan for MRI abdomen/pelvis to assess for appendicitis.  Patient is ultimately signed out to Dr. Portillo pending MRI.  Please see her note for final disposition.    MEDICAL DECISION MAKIN year old male presents with right flank pain, fever/chills, headache since Monday. Patient borderline febrile at 100.3F upon arrival. Please see Nubia Thompson PA-C note for full HPI, exam, differential diagnoses, and laboratory interpretations. Patient was signed out to me pending CT abdomen/pelvis.  CT was negative, but unfortunately did not visualize the appendix.  Evaluated patient and patient still with right lower quadrant abdominal tenderness.  He states that his symptoms initially started Monday in that right lower quadrant and seem to radiate up his back.  After discussion, patient ultimately elects to proceed with MRI to definitively assess for appendicitis. Patient is ultimately signed out to Dr. Portillo pending MRI.  Please see her note for final disposition.    Addendum:  12:03 AM Dr. Portillo had talked with radiologist who states she now visualized appendix, which was wrapped around the cecum. No evidence of appendicitis. Reevaluated and updated patient. I discussed the plan for discharge with the patient, and patient is agreeable. We discussed supportive cares  at home and reasons for return to the ER including new or worsening symptoms. All questions and concerns addressed. Patient to be discharged by RN.    FINAL IMPRESSION:    1. Right flank pain    2. Viral syndrome        VITALS:  Patient Vitals for the past 24 hrs:   BP Temp Temp src Pulse Resp SpO2 Weight   05/28/25 2247 -- 99  F (37.2  C) Oral -- 18 99 % --   05/28/25 2234 -- -- -- -- 18 -- --   05/28/25 2230 127/88 -- -- 70 18 99 % --   05/28/25 2215 118/80 -- -- 70 18 97 % --   05/28/25 2200 121/82 -- -- 84 18 94 % --   05/28/25 2145 127/79 -- -- 81 18 94 % --   05/28/25 2134 124/83 -- -- 78 18 97 % --   05/28/25 2130 -- -- -- 81 -- 98 % --   05/28/25 2115 -- -- -- 82 18 98 % --   05/28/25 2114 -- 98.9  F (37.2  C) -- -- -- -- --   05/28/25 2113 125/77 -- -- 80 18 99 % --   05/28/25 2100 -- -- -- 79 18 98 % --   05/28/25 2054 122/81 -- -- 72 18 97 % --   05/28/25 2039 118/73 -- -- 70 18 97 % --   05/28/25 2024 119/77 -- -- 79 18 97 % --   05/28/25 2015 114/78 -- -- 80 20 97 % --   05/28/25 1906 137/89 -- -- 91 20 98 % --   05/28/25 1836 (!) 156/92 -- -- 89 20 97 % --   05/28/25 1830 -- -- -- 97 -- 97 % --   05/28/25 1815 (!) 154/106 -- -- 105 20 98 % --   05/28/25 1639 132/84 100.3  F (37.9  C) Oral 90 20 97 % 89.4 kg (197 lb)       Wt Readings from Last 3 Encounters:   05/28/25 89.4 kg (197 lb)   04/30/25 89.4 kg (197 lb)   03/29/25 94.8 kg (209 lb)       PHYSICAL EXAM    Please see initial providers note for detailed physical exam.    LAB:  All pertinent labs reviewed and interpreted.  Recent Results (from the past 24 hours)   Influenza A/B, RSV and SARS-CoV2 PCR (COVID-19) Nasopharyngeal    Collection Time: 05/28/25  5:18 PM    Specimen: Nasopharyngeal; Swab   Result Value Ref Range    Influenza A PCR Negative Negative    Influenza B PCR Negative Negative    RSV PCR Negative Negative    SARS CoV2 PCR Negative Negative   UA with Microscopic reflex to Culture    Collection Time: 05/28/25  5:50 PM    Specimen:  Urine, Clean Catch   Result Value Ref Range    Color Urine Light Yellow Colorless, Straw, Light Yellow, Yellow    Appearance Urine Clear Clear    Glucose Urine Negative Negative mg/dL    Bilirubin Urine Negative Negative    Ketones Urine Negative Negative mg/dL    Specific Gravity Urine 1.020 1.001 - 1.030    Blood Urine Negative Negative    pH Urine 5.5 5.0 - 7.0    Protein Albumin Urine Negative Negative mg/dL    Urobilinogen Urine Normal Normal mg/dL    Nitrite Urine Negative Negative    Leukocyte Esterase Urine Negative Negative    Mucus Urine Present (A) None Seen /LPF    RBC Urine <1 <=2 /HPF    WBC Urine 1 <=5 /HPF   Hepatic function panel    Collection Time: 05/28/25  6:03 PM   Result Value Ref Range    Protein Total 6.6 6.4 - 8.3 g/dL    Albumin 3.9 3.5 - 5.2 g/dL    Bilirubin Total 0.4 <=1.2 mg/dL    Alkaline Phosphatase 77 40 - 150 U/L    AST 30 0 - 45 U/L    ALT 29 0 - 70 U/L    Bilirubin Direct 0.09 0.00 - 0.30 mg/dL   Magnesium    Collection Time: 05/28/25  6:03 PM   Result Value Ref Range    Magnesium 1.8 1.7 - 2.3 mg/dL   Lipase    Collection Time: 05/28/25  6:03 PM   Result Value Ref Range    Lipase 42 13 - 60 U/L   Basic metabolic panel    Collection Time: 05/28/25  6:03 PM   Result Value Ref Range    Sodium 140 135 - 145 mmol/L    Potassium 4.0 3.4 - 5.3 mmol/L    Chloride 104 98 - 107 mmol/L    Carbon Dioxide (CO2) 26 22 - 29 mmol/L    Anion Gap 10 7 - 15 mmol/L    Urea Nitrogen 10.5 6.0 - 20.0 mg/dL    Creatinine 1.26 (H) 0.67 - 1.17 mg/dL    GFR Estimate 79 >60 mL/min/1.73m2    Calcium 9.1 8.8 - 10.4 mg/dL    Glucose 90 70 - 99 mg/dL   CBC with platelets and differential    Collection Time: 05/28/25  6:03 PM   Result Value Ref Range    WBC Count 3.7 (L) 4.0 - 11.0 10e3/uL    RBC Count 5.56 4.40 - 5.90 10e6/uL    Hemoglobin 15.1 13.3 - 17.7 g/dL    Hematocrit 47.3 40.0 - 53.0 %    MCV 85 78 - 100 fL    MCH 27.2 26.5 - 33.0 pg    MCHC 31.9 31.5 - 36.5 g/dL    RDW 14.1 10.0 - 15.0 %    Platelet  Count 177 150 - 450 10e3/uL    % Neutrophils 58 %    % Lymphocytes 28 %    % Monocytes 14 %    % Eosinophils 0 %    % Basophils 0 %    % Immature Granulocytes 0 %    NRBCs per 100 WBC 0 <1 /100    Absolute Neutrophils 2.1 1.6 - 8.3 10e3/uL    Absolute Lymphocytes 1.0 0.8 - 5.3 10e3/uL    Absolute Monocytes 0.5 0.0 - 1.3 10e3/uL    Absolute Eosinophils 0.0 0.0 - 0.7 10e3/uL    Absolute Basophils 0.0 0.0 - 0.2 10e3/uL    Absolute Immature Granulocytes 0.0 <=0.4 10e3/uL    Absolute NRBCs 0.0 10e3/uL     RADIOLOGY:  Reviewed all pertinent imaging. Please see official radiology report.    CT Abdomen Pelvis w Contrast   Final Result   Addendum (preliminary) 1 of 1   Addendum: Appendix normal caliber, tiny in size inferior to the cecum    coronal images 48-54. No inflammatory change in this region.      Final   IMPRESSION:    1.  No inflammatory change, bowel obstruction or abscess.      MR Abdomen w/o Contrast    (Results Pending)     ISAI Escobedo Bianca, PA-C  05/28/25 2344       Sarah Ravi PA-C  05/29/25 0005

## 2025-06-03 ENCOUNTER — OFFICE VISIT (OUTPATIENT)
Dept: FAMILY MEDICINE | Facility: CLINIC | Age: 30
End: 2025-06-03
Payer: COMMERCIAL

## 2025-06-03 VITALS
WEIGHT: 192 LBS | BODY MASS INDEX: 30.13 KG/M2 | TEMPERATURE: 98.1 F | HEIGHT: 67 IN | HEART RATE: 79 BPM | OXYGEN SATURATION: 98 % | RESPIRATION RATE: 15 BRPM | SYSTOLIC BLOOD PRESSURE: 110 MMHG | DIASTOLIC BLOOD PRESSURE: 70 MMHG

## 2025-06-03 DIAGNOSIS — Z21 HIV TEST POSITIVE (H): Primary | ICD-10-CM

## 2025-06-03 LAB
ERYTHROCYTE [DISTWIDTH] IN BLOOD BY AUTOMATED COUNT: 13.4 % (ref 10–15)
HCT VFR BLD AUTO: 43.3 % (ref 40–53)
HGB BLD-MCNC: 14.1 G/DL (ref 13.3–17.7)
HIV 1+2 AB+HIV1 P24 AG SERPL QL IA: REACTIVE
MCH RBC QN AUTO: 27.3 PG (ref 26.5–33)
MCHC RBC AUTO-ENTMCNC: 32.6 G/DL (ref 31.5–36.5)
MCV RBC AUTO: 84 FL (ref 78–100)
PLATELET # BLD AUTO: 169 10E3/UL (ref 150–450)
RBC # BLD AUTO: 5.17 10E6/UL (ref 4.4–5.9)
WBC # BLD AUTO: 2.2 10E3/UL (ref 4–11)

## 2025-06-03 PROCEDURE — 87536 HIV-1 QUANT&REVRSE TRNSCRPJ: CPT | Mod: 59 | Performed by: FAMILY MEDICINE

## 2025-06-03 PROCEDURE — 3078F DIAST BP <80 MM HG: CPT | Performed by: FAMILY MEDICINE

## 2025-06-03 PROCEDURE — 86360 T CELL ABSOLUTE COUNT/RATIO: CPT | Performed by: FAMILY MEDICINE

## 2025-06-03 PROCEDURE — 86355 B CELLS TOTAL COUNT: CPT | Performed by: FAMILY MEDICINE

## 2025-06-03 PROCEDURE — 86357 NK CELLS TOTAL COUNT: CPT | Performed by: FAMILY MEDICINE

## 2025-06-03 PROCEDURE — 86359 T CELLS TOTAL COUNT: CPT | Performed by: FAMILY MEDICINE

## 2025-06-03 PROCEDURE — 86701 HIV-1ANTIBODY: CPT | Mod: 59 | Performed by: FAMILY MEDICINE

## 2025-06-03 PROCEDURE — 85027 COMPLETE CBC AUTOMATED: CPT | Performed by: FAMILY MEDICINE

## 2025-06-03 PROCEDURE — 36415 COLL VENOUS BLD VENIPUNCTURE: CPT | Performed by: FAMILY MEDICINE

## 2025-06-03 PROCEDURE — 3074F SYST BP LT 130 MM HG: CPT | Performed by: FAMILY MEDICINE

## 2025-06-03 PROCEDURE — 86702 HIV-2 ANTIBODY: CPT | Mod: 59 | Performed by: FAMILY MEDICINE

## 2025-06-03 PROCEDURE — 99215 OFFICE O/P EST HI 40 MIN: CPT | Performed by: FAMILY MEDICINE

## 2025-06-03 PROCEDURE — 87389 HIV-1 AG W/HIV-1&-2 AB AG IA: CPT | Performed by: FAMILY MEDICINE

## 2025-06-03 NOTE — PROGRESS NOTES
"  Assessment & Plan     HIV test positive (H)  Abnormal PCR test at the plasma center a few days ago, confirmatory tests are coming back positive, he had a decreased CD4 count and  WBC, discussed with him about HIV, we've  discussed treatment, we've  discussed ID consult, we've  discussed notifying sexual partners.  - REVIEW OF HEALTH MAINTENANCE PROTOCOL ORDERS  - HIV-1 RNA quantitative; Future  - HIV Antigen Antibody Combo; Future  - T cell subset extended profile; Future  - CBC with platelets; Future  - T cell subset profile; Future  - HIV-1 RNA quantitative  - HIV Antigen Antibody Combo  - T cell subset extended profile  - CBC with platelets  - HIV 1/2 Supplemental Assay  - HIV-1 RNA quantitative    Review of external notes as documented elsewhere in note  40 minutes spent by me on the date of the encounter doing chart review, review of outside records, review of test results, interpretation of tests, patient visit, documentation, and discussion with other provider(s)       BMI  Estimated body mass index is 30.07 kg/m  as calculated from the following:    Height as of this encounter: 1.702 m (5' 7\").    Weight as of this encounter: 87.1 kg (192 lb).         Follow-up   No follow-ups on file.        Leatha Watkins is a 30 year old, presenting for the following health issues:  Labs Only (Would like labs to test for HIV and STDs)        6/3/2025     8:06 AM   Additional Questions   Roomed by Maday   Accompanied by gordon     History of Present Illness       Reason for visit:  Std, HIV testing. Got a letter in the mail from [facility] saying i tested positive HIV so want to make sure i do have or if its a false positive.   He is taking medications regularly.        New patient to me, has seen providers at this clinic in the past, he is accompanied by his significant other male partners([place]anna), he brought a letter from the plasma center that he had a positive HIV PCR test a few days ago.  He denies any " "ongoing symptoms.  He does mention a viral illness that required an ED visit on May 28.  On March 29 he had an STD screening done including HIV, result were negative.  He denies IV drug use but acknowledge unprotected sex with male partners.      Review of Systems  Constitutional, HEENT, cardiovascular, pulmonary, gi and gu systems are negative, except as otherwise noted.      Objective    /70   Pulse 79   Temp 98.1  F (36.7  C) (Temporal)   Resp 15   Ht 1.702 m (5' 7\")   Wt 87.1 kg (192 lb)   SpO2 98%   BMI 30.07 kg/m    Body mass index is 30.07 kg/m .  Physical Exam   GENERAL: alert and no distress  NECK: no adenopathy, no asymmetry, masses, or scars  RESP: lungs clear to auscultation - no rales, rhonchi or wheezes  CV: regular rate and rhythm, normal S1 S2, no S3 or S4, no murmur, click or rub, no peripheral edema  MS: no gross musculoskeletal defects noted, no edema    Results for orders placed or performed in visit on 06/03/25   HIV Antigen Antibody Combo     Status: Abnormal   Result Value Ref Range    HIV Antigen Antibody Combo Reactive (A) Nonreactive   T cell subset extended profile     Status: Abnormal   Result Value Ref Range    CD3% Total T Cells 65 49 - 84 %    Absolute CD3, Total T Cells 444 (L) 603 - 2,990 cells/uL    CD4% [place] T Cells 34 28 - 63 %    Absolute CD4, [place] T Cells 232 (L) 441 - 2,156 cells/uL    CD8% Suppressor T Cells 32 10 - 40 %    Absolute CD8, Suppressor T Cells 216 125 - 1,312 cells/uL    CD4:CD8 Ratio 1.08 (L) 1.40 - 2.60    CD16+CD56% Natural Killer Cells 31 (H) 4 - 25 %    Absolute CD16+CD56, Natural Killer Cells 214 95 - 640 cells/uL    CD19% B Cells 3 (L) 6 - 27 %    Absolute CD19, B Cells 22 (L) 107 - 698 cells/uL    T Cell Extended Comment       This test was developed and its performance characteristics determined by [facility]. It has not been cleared or approved by the US Food and Drug " Administration.  FDA does not require this test to go through premarket FDA review.  This test is used for clinical purposes and should not be regarded as investigational or for research.  This laboratory is certified under the Clinical Laboratory   Improvement Amendments, aka CLIA, as qualified to perform high complexity clinical laboratory testing.       CBC with platelets     Status: Abnormal   Result Value Ref Range    WBC Count 2.2 (L) 4.0 - 11.0 10e3/uL    RBC Count 5.17 4.40 - 5.90 10e6/uL    Hemoglobin 14.1 13.3 - 17.7 g/dL    Hematocrit 43.3 40.0 - 53.0 %    MCV 84 78 - 100 fL    MCH 27.3 26.5 - 33.0 pg    MCHC 32.6 31.5 - 36.5 g/dL    RDW 13.4 10.0 - 15.0 %    Platelet Count 169 150 - 450 10e3/uL   HIV 1/2 Supplemental Assay     Status: None   Result Value Ref Range    HIV 1 Result Nonreactive Nonreactive    HIV 2 Result Nonreactive Nonreactive    HIV Supplemental Interpretation HIV Antibody NEGATIVE          This note was completed in part using a voice recognition software, any grammatical or context distortion are unintentional and inherent to the software.    Signed Electronically by: Elsa Hurst MD

## 2025-06-04 ENCOUNTER — RESULTS FOLLOW-UP (OUTPATIENT)
Dept: FAMILY MEDICINE | Facility: CLINIC | Age: 30
End: 2025-06-04
Payer: COMMERCIAL

## 2025-06-04 DIAGNOSIS — Z21 NEWLY DIAGNOSED HIV-POSITIVE (H): Primary | ICD-10-CM

## 2025-06-04 LAB
CD19 CELLS # BLD: 22 CELLS/UL (ref 107–698)
CD19 CELLS NFR BLD: 3 % (ref 6–27)
CD3 CELLS # BLD: 444 CELLS/UL (ref 603–2990)
CD3 CELLS NFR BLD: 65 % (ref 49–84)
CD3+CD4+ CELLS # BLD: 232 CELLS/UL (ref 441–2156)
CD3+CD4+ CELLS NFR BLD: 34 % (ref 28–63)
CD3+CD4+ CELLS/CD3+CD8+ CLL BLD: 1.08 % (ref 1.4–2.6)
CD3+CD8+ CELLS # BLD: 216 CELLS/UL (ref 125–1312)
CD3+CD8+ CELLS NFR BLD: 32 % (ref 10–40)
CD3-CD16+CD56+ CELLS # BLD: 214 CELLS/UL (ref 95–640)
CD3-CD16+CD56+ CELLS NFR BLD: 31 % (ref 4–25)
HIV 1+2 AB+HIV1P24 AG SERPLBLD IA.RAPID: NORMAL
HIV 2 AB SERPLBLD QL IA.RAPID: NONREACTIVE
HIV1 AB SERPLBLD QL IA.RAPID: NONREACTIVE
T CELL EXTENDED COMMENT: ABNORMAL

## 2025-06-05 LAB
HIV1 RNA # PLAS NAA DL=20: ABNORMAL COPIES/ML
HIV1 RNA # PLAS NAA DL=20: ABNORMAL COPIES/ML
HIV1 RNA SERPL NAA+PROBE-LOG#: >7 {LOG_COPIES}/ML
HIV1 RNA SERPL NAA+PROBE-LOG#: >7 {LOG_COPIES}/ML

## 2025-06-05 NOTE — TELEPHONE ENCOUNTER
Dr Hurst  T cells are type of white blood cell count that help fight the virus in your body and  the virus is  trying to destroy them; it is the one we would like to see improved with treatment, hope I answered your question.  Thanks    Last read by Roland Sandoval at 2:54PM on 6/5/2025.    No further action needed    Marianna Morales RN  [facility]

## 2025-06-06 ENCOUNTER — TELEPHONE (OUTPATIENT)
Dept: INFECTIOUS DISEASES | Facility: CLINIC | Age: 30
End: 2025-06-06
Payer: COMMERCIAL

## 2025-06-06 DIAGNOSIS — Z21 HIV (HUMAN IMMUNODEFICIENCY VIRUS INFECTION) (H): Primary | ICD-10-CM

## 2025-06-06 NOTE — TELEPHONE ENCOUNTER
RN entered new HIV labs per protocol. Pt already had some done recently. Will send [employer] message to see if he can get them done this weekend.

## 2025-06-09 ENCOUNTER — LAB (OUTPATIENT)
Dept: LAB | Facility: CLINIC | Age: 30
End: 2025-06-09
Payer: COMMERCIAL

## 2025-06-09 ENCOUNTER — OFFICE VISIT (OUTPATIENT)
Dept: INFECTIOUS DISEASES | Facility: CLINIC | Age: 30
End: 2025-06-09
Attending: STUDENT IN AN ORGANIZED HEALTH CARE EDUCATION/TRAINING PROGRAM
Payer: COMMERCIAL

## 2025-06-09 VITALS
DIASTOLIC BLOOD PRESSURE: 77 MMHG | WEIGHT: 198.8 LBS | OXYGEN SATURATION: 99 % | BODY MASS INDEX: 31.2 KG/M2 | SYSTOLIC BLOOD PRESSURE: 119 MMHG | TEMPERATURE: 98.6 F | HEART RATE: 71 BPM | HEIGHT: 67 IN

## 2025-06-09 DIAGNOSIS — B20 HUMAN IMMUNODEFICIENCY VIRUS (HIV) DISEASE (H): Primary | ICD-10-CM

## 2025-06-09 DIAGNOSIS — Z11.3 ROUTINE SCREENING FOR STI (SEXUALLY TRANSMITTED INFECTION): ICD-10-CM

## 2025-06-09 DIAGNOSIS — B20 HUMAN IMMUNODEFICIENCY VIRUS (HIV) DISEASE (H): ICD-10-CM

## 2025-06-09 DIAGNOSIS — Z21 HIV (HUMAN IMMUNODEFICIENCY VIRUS INFECTION) (H): ICD-10-CM

## 2025-06-09 LAB
HAV AB SER QL IA: REACTIVE
HAV IGM SERPL QL IA: NONREACTIVE
HBV CORE AB SERPL QL IA: NONREACTIVE
HBV SURFACE AB SERPL IA-ACNC: 46.6 M[IU]/ML
HBV SURFACE AB SERPL IA-ACNC: REACTIVE M[IU]/ML
HBV SURFACE AG SERPL QL IA: NONREACTIVE
HCV AB SERPL QL IA: NONREACTIVE
LAB ORDER RESULT STATUS: NORMAL
Lab: NORMAL
PERFORMING LABORATORY: NORMAL
TEST NAME: NORMAL

## 2025-06-09 PROCEDURE — 1126F AMNT PAIN NOTED NONE PRSNT: CPT

## 2025-06-09 PROCEDURE — 86708 HEPATITIS A ANTIBODY: CPT | Performed by: STUDENT IN AN ORGANIZED HEALTH CARE EDUCATION/TRAINING PROGRAM

## 2025-06-09 PROCEDURE — 86481 TB AG RESPONSE T-CELL SUSP: CPT | Performed by: STUDENT IN AN ORGANIZED HEALTH CARE EDUCATION/TRAINING PROGRAM

## 2025-06-09 PROCEDURE — 86709 HEPATITIS A IGM ANTIBODY: CPT | Performed by: STUDENT IN AN ORGANIZED HEALTH CARE EDUCATION/TRAINING PROGRAM

## 2025-06-09 PROCEDURE — 86762 RUBELLA ANTIBODY: CPT | Performed by: STUDENT IN AN ORGANIZED HEALTH CARE EDUCATION/TRAINING PROGRAM

## 2025-06-09 PROCEDURE — 87340 HEPATITIS B SURFACE AG IA: CPT | Performed by: STUDENT IN AN ORGANIZED HEALTH CARE EDUCATION/TRAINING PROGRAM

## 2025-06-09 PROCEDURE — 86735 MUMPS ANTIBODY: CPT | Performed by: STUDENT IN AN ORGANIZED HEALTH CARE EDUCATION/TRAINING PROGRAM

## 2025-06-09 PROCEDURE — 87591 N.GONORRHOEAE DNA AMP PROB: CPT | Performed by: STUDENT IN AN ORGANIZED HEALTH CARE EDUCATION/TRAINING PROGRAM

## 2025-06-09 PROCEDURE — 86780 TREPONEMA PALLIDUM: CPT | Performed by: STUDENT IN AN ORGANIZED HEALTH CARE EDUCATION/TRAINING PROGRAM

## 2025-06-09 PROCEDURE — 86803 HEPATITIS C AB TEST: CPT | Performed by: STUDENT IN AN ORGANIZED HEALTH CARE EDUCATION/TRAINING PROGRAM

## 2025-06-09 PROCEDURE — 87491 CHLMYD TRACH DNA AMP PROBE: CPT

## 2025-06-09 PROCEDURE — 86644 CMV ANTIBODY: CPT | Performed by: STUDENT IN AN ORGANIZED HEALTH CARE EDUCATION/TRAINING PROGRAM

## 2025-06-09 PROCEDURE — G2211 COMPLEX E/M VISIT ADD ON: HCPCS

## 2025-06-09 PROCEDURE — 99205 OFFICE O/P NEW HI 60 MIN: CPT

## 2025-06-09 PROCEDURE — 87591 N.GONORRHOEAE DNA AMP PROB: CPT

## 2025-06-09 PROCEDURE — 86765 RUBEOLA ANTIBODY: CPT | Performed by: STUDENT IN AN ORGANIZED HEALTH CARE EDUCATION/TRAINING PROGRAM

## 2025-06-09 PROCEDURE — 99000 SPECIMEN HANDLING OFFICE-LAB: CPT | Performed by: PATHOLOGY

## 2025-06-09 PROCEDURE — G0463 HOSPITAL OUTPT CLINIC VISIT: HCPCS

## 2025-06-09 PROCEDURE — 87491 CHLMYD TRACH DNA AMP PROBE: CPT | Performed by: STUDENT IN AN ORGANIZED HEALTH CARE EDUCATION/TRAINING PROGRAM

## 2025-06-09 PROCEDURE — 3078F DIAST BP <80 MM HG: CPT

## 2025-06-09 PROCEDURE — 3074F SYST BP LT 130 MM HG: CPT

## 2025-06-09 PROCEDURE — 86777 TOXOPLASMA ANTIBODY: CPT | Performed by: STUDENT IN AN ORGANIZED HEALTH CARE EDUCATION/TRAINING PROGRAM

## 2025-06-09 PROCEDURE — 86704 HEP B CORE ANTIBODY TOTAL: CPT | Performed by: STUDENT IN AN ORGANIZED HEALTH CARE EDUCATION/TRAINING PROGRAM

## 2025-06-09 PROCEDURE — 86706 HEP B SURFACE ANTIBODY: CPT | Performed by: STUDENT IN AN ORGANIZED HEALTH CARE EDUCATION/TRAINING PROGRAM

## 2025-06-09 ASSESSMENT — PAIN SCALES - GENERAL: PAINLEVEL_OUTOF10: NO PAIN (0)

## 2025-06-09 NOTE — LETTER
6/9/2025       RE: Roland Sandoval  [address]  [place] [address]     Dear Colleague,    Thank you for referring your patient, Roland Sandoval, to the [facility] [place] at [facility]. Please see a copy of my visit note below.    [facility] - HIV Care  Patient:  Roland Sandoval, Date of birth 1995, Medical record number [id number]  Date of Visit:  06/09/2025         Assessment and Recommendations:     Assessment:  Roland is a 31 y/o M here to establish care for recent diagnosis of HIV. CD4 232, VL >100,000,000    Roland and partner already have a good understanding of HIV.  Ag/ab positive, confirmatory test neg but viral load positive.     We used this first visit to establish care, discuss what to expect in terms of treatment options, prognosis, treatment goals, visit frequency, U=U, CD4 and VL. We will plan to start Biktarvy today. We will discuss vaccine and preventive aspects at future visits once we have ensured adequate persistent immune status.  CD4>200 so will not need PJP prophylaxis. STI screens done today. Chencho is still processing diagnosis, we will plan to repeat discussions as needed. He has a great support system in his partner and friends and has a therapist he plans to speak with soon.    Answered Chencho's questions re fertility(diagnosis does not preclude being a sperm donor per [place] guidelines if wel cotnrolled, [place] guidelines less clear but would be based on fertility clinic protocols) and HIV RNA.     Recommendations:  - Labs today - reviewed labs ordered in chart per staff protocol prior to visit, added additional labs to complete initial eval of HIV per IDSA guidelines. Reviewed labs from ER visit  - Start Biktarvy daily after labs are drawn  - STI screening swabs done in clinic today for chlamydia, gonorrhea  - Follow up in  around 5-6 weeks, with labs right before to monitor CD4 and viral load   - We will discuss vaccines at future visits    I have reviewed vitals, labs, images, cultures and antibiotics    Total time on day of visit including chart review, visit, counseling, documentation and coordination of care: 70 minutes      Elise Stinson  Staff Physician  Division of Infectious Diseases     [facility]              History of Infectious Disease Illness:     Pt is a 31 y/o M here to establish care for recent diagnosis of HIV on 6/3. Recent ER visit with body aches and headache reviewed.    Long term partner accompanies. Partner is on PrEP and has been screened negative same day. They have been together for many years and are planning a wedding In [place] soon.     Last neg test in 3/2025. Has had new partners since. Amenable to STI screens.     He lives in [place]. Originally from [place], been in [place] since 2016. Chencho is still processing, but has a friend who takes Biktarvy, who he discussed with and obtained a good understanding of particulars from.     No cough, SOB, chest pain. Has been feeling unwell lately..           HIV History:   Date of Diagnosis: 6/3/2025  Approximated time of transmission: between 3/2025 and 6/2025 likely  CD4 Alessandro: 232  Viral Load at Diagnosis:VL >100,000,000  Risk Factors: unprotected sexual contact, Male partners  Opportunistic Infections: None  Historical use of ARVs: Unknown    To get labs on:  CMV Status:  Toxo Status:  Tuberculosis Screening:  Historical Resistance Mutations:        Past Medical and Surgical History:   No past medical history on file.    No past surgical history on file.        Family History:     Family History   Problem Relation Age of Onset     Anxiety Disorder Mother      Hypertension Father            Social History:     Social History     Tobacco Use     Smoking status: Never     Passive exposure: Past     Smokeless tobacco: Never  "  Vaping Use     Vaping status: Never Used   Substance Use Topics     Alcohol use: No     Drug use: No     Social History     Social History Narrative     Not on file            Current Medications:     Current Outpatient Medications   Medication Sig Dispense Refill     sertraline (ZOLOFT) 25 MG tablet Take 1 tablet (25 mg) by mouth daily. 90 tablet 2            Immunization History:     Immunization History   Administered Date(s) Administered     COVID-19 12+ (Pfizer) 04/30/2025     COVID-19 MONOVALENT 12+ (Pfizer) 08/30/2021, 09/20/2021     COVID-19 Monovalent 12+ (Pfizer 2022) 05/23/2022     HPV9 (Gardasil) 05/23/2022, 04/30/2025     Hepatitis B, Adult (Energix-B/Recombivax HB) 11/10/2021, 04/30/2025     TDAP (Adacel,Boostrix) 12/05/2017            Allergies:   No Known Allergies         Physical Exam:   Vital signs:  /77 (BP Location: Right arm, Patient Position: Sitting)   Pulse 71   Temp 98.6  F (37  C) (Oral)   Ht 1.702 m (5' 7\")   Wt 90.2 kg (198 lb 12.8 oz)   SpO2 99%   BMI 31.14 kg/m      Physical Examination:  GENERAL:  well-developed, well-nourished, seated in no acute distress.  HEENT:  Head is normocephalic, atraumatic   EYES:  Eyes have anicteric sclerae without conjunctival injection   ENT:  Oropharynx is moist without exudates or ulcers. Tongue is midline  LUNGS: Breathing comfortably  SKIN:  No acute rashes.    NEUROLOGIC:  Grossly nonfocal. Active x4 extremities         Laboratory Data:     CD4 Count  Absolute CD4, [place] T Cells   Date Value Ref Range Status   06/03/2025 232 (L) 441 - 2,156 cells/uL Final     CD4% [place] T Cells   Date Value Ref Range Status   06/03/2025 34 28 - 63 % Final     CD4:CD8 Ratio   Date Value Ref Range Status   06/03/2025 1.08 (L) 1.40 - 2.60 Final       HIV-1 RNA Quantitative:  HIV-1 RNA copies/mL   Date Value Ref Range Status   06/03/2025 >10,000,000 (A) Not Detected Copies/mL Final   06/03/2025 >10,000,000 (A) Not Detected Copies/mL Final       Metabolic " Studies       Sodium   Date Value Ref Range Status   05/28/2025 140 135 - 145 mmol/L Final   09/26/2023 139 136 - 145 mmol/L Final     Potassium   Date Value Ref Range Status   05/28/2025 4.0 3.4 - 5.3 mmol/L Final   09/26/2023 4.4 3.4 - 5.3 mmol/L Final   11/10/2021 4.1 3.5 - 5.0 mmol/L Final   09/16/2019 3.9 3.5 - 5.0 mmol/L Final     Chloride   Date Value Ref Range Status   05/28/2025 104 98 - 107 mmol/L Final   09/26/2023 105 98 - 107 mmol/L Final   11/10/2021 106 98 - 107 mmol/L Final   09/16/2019 108 (H) 98 - 107 mmol/L Final     Carbon Dioxide (CO2)   Date Value Ref Range Status   05/28/2025 26 22 - 29 mmol/L Final   09/26/2023 27 22 - 29 mmol/L Final   11/10/2021 23 22 - 31 mmol/L Final   09/16/2019 23 22 - 31 mmol/L Final     Anion Gap   Date Value Ref Range Status   05/28/2025 10 7 - 15 mmol/L Final   09/26/2023 7 7 - 15 mmol/L Final   11/10/2021 12 5 - 18 mmol/L Final   09/16/2019 13 5 - 18 mmol/L Final     Urea Nitrogen   Date Value Ref Range Status   05/28/2025 10.5 6.0 - 20.0 mg/dL Final   09/26/2023 15.1 6.0 - 20.0 mg/dL Final   11/10/2021 17 8 - 22 mg/dL Final   09/16/2019 15 8 - 22 mg/dL Final     Creatinine   Date Value Ref Range Status   05/28/2025 1.26 (H) 0.67 - 1.17 mg/dL Final   09/26/2023 1.04 0.67 - 1.17 mg/dL Final     GFR Estimate   Date Value Ref Range Status   05/28/2025 79 >60 mL/min/1.73m2 Final     Comment:     eGFR calculated using 2021 CKD-EPI equation.   09/26/2023 >90 >60 mL/min/1.73m2 Final   09/16/2019 >60 >60 mL/min/1.73m2 Final     Glucose   Date Value Ref Range Status   05/28/2025 90 70 - 99 mg/dL Final   04/30/2025 83 70 - 99 mg/dL Final   11/10/2021 81 70 - 125 mg/dL Final   09/16/2019 78 70 - 125 mg/dL Final     Calcium   Date Value Ref Range Status   05/28/2025 9.1 8.8 - 10.4 mg/dL Final   09/26/2023 8.9 8.6 - 10.0 mg/dL Final     Magnesium   Date Value Ref Range Status   05/28/2025 1.8 1.7 - 2.3 mg/dL Final       Hepatic Studies    Bilirubin Total   Date Value Ref Range  Status   05/28/2025 0.4 <=1.2 mg/dL Final   09/26/2023 0.3 <=1.2 mg/dL Final     Alkaline Phosphatase   Date Value Ref Range Status   05/28/2025 77 40 - 150 U/L Final   09/26/2023 77 40 - 129 U/L Final     Albumin   Date Value Ref Range Status   05/28/2025 3.9 3.5 - 5.2 g/dL Final   09/26/2023 3.8 3.5 - 5.2 g/dL Final   11/10/2021 4.3 3.5 - 5.0 g/dL Final   09/16/2019 3.1 (L) 3.5 - 5.0 g/dL Final     AST   Date Value Ref Range Status   05/28/2025 30 0 - 45 U/L Final   09/26/2023 23 0 - 45 U/L Final     Comment:     Specimen is hemolyzed which can falsely elevate AST. Analysis of a non-hemolyzed specimen may result in a lower value.  Reference intervals for this test were updated on 6/12/2023 to more accurately reflect our healthy population. There may be differences in the flagging of prior results with similar values performed with this method. Interpretation of those prior results can be made in the context of the updated reference intervals.     ALT   Date Value Ref Range Status   05/28/2025 29 0 - 70 U/L Final   09/26/2023 22 0 - 70 U/L Final     Comment:     Reference intervals for this test were updated on 6/12/2023 to more accurately reflect our healthy population. There may be differences in the flagging of prior results with similar values performed with this method. Interpretation of those prior results can be made in the context of the updated reference intervals.         Hematology Studies      WBC Count   Date Value Ref Range Status   06/03/2025 2.2 (L) 4.0 - 11.0 10e3/uL Final   05/28/2025 3.7 (L) 4.0 - 11.0 10e3/uL Final     Absolute Neutrophils   Date Value Ref Range Status   05/28/2025 2.1 1.6 - 8.3 10e3/uL Final     Absolute Lymphocytes   Date Value Ref Range Status   05/28/2025 1.0 0.8 - 5.3 10e3/uL Final     Absolute Monocytes   Date Value Ref Range Status   05/28/2025 0.5 0.0 - 1.3 10e3/uL Final     Absolute Eosinophils   Date Value Ref Range Status   05/28/2025 0.0 0.0 - 0.7 10e3/uL Final      Hemoglobin   Date Value Ref Range Status   06/03/2025 14.1 13.3 - 17.7 g/dL Final   05/28/2025 15.1 13.3 - 17.7 g/dL Final     Hematocrit   Date Value Ref Range Status   06/03/2025 43.3 40.0 - 53.0 % Final   05/28/2025 47.3 40.0 - 53.0 % Final     Platelet Count   Date Value Ref Range Status   06/03/2025 169 150 - 450 10e3/uL Final   05/28/2025 177 150 - 450 10e3/uL Final       Hepatitis B Testing     Recent Labs   Lab Test 11/10/21  1505 09/16/19  1017   [facility] Nonreactive Negative       Hepatitis C Testing     Hepatitis C Antibody   Date Value Ref Range Status   11/10/2021 Negative Negative Final   09/16/2019 Negative Negative Final             Again, thank you for allowing me to participate in the care of your patient.      Sincerely,    [facility]

## 2025-06-09 NOTE — PROGRESS NOTES
[facility] - HIV Care  Patient:  Roland Sandoval, Date of birth 1995, Medical record number [id number]  Date of Visit:  06/09/2025         Assessment and Recommendations:     Assessment:  Roland is a 29 y/o M here to establish care for recent diagnosis of HIV. CD4 232, VL >100,000,000    Roland and partner already have a good understanding of HIV.  Ag/ab positive, confirmatory test neg but viral load positive.     We used this first visit to establish care, discuss what to expect in terms of treatment options, prognosis, treatment goals, visit frequency, U=U, CD4 and VL. We will plan to start Biktarvy today. We will discuss vaccine and preventive aspects at future visits once we have ensured adequate persistent immune status.  CD4>200 so will not need PJP prophylaxis. STI screens done today. Chencho is still processing diagnosis, we will plan to repeat discussions as needed. He has a great support system in his partner and friends and has a therapist he plans to speak with soon.    Answered Chencho's questions re fertility(diagnosis does not preclude being a sperm donor per [place] guidelines if wel cotnrolled, [place] guidelines less clear but would be based on fertility clinic protocols) and HIV RNA.     Recommendations:  - Labs today - reviewed labs ordered in chart per staff protocol prior to visit, added additional labs to complete initial eval of HIV per IDSA guidelines. Reviewed labs from ER visit  - Start Biktarvy daily after labs are drawn  - STI screening swabs done in clinic today for chlamydia, gonorrhea  - Follow up in around 5-6 weeks, with labs right before to monitor CD4 and viral load   - We will discuss vaccines at future visits    I have reviewed vitals, labs, images, cultures and antibiotics    Total time on day of visit including chart review, visit, counseling, documentation and coordination of care: 70 minutes      Elise New  Milad  Staff Physician  Division of Infectious Diseases     [facility]              History of Infectious Disease Illness:     Pt is a 31 y/o M here to establish care for recent diagnosis of HIV on 6/3. Recent ER visit with body aches and headache reviewed.    Long term partner accompanies. Partner is on PrEP and has been screened negative same day. They have been together for many years and are planning a wedding In [place] soon.     Last neg test in 3/2025. Has had new partners since. Amenable to STI screens.     He lives in [place]. Originally from [place], been in [place] since 2016. Chencho is still processing, but has a friend who takes Biktarvy, who he discussed with and obtained a good understanding of particulars from.     No cough, SOB, chest pain. Has been feeling unwell lately..           HIV History:   Date of Diagnosis: 6/3/2025  Approximated time of transmission: between 3/2025 and 6/2025 likely  CD4 Alessandro: 232  Viral Load at Diagnosis:VL >100,000,000  Risk Factors: unprotected sexual contact, Male partners  Opportunistic Infections: None  Historical use of ARVs: Unknown    To get labs on:  CMV Status:  Toxo Status:  Tuberculosis Screening:  Historical Resistance Mutations:        Past Medical and Surgical History:   No past medical history on file.    No past surgical history on file.        Family History:     Family History   Problem Relation Age of Onset    Anxiety Disorder Mother     Hypertension Father            Social History:     Social History     Tobacco Use    Smoking status: Never     Passive exposure: Past    Smokeless tobacco: Never   Vaping Use    Vaping status: Never Used   Substance Use Topics    Alcohol use: No    Drug use: No     Social History     Social History Narrative    Not on file            Current Medications:     Current Outpatient Medications   Medication Sig Dispense Refill    sertraline (ZOLOFT) 25 MG tablet Take 1 tablet (25 mg) by  "mouth daily. 90 tablet 2            Immunization History:     Immunization History   Administered Date(s) Administered    COVID-19 12+ (Pfizer) 04/30/2025    COVID-19 MONOVALENT 12+ (Pfizer) 08/30/2021, 09/20/2021    COVID-19 Monovalent 12+ (Pfizer 2022) 05/23/2022    HPV9 (Gardasil) 05/23/2022, 04/30/2025    Hepatitis B, Adult (Energix-B/Recombivax HB) 11/10/2021, 04/30/2025    TDAP (Adacel,Boostrix) 12/05/2017            Allergies:   No Known Allergies         Physical Exam:   Vital signs:  /77 (BP Location: Right arm, Patient Position: Sitting)   Pulse 71   Temp 98.6  F (37  C) (Oral)   Ht 1.702 m (5' 7\")   Wt 90.2 kg (198 lb 12.8 oz)   SpO2 99%   BMI 31.14 kg/m      Physical Examination:  GENERAL:  well-developed, well-nourished, seated in no acute distress.  HEENT:  Head is normocephalic, atraumatic   EYES:  Eyes have anicteric sclerae without conjunctival injection   ENT:  Oropharynx is moist without exudates or ulcers. Tongue is midline  LUNGS: Breathing comfortably  SKIN:  No acute rashes.    NEUROLOGIC:  Grossly nonfocal. Active x4 extremities         Laboratory Data:     CD4 Count  Absolute CD4, [place] T Cells   Date Value Ref Range Status   06/03/2025 232 (L) 441 - 2,156 cells/uL Final     CD4% [place] T Cells   Date Value Ref Range Status   06/03/2025 34 28 - 63 % Final     CD4:CD8 Ratio   Date Value Ref Range Status   06/03/2025 1.08 (L) 1.40 - 2.60 Final       HIV-1 RNA Quantitative:  HIV-1 RNA copies/mL   Date Value Ref Range Status   06/03/2025 >10,000,000 (A) Not Detected Copies/mL Final   06/03/2025 >10,000,000 (A) Not Detected Copies/mL Final       Metabolic Studies       Sodium   Date Value Ref Range Status   05/28/2025 140 135 - 145 mmol/L Final   09/26/2023 139 136 - 145 mmol/L Final     Potassium   Date Value Ref Range Status   05/28/2025 4.0 3.4 - 5.3 mmol/L Final   09/26/2023 4.4 3.4 - 5.3 mmol/L Final   11/10/2021 4.1 3.5 - 5.0 mmol/L Final   09/16/2019 3.9 3.5 - 5.0 mmol/L Final "     Chloride   Date Value Ref Range Status   05/28/2025 104 98 - 107 mmol/L Final   09/26/2023 105 98 - 107 mmol/L Final   11/10/2021 106 98 - 107 mmol/L Final   09/16/2019 108 (H) 98 - 107 mmol/L Final     Carbon Dioxide (CO2)   Date Value Ref Range Status   05/28/2025 26 22 - 29 mmol/L Final   09/26/2023 27 22 - 29 mmol/L Final   11/10/2021 23 22 - 31 mmol/L Final   09/16/2019 23 22 - 31 mmol/L Final     Anion Gap   Date Value Ref Range Status   05/28/2025 10 7 - 15 mmol/L Final   09/26/2023 7 7 - 15 mmol/L Final   11/10/2021 12 5 - 18 mmol/L Final   09/16/2019 13 5 - 18 mmol/L Final     Urea Nitrogen   Date Value Ref Range Status   05/28/2025 10.5 6.0 - 20.0 mg/dL Final   09/26/2023 15.1 6.0 - 20.0 mg/dL Final   11/10/2021 17 8 - 22 mg/dL Final   09/16/2019 15 8 - 22 mg/dL Final     Creatinine   Date Value Ref Range Status   05/28/2025 1.26 (H) 0.67 - 1.17 mg/dL Final   09/26/2023 1.04 0.67 - 1.17 mg/dL Final     GFR Estimate   Date Value Ref Range Status   05/28/2025 79 >60 mL/min/1.73m2 Final     Comment:     eGFR calculated using 2021 CKD-EPI equation.   09/26/2023 >90 >60 mL/min/1.73m2 Final   09/16/2019 >60 >60 mL/min/1.73m2 Final     Glucose   Date Value Ref Range Status   05/28/2025 90 70 - 99 mg/dL Final   04/30/2025 83 70 - 99 mg/dL Final   11/10/2021 81 70 - 125 mg/dL Final   09/16/2019 78 70 - 125 mg/dL Final     Calcium   Date Value Ref Range Status   05/28/2025 9.1 8.8 - 10.4 mg/dL Final   09/26/2023 8.9 8.6 - 10.0 mg/dL Final     Magnesium   Date Value Ref Range Status   05/28/2025 1.8 1.7 - 2.3 mg/dL Final       Hepatic Studies    Bilirubin Total   Date Value Ref Range Status   05/28/2025 0.4 <=1.2 mg/dL Final   09/26/2023 0.3 <=1.2 mg/dL Final     Alkaline Phosphatase   Date Value Ref Range Status   05/28/2025 77 40 - 150 U/L Final   09/26/2023 77 40 - 129 U/L Final     Albumin   Date Value Ref Range Status   05/28/2025 3.9 3.5 - 5.2 g/dL Final   09/26/2023 3.8 3.5 - 5.2 g/dL Final   11/10/2021  4.3 3.5 - 5.0 g/dL Final   09/16/2019 3.1 (L) 3.5 - 5.0 g/dL Final     AST   Date Value Ref Range Status   05/28/2025 30 0 - 45 U/L Final   09/26/2023 23 0 - 45 U/L Final     Comment:     Specimen is hemolyzed which can falsely elevate AST. Analysis of a non-hemolyzed specimen may result in a lower value.  Reference intervals for this test were updated on 6/12/2023 to more accurately reflect our healthy population. There may be differences in the flagging of prior results with similar values performed with this method. Interpretation of those prior results can be made in the context of the updated reference intervals.     ALT   Date Value Ref Range Status   05/28/2025 29 0 - 70 U/L Final   09/26/2023 22 0 - 70 U/L Final     Comment:     Reference intervals for this test were updated on 6/12/2023 to more accurately reflect our healthy population. There may be differences in the flagging of prior results with similar values performed with this method. Interpretation of those prior results can be made in the context of the updated reference intervals.         Hematology Studies      WBC Count   Date Value Ref Range Status   06/03/2025 2.2 (L) 4.0 - 11.0 10e3/uL Final   05/28/2025 3.7 (L) 4.0 - 11.0 10e3/uL Final     Absolute Neutrophils   Date Value Ref Range Status   05/28/2025 2.1 1.6 - 8.3 10e3/uL Final     Absolute Lymphocytes   Date Value Ref Range Status   05/28/2025 1.0 0.8 - 5.3 10e3/uL Final     Absolute Monocytes   Date Value Ref Range Status   05/28/2025 0.5 0.0 - 1.3 10e3/uL Final     Absolute Eosinophils   Date Value Ref Range Status   05/28/2025 0.0 0.0 - 0.7 10e3/uL Final     Hemoglobin   Date Value Ref Range Status   06/03/2025 14.1 13.3 - 17.7 g/dL Final   05/28/2025 15.1 13.3 - 17.7 g/dL Final     Hematocrit   Date Value Ref Range Status   06/03/2025 43.3 40.0 - 53.0 % Final   05/28/2025 47.3 40.0 - 53.0 % Final     Platelet Count   Date Value Ref Range Status   06/03/2025 169 150 - 450 10e3/uL Final    05/28/2025 177 150 - 450 10e3/uL Final       Hepatitis B Testing     Recent Labs   Lab Test 11/10/21  1505 09/16/19  1017   [facility] Nonreactive Negative       Hepatitis C Testing     Hepatitis C Antibody   Date Value Ref Range Status   11/10/2021 Negative Negative Final   09/16/2019 Negative Negative Final

## 2025-06-09 NOTE — PATIENT INSTRUCTIONS
- Labs today  - Start Biktarvy daily after labs are drawn  - STI screening swabs done in clinic today  - Follow up in around 5-6 weeks, with labs right before to monitor CD4(absolute CD4) and viral load (HIV RNA)  - We will discuss vaccines at future

## 2025-06-09 NOTE — NURSING NOTE
"Chief Complaint   Patient presents with    Consult     Complete RM   B20     /77 (BP Location: Right arm, Patient Position: Sitting)   Pulse 71   Temp 98.6  F (37  C) (Oral)   Ht 1.702 m (5' 7\")   Wt 90.2 kg (198 lb 12.8 oz)   SpO2 99%   BMI 31.14 kg/m    Franny Gómez CMA on 6/9/2025 at 1:22 PM    " detailed exam detailed exam detailed exam detailed exam detailed exam detailed exam detailed exam detailed exam detailed exam

## 2025-06-10 LAB
C TRACH DNA SPEC QL NAA+PROBE: NEGATIVE
CMV IGG SERPL IA-ACNC: 3.8 U/ML
CMV IGG SERPL IA-ACNC: ABNORMAL
MEV IGG SER IA-ACNC: 267 AU/ML
MEV IGG SER IA-ACNC: POSITIVE
MUMPS ANTIBODY IGG INSTRUMENT VALUE: <5 AU/ML
MUV IGG SER QL IA: NORMAL
N GONORRHOEA DNA SPEC QL NAA+PROBE: NEGATIVE
RUBV IGG SERPL QL IA: 1.01 INDEX
RUBV IGG SERPL QL IA: POSITIVE
SPECIMEN TYPE: NORMAL
T GONDII IGG SER QL: <3 IU/ML (ref 0–7.1)
T PALLIDUM AB SER QL: NONREACTIVE

## 2025-06-11 LAB
GAMMA INTERFERON BACKGROUND BLD IA-ACNC: 0.13 IU/ML
M TB IFN-G BLD-IMP: NEGATIVE
M TB IFN-G CD4+ BCKGRND COR BLD-ACNC: 7.24 IU/ML
MITOGEN IGNF BCKGRD COR BLD-ACNC: 0 IU/ML
MITOGEN IGNF BCKGRD COR BLD-ACNC: 0 IU/ML
QUANTIFERON MITOGEN: 7.37 IU/ML
QUANTIFERON NIL TUBE: 0.13 IU/ML
QUANTIFERON TB1 TUBE: 0.13 IU/ML
QUANTIFERON TB2 TUBE: 0.13

## 2025-06-17 ENCOUNTER — RESULTS FOLLOW-UP (OUTPATIENT)
Dept: INFECTIOUS DISEASES | Facility: CLINIC | Age: 30
End: 2025-06-17
Payer: COMMERCIAL

## 2025-06-25 ENCOUNTER — VIRTUAL VISIT (OUTPATIENT)
Dept: PSYCHOLOGY | Facility: CLINIC | Age: 30
End: 2025-06-25
Payer: COMMERCIAL

## 2025-06-25 DIAGNOSIS — F41.1 GAD (GENERALIZED ANXIETY DISORDER): Primary | ICD-10-CM

## 2025-06-25 DIAGNOSIS — F33.0 MILD EPISODE OF RECURRENT MAJOR DEPRESSIVE DISORDER: ICD-10-CM

## 2025-06-25 PROCEDURE — 90837 PSYTX W PT 60 MINUTES: CPT | Mod: 95 | Performed by: COUNSELOR

## 2025-06-25 ASSESSMENT — PATIENT HEALTH QUESTIONNAIRE - PHQ9
SUM OF ALL RESPONSES TO PHQ QUESTIONS 1-9: 5
10. IF YOU CHECKED OFF ANY PROBLEMS, HOW DIFFICULT HAVE THESE PROBLEMS MADE IT FOR YOU TO DO YOUR WORK, TAKE CARE OF THINGS AT HOME, OR GET ALONG WITH OTHER PEOPLE: SOMEWHAT DIFFICULT
SUM OF ALL RESPONSES TO PHQ QUESTIONS 1-9: 5

## 2025-06-25 NOTE — PROGRESS NOTES
[facility]                                     Progress Note    Patient Name: Roland Sandoval  Date: 6/25/25       Service Type: Individual      Session Start Time: 4:03 pm      Session End Time: 4:56 pm     Session Length: 53 minutes    Session #: 16    Attendees: Client attended alone    Service Modality:   Video Visit:      Provider verified identity through the following two step process.  Patient provided:  Patient is known previously to provider    Telemedicine Visit: The patient's condition can be safely assessed and treated via synchronous audio and visual telemedicine encounter.      Reason for Telemedicine Visit: Services only offered telehealth    Originating Site (Patient Location): Patient's home    Distant Site (Provider Location): Provider Remote Setting- Home Office    Consent:  The patient/guardian has verbally consented to: the potential risks and benefits of telemedicine (video visit) versus in person care; bill my insurance or make self-payment for services provided; and responsibility for payment of non-covered services.     Patient would like the video invitation sent by:  My Chart    Mode of Communication:  Video Conference via [employer]well    Distant Location (Provider):  Off-site    As the provider I attest to compliance with applicable laws and regulations related to telemedicine.    DATA  Interactive Complexity: No  Crisis: No   Extended Session (53+ minutes): No     Progress Since Last Session (Related to Symptoms / Goals / Homework):   Symptoms: Worsening pt has been feeling more depressed and worrisome    Homework: Partially completed      Episode of Care Goals: Satisfactory progress - ACTION (Actively working towards change); Intervened by reinforcing change plan / affirming steps taken.       Current / Ongoing Stressors and Concerns:    Pt is currently seeking therapy due to challenges with navigating finding an accepting community to connect with here in [place] and  "cope with recent political development nationally. With the help of therapy pt has found some success in fostering a local community of support. Pt continues to experience a fair amount of anxiety related to the political climate nationally and worries how those could impact him as a minority. Since last session, pt did propose to his boyfriend, which he accepted which was exciting, and pt is feeling happy about. Pt was able to reach his hours at work as well so he should be able to get his contract hours, so he will be returning next year but might be in a different location because his overall fidelity was low. Pt is expecting to find out the details for next year in September and his school year is now over. Pt has started applying for summer jobs, which has been a bit stressful, and yet he Is still doing it. Pt's fiance has started a new job and is really liking it, so that is helping with the stress levels. Pt processed through having to go to the hospital and learning that he has contracted HIV. Pt has been feeling more depressed since getting the diagnosis and noticed that he has been feeling more hopeless. Pt has been trying to \"mask\" so he is still being social and active, but really doesn't want to be. Pt acknowledged that he initially was definitely depressed and now feels more like he is still in some short of shock about it, not sure what moving forward looks like. Pt reflected on how he is still coming to terms with it and recognizes that he is holding a lot of anger and sadness around feeling betrayed. Pt also feels somewhat physically disgusted with himself due to complexities of being a black quick man who has contracted HIV, even if he knows this isn't fair to himself. Pt does have follow-up in July to get his blood work done to see where his HIV is at and has already started medication for it. Pt's biggest worry is that the meds won't work and yet admitted that he has internal tension around hoping " they don't work. Pt verbalized that this internal tension probably comes from some lingering internalized-homophobia related to his upbringing and the messaging he got growing up about quick people and AIDS. Pt briefly explored how he can reframe HIV to be more similar to other chronic health conditions people can develop to help reduce the associated stigma of it, which pt felt was helpful. Session went longer then anticipated due to presenting concerns requiring more time to process then expected.      Treatment Objective(s) Addressed in This Session:   identify 3 fears, thoughts, or stressors which contribute to feelings of anxiety, use thought-stopping strategy daily to reduce intrusive thoughts, Decrease frequency and intensity of feeling down, depressed, hopeless  Improve concentration, focus, and mindfulness in daily activities , identify at least 3 calming thoughts to use when anxious     Intervention:   CBT: Reviewed how he can challenge his internal-narrative around his MH symptoms    Motivational Interviewing    MI Intervention: Expressed Empathy/Understanding, Supported Autonomy, Collaboration, Evocation, Open-ended questions, Reflections: simple and complex, Change talk (evoked), and Reframe     Change Talk Expressed by the Patient: Desire to change Ability to change Reasons to change Need to change Committment to change Activation Taking steps    Provider Response to Change Talk: E - Evoked more info from patient about behavior change, A - Affirmed patient's thoughts, decisions, or attempts at behavior change, R - Reflected patient's change talk, and S - Summarized patient's change talk statements    Psychodynamic: Processed through internal experiences related to anxiety and trust/social connection  Solution Focused: Identified immediate areas of concern and potential barriers to success    Safety plan: Was reviewed and reinforced    Assessments:  PHQ2:       11/10/2021     2:32 PM   PHQ-2 ( 1999  [employer])   Q1: Little interest or pleasure in doing things 0    Q2: Feeling down, depressed or hopeless 0    PHQ-2 Score 0   Q1: Little interest or pleasure in doing things Not at all   Q2: Feeling down, depressed or hopeless Not at all   PHQ-2 Score 0       Proxy-reported     PHQ9:       1/9/2025     1:38 PM 2/21/2025     9:19 AM 2/27/2025     4:34 PM 3/14/2025     3:11 PM 4/18/2025     3:56 PM 5/2/2025     4:07 PM 6/25/2025     3:42 PM   PHQ-9 SCORE   PHQ-9 Total Score MyChart 1 (Minimal depression) 2 (Minimal depression) 3 (Minimal depression) 2 (Minimal depression) 1 (Minimal depression) 0 5 (Mild depression)   PHQ-9 Total Score 1  2  3  2  1  0  5        Patient-reported     GAD2:       4/5/2024    12:11 PM 7/25/2024    10:07 AM 8/8/2024    11:54 AM 9/9/2024     4:20 AM 1/9/2025     1:38 PM 4/18/2025     3:56 PM 5/2/2025     4:07 PM   PARI-2   Feeling nervous, anxious, or on edge 0 2 0 0 1 0 0   Not being able to stop or control worrying 1 2 0 0 1 0 1   PARI-2 Total Score 1 4 0 0 2  0  1        Patient-reported     GAD7:       10/31/2023     6:05 PM 1/5/2024     1:39 PM 3/8/2024    12:33 PM 3/22/2024    12:29 PM 5/31/2024     3:47 PM 7/25/2024    10:07 AM   PARI-7 SCORE   Total Score      6 (mild anxiety)   Total Score 7 6 7 7 7 6     PROMIS 10-Global Health (only subscores and total score):       4/5/2024    12:13 PM 7/25/2024    10:08 AM 8/8/2024    11:55 AM 9/9/2024     4:33 AM 1/9/2025     1:39 PM 4/18/2025     3:58 PM 4/30/2025     4:11 PM   PROMIS-10 Scores Only   Global Mental Health Score 9 10 12 11 12  13  13    Global Physical Health Score 19 16 19 18 17  17  18    PROMIS TOTAL - SUBSCORES 28 26 31 29 29  30  31        Patient-reported     [place]ock Suicide Severity Rating Scale (Lifetime/Recent)      8/8/2024     1:59 PM 3/29/2025     1:51 PM 5/28/2025     4:38 PM   [place]ock Suicide Severity Rating (Lifetime/Recent)   Q1 Wished to be Dead (Past Month)  0-->no 0-->no   Q2 Suicidal Thoughts (Past Month)   0-->no 0-->no   Q6 Suicide Behavior (Lifetime)  0-->no 0-->no   Level of Risk per Screen  no risks indicated  no risks indicated    1. Wish to be Dead (Lifetime) N     2. Non-Specific Active Suicidal Thoughts (Lifetime) N     Actual Attempt (Lifetime) N     Has subject engaged in non-suicidal self-injurious behavior? (Lifetime) N     Interrupted Attempts (Lifetime) N     Aborted or Self-Interrupted Attempt (Lifetime) N     Preparatory Acts or Behavior (Lifetime) N     Calculated C-SSRS Risk Score (Lifetime/Recent) No Risk Indicated         Data saved with a previous flowsheet row definition       ASSESSMENT: Current Emotional / Mental Status (status of significant symptoms):   Risk status (Self / Other harm or suicidal ideation)   Patient denies current fears or concerns for personal safety.   Patient denies current or recent suicidal ideation or behaviors.   Patient denies current or recent homicidal ideation or behaviors.   Patient denies current or recent self injurious behavior or ideation.   Patient denies other safety concerns.   Patient reports there has been no change in risk factors since their last session.     Patient reports there has been no change in protective factors since their last session.     Recommended that patient call 911 or go to the local ED should there be a change in any of these risk factors     Appearance:   Appropriate    Eye Contact:   Good    Psychomotor Behavior: Normal    Attitude:   Cooperative  Interested Friendly Pleasant   Orientation:   All   Speech    Rate / Production: Normal/ Responsive Talkative    Volume:  Normal    Mood:    Anxious  Depressed  Normal   Affect:    Appropriate  Worrisome    Thought Content:  Clear    Thought Form:  Coherent  Logical    Insight:    Good , External locus, and Intellectual Insight     Medication Review:   No changes to current psychiatric medication(s)     Medication Compliance:   Yes     Changes in Health Issues:   Yes: Chronic disease  management, Associated Psychological Distress     Chemical Use Review:   Substance Use: Chemical use reviewed, no active concerns identified      Tobacco Use: No current tobacco use.      Diagnosis:  1. PARI (generalized anxiety disorder)    2. Mild episode of recurrent major depressive disorder      Collateral Reports Completed:   Not Applicable    PLAN: (Patient Tasks / Therapist Tasks / Other):  Go out and spend time with friends  Follow-up with trx team on medical concerns  Continue to apply for new jobs   Check-in with self about cognitive distortions  Try to re-framing strategies discussed in therapy    Troy Gilbert [facility]  ______________________________________________________________________    Individual Treatment Plan    Patient's Name: Roland Sandoval  YOB: 1995    Date of Creation: 11/14/2023    Date Treatment Plan Last Reviewed/Revised: 4/18/25    DSM5 Diagnoses: 296.31 (F33.0) Major Depressive Disorder, Recurrent Episode, Mild _ or 300.02 (F41.1) Generalized Anxiety Disorder  Psychosocial / Contextual Factors:  Feelings around rejection, not belonging for so long that it has been affecting his sleep and his thinking.    PROMIS (reviewed every 90 days):  PROMIS-10 Scores        1/9/2025     1:39 PM 4/18/2025     3:58 PM 4/30/2025     4:11 PM   PROMIS-10 Total Score w/o Sub Scores   PROMIS TOTAL - SUBSCORES 29  30  31        Patient-reported     Referral / Collaboration:  Referral to another professional/service is not indicated at this time..    Anticipated number of session for this episode of care: 9-12 sessions  Anticipation frequency of session: Biweekly  Anticipated Duration of each session: 38-52 minutes  Treatment plan will be reviewed in 90 days or when goals have been changed.     MeasurableTreatment Goal(s) related to diagnosis / functional impairment(s)  Goal 1: Patient will stabilize anxiety level while increasing ability to function on a daily basis.    I will know I've  met my goal when patient is able to challenge any cognitive traps as they present at least at 80 % of the time by the next review(90 days)     Objective #A (Patient Action)    Patient will use thought-stopping strategy daily to reduce intrusive thoughts.  Status: Completed - Date: 1/10/25     Intervention(s)  Therapist will teach distraction skills. Using the 5 senses.    Objective #B  Patient will identify at least 4 fears / thoughts that contribute to feeling anxious.  Status: Completed - Date: 1/10/25     Intervention(s)  Therapist will teach CBT modality and coping skills to challenge any cognitive traps such as the 3 Cs    Goal 2: Patient will alleviate depressed mood and return to previous level of effective functioning    I will know I've met my goal when my energy level have increased at least at 70 % on daily basis by the next review( 90 days)    Objective #A (Patient Action)    Status: Continued - Date(s):  4/18/25   Patient will Decrease frequency and intensity of feeling down, depressed, hopeless.  Intervention(s)  Therapist will provide educational materials on mindfulness.    Objective #B  Patient will Improve concentration, focus, and mindfulness in daily activities .    Status: Continued - Date(s):  4/18/25   Intervention(s)  Therapist will provide space to share and process thoughts and feelings related to current stressors.    Patient has reviewed and agreed to the above plan.    Originally developed by RAND Howard  June 11, 2023  Reviewed/Continued by ILYA Mcfarland  August 8, 2024

## 2025-06-26 NOTE — TELEPHONE ENCOUNTER
Writer verified pt's lab results.   Immune to hepatitis B; hepatitis C and toxoplasma negative. Labs without concerns.     Messaged pt.     Velvet Starr RN

## 2025-06-29 LAB — MISCELLANEOUS TEST 1 (ARUP): NORMAL

## 2025-07-09 ENCOUNTER — VIRTUAL VISIT (OUTPATIENT)
Dept: PSYCHIATRY | Facility: CLINIC | Age: 30
End: 2025-07-09
Payer: COMMERCIAL

## 2025-07-09 DIAGNOSIS — F32.1 CURRENT MODERATE EPISODE OF MAJOR DEPRESSIVE DISORDER, UNSPECIFIED WHETHER RECURRENT (H): Primary | ICD-10-CM

## 2025-07-09 PROCEDURE — 98006 SYNCH AUDIO-VIDEO EST MOD 30: CPT | Performed by: NURSE PRACTITIONER

## 2025-07-09 ASSESSMENT — PATIENT HEALTH QUESTIONNAIRE - PHQ9
SUM OF ALL RESPONSES TO PHQ QUESTIONS 1-9: 4
10. IF YOU CHECKED OFF ANY PROBLEMS, HOW DIFFICULT HAVE THESE PROBLEMS MADE IT FOR YOU TO DO YOUR WORK, TAKE CARE OF THINGS AT HOME, OR GET ALONG WITH OTHER PEOPLE: SOMEWHAT DIFFICULT
SUM OF ALL RESPONSES TO PHQ QUESTIONS 1-9: 4

## 2025-07-09 NOTE — NURSING NOTE
Current patient location: [address] [place] [id number]    Is the patient currently in the state of [place]? YES    Visit mode: VIDEO    If the visit is dropped, the patient can be reconnected by:VIDEO VISIT: Text to cell phone:   Telephone Information:   Mobile [phone]       Will anyone else be joining the visit? NO  (If patient encounters technical issues they should call [phone]    Are changes needed to the allergy or medication list? Pt stated no changes to allergies and Pt stated no med changes    Are refills needed on medications prescribed by this physician? Discuss with provider    Rooming Documentation:  Questionnaire(s) completed    Reason for visit: RECHAMARILIS WALKER

## 2025-07-09 NOTE — PROGRESS NOTES
"  The patient has been notified of following:      \"This virtual  visit will be conducted via a call between you and your physician/provider. We have found that certain health care needs can be provided without the need for a physical exam.  This service lets us provide the care you need virtually/via video   If a prescription is necessary we can send it directly to your pharmacy.  If lab work is needed we can place an order for that and you can then stop by our lab to have the test done at a later time.     Virtual/Video visits are billed at different rates depending on your insurance coverage.Some insurers they may be billed the same as an in-person visit.  Please reach out to your insurance provider with any questions.    Patient has given verbal consent for virtual  visit : Yes      Ho w would you like to obtain your AVS? Mail a copy  If the video visit is dropped, the invitation should be resent by: Send to e-mail at: [email or web address]  Will anyone else be joining your video visit? No            Psychiatric  Out- Patient  Follow Up Progress Note  Date of visit:7/9/2025           Discussion of Care and Treatment Recommendations:   This is a 30 year old male with  a history of depression presenting to the clinic today for follow-up appointment .          Last visit 01/09/2025.  Recommendation at last visit .  . Continue Zoloft 25 mg daily - MDD  2.Highly recommend increase   Psychotherapy session to weekly or biweekly: P3.Recommend abstain from THC- repots he has significantly reduced use and is working on  Total abstinence   4. RTC-  6 months  call in between visits with any questions or concerns  Patient and I reviewed diagnosis and treatment plan and patient agrees with following recommendations:  Ongoing education given regarding diagnostic and treatment options with adequate verbalization of understanding.  Plan   . Continue Zoloft 25 mg daily - MDD  2.Highly recommend increase   Psychotherapy " session to weekly or biweekly: P3.Recommend abstain from THC- repots he has significantly reduced use and is working on  Total abstinence   4. RTC-  6 months  call in between visits with any questions or concerns         DIagnoses:     Moderate episode of recurrent major depressive disorder     Patient Active Problem List   Diagnosis    Moderate episode of recurrent major depressive disorder (H)    Low HDL (under 40)    Herpes genitalis in men             Chief Complaint / Subjective:    Chief complaint: Depression     History of Present Illness:     Per patient statement : He recently tested positive for HIV  and has started treatment . Currently   Patient has a great support system and his partner and also has therapist-last visit with therapist was 6/25/2025  Depression and anxiety are well-managed on current low-dose of sertraline at 25 mg.  He offers no other concerns today.    Patient has recently been diagnosed with HIV.  He appears to be pending.  If she needs HIV and is positive about continued treatment.  He has a supportive system significant other partner and also is meeting therapy.  Depression and anxiety are well-managed at this time.  Manage continue current medications.  Will continue with current schedule of 10 visits every 6 months but patient is welcome to call and get a sooner appointment if indicated or he has any needs to.    Answers submitted by the patient for this visit:  Patient Health Questionnaire (Submitted on 7/9/2025)  If you checked off any problems, how difficult have these problems made it for you to do your work, take care of things at home, or get along with other people?: Somewhat difficult  PHQ9 TOTAL SCORE: 4    Mental Status Examination:   Appearance: Well groomed, good eye contact   Orientation: Patient alert and oriented to person, place, time, and situation  Reliability:  Patient appears to be an adequate historian.    Behavior: cooperative   Speech: Speech is spontaneous  "and coherent, with a normal rate, rhythm and tone.    Language:There are no difficulties with expressive or receptive language as observed throughout the interview.    Mood: Described as \" alright\" .    Affect: congruent   Judgement: Able to make basic decision regarding safety.  Insight: Good awareness of physical and mental health conditions and aware of needs around care for these.  Gait and station: unable to assess  Thought process: Logical   Thought content: No evidence of delusions or paranoia.    Hallucinations : No evidence of any hallucination  Thought content: No evidence of delusions or paranoia.   Suicidal /Homical Ideations:  No thoughts of self harm or suicide. No thoughts of harming others.  Associations: Connected  Fund of knowledge: Average  Attention / Concentration: Able to remain focused during the interview with minimal distractibility or need for redirection.  Short Term Memory: Grossly intact as evidence by client recalling themes and ideas discussed.  Long Term Memory: Intact  Motor Status: unable to asse    Drug/treatment history and current pattern of use:   Depression     Medication changes: See Above   Medication adherence: compliant  Medication side effects: absent  Information about medications: Side effects, benefits and alternative treatments discussed and patient agrees .    Psychotherapy: Supportive therapy day-to-day living    Education: Diet, exercise, abstinence from drugs and alcohol, patient will not drive if sedated and medications or  under influence of any substance    Lab Results:   Personally reviewed and discussed with the patient    Lab Results   Component Value Date    WBC 2.2 (L) 06/03/2025    HGB 14.1 06/03/2025    HCT 43.3 06/03/2025     06/03/2025    CHOL 187 04/30/2025    TRIG 112 04/30/2025    HDL 34 (L) 04/30/2025    ALT 29 05/28/2025    AST 30 05/28/2025     05/28/2025    BUN 10.5 05/28/2025    CO2 26 05/28/2025       Vital signs:  There were no " vitals taken for this visit.  Telemedicine visit-no vital signs completed  Allergies: Patient has no known allergies.         Medications:     Current Outpatient Medications   Medication Sig Dispense Refill    bictegravir-emtricitabine-tenofovir (BIKTARVY) -25 MG per tablet Take 1 tablet by mouth daily. 60 tablet 1    sertraline (ZOLOFT) 25 MG tablet Take 1 tablet (25 mg) by mouth daily. 90 tablet 2     No current facility-administered medications for this visit.       Medication adherence: Reviewed risk/benefits of medication , Patient able to verbalize understanding of side effects and Patient verbally consents to taking medications    Suicide Risk Assessment:   Feels safe in home: Yes   Suicidal ideation: Denies  History of suicide attempts:  No   Hx of impulsivity: No Impetuous and self-damaging behavior is common and can take many forms. Patients abuse substances, binge eat, engage in unsafe sex, spend money irresponsibly, and drive recklessly. In addition, patients can suddenly quit a job that they need or end a relationship that has the potential to last, thereby sabotaging their own success. Impulsivity can also manifest with immature and regressive behavior and often takes the form of sexually acting out.  Hope for the future: present   Hx of Command hallucinations or current psychosis: No  History of Self-injurious behaviors: No Current:  No  Family member  by suicide:  No  A thorough assessment of risk factors related to suicide and self-harm have been reviewed and are noted above. The patient convincingly denies acute suicidality on several occasions. Local community safety resources reviewed and printed for patient to use if needed. There was no deceit detected, and the patient presented in a manner that was believab          PSYCHOEDUCATION:  Medication side effects and alternatives reviewed. Health promotion activities recommended and reviewed today. All questions addressed. Education and  counseling completed regarding risks and benefits of medications and psychotherapy options.  Consent provided by patient/guardian  Call the psychiatric nurse line with medication questions or concerns at [phone].  MyChart may be used to communicate with your provider, but this is not intended to be used for emergencies.  SEROTONIN SYNDROME:  Discussed risks of Serotonin syndrome (ie, serotonin toxicity) which is a potentially life-threatening condition associated with increased serotonergic activity in the central nervous system (CNS). It is seen with therapeutic medication use, inadvertent interactions between drugs, and intentional self-poisoning. Serotonin syndrome may involve a spectrum of clinical findings, which often include mental status changes, autonomic hyperactivity, and neuromuscular abnormalities.    STIMULANT THERAPY: Side effects discussed including but not limited to cardiac (including HTN, tachycardia, sudden death), motor/tic, appetite/growth, mood lability and sleep disruption. This is a controlled substance with risk for abuse, need to keep in a safe keep place and cannot replace lost scripts  HARM REDUCTION:  Discussions regarding effects of mood altering substances, alcohol and cannabis, on mood and that approach is harm reduction, will continue to prescribe meds as they work to cut back use.    SAFETY:  We all care about your loved one's safety. To reduce the risk of self-harm, remove access to all:  Firearms, Medicines (both prescribed and over-the-counter), Knives and other sharp objects, Ropes and like materials, and Alcohol  SLEEP HYGIENE: establish a sleep routine, limit screen time 1 hour prior to bed, use bed for sleep only, take sleep/medications on time (including sleepy time tea, trazadone or herbal treatments such as melatonin), aroma therapy, limit caffeine/sugar, yoga, guided imagery, stretch, meditation, limit naps to 20 minutes, make a temperature change in the room, white  noise, be mindful of slowing down breathing, take a warm bath/shower, frequently wash sheets, and journaling.   Medlineplus.gov is information for patients.  It is run by the National Library of Medicine and it contains information about all disorders, diseases and all medications.              Review of Systems:      ROS:    Subjective Data Only- Tele-Health Visit    10 point ROS was negative except for the items listed in HPI.      Crisis Resources:    Present to the Emergency Department as needed or call after hours crisis line at [phone] or [phone].   [place] Crisis Text Line: Text [place] to 204304.  Suicide LifeLine Chat: suicideMagnus Life Science.org/chat/.  [place] Suicide Prevention Lifeline: 113.720.5501 (TTY: 564.206.3250). Call anytime for help.  (www.suicidepreventionAsk The Doctorline.org)  National [place] on Mental Illness (www.livan.org): 954.338.6211 or 940-349-0964.  Mental Health Association (www.mentalhealth.org): 341.141.3453 or 231-646-4969.      Coordination of Care:   More than 50% of time spent on coordination of care including: Educating patient about diagnosis, prognosis, side effects and benefits of medications, diet, exercise.  Time also spent providing supportive therapy regarding above issues.    Video-Visit Details    Type of service:  Video Visit    Originating Location (pt. Location): Home    Distant Location (provider location): Providers Remote Office     Platform used for Video Visit: [id number]      Disclaimer: This note consists of symbols derived from keyboarding, dictation and/or voice recognition software. As a result, there may be errors in the script that have gone undetected. Please consider this when interpreting information found in this chart.    Start Time : 1520  End time : 1554

## 2025-07-16 ENCOUNTER — TELEPHONE (OUTPATIENT)
Dept: INFECTIOUS DISEASES | Facility: CLINIC | Age: 30
End: 2025-07-16

## 2025-07-16 ENCOUNTER — LAB (OUTPATIENT)
Dept: LAB | Facility: CLINIC | Age: 30
End: 2025-07-16
Payer: COMMERCIAL

## 2025-07-16 DIAGNOSIS — B20 HUMAN IMMUNODEFICIENCY VIRUS (HIV) DISEASE (H): Primary | ICD-10-CM

## 2025-07-16 DIAGNOSIS — B20 HUMAN IMMUNODEFICIENCY VIRUS (HIV) DISEASE (H): ICD-10-CM

## 2025-07-16 NOTE — TELEPHONE ENCOUNTER
Received call from call center stating patient in the lab and needs orders. Per chart review, Dr. Stinson would like viral load and CD4 count checked. RN entered orders and informed call center. They will let patient know.

## 2025-07-17 LAB
CD3 CELLS # BLD: 1196 CELLS/UL (ref 603–2990)
CD3 CELLS NFR BLD: 82 % (ref 49–84)
CD3+CD4+ CELLS # BLD: 579 CELLS/UL (ref 441–2156)
CD3+CD4+ CELLS NFR BLD: 40 % (ref 28–63)
CD3+CD4+ CELLS/CD3+CD8+ CLL BLD: 0.9 % (ref 1.4–2.6)
CD3+CD8+ CELLS # BLD: 643 CELLS/UL (ref 125–1312)
CD3+CD8+ CELLS NFR BLD: 44 % (ref 10–40)
T CELL COMMENT: ABNORMAL

## 2025-07-23 ENCOUNTER — OFFICE VISIT (OUTPATIENT)
Dept: INFECTIOUS DISEASES | Facility: CLINIC | Age: 30
End: 2025-07-23
Attending: STUDENT IN AN ORGANIZED HEALTH CARE EDUCATION/TRAINING PROGRAM
Payer: COMMERCIAL

## 2025-07-23 VITALS
HEART RATE: 70 BPM | TEMPERATURE: 98.2 F | HEIGHT: 67 IN | BODY MASS INDEX: 30.76 KG/M2 | SYSTOLIC BLOOD PRESSURE: 151 MMHG | DIASTOLIC BLOOD PRESSURE: 95 MMHG | WEIGHT: 196 LBS

## 2025-07-23 DIAGNOSIS — B20 HUMAN IMMUNODEFICIENCY VIRUS (HIV) DISEASE (H): Primary | ICD-10-CM

## 2025-07-23 PROCEDURE — 99214 OFFICE O/P EST MOD 30 MIN: CPT | Performed by: STUDENT IN AN ORGANIZED HEALTH CARE EDUCATION/TRAINING PROGRAM

## 2025-07-23 PROCEDURE — G2211 COMPLEX E/M VISIT ADD ON: HCPCS | Performed by: STUDENT IN AN ORGANIZED HEALTH CARE EDUCATION/TRAINING PROGRAM

## 2025-07-23 PROCEDURE — 1126F AMNT PAIN NOTED NONE PRSNT: CPT | Performed by: STUDENT IN AN ORGANIZED HEALTH CARE EDUCATION/TRAINING PROGRAM

## 2025-07-23 PROCEDURE — 3080F DIAST BP >= 90 MM HG: CPT | Performed by: STUDENT IN AN ORGANIZED HEALTH CARE EDUCATION/TRAINING PROGRAM

## 2025-07-23 PROCEDURE — 3077F SYST BP >= 140 MM HG: CPT | Performed by: STUDENT IN AN ORGANIZED HEALTH CARE EDUCATION/TRAINING PROGRAM

## 2025-07-23 PROCEDURE — G0463 HOSPITAL OUTPT CLINIC VISIT: HCPCS | Performed by: STUDENT IN AN ORGANIZED HEALTH CARE EDUCATION/TRAINING PROGRAM

## 2025-07-23 ASSESSMENT — PAIN SCALES - GENERAL: PAINLEVEL_OUTOF10: NO PAIN (0)

## 2025-07-23 NOTE — LETTER
7/23/2025       RE: Roland Sandoval  [address]  [place] [address]     Dear Colleague,    Thank you for referring your patient, Roland Sandoval, to the [facility] INFECTIOUS DISEASE [facility] [place] at [facility]. Please see a copy of my visit note below.    [facility] - HIV Care  Patient:  Roland Sandoval, Date of birth 1995, Medical record number [id number]  Date of Visit:  07/23/25         Assessment and Recommendations:     Discussion:  Roland is a 29 y/o M who recently established care for recent diagnosis of HIV. Initial CD4 232, VL >100,000,000 in 6/2025    Roland and partner already had a good understanding of HIV.  Ag/ab positive, confirmatory test neg but viral load positive.     We used first visit on 6/9/2025 to establish care, discuss what to expect in terms of treatment options, prognosis, treatment goals, visit frequency, U=U, CD4 and VL. We started Biktarvy, planned to  discuss vaccine and preventive aspects at future visits once we have ensured adequate persistent immune status.  CD4>200 so did not need PJP prophylaxis. He has a great support system in his partner and friends and has a therapist.    Chencho has done excellently since then w Biktarvy, viral load already down to undetectable level at 69, CD4 579. He is coping better, and is interested in pursuing injectables when able. We reviewed interim labs, no concerns, phenosense without resistance. Immune to hepatitis B, measles, rubella; hepatitis C and toxoplasma negative, CMV pos, STI screens neg.      Recommendations:  - Continue Biktarvy  - Follow up in 2 months w labs prior  - [facility] pharmacist referral created to initiate discussions w Yara  - We will discuss vaccines at future visits    I have reviewed vitals, labs, images, cultures and antibiotics    Total time on day of visit including chart  review, visit, counseling, documentation and coordination of care: 35 minutes      Elise Stinson  Staff Physician  Division of Infectious Diseases     [facility]             Interval events     Doing well on Biktarvy, partner accompanies, has noted an improvement in coping, interested in injectables.          Initial History of Infectious Disease Illness 6/9/25     Pt is a 31 y/o M here to establish care for recent diagnosis of HIV on 6/3. Recent ER visit with body aches and headache reviewed.    Long term partner accompanies. Partner is on PrEP and has been screened negative same day. They have been together for many years and are planning a wedding [place] soon.     Last neg test in 3/2025. Has had new partners since. Amenable to STI screens.     He lives in [place]. Originally from [place], been in [place] since 2016. Chencho is still processing, but has a friend who takes Biktarvy, who he discussed with and obtained a good understanding of particulars from.     No cough, SOB, chest pain. Has been feeling unwell lately..           HIV History:   Date of Diagnosis: 6/3/2025  Approximated time of transmission: between 3/2025 and 6/2025 likely  CD4 Alessandro: 232  Viral Load at Diagnosis:VL >100,000,000  Risk Factors: unprotected sexual contact, Male partners  Opportunistic Infections: None  Historical use of ARVs: Unknown    CMV Status: Positive  Toxo Status: Neg  Tuberculosis Screening: neg  Historical Resistance Mutations: In chart        Past Medical and Surgical History:   No past medical history on file.    No past surgical history on file.        Family History:     Family History   Problem Relation Age of Onset     Anxiety Disorder Mother      Hypertension Father            Social History:     Social History     Tobacco Use     Smoking status: Never     Passive exposure: Past     Smokeless tobacco: Never   Vaping Use     Vaping status: Never Used   Substance Use Topics      "Alcohol use: No     Drug use: No     Social History     Social History Narrative     Not on file            Current Medications:     Current Outpatient Medications   Medication Sig Dispense Refill     bictegravir-emtricitabine-tenofovir (BIKTARVY) -25 MG per tablet Take 1 tablet by mouth daily. 60 tablet 1     sertraline (ZOLOFT) 25 MG tablet Take 1 tablet (25 mg) by mouth daily. 90 tablet 2            Immunization History:     Immunization History   Administered Date(s) Administered     COVID-19 12+ (Pfizer) 04/30/2025     COVID-19 MONOVALENT 12+ (Pfizer) 08/30/2021, 09/20/2021     COVID-19 Monovalent 12+ (Pfizer 2022) 05/23/2022     HPV9 (Gardasil) 05/23/2022, 04/30/2025     Hepatitis B, Adult (Energix-B/Recombivax HB) 11/10/2021, 04/30/2025     TDAP (Adacel,Boostrix) 12/05/2017            Allergies:   No Known Allergies         Physical Exam:   Vital signs:  BP (!) 151/95   Pulse 70   Temp 98.2  F (36.8  C) (Oral)   Ht 1.702 m (5' 7\")   Wt 88.9 kg (196 lb)   BMI 30.70 kg/m      Physical Examination:  GENERAL:  well-developed, well-nourished, seated in no acute distress.  HEENT:  Head is normocephalic, atraumatic   EYES:  Eyes have anicteric sclerae without conjunctival injection   ENT:  Oropharynx is moist without exudates or ulcers. Tongue is midline  LUNGS: Breathing comfortably  SKIN:  No acute rashes.    NEUROLOGIC:  Grossly nonfocal. Active x4 extremities         Laboratory Data:     CD4 Count  Absolute CD4, [place] T Cells   Date Value Ref Range Status   07/16/2025 579 441 - 2,156 cells/uL Final     CD4% [place] T Cells   Date Value Ref Range Status   07/16/2025 40 28 - 63 % Final     CD4:CD8 Ratio   Date Value Ref Range Status   07/16/2025 0.90 (L) 1.40 - 2.60 Final       HIV-1 RNA Quantitative:  HIV-1 RNA copies/mL   Date Value Ref Range Status   06/03/2025 >10,000,000 (A) Not Detected Copies/mL Final   06/03/2025 >10,000,000 (A) Not Detected Copies/mL Final       Metabolic Studies       Sodium "   Date Value Ref Range Status   05/28/2025 140 135 - 145 mmol/L Final   09/26/2023 139 136 - 145 mmol/L Final     Potassium   Date Value Ref Range Status   05/28/2025 4.0 3.4 - 5.3 mmol/L Final   09/26/2023 4.4 3.4 - 5.3 mmol/L Final   11/10/2021 4.1 3.5 - 5.0 mmol/L Final   09/16/2019 3.9 3.5 - 5.0 mmol/L Final     Chloride   Date Value Ref Range Status   05/28/2025 104 98 - 107 mmol/L Final   09/26/2023 105 98 - 107 mmol/L Final   11/10/2021 106 98 - 107 mmol/L Final   09/16/2019 108 (H) 98 - 107 mmol/L Final     Carbon Dioxide (CO2)   Date Value Ref Range Status   05/28/2025 26 22 - 29 mmol/L Final   09/26/2023 27 22 - 29 mmol/L Final   11/10/2021 23 22 - 31 mmol/L Final   09/16/2019 23 22 - 31 mmol/L Final     Anion Gap   Date Value Ref Range Status   05/28/2025 10 7 - 15 mmol/L Final   09/26/2023 7 7 - 15 mmol/L Final   11/10/2021 12 5 - 18 mmol/L Final   09/16/2019 13 5 - 18 mmol/L Final     Urea Nitrogen   Date Value Ref Range Status   05/28/2025 10.5 6.0 - 20.0 mg/dL Final   09/26/2023 15.1 6.0 - 20.0 mg/dL Final   11/10/2021 17 8 - 22 mg/dL Final   09/16/2019 15 8 - 22 mg/dL Final     Creatinine   Date Value Ref Range Status   05/28/2025 1.26 (H) 0.67 - 1.17 mg/dL Final   09/26/2023 1.04 0.67 - 1.17 mg/dL Final     GFR Estimate   Date Value Ref Range Status   05/28/2025 79 >60 mL/min/1.73m2 Final     Comment:     eGFR calculated using 2021 CKD-EPI equation.   09/26/2023 >90 >60 mL/min/1.73m2 Final   09/16/2019 >60 >60 mL/min/1.73m2 Final     Glucose   Date Value Ref Range Status   05/28/2025 90 70 - 99 mg/dL Final   04/30/2025 83 70 - 99 mg/dL Final   11/10/2021 81 70 - 125 mg/dL Final   09/16/2019 78 70 - 125 mg/dL Final     Calcium   Date Value Ref Range Status   05/28/2025 9.1 8.8 - 10.4 mg/dL Final   09/26/2023 8.9 8.6 - 10.0 mg/dL Final     Magnesium   Date Value Ref Range Status   05/28/2025 1.8 1.7 - 2.3 mg/dL Final       Hepatic Studies    Bilirubin Total   Date Value Ref Range Status   05/28/2025  0.4 <=1.2 mg/dL Final   09/26/2023 0.3 <=1.2 mg/dL Final     Alkaline Phosphatase   Date Value Ref Range Status   05/28/2025 77 40 - 150 U/L Final   09/26/2023 77 40 - 129 U/L Final     Albumin   Date Value Ref Range Status   05/28/2025 3.9 3.5 - 5.2 g/dL Final   09/26/2023 3.8 3.5 - 5.2 g/dL Final   11/10/2021 4.3 3.5 - 5.0 g/dL Final   09/16/2019 3.1 (L) 3.5 - 5.0 g/dL Final     AST   Date Value Ref Range Status   05/28/2025 30 0 - 45 U/L Final   09/26/2023 23 0 - 45 U/L Final     Comment:     Specimen is hemolyzed which can falsely elevate AST. Analysis of a non-hemolyzed specimen may result in a lower value.  Reference intervals for this test were updated on 6/12/2023 to more accurately reflect our healthy population. There may be differences in the flagging of prior results with similar values performed with this method. Interpretation of those prior results can be made in the context of the updated reference intervals.     ALT   Date Value Ref Range Status   05/28/2025 29 0 - 70 U/L Final   09/26/2023 22 0 - 70 U/L Final     Comment:     Reference intervals for this test were updated on 6/12/2023 to more accurately reflect our healthy population. There may be differences in the flagging of prior results with similar values performed with this method. Interpretation of those prior results can be made in the context of the updated reference intervals.         Hematology Studies      WBC Count   Date Value Ref Range Status   06/03/2025 2.2 (L) 4.0 - 11.0 10e3/uL Final   05/28/2025 3.7 (L) 4.0 - 11.0 10e3/uL Final     Absolute Neutrophils   Date Value Ref Range Status   05/28/2025 2.1 1.6 - 8.3 10e3/uL Final     Absolute Lymphocytes   Date Value Ref Range Status   05/28/2025 1.0 0.8 - 5.3 10e3/uL Final     Absolute Monocytes   Date Value Ref Range Status   05/28/2025 0.5 0.0 - 1.3 10e3/uL Final     Absolute Eosinophils   Date Value Ref Range Status   05/28/2025 0.0 0.0 - 0.7 10e3/uL Final     Hemoglobin   Date  Value Ref Range Status   06/03/2025 14.1 13.3 - 17.7 g/dL Final   05/28/2025 15.1 13.3 - 17.7 g/dL Final     Hematocrit   Date Value Ref Range Status   06/03/2025 43.3 40.0 - 53.0 % Final   05/28/2025 47.3 40.0 - 53.0 % Final     Platelet Count   Date Value Ref Range Status   06/03/2025 169 150 - 450 10e3/uL Final   05/28/2025 177 150 - 450 10e3/uL Final       Hepatitis B Testing     Recent Labs   Lab Test 06/09/25  1515 11/10/21  1505   HBCAB Nonreactive  --    HEPBANG Nonreactive Nonreactive       Hepatitis C Testing     Hepatitis C Antibody   Date Value Ref Range Status   06/09/2025 Nonreactive Nonreactive Final     Comment:     A nonreactive screening test result does not exclude the possibility of exposure to or infection with HCV. Nonreactive screening test results in individuals with prior exposure to HCV may be due to antibody levels below the limit of detection of this assay or lack of reactivity to the HCV antigens used in this assay. Patients with recent HCV infections (<3 months from time of exposure) may have false-negative HCV antibody results due to the time needed for seroconversion (average of 8 to 9 weeks).    Assay performance characteristics have not been established in populations of immunocompromised or immunosuppressed patients.   11/10/2021 Negative Negative Final           Again, thank you for allowing me to participate in the care of your patient.      Sincerely,    Elise Stinson MD

## 2025-07-23 NOTE — NURSING NOTE
"Chief Complaint   Patient presents with    RECHECK     B20     BP (!) 151/95   Pulse 70   Temp 98.2  F (36.8  C) (Oral)   Ht 1.702 m (5' 7\")   Wt 88.9 kg (196 lb)   BMI 30.70 kg/m    Bernadette Hall MA on 7/23/2025 at 3:34 PM    "

## 2025-07-23 NOTE — PATIENT INSTRUCTIONS
- Continue Biktarvy  - Follow up in 2 months w labs prior  - [facility] pharmacist referral re [place] shot

## 2025-07-24 ENCOUNTER — VIRTUAL VISIT (OUTPATIENT)
Dept: PSYCHOLOGY | Facility: CLINIC | Age: 30
End: 2025-07-24
Payer: COMMERCIAL

## 2025-07-24 DIAGNOSIS — F41.1 GAD (GENERALIZED ANXIETY DISORDER): ICD-10-CM

## 2025-07-24 DIAGNOSIS — F43.21 ADJUSTMENT DISORDER WITH DEPRESSED MOOD: Primary | ICD-10-CM

## 2025-07-24 DIAGNOSIS — F33.0 MILD EPISODE OF RECURRENT MAJOR DEPRESSIVE DISORDER: ICD-10-CM
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
## 2025-07-26 NOTE — PROGRESS NOTES
[facility] - HIV Care  Patient:  Roland Sandoval, Date of birth 1995, Medical record number [id number]  Date of Visit:  07/23/25         Assessment and Recommendations:     Discussion:  Roland is a 31 y/o M who recently established care for recent diagnosis of HIV. Initial CD4 232, VL >100,000,000 in 6/2025    Roland and partner already had a good understanding of HIV.  Ag/ab positive, confirmatory test neg but viral load positive.     We used first visit on 6/9/2025 to establish care, discuss what to expect in terms of treatment options, prognosis, treatment goals, visit frequency, U=U, CD4 and VL. We started Biktarvy, planned to  discuss vaccine and preventive aspects at future visits once we have ensured adequate persistent immune status.  CD4>200 so did not need PJP prophylaxis. He has a great support system in his partner and friends and has a therapist.    Chencho has done excellently since then w Biktarvy, viral load already down to undetectable level at 69, CD4 579. He is coping better, and is interested in pursuing injectables when able. We reviewed interim labs, no concerns, phenosense without resistance. Immune to hepatitis B, measles, rubella; hepatitis C and toxoplasma negative, CMV pos, STI screens neg.      Recommendations:  - Continue Biktarvy  - Follow up in 2 months w labs prior  - [facility] pharmacist referral created to initiate discussions w Yara  - We will discuss vaccines at future visits    I have reviewed vitals, labs, images, cultures and antibiotics    Total time on day of visit including chart review, visit, counseling, documentation and coordination of care: 35 minutes      Elise Stinson  Staff Physician  Division of Infectious Diseases     [facility]             Interval events     Doing well on Biktarvy, partner accompanies, has noted an improvement in coping, interested in injectables.           Initial History of Infectious Disease Illness 6/9/25     Pt is a 31 y/o M here to establish care for recent diagnosis of HIV on 6/3. Recent ER visit with body aches and headache reviewed.    Long term partner accompanies. Partner is on PrEP and has been screened negative same day. They have been together for many years and are planning a wedding In [place] soon.     Last neg test in 3/2025. Has had new partners since. Amenable to STI screens.     He lives in [place]. Originally from [place], been in [place] since 2016. Chencho is still processing, but has a friend who takes Biktarvy, who he discussed with and obtained a good understanding of particulars from.     No cough, SOB, chest pain. Has been feeling unwell lately..           HIV History:   Date of Diagnosis: 6/3/2025  Approximated time of transmission: between 3/2025 and 6/2025 likely  CD4 Alessandro: 232  Viral Load at Diagnosis:VL >100,000,000  Risk Factors: unprotected sexual contact, Male partners  Opportunistic Infections: None  Historical use of ARVs: Unknown    CMV Status: Positive  Toxo Status: Neg  Tuberculosis Screening: neg  Historical Resistance Mutations: In chart        Past Medical and Surgical History:   No past medical history on file.    No past surgical history on file.        Family History:     Family History   Problem Relation Age of Onset    Anxiety Disorder Mother     Hypertension Father            Social History:     Social History     Tobacco Use    Smoking status: Never     Passive exposure: Past    Smokeless tobacco: Never   Vaping Use    Vaping status: Never Used   Substance Use Topics    Alcohol use: No    Drug use: No     Social History     Social History Narrative    Not on file            Current Medications:     Current Outpatient Medications   Medication Sig Dispense Refill    bictegravir-emtricitabine-tenofovir (BIKTARVY) -25 MG per tablet Take 1 tablet by mouth daily. 60 tablet 1    sertraline (ZOLOFT) 25 MG  "tablet Take 1 tablet (25 mg) by mouth daily. 90 tablet 2            Immunization History:     Immunization History   Administered Date(s) Administered    COVID-19 12+ (Pfizer) 04/30/2025    COVID-19 MONOVALENT 12+ (Pfizer) 08/30/2021, 09/20/2021    COVID-19 Monovalent 12+ (Pfizer 2022) 05/23/2022    HPV9 (Gardasil) 05/23/2022, 04/30/2025    Hepatitis B, Adult (Energix-B/Recombivax HB) 11/10/2021, 04/30/2025    TDAP (Adacel,Boostrix) 12/05/2017            Allergies:   No Known Allergies         Physical Exam:   Vital signs:  BP (!) 151/95   Pulse 70   Temp 98.2  F (36.8  C) (Oral)   Ht 1.702 m (5' 7\")   Wt 88.9 kg (196 lb)   BMI 30.70 kg/m      Physical Examination:  GENERAL:  well-developed, well-nourished, seated in no acute distress.  HEENT:  Head is normocephalic, atraumatic   EYES:  Eyes have anicteric sclerae without conjunctival injection   ENT:  Oropharynx is moist without exudates or ulcers. Tongue is midline  LUNGS: Breathing comfortably  SKIN:  No acute rashes.    NEUROLOGIC:  Grossly nonfocal. Active x4 extremities         Laboratory Data:     CD4 Count  Absolute CD4, [place] T Cells   Date Value Ref Range Status   07/16/2025 579 441 - 2,156 cells/uL Final     CD4% [place] T Cells   Date Value Ref Range Status   07/16/2025 40 28 - 63 % Final     CD4:CD8 Ratio   Date Value Ref Range Status   07/16/2025 0.90 (L) 1.40 - 2.60 Final       HIV-1 RNA Quantitative:  HIV-1 RNA copies/mL   Date Value Ref Range Status   06/03/2025 >10,000,000 (A) Not Detected Copies/mL Final   06/03/2025 >10,000,000 (A) Not Detected Copies/mL Final       Metabolic Studies       Sodium   Date Value Ref Range Status   05/28/2025 140 135 - 145 mmol/L Final   09/26/2023 139 136 - 145 mmol/L Final     Potassium   Date Value Ref Range Status   05/28/2025 4.0 3.4 - 5.3 mmol/L Final   09/26/2023 4.4 3.4 - 5.3 mmol/L Final   11/10/2021 4.1 3.5 - 5.0 mmol/L Final   09/16/2019 3.9 3.5 - 5.0 mmol/L Final     Chloride   Date Value Ref Range " Status   05/28/2025 104 98 - 107 mmol/L Final   09/26/2023 105 98 - 107 mmol/L Final   11/10/2021 106 98 - 107 mmol/L Final   09/16/2019 108 (H) 98 - 107 mmol/L Final     Carbon Dioxide (CO2)   Date Value Ref Range Status   05/28/2025 26 22 - 29 mmol/L Final   09/26/2023 27 22 - 29 mmol/L Final   11/10/2021 23 22 - 31 mmol/L Final   09/16/2019 23 22 - 31 mmol/L Final     Anion Gap   Date Value Ref Range Status   05/28/2025 10 7 - 15 mmol/L Final   09/26/2023 7 7 - 15 mmol/L Final   11/10/2021 12 5 - 18 mmol/L Final   09/16/2019 13 5 - 18 mmol/L Final     Urea Nitrogen   Date Value Ref Range Status   05/28/2025 10.5 6.0 - 20.0 mg/dL Final   09/26/2023 15.1 6.0 - 20.0 mg/dL Final   11/10/2021 17 8 - 22 mg/dL Final   09/16/2019 15 8 - 22 mg/dL Final     Creatinine   Date Value Ref Range Status   05/28/2025 1.26 (H) 0.67 - 1.17 mg/dL Final   09/26/2023 1.04 0.67 - 1.17 mg/dL Final     GFR Estimate   Date Value Ref Range Status   05/28/2025 79 >60 mL/min/1.73m2 Final     Comment:     eGFR calculated using 2021 CKD-EPI equation.   09/26/2023 >90 >60 mL/min/1.73m2 Final   09/16/2019 >60 >60 mL/min/1.73m2 Final     Glucose   Date Value Ref Range Status   05/28/2025 90 70 - 99 mg/dL Final   04/30/2025 83 70 - 99 mg/dL Final   11/10/2021 81 70 - 125 mg/dL Final   09/16/2019 78 70 - 125 mg/dL Final     Calcium   Date Value Ref Range Status   05/28/2025 9.1 8.8 - 10.4 mg/dL Final   09/26/2023 8.9 8.6 - 10.0 mg/dL Final     Magnesium   Date Value Ref Range Status   05/28/2025 1.8 1.7 - 2.3 mg/dL Final       Hepatic Studies    Bilirubin Total   Date Value Ref Range Status   05/28/2025 0.4 <=1.2 mg/dL Final   09/26/2023 0.3 <=1.2 mg/dL Final     Alkaline Phosphatase   Date Value Ref Range Status   05/28/2025 77 40 - 150 U/L Final   09/26/2023 77 40 - 129 U/L Final     Albumin   Date Value Ref Range Status   05/28/2025 3.9 3.5 - 5.2 g/dL Final   09/26/2023 3.8 3.5 - 5.2 g/dL Final   11/10/2021 4.3 3.5 - 5.0 g/dL Final   09/16/2019  3.1 (L) 3.5 - 5.0 g/dL Final     AST   Date Value Ref Range Status   05/28/2025 30 0 - 45 U/L Final   09/26/2023 23 0 - 45 U/L Final     Comment:     Specimen is hemolyzed which can falsely elevate AST. Analysis of a non-hemolyzed specimen may result in a lower value.  Reference intervals for this test were updated on 6/12/2023 to more accurately reflect our healthy population. There may be differences in the flagging of prior results with similar values performed with this method. Interpretation of those prior results can be made in the context of the updated reference intervals.     ALT   Date Value Ref Range Status   05/28/2025 29 0 - 70 U/L Final   09/26/2023 22 0 - 70 U/L Final     Comment:     Reference intervals for this test were updated on 6/12/2023 to more accurately reflect our healthy population. There may be differences in the flagging of prior results with similar values performed with this method. Interpretation of those prior results can be made in the context of the updated reference intervals.         Hematology Studies      WBC Count   Date Value Ref Range Status   06/03/2025 2.2 (L) 4.0 - 11.0 10e3/uL Final   05/28/2025 3.7 (L) 4.0 - 11.0 10e3/uL Final     Absolute Neutrophils   Date Value Ref Range Status   05/28/2025 2.1 1.6 - 8.3 10e3/uL Final     Absolute Lymphocytes   Date Value Ref Range Status   05/28/2025 1.0 0.8 - 5.3 10e3/uL Final     Absolute Monocytes   Date Value Ref Range Status   05/28/2025 0.5 0.0 - 1.3 10e3/uL Final     Absolute Eosinophils   Date Value Ref Range Status   05/28/2025 0.0 0.0 - 0.7 10e3/uL Final     Hemoglobin   Date Value Ref Range Status   06/03/2025 14.1 13.3 - 17.7 g/dL Final   05/28/2025 15.1 13.3 - 17.7 g/dL Final     Hematocrit   Date Value Ref Range Status   06/03/2025 43.3 40.0 - 53.0 % Final   05/28/2025 47.3 40.0 - 53.0 % Final     Platelet Count   Date Value Ref Range Status   06/03/2025 169 150 - 450 10e3/uL Final   05/28/2025 177 150 - 450 10e3/uL  Final       Hepatitis B Testing     Recent Labs   Lab Test 06/09/25  1515 11/10/21  1505   HBCAB Nonreactive  --    HEPBANG Nonreactive Nonreactive       Hepatitis C Testing     Hepatitis C Antibody   Date Value Ref Range Status   06/09/2025 Nonreactive Nonreactive Final     Comment:     A nonreactive screening test result does not exclude the possibility of exposure to or infection with HCV. Nonreactive screening test results in individuals with prior exposure to HCV may be due to antibody levels below the limit of detection of this assay or lack of reactivity to the HCV antigens used in this assay. Patients with recent HCV infections (<3 months from time of exposure) may have false-negative HCV antibody results due to the time needed for seroconversion (average of 8 to 9 weeks).    Assay performance characteristics have not been established in populations of immunocompromised or immunosuppressed patients.   11/10/2021 Negative Negative Final

## 2025-07-30 ENCOUNTER — HOSPITAL ENCOUNTER (EMERGENCY)
Facility: HOSPITAL | Age: 30
Discharge: HOME | End: 2025-07-31
Attending: EMERGENCY MEDICINE | Admitting: EMERGENCY MEDICINE
Payer: COMMERCIAL

## 2025-07-30 DIAGNOSIS — M25.462 EFFUSION OF LEFT KNEE: ICD-10-CM

## 2025-07-30 DIAGNOSIS — M25.562 ACUTE PAIN OF LEFT KNEE: Primary | ICD-10-CM

## 2025-07-30 PROCEDURE — 99283 EMERGENCY DEPT VISIT LOW MDM: CPT

## 2025-07-30 PROCEDURE — 250N000013 HC RX MED GY IP 250 OP 250 PS 637: Performed by: EMERGENCY MEDICINE

## 2025-07-30 RX ORDER — OXYCODONE HYDROCHLORIDE 5 MG/1
5 TABLET ORAL ONCE
Refills: 0 | Status: COMPLETED | OUTPATIENT
Start: 2025-07-30 | End: 2025-07-30

## 2025-07-30 RX ORDER — HYDROCODONE BITARTRATE AND ACETAMINOPHEN 5; 325 MG/1; MG/1
1 TABLET ORAL EVERY 6 HOURS PRN
Qty: 10 TABLET | Refills: 0 | Status: SHIPPED | OUTPATIENT
Start: 2025-07-30 | End: 2025-08-02

## 2025-07-30 RX ORDER — OXYCODONE HYDROCHLORIDE 5 MG/1
5 TABLET ORAL ONCE
Refills: 0 | Status: COMPLETED | OUTPATIENT
Start: 2025-07-31 | End: 2025-07-30

## 2025-07-30 RX ORDER — IBUPROFEN 600 MG/1
600 TABLET, FILM COATED ORAL ONCE
Status: COMPLETED | OUTPATIENT
Start: 2025-07-30 | End: 2025-07-30

## 2025-07-30 RX ADMIN — IBUPROFEN 600 MG: 600 TABLET ORAL at 22:20

## 2025-07-30 RX ADMIN — OXYCODONE HYDROCHLORIDE 5 MG: 5 TABLET ORAL at 23:49

## 2025-07-30 RX ADMIN — OXYCODONE HYDROCHLORIDE 5 MG: 5 TABLET ORAL at 22:20

## 2025-07-30 ASSESSMENT — ACTIVITIES OF DAILY LIVING (ADL)
ADLS_ACUITY_SCORE: 41
ADLS_ACUITY_SCORE: 41

## 2025-07-31 ENCOUNTER — TELEPHONE (OUTPATIENT)
Dept: FAMILY MEDICINE | Facility: CLINIC | Age: 30
End: 2025-07-31
Payer: COMMERCIAL

## 2025-07-31 VITALS
SYSTOLIC BLOOD PRESSURE: 137 MMHG | DIASTOLIC BLOOD PRESSURE: 76 MMHG | HEART RATE: 90 BPM | OXYGEN SATURATION: 99 % | RESPIRATION RATE: 18 BRPM | TEMPERATURE: 97.4 F

## 2025-07-31 NOTE — DISCHARGE INSTRUCTIONS
You were seen in the emergency department today for knee pain.  As we discussed, your x-ray showed that you have quite a bit of fluid in your knee joint but no broken bones. It's possible that you injured one of the ligaments, tendons or meniscus but this would not show up on the x-ray so I do think you will need to see the orthopedic surgeons for more evaluation.      To help with pain:  - Ice the injury for 20 minutes, 3-5 times per day (or up to every 2 hours as needed) for the first 24-72 hours. You can either use an ice pack or plastic bag filled with ice, cover either one with a damp cloth to prevent the cold from burning your skin.   - Elevate your leg when you can  - Wear the immobilizer and use the crutches.   - Take 650-1000 mg of Acetaminophen (Tylenol) (no more than 3000 mg in 24 hours).  - Take 600 mg of Ibuprofen (Motrin, Advil) by mouth with food every 6-8 hours (no more than 3200 mg in 24hrs).   - Norco every 4-6 hours for the next 24-48 hours. Only use this for severe pain that does not get better with tylenol and ibuprofen. It will likely make you feel drowsy so you should try to take it at night to lessen the pain and help you sleep. This DOES contain 325 mg acetaminophen so if you take it, be sure you are not taking too much tylenol in addition.     You should take ibuprofen and tylenol for baseline pain. You can take one or the other every 3 hours while awake (such that each is taken every 6 hours). For example, if you take Tylenol when you get home then you would take ibuprofen 3 hours later followed by another Tylenol dose 3 hours after that. Write down the times you are taking both medications to ensure appropriate time in between doses.    Note: Norco is a strong narcotic pain medication that can lead to addiction and should be used carefully.  Please don't take more than the recommended dose and limit your use of this medication as much as possible.  Please don't take this medication with  other medications or drugs that cause you to feel sleepy (alcohol, benzodiazepines, etc) because it may impair your ability to breathe.  In addition, please do not take it prior to performing activities such as driving, operating power tools, or other activities that carry a risk of bodily harm.     You will likely find that you are more sore over the next 1-2 days, but please return to the Emergency Department if you have a significant increase in pain, difficulty breathing, numbness, weakness, or any other new or concerning symptoms. Otherwise please follow up with the [facility] orthopedic surgery team in 1 week for recheck.    Below is some information that you might find informative and useful.    Thank you for choosing [facility]. It was a pleasure taking care of you today!  - Dr. Coral Portillo

## 2025-07-31 NOTE — ED PROVIDER NOTES
"EMERGENCY DEPARTMENT ENCOUNTER      NAME: Roland Sandoval  AGE: 30 year old male  YOB: 1995  EVALUATION DATE & TIME: 7/30/2025  9:12 PM    ED PROVIDER: Coral Portillo MD    Chief Complaint   Patient presents with    Knee Pain       FINAL IMPRESSION  1. Acute pain of left knee    2. Effusion of left knee        MEDICAL DECISION MAKING   Roland Sandoval is a 30 year old male who presents with significant other for evaluation of left knee pain.  Patient reports onset of symptoms 2 days ago after he hit his left knee on a wall.  He reports that he did not think much of it but the following day, worked out and did a \"leg day which seemed to exacerbate the discomfort.  Additionally, he has noticed some associated swelling and significant other reports that it looks like he had fluid around his knee that moved around.  Patient has tried taking prescription strength Tylenol without much improvement and states that the pain is now 10 out of 10.  He did not sustain any other injuries 2 days ago when he hit his leg on the wall and has no pain in his more proximal or distal extremity.  He also denies numbness or tingling.    I considered a broad differential COVID not limited to fracture, dislocation, effusion, ligamentous or meniscal injury.  Low suspicion for neurovascular injury given distal CMS is intact.  No overlying erythema, warmth, open wounds to suggest infectious or inflammatory process.  Discussed options for workup and management with patient.  We have agreed on the plan for x-ray and management of pain with oral analgesic.    I independently reviewed x-ray which showed a large effusion but no acute fracture or bony injury.  At this point, I do have concern for ligamentous or meniscal injury or potentially, pain related to knee effusion.  I updated patient with results.  He did have slight improvement with initial interventions but continued to complain of pain with difficulty moving around.  " Discussed options for further workup and management.  We have agreed on plan to place him in a knee immobilizer here and have him follow-up with [facility] for recheck..     At the end of the encounter, we reviewed the results, potential diagnoses, as well as return precautions and recommendations for follow up. I instructed Mr. Sandoval to return to the emergency department immediately if he develops any new or worsening symptoms and provided additional verbal discharge instructions. Mr. Sandoval expressed understanding and agreement with this plan of care, his questions were answered, and he was discharged in stable condition.      Additional Considerations in MDM  History:  Supplemental history from: the patient and the patient's significant other  External Record(s) reviewed: No relevant ED or clinic visits    Work Up:  Chart documentation includes differential diagnoses considered and any EKGs or imaging independently interpreted as specified above.   In additional to work up documented, I considered additional advanced imaging and laboratory workup but deferred after shared decision making conversation with patient/family    External Consultation(s):  Discussion of management with another provider as documented above and in ED course     Chronic Illness(es):  Care impacted by chronic illness(es): N/A    Disposition considerations: Discharge. I prescribed additional prescription strength medication(s) as charted. I considered admission, but discharged patient after significant clinical improvement.    MIPS: Not Applicable       Sepsis/STEMI/Stroke: None        ED COURSE  10:12 PM I met with the patient, obtained history, performed an initial exam, and discussed options and plan for diagnostics and treatment here in the ED.  11:30 PM I went to go recheck the patient. We discussed the plan for discharge and the patient is agreeable. Reviewed supportive cares, symptomatic treatment, outpatient follow up, and  "reasons to return to the Emergency Department. Patient to be discharged by ED RN.        MEDICATIONS GIVEN IN THE ED  Medications   oxyCODONE (ROXICODONE) tablet 5 mg (5 mg Oral $Given 7/30/25 2220)   ibuprofen (ADVIL/MOTRIN) tablet 600 mg (600 mg Oral $Given 7/30/25 2220)   oxyCODONE (ROXICODONE) tablet 5 mg (5 mg Oral $Given 7/30/25 2349)       NEW PRESCRIPTIONS STARTED AT TODAY'S VISIT  Discharge Medication List as of 7/30/2025 11:45 PM        START taking these medications    Details   HYDROcodone-acetaminophen (NORCO) 5-325 MG tablet Take 1 tablet by mouth every 6 hours as needed., Disp-10 tablet, R-0, Local Print                =================================================================    HPI:    Use of : N/A     Sabasandreas Sandoval is a 30 year old male who presents with left knee pain.    Per patient, on Monday (07/28/2025) he accidentally hit his left knee against the wall. Tuesday morning (07/29/2025) he didn't have any left knee pain and decided to do leg day at the gym. However, the knee began to hurt Tuesday night. This morning (07/30/2025) he awoke and his left knee was swollen like there was \"liquid in it\". He also continued to have pain in his left knee this morning and due to the pain he could barely walk. He did take prescription strength Tylenol for the pain this morning.    He has had no numbness/tingling. As far as he knows he has no allergies to any medications. No daily medications were mentioned.      RELEVANT HISTORY, MEDICATIONS, & ALLERGIES   Past medical history, surgical history, family history, medications, and allergies reviewed and pertinent noted in HPI.    REVIEW OF SYSTEMS:  A complete review of systems was performed with pertinent positives and negatives noted in the HPI.     PHYSICAL EXAM:    Vitals: /76   Pulse 90   Temp 97.4  F (36.3  C)   Resp 18   SpO2 99%   General: Awake, alert, interactive.   HENT: Atraumatic. MMM.   Neck: Full " AROM.  Cardiovascular: Regular rate.  Respiratory/Chest: Normal work of breathing.   Abdomen: Non-distended.   Left leg: Large effusion with diffuse tenderness palpation, maximal over the lateral aspect of patella.  No obvious deformity.  No tenderness palpation of thigh or distal extremity.  Distal CMS is intact.  2+ distal pulses.  Tenderness palpation of long bones of  Skin: Normal color. No visible rash.  Neurologic: Alert, oriented. Speech clear. CNs grossly intact. Moving all extremities spontaneously.   Psychiatric: Normal affect/mood.       RADIOLOGY  XR Knee Left 3 Views   Final Result   IMPRESSION: No visible fracture or dislocation. Joint effusion.        All laboratory and imaging results and EKG's were personally reviewed and interpreted by myself prior to disposition decision.         I, Kurt Knapp, am serving as a scribe to document services personally performed by Dr. Coral Portillo based on my observation and the provider's statements to me. I, Coral Portillo MD attest that Kurt Knapp is acting in a scribe capacity, has observed my performance of the services and has documented them in accordance with my direction.    Coral Portillo M.D.  Emergency Medicine  [facility] EMERGENCY DEPARTMENT  [address] [place] [id number]  [phone]  Dept: [phone]      Coral Portillo MD  07/31/25 0139

## 2025-07-31 NOTE — TELEPHONE ENCOUNTER
Incoming call from patient with his friend present.    Pt seen in the ED yesterday for left knee pain/injury. Patient placed in knee immobilizer and referred to [facility].    Main question is if he can take Naproxen with his Norco prescription. Advised that the patient can take these medications but should consult his primary doctor if he has stomach, kidney problems, or has ever been told to avoid NSAIDs. Discussed appropriate use of Naproxen. Patient had no further questions.    Naproxen      Aleve              220 mg Tablets or Capsules      220 mg (1 pill) every 8-12 hours                    For pain relief you can take naproxen.      Then take 220 mg (1 pill) every 8-12 hours. You may take 440 mg (2 pills) for your first dose.      Drink a glass of water each time you take this medicine.      The most you should take each day is 660 mg (3 pills), unless your doctor has told you to take more.      Use the lowest amount that makes your pain feel better.  Extra Notes:      Caution: Do not take naproxen if you have stomach problems, kidney disease, are pregnant, or have been told by your doctor to avoid this type of anti-inflammatory drug. Do not take naproxen for more than 7 days without consulting your doctor.      Availability: Naproxen is not available OTC in [place].      Before taking any medicine, read all the instructions on the package.

## 2025-07-31 NOTE — ED TRIAGE NOTES
Pt states left knee pain starting on Tuesday.  Pt states hitting his left knee on the wall the day prior on Monday.  Pt states pain is 10/10 and is unable to bear weight.  Pt denies numbness/tingling.  Pt states knee is swollen and feels warm to the touch.

## 2025-08-07 ENCOUNTER — VIRTUAL VISIT (OUTPATIENT)
Dept: PHARMACY | Facility: CLINIC | Age: 30
End: 2025-08-07
Attending: STUDENT IN AN ORGANIZED HEALTH CARE EDUCATION/TRAINING PROGRAM
Payer: COMMERCIAL

## 2025-08-07 ENCOUNTER — TRANSFERRED RECORDS (OUTPATIENT)
Dept: HEALTH INFORMATION MANAGEMENT | Facility: CLINIC | Age: 30
End: 2025-08-07
Payer: COMMERCIAL

## 2025-08-07 ENCOUNTER — VIRTUAL VISIT (OUTPATIENT)
Dept: PSYCHOLOGY | Facility: CLINIC | Age: 30
End: 2025-08-07
Payer: COMMERCIAL

## 2025-08-07 DIAGNOSIS — B20 HUMAN IMMUNODEFICIENCY VIRUS (HIV) DISEASE (H): Primary | ICD-10-CM

## 2025-08-07 DIAGNOSIS — F41.1 GAD (GENERALIZED ANXIETY DISORDER): ICD-10-CM

## 2025-08-07 DIAGNOSIS — F33.0 MILD EPISODE OF RECURRENT MAJOR DEPRESSIVE DISORDER: ICD-10-CM

## 2025-08-07 DIAGNOSIS — F33.1 MODERATE EPISODE OF RECURRENT MAJOR DEPRESSIVE DISORDER (H): ICD-10-CM

## 2025-08-07 DIAGNOSIS — F43.21 ADJUSTMENT DISORDER WITH DEPRESSED MOOD: Primary | ICD-10-CM

## 2025-08-07 DIAGNOSIS — R52 PAIN: ICD-10-CM

## 2025-08-07 RX ORDER — HYDROCODONE BITARTRATE AND ACETAMINOPHEN 5; 325 MG/1; MG/1
1 TABLET ORAL EVERY 6 HOURS PRN
COMMUNITY

## 2025-08-07 RX ORDER — COVID-19 ANTIGEN TEST
220 KIT MISCELLANEOUS EVERY 6 HOURS PRN
COMMUNITY

## 2025-08-15 ENCOUNTER — LAB REQUISITION (OUTPATIENT)
Dept: LAB | Facility: CLINIC | Age: 30
End: 2025-08-15
Payer: COMMERCIAL

## 2025-08-15 ENCOUNTER — TRANSFERRED RECORDS (OUTPATIENT)
Dept: HEALTH INFORMATION MANAGEMENT | Facility: CLINIC | Age: 30
End: 2025-08-15

## 2025-08-15 DIAGNOSIS — M25.562 PAIN IN LEFT KNEE: ICD-10-CM

## 2025-08-15 PROCEDURE — 87102 FUNGUS ISOLATION CULTURE: CPT | Mod: ORL | Performed by: PHYSICIAN ASSISTANT

## 2025-08-17 LAB
LAB ORDER RESULT STATUS: NORMAL
Lab: 109
PERFORMING LABORATORY: NORMAL
TEST NAME: NORMAL

## 2025-08-19 LAB — B BURGDOR DNA SPEC QL NAA+PROBE: NOT DETECTED

## 2025-08-20 LAB
BACTERIA SNV CULT: NO GROWTH
GRAM STAIN RESULT: NORMAL
GRAM STAIN RESULT: NORMAL

## 2025-08-27 ENCOUNTER — MYC MEDICAL ADVICE (OUTPATIENT)
Dept: INFECTIOUS DISEASES | Facility: CLINIC | Age: 30
End: 2025-08-27
Payer: COMMERCIAL

## 2025-08-27 LAB
SCANNED LAB RESULT: ABNORMAL
TEST NAME: ABNORMAL

## 2025-09-02 ENCOUNTER — HOSPITAL ENCOUNTER (EMERGENCY)
Facility: CLINIC | Age: 30
Discharge: HOME | End: 2025-09-02
Payer: COMMERCIAL

## 2025-09-02 VITALS
TEMPERATURE: 98.3 F | OXYGEN SATURATION: 98 % | RESPIRATION RATE: 15 BRPM | DIASTOLIC BLOOD PRESSURE: 96 MMHG | HEART RATE: 85 BPM | SYSTOLIC BLOOD PRESSURE: 150 MMHG

## 2025-09-02 DIAGNOSIS — M00.862 ARTHRITIS OF LEFT KNEE DUE TO OTHER BACTERIA (H): Primary | ICD-10-CM

## 2025-09-02 PROCEDURE — 250N000011 HC RX IP 250 OP 636: Performed by: PHYSICIAN ASSISTANT

## 2025-09-02 PROCEDURE — 99284 EMERGENCY DEPT VISIT MOD MDM: CPT | Mod: 25

## 2025-09-02 RX ORDER — CEFIXIME 400 MG/1
400 CAPSULE ORAL 2 TIMES DAILY
Qty: 14 CAPSULE | Refills: 0 | Status: SHIPPED | OUTPATIENT
Start: 2025-09-02

## 2025-09-02 RX ORDER — CEFTRIAXONE 1 G/1
1 INJECTION, POWDER, FOR SOLUTION INTRAMUSCULAR; INTRAVENOUS ONCE
Status: COMPLETED | OUTPATIENT
Start: 2025-09-02 | End: 2025-09-02

## 2025-09-02 RX ADMIN — CEFTRIAXONE SODIUM 1 G: 1 INJECTION, POWDER, FOR SOLUTION INTRAMUSCULAR; INTRAVENOUS at 18:53

## 2025-09-02 ASSESSMENT — COLUMBIA-SUICIDE SEVERITY RATING SCALE - C-SSRS
1. IN THE PAST MONTH, HAVE YOU WISHED YOU WERE DEAD OR WISHED YOU COULD GO TO SLEEP AND NOT WAKE UP?: NO
6. HAVE YOU EVER DONE ANYTHING, STARTED TO DO ANYTHING, OR PREPARED TO DO ANYTHING TO END YOUR LIFE?: NO
2. HAVE YOU ACTUALLY HAD ANY THOUGHTS OF KILLING YOURSELF IN THE PAST MONTH?: NO

## 2025-09-02 ASSESSMENT — ACTIVITIES OF DAILY LIVING (ADL)
ADLS_ACUITY_SCORE: 41
ADLS_ACUITY_SCORE: 41

## 2025-09-03 ENCOUNTER — HOME INFUSION (OUTPATIENT)
Dept: HOME HEALTH SERVICES | Facility: HOME HEALTH | Age: 30
End: 2025-09-03
Payer: COMMERCIAL

## 2025-09-03 ENCOUNTER — HOSPITAL ENCOUNTER (OUTPATIENT)
Dept: VASCULAR ULTRASOUND | Facility: CLINIC | Age: 30
Discharge: HOME | End: 2025-09-03
Attending: STUDENT IN AN ORGANIZED HEALTH CARE EDUCATION/TRAINING PROGRAM | Admitting: STUDENT IN AN ORGANIZED HEALTH CARE EDUCATION/TRAINING PROGRAM
Payer: COMMERCIAL

## 2025-09-03 ENCOUNTER — TELEPHONE (OUTPATIENT)
Dept: INFECTIOUS DISEASES | Facility: CLINIC | Age: 30
End: 2025-09-03
Payer: COMMERCIAL

## 2025-09-03 ENCOUNTER — DOCUMENTATION ONLY (OUTPATIENT)
Dept: INFECTIOUS DISEASES | Facility: CLINIC | Age: 30
End: 2025-09-03
Payer: COMMERCIAL

## 2025-09-03 ENCOUNTER — ENROLLMENT (OUTPATIENT)
Dept: HOME HEALTH SERVICES | Facility: HOME HEALTH | Age: 30
End: 2025-09-03
Payer: COMMERCIAL

## 2025-09-03 ENCOUNTER — HOME INFUSION BILLING (OUTPATIENT)
Dept: HOME HEALTH SERVICES | Facility: HOME HEALTH | Age: 30
End: 2025-09-03
Payer: COMMERCIAL

## 2025-09-03 DIAGNOSIS — A54.86 DGI (DISSEMINATED GONOCOCCAL INFECTION) (H): Primary | ICD-10-CM

## 2025-09-03 DIAGNOSIS — M00.9 SEPTIC JOINT (H): Primary | ICD-10-CM

## 2025-09-03 DIAGNOSIS — M00.9 SEPTIC JOINT (H): ICD-10-CM

## 2025-09-03 PROCEDURE — 36569 INSJ PICC 5 YR+ W/O IMAGING: CPT

## 2025-09-03 PROCEDURE — 250N000009 HC RX 250: Performed by: STUDENT IN AN ORGANIZED HEALTH CARE EDUCATION/TRAINING PROGRAM

## 2025-09-03 PROCEDURE — 272N000450 HC KIT 4FR POWER PICC SINGLE LUMEN

## 2025-09-03 RX ORDER — CEFTRIAXONE SODIUM 10 G/100ML
1000 INJECTION, POWDER, FOR SOLUTION INTRAVENOUS EVERY 24 HOURS
Qty: 999999 ML | Refills: 0 | Status: DISPENSED | OUTPATIENT
Start: 2025-09-03 | End: 2025-09-17

## 2025-09-03 RX ORDER — HEPARIN SODIUM,PORCINE 10 UNIT/ML
5-15 VIAL (ML) INTRAVENOUS EVERY 24 HOURS
Status: DISCONTINUED | OUTPATIENT
Start: 2025-09-03 | End: 2025-09-04 | Stop reason: HOSPADM

## 2025-09-03 RX ORDER — HEPARIN SODIUM,PORCINE 10 UNIT/ML
5-15 VIAL (ML) INTRAVENOUS
Status: DISCONTINUED | OUTPATIENT
Start: 2025-09-03 | End: 2025-09-04 | Stop reason: HOSPADM

## 2025-09-03 RX ADMIN — LIDOCAINE HYDROCHLORIDE ANHYDROUS 3 ML: 10 INJECTION, SOLUTION INFILTRATION at 14:32

## 2025-09-03 ASSESSMENT — ENCOUNTER SYMPTOMS
JOINT SWELLING: 1
ACTIVITY CHANGE: 1
APNEA: 1
NEUROLOGICAL NEGATIVE: 1
CARDIOVASCULAR NEGATIVE: 1
ARTHRALGIAS: 1
GASTROINTESTINAL NEGATIVE: 1
HEMATOLOGIC/LYMPHATIC NEGATIVE: 1
EYES NEGATIVE: 1
PSYCHIATRIC NEGATIVE: 1

## 2025-09-04 ENCOUNTER — LAB REQUISITION (OUTPATIENT)
Dept: LAB | Facility: HOSPITAL | Age: 30
End: 2025-09-04
Payer: COMMERCIAL

## 2025-09-04 ENCOUNTER — HOME CARE VISIT (OUTPATIENT)
Dept: HOME HEALTH SERVICES | Facility: HOME HEALTH | Age: 30
End: 2025-09-04
Payer: COMMERCIAL

## 2025-09-04 VITALS
RESPIRATION RATE: 16 BRPM | DIASTOLIC BLOOD PRESSURE: 72 MMHG | SYSTOLIC BLOOD PRESSURE: 118 MMHG | TEMPERATURE: 97.1 F | OXYGEN SATURATION: 97 % | HEART RATE: 78 BPM

## 2025-09-04 DIAGNOSIS — M00.9 PYOGENIC ARTHRITIS, UNSPECIFIED (H): ICD-10-CM

## 2025-09-04 LAB
ALT SERPL W P-5'-P-CCNC: 149 U/L (ref 0–70)
AST SERPL W P-5'-P-CCNC: 50 U/L (ref 0–45)
BACTERIA SNV CULT: NORMAL
BASOPHILS # BLD AUTO: 0.03 10E3/UL (ref 0–0.2)
BASOPHILS NFR BLD AUTO: 0.6 %
CREAT SERPL-MCNC: 1.01 MG/DL (ref 0.67–1.17)
CRP SERPL-MCNC: 16.4 MG/L
EGFRCR SERPLBLD CKD-EPI 2021: >90 ML/MIN/1.73M2
EOSINOPHIL # BLD AUTO: 0.04 10E3/UL (ref 0–0.7)
EOSINOPHIL NFR BLD AUTO: 0.8 %
ERYTHROCYTE [DISTWIDTH] IN BLOOD BY AUTOMATED COUNT: 14.7 % (ref 10–15)
HCT VFR BLD AUTO: 38.6 % (ref 40–53)
HGB BLD-MCNC: 12.6 G/DL (ref 13.3–17.7)
IMM GRANULOCYTES # BLD: <0.03 10E3/UL
IMM GRANULOCYTES NFR BLD: 0.4 %
LYMPHOCYTES # BLD AUTO: 1.93 10E3/UL (ref 0.8–5.3)
LYMPHOCYTES NFR BLD AUTO: 39.8 %
MCH RBC QN AUTO: 27 PG (ref 26.5–33)
MCHC RBC AUTO-ENTMCNC: 32.6 G/DL (ref 31.5–36.5)
MCV RBC AUTO: 82.8 FL (ref 78–100)
MONOCYTES # BLD AUTO: 0.46 10E3/UL (ref 0–1.3)
MONOCYTES NFR BLD AUTO: 9.5 %
NEUTROPHILS # BLD AUTO: 2.37 10E3/UL (ref 1.6–8.3)
NEUTROPHILS NFR BLD AUTO: 48.9 %
NRBC # BLD AUTO: <0.03 10E3/UL
NRBC BLD AUTO-RTO: 0 /100
PLATELET # BLD AUTO: 313 10E3/UL (ref 150–450)
RBC # BLD AUTO: 4.66 10E6/UL (ref 4.4–5.9)
WBC # BLD AUTO: 4.85 10E3/UL (ref 4–11)

## 2025-09-04 PROCEDURE — 84460 ALANINE AMINO (ALT) (SGPT): CPT | Performed by: STUDENT IN AN ORGANIZED HEALTH CARE EDUCATION/TRAINING PROGRAM

## 2025-09-04 PROCEDURE — 82565 ASSAY OF CREATININE: CPT | Performed by: STUDENT IN AN ORGANIZED HEALTH CARE EDUCATION/TRAINING PROGRAM

## 2025-09-04 PROCEDURE — 86140 C-REACTIVE PROTEIN: CPT | Performed by: STUDENT IN AN ORGANIZED HEALTH CARE EDUCATION/TRAINING PROGRAM

## 2025-09-04 PROCEDURE — 84450 TRANSFERASE (AST) (SGOT): CPT | Performed by: STUDENT IN AN ORGANIZED HEALTH CARE EDUCATION/TRAINING PROGRAM

## 2025-09-04 PROCEDURE — 85014 HEMATOCRIT: CPT | Performed by: STUDENT IN AN ORGANIZED HEALTH CARE EDUCATION/TRAINING PROGRAM
